# Patient Record
Sex: MALE | Race: WHITE | Employment: OTHER | URBAN - METROPOLITAN AREA
[De-identification: names, ages, dates, MRNs, and addresses within clinical notes are randomized per-mention and may not be internally consistent; named-entity substitution may affect disease eponyms.]

---

## 2017-01-04 ENCOUNTER — OFFICE VISIT (OUTPATIENT)
Dept: ENT CLINIC | Age: 82
End: 2017-01-04

## 2017-01-04 DIAGNOSIS — H90.3 SENSORINEURAL HEARING LOSS, BILATERAL: Primary | ICD-10-CM

## 2017-01-04 PROCEDURE — V5020 CONFORMITY EVALUATION: HCPCS | Performed by: AUDIOLOGIST

## 2017-06-12 ENCOUNTER — OFFICE VISIT (OUTPATIENT)
Dept: INTERNAL MEDICINE CLINIC | Age: 82
End: 2017-06-12

## 2017-06-12 VITALS
DIASTOLIC BLOOD PRESSURE: 76 MMHG | HEART RATE: 80 BPM | WEIGHT: 219 LBS | BODY MASS INDEX: 31.42 KG/M2 | TEMPERATURE: 98.8 F | SYSTOLIC BLOOD PRESSURE: 120 MMHG

## 2017-06-12 DIAGNOSIS — Z00.00 REGULAR CHECK-UP: ICD-10-CM

## 2017-06-12 DIAGNOSIS — R56.9 SEIZURE (HCC): Primary | ICD-10-CM

## 2017-06-12 LAB
A/G RATIO: 1.4 (ref 1.1–2.2)
ALBUMIN SERPL-MCNC: 4.1 G/DL (ref 3.4–5)
ALP BLD-CCNC: 107 U/L (ref 40–129)
ALT SERPL-CCNC: 14 U/L (ref 10–40)
ANION GAP SERPL CALCULATED.3IONS-SCNC: 13 MMOL/L (ref 3–16)
AST SERPL-CCNC: 19 U/L (ref 15–37)
BILIRUB SERPL-MCNC: 0.7 MG/DL (ref 0–1)
BILIRUBIN, POC: NORMAL
BLOOD URINE, POC: NORMAL
BUN BLDV-MCNC: 15 MG/DL (ref 7–20)
CALCIUM SERPL-MCNC: 9.4 MG/DL (ref 8.3–10.6)
CHLORIDE BLD-SCNC: 97 MMOL/L (ref 99–110)
CHOLESTEROL, TOTAL: 137 MG/DL (ref 0–199)
CLARITY, POC: NORMAL
CO2: 28 MMOL/L (ref 21–32)
COLOR, POC: YELLOW
CREAT SERPL-MCNC: 0.7 MG/DL (ref 0.8–1.3)
GFR AFRICAN AMERICAN: >60
GFR NON-AFRICAN AMERICAN: >60
GLOBULIN: 2.9 G/DL
GLUCOSE BLD-MCNC: 101 MG/DL (ref 70–99)
GLUCOSE URINE, POC: NORMAL
HCT VFR BLD CALC: 39.5 % (ref 40.5–52.5)
HDLC SERPL-MCNC: 54 MG/DL (ref 40–60)
HEMOGLOBIN: 13.2 G/DL (ref 13.5–17.5)
KETONES, POC: NORMAL
LDL CHOLESTEROL CALCULATED: 70 MG/DL
LEUKOCYTE EST, POC: POSITIVE
MCH RBC QN AUTO: 33.3 PG (ref 26–34)
MCHC RBC AUTO-ENTMCNC: 33.5 G/DL (ref 31–36)
MCV RBC AUTO: 99.6 FL (ref 80–100)
NITRITE, POC: POSITIVE
PDW BLD-RTO: 13.5 % (ref 12.4–15.4)
PH, POC: 6
PLATELET # BLD: 290 K/UL (ref 135–450)
PMV BLD AUTO: 9.6 FL (ref 5–10.5)
POTASSIUM SERPL-SCNC: 4.2 MMOL/L (ref 3.5–5.1)
PROTEIN, POC: NORMAL
RBC # BLD: 3.97 M/UL (ref 4.2–5.9)
SODIUM BLD-SCNC: 138 MMOL/L (ref 136–145)
SPECIFIC GRAVITY, POC: 1.02
TOTAL PROTEIN: 7 G/DL (ref 6.4–8.2)
TRIGL SERPL-MCNC: 66 MG/DL (ref 0–150)
TSH SERPL DL<=0.05 MIU/L-ACNC: 1.04 UIU/ML (ref 0.27–4.2)
UROBILINOGEN, POC: 0.2
VLDLC SERPL CALC-MCNC: 13 MG/DL
WBC # BLD: 8.9 K/UL (ref 4–11)

## 2017-06-12 PROCEDURE — 99214 OFFICE O/P EST MOD 30 MIN: CPT | Performed by: INTERNAL MEDICINE

## 2017-06-12 PROCEDURE — 36415 COLL VENOUS BLD VENIPUNCTURE: CPT | Performed by: INTERNAL MEDICINE

## 2017-06-12 PROCEDURE — 81002 URINALYSIS NONAUTO W/O SCOPE: CPT | Performed by: INTERNAL MEDICINE

## 2017-06-12 RX ORDER — CIPROFLOXACIN 500 MG/1
500 TABLET, FILM COATED ORAL 2 TIMES DAILY
Qty: 20 TABLET | Refills: 0 | Status: SHIPPED | OUTPATIENT
Start: 2017-06-12 | End: 2017-06-22

## 2017-06-12 ASSESSMENT — ENCOUNTER SYMPTOMS: RESPIRATORY NEGATIVE: 1

## 2017-06-13 ENCOUNTER — TELEPHONE (OUTPATIENT)
Dept: INTERNAL MEDICINE CLINIC | Age: 82
End: 2017-06-13

## 2017-06-13 DIAGNOSIS — R56.9 SEIZURE (HCC): Primary | ICD-10-CM

## 2017-07-20 PROBLEM — R56.9 SEIZURE (HCC): Status: ACTIVE | Noted: 2017-07-20

## 2017-07-20 PROBLEM — E87.20 LACTIC ACIDOSIS: Status: ACTIVE | Noted: 2017-07-20

## 2017-07-25 ENCOUNTER — HOSPITAL ENCOUNTER (OUTPATIENT)
Dept: NEUROLOGY | Age: 82
Discharge: HOME | End: 2017-07-25
Attending: HOSPITALIST | Admitting: HOSPITALIST

## 2017-07-25 DIAGNOSIS — R56.9 CONVULSIONS (HCC): ICD-10-CM

## 2017-08-22 ENCOUNTER — OFFICE VISIT (OUTPATIENT)
Dept: INTERNAL MEDICINE CLINIC | Age: 82
End: 2017-08-22

## 2017-08-22 VITALS
BODY MASS INDEX: 33.36 KG/M2 | TEMPERATURE: 97.8 F | WEIGHT: 213 LBS | DIASTOLIC BLOOD PRESSURE: 74 MMHG | HEART RATE: 72 BPM | SYSTOLIC BLOOD PRESSURE: 128 MMHG

## 2017-08-22 DIAGNOSIS — R56.9 SEIZURE (HCC): Primary | ICD-10-CM

## 2017-08-22 DIAGNOSIS — D64.9 ANEMIA, UNSPECIFIED TYPE: ICD-10-CM

## 2017-08-22 LAB
HCT VFR BLD CALC: 38.9 % (ref 40.5–52.5)
HEMOGLOBIN: 13.7 G/DL (ref 13.5–17.5)
MCH RBC QN AUTO: 34.6 PG (ref 26–34)
MCHC RBC AUTO-ENTMCNC: 35.2 G/DL (ref 31–36)
MCV RBC AUTO: 98.3 FL (ref 80–100)
PDW BLD-RTO: 14 % (ref 12.4–15.4)
PLATELET # BLD: 277 K/UL (ref 135–450)
PMV BLD AUTO: 10.3 FL (ref 5–10.5)
RBC # BLD: 3.95 M/UL (ref 4.2–5.9)
WBC # BLD: 8.9 K/UL (ref 4–11)

## 2017-08-22 PROCEDURE — 36415 COLL VENOUS BLD VENIPUNCTURE: CPT | Performed by: INTERNAL MEDICINE

## 2017-08-22 PROCEDURE — 99214 OFFICE O/P EST MOD 30 MIN: CPT | Performed by: INTERNAL MEDICINE

## 2017-08-22 RX ORDER — LEVETIRACETAM 500 MG/1
500 TABLET ORAL 2 TIMES DAILY
Qty: 180 TABLET | Refills: 3 | Status: SHIPPED | OUTPATIENT
Start: 2017-08-22 | End: 2018-08-07 | Stop reason: SDUPTHER

## 2017-08-22 ASSESSMENT — ENCOUNTER SYMPTOMS
RESPIRATORY NEGATIVE: 1
GASTROINTESTINAL NEGATIVE: 1

## 2018-08-07 ENCOUNTER — OFFICE VISIT (OUTPATIENT)
Dept: INTERNAL MEDICINE CLINIC | Age: 83
End: 2018-08-07

## 2018-08-07 VITALS
SYSTOLIC BLOOD PRESSURE: 120 MMHG | WEIGHT: 213 LBS | HEIGHT: 60 IN | DIASTOLIC BLOOD PRESSURE: 60 MMHG | HEART RATE: 69 BPM | BODY MASS INDEX: 41.82 KG/M2 | TEMPERATURE: 98.1 F | OXYGEN SATURATION: 97 %

## 2018-08-07 DIAGNOSIS — M20.42 HAMMER TOE OF LEFT FOOT: Primary | ICD-10-CM

## 2018-08-07 DIAGNOSIS — D64.9 ANEMIA, UNSPECIFIED TYPE: ICD-10-CM

## 2018-08-07 DIAGNOSIS — R56.9 SEIZURE (HCC): ICD-10-CM

## 2018-08-07 LAB
HCT VFR BLD CALC: 37 % (ref 40.5–52.5)
HEMOGLOBIN: 13.3 G/DL (ref 13.5–17.5)
MCH RBC QN AUTO: 35.2 PG (ref 26–34)
MCHC RBC AUTO-ENTMCNC: 35.9 G/DL (ref 31–36)
MCV RBC AUTO: 98.1 FL (ref 80–100)
PDW BLD-RTO: 13.5 % (ref 12.4–15.4)
PLATELET # BLD: 254 K/UL (ref 135–450)
PMV BLD AUTO: 10 FL (ref 5–10.5)
RBC # BLD: 3.78 M/UL (ref 4.2–5.9)
WBC # BLD: 8.1 K/UL (ref 4–11)

## 2018-08-07 PROCEDURE — G8427 DOCREV CUR MEDS BY ELIG CLIN: HCPCS | Performed by: INTERNAL MEDICINE

## 2018-08-07 PROCEDURE — 4040F PNEUMOC VAC/ADMIN/RCVD: CPT | Performed by: INTERNAL MEDICINE

## 2018-08-07 PROCEDURE — 99214 OFFICE O/P EST MOD 30 MIN: CPT | Performed by: INTERNAL MEDICINE

## 2018-08-07 PROCEDURE — 1101F PT FALLS ASSESS-DOCD LE1/YR: CPT | Performed by: INTERNAL MEDICINE

## 2018-08-07 PROCEDURE — G8417 CALC BMI ABV UP PARAM F/U: HCPCS | Performed by: INTERNAL MEDICINE

## 2018-08-07 PROCEDURE — 1036F TOBACCO NON-USER: CPT | Performed by: INTERNAL MEDICINE

## 2018-08-07 PROCEDURE — 36415 COLL VENOUS BLD VENIPUNCTURE: CPT | Performed by: INTERNAL MEDICINE

## 2018-08-07 PROCEDURE — 1123F ACP DISCUSS/DSCN MKR DOCD: CPT | Performed by: INTERNAL MEDICINE

## 2018-08-07 RX ORDER — LEVETIRACETAM 500 MG/1
500 TABLET ORAL 2 TIMES DAILY
Qty: 180 TABLET | Refills: 3 | Status: SHIPPED | OUTPATIENT
Start: 2018-08-07 | End: 2019-04-30 | Stop reason: SDUPTHER

## 2018-08-07 ASSESSMENT — ENCOUNTER SYMPTOMS
ABDOMINAL PAIN: 0
VOMITING: 0
COLOR CHANGE: 0
EYES NEGATIVE: 1
SINUS PRESSURE: 0
CHOKING: 0
BACK PAIN: 0
STRIDOR: 0
APNEA: 0

## 2019-04-30 RX ORDER — LEVETIRACETAM 500 MG/1
500 TABLET ORAL 2 TIMES DAILY
Qty: 180 TABLET | Refills: 1 | Status: SHIPPED | OUTPATIENT
Start: 2019-04-30 | End: 2019-05-17 | Stop reason: SDUPTHER

## 2019-05-17 ENCOUNTER — OFFICE VISIT (OUTPATIENT)
Dept: INTERNAL MEDICINE CLINIC | Age: 84
End: 2019-05-17
Payer: MEDICARE

## 2019-05-17 VITALS
OXYGEN SATURATION: 95 % | HEART RATE: 75 BPM | BODY MASS INDEX: 1045.77 KG/M2 | WEIGHT: 214.19 LBS | DIASTOLIC BLOOD PRESSURE: 80 MMHG | SYSTOLIC BLOOD PRESSURE: 130 MMHG | TEMPERATURE: 98.1 F

## 2019-05-17 DIAGNOSIS — Z00.00 REGULAR CHECK-UP: ICD-10-CM

## 2019-05-17 DIAGNOSIS — R56.9 SEIZURE (HCC): Primary | ICD-10-CM

## 2019-05-17 PROCEDURE — 1123F ACP DISCUSS/DSCN MKR DOCD: CPT | Performed by: INTERNAL MEDICINE

## 2019-05-17 PROCEDURE — 4040F PNEUMOC VAC/ADMIN/RCVD: CPT | Performed by: INTERNAL MEDICINE

## 2019-05-17 PROCEDURE — G8417 CALC BMI ABV UP PARAM F/U: HCPCS | Performed by: INTERNAL MEDICINE

## 2019-05-17 PROCEDURE — 1036F TOBACCO NON-USER: CPT | Performed by: INTERNAL MEDICINE

## 2019-05-17 PROCEDURE — G8427 DOCREV CUR MEDS BY ELIG CLIN: HCPCS | Performed by: INTERNAL MEDICINE

## 2019-05-17 PROCEDURE — 99213 OFFICE O/P EST LOW 20 MIN: CPT | Performed by: INTERNAL MEDICINE

## 2019-05-17 RX ORDER — LEVETIRACETAM 500 MG/1
500 TABLET ORAL 2 TIMES DAILY
Qty: 180 TABLET | Refills: 3 | Status: SHIPPED | OUTPATIENT
Start: 2019-05-17 | End: 2020-05-07

## 2019-05-17 ASSESSMENT — PATIENT HEALTH QUESTIONNAIRE - PHQ9
2. FEELING DOWN, DEPRESSED OR HOPELESS: 0
1. LITTLE INTEREST OR PLEASURE IN DOING THINGS: 1
SUM OF ALL RESPONSES TO PHQ QUESTIONS 1-9: 1
SUM OF ALL RESPONSES TO PHQ9 QUESTIONS 1 & 2: 1
SUM OF ALL RESPONSES TO PHQ QUESTIONS 1-9: 1

## 2019-05-17 ASSESSMENT — ENCOUNTER SYMPTOMS
EYES NEGATIVE: 1
COLOR CHANGE: 0
APNEA: 0
BACK PAIN: 0
RESPIRATORY NEGATIVE: 1
CHOKING: 0
STRIDOR: 0
SINUS PRESSURE: 0
GASTROINTESTINAL NEGATIVE: 1

## 2019-05-17 NOTE — PATIENT INSTRUCTIONS
Please call your pharmacy if you need any refills of your medication(s). Please call our office at [phone] if you don't hear from us about your test results. Bring an accurate list of your medications with you at every appointment to ensure that we have the correct information.     Our office hours are: Monday - Friday 8:30 am- 5 pm    Phone lines turn on at 8:30 am

## 2019-05-17 NOTE — PROGRESS NOTES
Subjective:      Patient ID: Christ Jenkins is a 80 y.o. male. 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 No side effect and no more seizure activities since. Needs refill thru mail order. Christ Jenkins is a 80 y.o. male with the following history as recorded in EpicCare:  Patient Active Problem List    Seizure [facility])         Date Noted: 07/20/2017      Lactic acidosis         Date Noted: 07/20/2017      Current Outpatient Medications:  levETIRAcetam (KEPPRA) 500 MG tablet, Take 1 tablet by mouth 2 times daily, Disp: 180 tablet, Rfl: 3    No current facility-administered medications for this visit. Allergies: Patient has no known allergies. Past Medical History:  No date: Dehydration  2014, 2015: Leg weakness, bilateral      Comment:  legs give out and pt collaspes  7/20/2017: Seizure ([address]  8-: UTI (urinary tract infection)      Comment:  admitted with high fever and fainted. Past Surgical History:  No date: CATARACT REMOVAL; Bilateral  No date: SPLENECTOMY, TOTAL      Comment:  at age 15 y old. Review of patient's family history indicates:  Problem: Cancer      Relation: Mother          Age of Onset: (Not Specified)          Comment: pancreatic  Problem: Diabetes      Relation: Father          Age of Onset: (Not Specified)  Problem: Cancer      Relation: Brother          Age of Onset: (Not Specified)    Social History    Tobacco Use      Smoking status: Never Smoker      Smokeless tobacco: Never Used    Alcohol use: Yes      Alcohol/week: 0.0 oz      Comment: occasionally       Seizures   This is a chronic problem. The current episode started more than 1 year ago. The problem occurs rarely. Associated symptoms include a rash. Pertinent negatives include no anorexia or myalgias. Nothing aggravates the symptoms. Treatments tried: Keppra. The treatment provided significant relief. Review of Systems   Constitutional: Negative.   Negative for appetite change and unexpected weight change. HENT: Negative. Negative for ear pain, hearing loss and sinus pressure. Eyes: Negative. Negative for visual disturbance. Respiratory: Negative. Negative for apnea, choking and stridor. Cardiovascular: Negative. Gastrointestinal: Negative. Negative for anorexia. Genitourinary: Negative. Negative for discharge, hematuria, penile pain and penile swelling. Musculoskeletal: Negative. Negative for back pain and myalgias. Skin: Positive for rash. Negative for color change and pallor. Neurological: Positive for seizures. Negative for dizziness, tremors and syncope. Hematological: Does not bruise/bleed easily. Psychiatric/Behavioral: Negative. Negative for confusion, decreased concentration and dysphoric mood. Objective:   Physical Exam   HENT:   Head: Normocephalic. Right Ear: External ear normal.   Left Ear: External ear normal.   Nose: Nose normal.   Mouth/Throat: Oropharynx is clear and moist. No oropharyngeal exudate. Eyes: Pupils are equal, round, and reactive to light. Conjunctivae and EOM are normal. No scleral icterus. Neck: Normal range of motion. No thyromegaly present. Cardiovascular: Normal rate and regular rhythm. No murmur heard. Pulmonary/Chest: Effort normal. He has no wheezes. Abdominal: Soft. Bowel sounds are normal. There is no tenderness. Musculoskeletal: He exhibits edema. He exhibits no tenderness. Lymphadenopathy:     He has no cervical adenopathy. Neurological: He is alert. Skin: Skin is warm. He is not diaphoretic. Assessment:      Encounter Diagnoses   Name Primary?  Seizure ([address] Yes    Regular check-up            Plan:      Alberto Rubio was seen today for medication check. Diagnoses and all orders for this visit:    Seizure ([address]    Regular check-up    Other orders  -     levETIRAcetam (KEPPRA) 500 MG tablet;  Take 1 tablet by mouth 2 times daily              Myke Biswas MD

## 2020-05-07 RX ORDER — LEVETIRACETAM 500 MG/1
TABLET ORAL
Qty: 180 TABLET | Refills: 3 | Status: SHIPPED | OUTPATIENT
Start: 2020-05-07 | End: 2021-03-19

## 2020-12-22 ENCOUNTER — APPOINTMENT (OUTPATIENT)
Dept: CT IMAGING | Age: 85
DRG: 871 | End: 2020-12-22
Payer: MEDICARE

## 2020-12-22 ENCOUNTER — HOSPITAL ENCOUNTER (INPATIENT)
Age: 85
LOS: 6 days | Discharge: NURSING, REHAB OR LONG-TERM CARE | DRG: 871 | End: 2020-12-28
Attending: EMERGENCY MEDICINE | Admitting: INTERNAL MEDICINE
Payer: MEDICARE

## 2020-12-22 ENCOUNTER — APPOINTMENT (OUTPATIENT)
Dept: GENERAL RADIOLOGY | Age: 85
DRG: 871 | End: 2020-12-22
Payer: MEDICARE

## 2020-12-22 PROBLEM — J15.9 BACTERIAL PNEUMONIA: Status: ACTIVE | Noted: 2020-12-22

## 2020-12-22 PROBLEM — A41.9 SEPSIS (HCC): Status: ACTIVE | Noted: 2020-12-22

## 2020-12-22 PROBLEM — M62.82 RHABDOMYOLYSIS: Status: ACTIVE | Noted: 2020-12-22

## 2020-12-22 PROBLEM — F10.10 ALCOHOL ABUSE: Status: ACTIVE | Noted: 2020-12-22

## 2020-12-22 PROBLEM — J12.82 PNEUMONIA DUE TO COVID-19 VIRUS: Status: ACTIVE | Noted: 2020-12-22

## 2020-12-22 PROBLEM — S09.93XA FACIAL TRAUMA: Status: ACTIVE | Noted: 2020-12-22

## 2020-12-22 PROBLEM — I26.99 BILATERAL PULMONARY EMBOLISM (HCC): Status: ACTIVE | Noted: 2020-12-22

## 2020-12-22 PROBLEM — R79.89 ELEVATED LFTS: Status: ACTIVE | Noted: 2020-12-22

## 2020-12-22 PROBLEM — U07.1 PNEUMONIA DUE TO COVID-19 VIRUS: Status: ACTIVE | Noted: 2020-12-22

## 2020-12-22 LAB
A/G RATIO: 1 (ref 1.1–2.2)
ABO/RH: NORMAL
ALBUMIN SERPL-MCNC: 3.5 G/DL (ref 3.4–5)
ALP BLD-CCNC: 122 U/L (ref 40–129)
ALT SERPL-CCNC: 47 U/L (ref 10–40)
ANION GAP SERPL CALCULATED.3IONS-SCNC: 15 MMOL/L (ref 3–16)
ANTIBODY SCREEN: NORMAL
APTT: 135.1 SEC (ref 24.2–36.2)
APTT: 159.8 SEC (ref 24.2–36.2)
APTT: 30.6 SEC (ref 24.2–36.2)
AST SERPL-CCNC: 166 U/L (ref 15–37)
BASE EXCESS ARTERIAL: 2.2 MMOL/L (ref -3–3)
BASOPHILS ABSOLUTE: 0 K/UL (ref 0–0.2)
BASOPHILS RELATIVE PERCENT: 0.2 %
BILIRUB SERPL-MCNC: 1.2 MG/DL (ref 0–1)
BILIRUBIN URINE: ABNORMAL
BLOOD, URINE: ABNORMAL
BUN BLDV-MCNC: 29 MG/DL (ref 7–20)
C-REACTIVE PROTEIN: 87.8 MG/L (ref 0–5.1)
CALCIUM SERPL-MCNC: 8.8 MG/DL (ref 8.3–10.6)
CARBOXYHEMOGLOBIN ARTERIAL: 1.2 % (ref 0–1.5)
CHLORIDE BLD-SCNC: 98 MMOL/L (ref 99–110)
CLARITY: ABNORMAL
CO2: 25 MMOL/L (ref 21–32)
COARSE CASTS, UA: ABNORMAL /LPF (ref 0–2)
COLOR: ABNORMAL
COMMENT UA: ABNORMAL
CREAT SERPL-MCNC: 0.9 MG/DL (ref 0.8–1.3)
EOSINOPHILS ABSOLUTE: 0 K/UL (ref 0–0.6)
EOSINOPHILS RELATIVE PERCENT: 0 %
EPITHELIAL CELLS, UA: 13 /HPF (ref 0–5)
ETHANOL: NORMAL MG/DL (ref 0–0.08)
FERRITIN: 722.6 NG/ML (ref 30–400)
FIBRINOGEN: 356 MG/DL (ref 200–397)
GFR AFRICAN AMERICAN: >60
GFR NON-AFRICAN AMERICAN: >60
GLOBULIN: 3.5 G/DL
GLUCOSE BLD-MCNC: 149 MG/DL (ref 70–99)
GLUCOSE URINE: NEGATIVE MG/DL
HCO3 ARTERIAL: 26.6 MMOL/L (ref 21–29)
HCT VFR BLD CALC: 44.2 % (ref 40.5–52.5)
HEMOGLOBIN, ART, EXTENDED: 14.4 G/DL (ref 13.5–17.5)
HEMOGLOBIN: 15.2 G/DL (ref 13.5–17.5)
HYALINE CASTS: ABNORMAL /LPF (ref 0–2)
KEPPRA DOSE AMT: ABNORMAL
KEPPRA: <2 UG/ML (ref 6–46)
KETONES, URINE: 15 MG/DL
LACTIC ACID, SEPSIS: 2.6 MMOL/L (ref 0.4–1.9)
LACTIC ACID, SEPSIS: 4 MMOL/L (ref 0.4–1.9)
LACTIC ACID: 2.1 MMOL/L (ref 0.4–2)
LEUKOCYTE ESTERASE, URINE: NEGATIVE
LYMPHOCYTES ABSOLUTE: 0.8 K/UL (ref 1–5.1)
LYMPHOCYTES RELATIVE PERCENT: 4.5 %
MCH RBC QN AUTO: 32.8 PG (ref 26–34)
MCHC RBC AUTO-ENTMCNC: 34.4 G/DL (ref 31–36)
MCV RBC AUTO: 95.2 FL (ref 80–100)
METHEMOGLOBIN ARTERIAL: 0.2 %
MICROSCOPIC EXAMINATION: YES
MONOCYTES ABSOLUTE: 1.5 K/UL (ref 0–1.3)
MONOCYTES RELATIVE PERCENT: 9.1 %
MUCUS: ABNORMAL /LPF
NEUTROPHILS ABSOLUTE: 14.7 K/UL (ref 1.7–7.7)
NEUTROPHILS RELATIVE PERCENT: 86.2 %
NITRITE, URINE: NEGATIVE
O2 CONTENT ARTERIAL: 20 ML/DL
O2 SAT, ARTERIAL: 99.3 %
O2 THERAPY: ABNORMAL
PCO2 ARTERIAL: 39.7 MMHG (ref 35–45)
PDW BLD-RTO: 13.1 % (ref 12.4–15.4)
PH ARTERIAL: 7.43 (ref 7.35–7.45)
PH UA: 5.5 (ref 5–8)
PLATELET # BLD: 299 K/UL (ref 135–450)
PMV BLD AUTO: 9.2 FL (ref 5–10.5)
PO2 ARTERIAL: 112 MMHG (ref 75–108)
POTASSIUM SERPL-SCNC: 4.5 MMOL/L (ref 3.5–5.1)
PRO-BNP: 1136 PG/ML (ref 0–449)
PROCALCITONIN: 0.28 NG/ML (ref 0–0.15)
PROTEIN UA: 30 MG/DL
RAPID INFLUENZA  B AGN: NEGATIVE
RAPID INFLUENZA A AGN: NEGATIVE
RBC # BLD: 4.65 M/UL (ref 4.2–5.9)
RBC UA: 62 /HPF (ref 0–4)
RENAL EPITHELIAL, UA: ABNORMAL /HPF (ref 0–1)
SARS-COV-2, NAAT: DETECTED
SODIUM BLD-SCNC: 138 MMOL/L (ref 136–145)
SPECIFIC GRAVITY UA: 1.02 (ref 1–1.03)
TCO2 ARTERIAL: 27.8 MMOL/L
TOTAL CK: 2301 U/L (ref 39–308)
TOTAL CK: 2930 U/L (ref 39–308)
TOTAL PROTEIN: 7 G/DL (ref 6.4–8.2)
TROPONIN: 0.02 NG/ML
URINE REFLEX TO CULTURE: YES
URINE TYPE: ABNORMAL
UROBILINOGEN, URINE: 1 E.U./DL
WBC # BLD: 17 K/UL (ref 4–11)
WBC UA: 10 /HPF (ref 0–5)

## 2020-12-22 PROCEDURE — 36415 COLL VENOUS BLD VENIPUNCTURE: CPT

## 2020-12-22 PROCEDURE — 82306 VITAMIN D 25 HYDROXY: CPT

## 2020-12-22 PROCEDURE — 84145 PROCALCITONIN (PCT): CPT

## 2020-12-22 PROCEDURE — 73130 X-RAY EXAM OF HAND: CPT

## 2020-12-22 PROCEDURE — 85025 COMPLETE CBC W/AUTO DIFF WBC: CPT

## 2020-12-22 PROCEDURE — 84484 ASSAY OF TROPONIN QUANT: CPT

## 2020-12-22 PROCEDURE — 87086 URINE CULTURE/COLONY COUNT: CPT

## 2020-12-22 PROCEDURE — 82550 ASSAY OF CK (CPK): CPT

## 2020-12-22 PROCEDURE — 2500000003 HC RX 250 WO HCPCS: Performed by: INTERNAL MEDICINE

## 2020-12-22 PROCEDURE — 82803 BLOOD GASES ANY COMBINATION: CPT

## 2020-12-22 PROCEDURE — 2060000000 HC ICU INTERMEDIATE R&B

## 2020-12-22 PROCEDURE — 80177 DRUG SCRN QUAN LEVETIRACETAM: CPT

## 2020-12-22 PROCEDURE — 94640 AIRWAY INHALATION TREATMENT: CPT

## 2020-12-22 PROCEDURE — 2500000003 HC RX 250 WO HCPCS: Performed by: EMERGENCY MEDICINE

## 2020-12-22 PROCEDURE — G0480 DRUG TEST DEF 1-7 CLASSES: HCPCS

## 2020-12-22 PROCEDURE — 6370000000 HC RX 637 (ALT 250 FOR IP): Performed by: INTERNAL MEDICINE

## 2020-12-22 PROCEDURE — 83880 ASSAY OF NATRIURETIC PEPTIDE: CPT

## 2020-12-22 PROCEDURE — U0003 INFECTIOUS AGENT DETECTION BY NUCLEIC ACID (DNA OR RNA); SEVERE ACUTE RESPIRATORY SYNDROME CORONAVIRUS 2 (SARS-COV-2) (CORONAVIRUS DISEASE [COVID-19]), AMPLIFIED PROBE TECHNIQUE, MAKING USE OF HIGH THROUGHPUT TECHNOLOGIES AS DESCRIBED BY CMS-2020-01-R: HCPCS

## 2020-12-22 PROCEDURE — 72170 X-RAY EXAM OF PELVIS: CPT

## 2020-12-22 PROCEDURE — 86140 C-REACTIVE PROTEIN: CPT

## 2020-12-22 PROCEDURE — 87804 INFLUENZA ASSAY W/OPTIC: CPT

## 2020-12-22 PROCEDURE — 6360000002 HC RX W HCPCS: Performed by: EMERGENCY MEDICINE

## 2020-12-22 PROCEDURE — 70486 CT MAXILLOFACIAL W/O DYE: CPT

## 2020-12-22 PROCEDURE — 71260 CT THORAX DX C+: CPT

## 2020-12-22 PROCEDURE — 96361 HYDRATE IV INFUSION ADD-ON: CPT

## 2020-12-22 PROCEDURE — 71045 X-RAY EXAM CHEST 1 VIEW: CPT

## 2020-12-22 PROCEDURE — 99285 EMERGENCY DEPT VISIT HI MDM: CPT

## 2020-12-22 PROCEDURE — 6360000002 HC RX W HCPCS: Performed by: INTERNAL MEDICINE

## 2020-12-22 PROCEDURE — 82728 ASSAY OF FERRITIN: CPT

## 2020-12-22 PROCEDURE — 94761 N-INVAS EAR/PLS OXIMETRY MLT: CPT

## 2020-12-22 PROCEDURE — 72125 CT NECK SPINE W/O DYE: CPT

## 2020-12-22 PROCEDURE — 96365 THER/PROPH/DIAG IV INF INIT: CPT

## 2020-12-22 PROCEDURE — 6370000000 HC RX 637 (ALT 250 FOR IP): Performed by: EMERGENCY MEDICINE

## 2020-12-22 PROCEDURE — 85384 FIBRINOGEN ACTIVITY: CPT

## 2020-12-22 PROCEDURE — XW13325 TRANSFUSION OF CONVALESCENT PLASMA (NONAUTOLOGOUS) INTO PERIPHERAL VEIN, PERCUTANEOUS APPROACH, NEW TECHNOLOGY GROUP 5: ICD-10-PCS | Performed by: INTERNAL MEDICINE

## 2020-12-22 PROCEDURE — 83605 ASSAY OF LACTIC ACID: CPT

## 2020-12-22 PROCEDURE — 81001 URINALYSIS AUTO W/SCOPE: CPT

## 2020-12-22 PROCEDURE — 93005 ELECTROCARDIOGRAM TRACING: CPT | Performed by: EMERGENCY MEDICINE

## 2020-12-22 PROCEDURE — 96375 TX/PRO/DX INJ NEW DRUG ADDON: CPT

## 2020-12-22 PROCEDURE — 85730 THROMBOPLASTIN TIME PARTIAL: CPT

## 2020-12-22 PROCEDURE — 6360000004 HC RX CONTRAST MEDICATION: Performed by: EMERGENCY MEDICINE

## 2020-12-22 PROCEDURE — XW033E5 INTRODUCTION OF REMDESIVIR ANTI-INFECTIVE INTO PERIPHERAL VEIN, PERCUTANEOUS APPROACH, NEW TECHNOLOGY GROUP 5: ICD-10-PCS | Performed by: INTERNAL MEDICINE

## 2020-12-22 PROCEDURE — 86900 BLOOD TYPING SEROLOGIC ABO: CPT

## 2020-12-22 PROCEDURE — 80053 COMPREHEN METABOLIC PANEL: CPT

## 2020-12-22 PROCEDURE — 73110 X-RAY EXAM OF WRIST: CPT

## 2020-12-22 PROCEDURE — U0002 COVID-19 LAB TEST NON-CDC: HCPCS

## 2020-12-22 PROCEDURE — 87040 BLOOD CULTURE FOR BACTERIA: CPT

## 2020-12-22 PROCEDURE — 2580000003 HC RX 258: Performed by: EMERGENCY MEDICINE

## 2020-12-22 PROCEDURE — 86850 RBC ANTIBODY SCREEN: CPT

## 2020-12-22 PROCEDURE — 70450 CT HEAD/BRAIN W/O DYE: CPT

## 2020-12-22 PROCEDURE — 86901 BLOOD TYPING SEROLOGIC RH(D): CPT

## 2020-12-22 PROCEDURE — 2580000003 HC RX 258: Performed by: INTERNAL MEDICINE

## 2020-12-22 PROCEDURE — 2700000000 HC OXYGEN THERAPY PER DAY

## 2020-12-22 PROCEDURE — 94760 N-INVAS EAR/PLS OXIMETRY 1: CPT

## 2020-12-22 RX ORDER — METHYLPREDNISOLONE SODIUM SUCCINATE 125 MG/2ML
125 INJECTION, POWDER, LYOPHILIZED, FOR SOLUTION INTRAMUSCULAR; INTRAVENOUS ONCE
Status: COMPLETED | OUTPATIENT
Start: 2020-12-22 | End: 2020-12-22

## 2020-12-22 RX ORDER — ACETAMINOPHEN 325 MG/1
650 TABLET ORAL EVERY 6 HOURS PRN
Status: DISCONTINUED | OUTPATIENT
Start: 2020-12-22 | End: 2020-12-28 | Stop reason: HOSPADM

## 2020-12-22 RX ORDER — POLYETHYLENE GLYCOL 3350 17 G/17G
17 POWDER, FOR SOLUTION ORAL DAILY PRN
Status: DISCONTINUED | OUTPATIENT
Start: 2020-12-22 | End: 2020-12-28 | Stop reason: HOSPADM

## 2020-12-22 RX ORDER — SODIUM CHLORIDE 0.9 % (FLUSH) 0.9 %
10 SYRINGE (ML) INJECTION EVERY 12 HOURS SCHEDULED
Status: DISCONTINUED | OUTPATIENT
Start: 2020-12-22 | End: 2020-12-28 | Stop reason: HOSPADM

## 2020-12-22 RX ORDER — DEXAMETHASONE SODIUM PHOSPHATE 10 MG/ML
10 INJECTION, SOLUTION INTRAMUSCULAR; INTRAVENOUS EVERY 24 HOURS
Status: DISCONTINUED | OUTPATIENT
Start: 2020-12-22 | End: 2020-12-28 | Stop reason: HOSPADM

## 2020-12-22 RX ORDER — LEVETIRACETAM 500 MG/1
500 TABLET ORAL 2 TIMES DAILY
Status: DISCONTINUED | OUTPATIENT
Start: 2020-12-22 | End: 2020-12-28 | Stop reason: HOSPADM

## 2020-12-22 RX ORDER — ALBUTEROL SULFATE 90 UG/1
6 AEROSOL, METERED RESPIRATORY (INHALATION) ONCE
Status: DISCONTINUED | OUTPATIENT
Start: 2020-12-22 | End: 2020-12-22

## 2020-12-22 RX ORDER — SODIUM CHLORIDE 9 MG/ML
INJECTION, SOLUTION INTRAVENOUS CONTINUOUS
Status: DISCONTINUED | OUTPATIENT
Start: 2020-12-22 | End: 2020-12-23

## 2020-12-22 RX ORDER — HEPARIN SODIUM 1000 [USP'U]/ML
80 INJECTION, SOLUTION INTRAVENOUS; SUBCUTANEOUS PRN
Status: DISCONTINUED | OUTPATIENT
Start: 2020-12-22 | End: 2020-12-22

## 2020-12-22 RX ORDER — SODIUM CHLORIDE 9 MG/ML
INJECTION, SOLUTION INTRAVENOUS PRN
Status: DISCONTINUED | OUTPATIENT
Start: 2020-12-22 | End: 2020-12-23

## 2020-12-22 RX ORDER — ONDANSETRON 2 MG/ML
4 INJECTION INTRAMUSCULAR; INTRAVENOUS EVERY 6 HOURS PRN
Status: DISCONTINUED | OUTPATIENT
Start: 2020-12-22 | End: 2020-12-28 | Stop reason: HOSPADM

## 2020-12-22 RX ORDER — ALBUTEROL SULFATE 2.5 MG/3ML
2.5 SOLUTION RESPIRATORY (INHALATION) ONCE
Status: COMPLETED | OUTPATIENT
Start: 2020-12-22 | End: 2020-12-22

## 2020-12-22 RX ORDER — SODIUM CHLORIDE 0.9 % (FLUSH) 0.9 %
10 SYRINGE (ML) INJECTION EVERY 12 HOURS SCHEDULED
Status: DISCONTINUED | OUTPATIENT
Start: 2020-12-22 | End: 2020-12-22

## 2020-12-22 RX ORDER — SODIUM CHLORIDE 0.9 % (FLUSH) 0.9 %
10 SYRINGE (ML) INJECTION PRN
Status: DISCONTINUED | OUTPATIENT
Start: 2020-12-22 | End: 2020-12-28 | Stop reason: HOSPADM

## 2020-12-22 RX ORDER — IPRATROPIUM BROMIDE AND ALBUTEROL SULFATE 2.5; .5 MG/3ML; MG/3ML
1 SOLUTION RESPIRATORY (INHALATION) ONCE
Status: COMPLETED | OUTPATIENT
Start: 2020-12-22 | End: 2020-12-22

## 2020-12-22 RX ORDER — HEPARIN SODIUM 10000 [USP'U]/100ML
12.3 INJECTION, SOLUTION INTRAVENOUS CONTINUOUS
Status: DISCONTINUED | OUTPATIENT
Start: 2020-12-22 | End: 2020-12-24

## 2020-12-22 RX ORDER — SODIUM CHLORIDE FOR INHALATION 0.9 %
3 VIAL, NEBULIZER (ML) INHALATION ONCE
Status: COMPLETED | OUTPATIENT
Start: 2020-12-22 | End: 2020-12-22

## 2020-12-22 RX ORDER — PROMETHAZINE HYDROCHLORIDE 25 MG/1
12.5 TABLET ORAL EVERY 6 HOURS PRN
Status: DISCONTINUED | OUTPATIENT
Start: 2020-12-22 | End: 2020-12-28 | Stop reason: HOSPADM

## 2020-12-22 RX ORDER — MULTIVITAMIN WITH IRON
1 TABLET ORAL DAILY
Status: DISCONTINUED | OUTPATIENT
Start: 2020-12-22 | End: 2020-12-28 | Stop reason: HOSPADM

## 2020-12-22 RX ORDER — DEXAMETHASONE 6 MG/1
6 TABLET ORAL DAILY
Status: CANCELLED | OUTPATIENT
Start: 2020-12-23 | End: 2021-01-02

## 2020-12-22 RX ORDER — 0.9 % SODIUM CHLORIDE 0.9 %
30 INTRAVENOUS SOLUTION INTRAVENOUS ONCE
Status: COMPLETED | OUTPATIENT
Start: 2020-12-22 | End: 2020-12-22

## 2020-12-22 RX ORDER — HEPARIN SODIUM 1000 [USP'U]/ML
40 INJECTION, SOLUTION INTRAVENOUS; SUBCUTANEOUS PRN
Status: DISCONTINUED | OUTPATIENT
Start: 2020-12-22 | End: 2020-12-22

## 2020-12-22 RX ORDER — HEPARIN SODIUM 1000 [USP'U]/ML
6600 INJECTION, SOLUTION INTRAVENOUS; SUBCUTANEOUS ONCE
Status: COMPLETED | OUTPATIENT
Start: 2020-12-22 | End: 2020-12-22

## 2020-12-22 RX ORDER — ACETAMINOPHEN 650 MG/1
650 SUPPOSITORY RECTAL EVERY 6 HOURS PRN
Status: DISCONTINUED | OUTPATIENT
Start: 2020-12-22 | End: 2020-12-28 | Stop reason: HOSPADM

## 2020-12-22 RX ORDER — THIAMINE HYDROCHLORIDE 100 MG/ML
100 INJECTION, SOLUTION INTRAMUSCULAR; INTRAVENOUS DAILY
Status: DISCONTINUED | OUTPATIENT
Start: 2020-12-22 | End: 2020-12-22

## 2020-12-22 RX ORDER — SODIUM CHLORIDE 0.9 % (FLUSH) 0.9 %
10 SYRINGE (ML) INJECTION PRN
Status: DISCONTINUED | OUTPATIENT
Start: 2020-12-22 | End: 2020-12-22

## 2020-12-22 RX ADMIN — HEPARIN SODIUM 14.8 ML/HR: 10000 INJECTION, SOLUTION INTRAVENOUS at 15:57

## 2020-12-22 RX ADMIN — THIAMINE HYDROCHLORIDE 100 MG: 100 INJECTION, SOLUTION INTRAMUSCULAR; INTRAVENOUS at 22:31

## 2020-12-22 RX ADMIN — SODIUM CHLORIDE: 9 INJECTION, SOLUTION INTRAVENOUS at 21:31

## 2020-12-22 RX ADMIN — CEFTRIAXONE SODIUM 1 G: 1 INJECTION, POWDER, FOR SOLUTION INTRAMUSCULAR; INTRAVENOUS at 14:41

## 2020-12-22 RX ADMIN — IOPAMIDOL 75 ML: 755 INJECTION, SOLUTION INTRAVENOUS at 14:14

## 2020-12-22 RX ADMIN — METHYLPREDNISOLONE SODIUM SUCCINATE 125 MG: 125 INJECTION, POWDER, FOR SOLUTION INTRAMUSCULAR; INTRAVENOUS at 12:04

## 2020-12-22 RX ADMIN — SODIUM CHLORIDE: 9 INJECTION, SOLUTION INTRAVENOUS at 15:52

## 2020-12-22 RX ADMIN — IPRATROPIUM BROMIDE AND ALBUTEROL SULFATE 1 AMPULE: .5; 3 SOLUTION RESPIRATORY (INHALATION) at 12:23

## 2020-12-22 RX ADMIN — REMDESIVIR 200 MG: 100 INJECTION, POWDER, LYOPHILIZED, FOR SOLUTION INTRAVENOUS at 21:31

## 2020-12-22 RX ADMIN — HEPARIN SODIUM 6600 UNITS: 1000 INJECTION INTRAVENOUS; SUBCUTANEOUS at 15:54

## 2020-12-22 RX ADMIN — SODIUM CHLORIDE 2889 ML: 9 INJECTION, SOLUTION INTRAVENOUS at 12:04

## 2020-12-22 RX ADMIN — AZITHROMYCIN MONOHYDRATE 500 MG: 500 INJECTION, POWDER, LYOPHILIZED, FOR SOLUTION INTRAVENOUS at 15:20

## 2020-12-22 RX ADMIN — ALBUTEROL SULFATE 2.5 MG: 2.5 SOLUTION RESPIRATORY (INHALATION) at 12:23

## 2020-12-22 RX ADMIN — ISODIUM CHLORIDE 3 ML: 0.03 SOLUTION RESPIRATORY (INHALATION) at 12:35

## 2020-12-22 RX ADMIN — RACEPINEPHRINE HYDROCHLORIDE 11.25 MG: 11.25 SOLUTION RESPIRATORY (INHALATION) at 12:35

## 2020-12-22 RX ADMIN — LEVETIRACETAM 500 MG: 500 TABLET ORAL at 21:32

## 2020-12-22 RX ADMIN — DEXAMETHASONE SODIUM PHOSPHATE 10 MG: 10 INJECTION, SOLUTION INTRAMUSCULAR; INTRAVENOUS at 21:33

## 2020-12-22 RX ADMIN — THERA TABS 1 TABLET: TAB at 21:32

## 2020-12-22 ASSESSMENT — PAIN SCALES - GENERAL
PAINLEVEL_OUTOF10: 0

## 2020-12-22 NOTE — H&P
Hospital Medicine History & Physical      PCP: Doreen Royal MD    Date of Admission: 12/22/2020    Date of Service: Pt seen/examined on 12/22/2020 and Admitted to Inpatient with expected LOS greater than two midnights due to medical therapy. Chief Complaint: Cough, shortness of breath found on the floor      History Of Present Illness:      80 y.o. male who presented to [facility] after he was found on the floor at his home, apparently his son found him at about 9/9/1930 this morning, patient stated that he is feeling bad since Saturday having some chills to note that patient is hard of hearing and very poor historian, in emergency department found to have facial trauma extensive work-up was done tested positive for Covid his lactic acid is elevated found to have bilateral pulmonary embolism, patient at this time having mild shortness of breath having cough no obvious alleviating or aggravating factors denies fever at this time no nausea vomiting or abdominal pain at this time he stated he drinks alcohol on a daily basis he was not able to further quantify. Past Medical History:          Diagnosis Date    Alcohol abuse 12/22/2020    Dehydration     Leg weakness, bilateral 2014, 2015    legs give out and pt collaspes    Seizure ([address] 7/20/2017    UTI (urinary tract infection) 8-    admitted with high fever and fainted. Past Surgical History:          Procedure Laterality Date    CATARACT REMOVAL Bilateral     SPLENECTOMY, TOTAL      at age 15 y old. Medications Prior to Admission:      Prior to Admission medications    Medication Sig Start Date End Date Taking? Authorizing Provider   levETIRAcetam (KEPPRA) 500 MG tablet TAKE 1 TABLET BY MOUTH TWO  TIMES DAILY 5/7/20  Yes Doreen Royal MD       Allergies:  Patient has no known allergies. Social History:      The patient currently lives at home    TOBACCO:   reports that he has never smoked.  He has never used smokeless reviewed the CXR with the following interpretation: biLateral opacities  EKG:  I have reviewed the EKG with the following interpretation: No ST elevation    CT CHEST PULMONARY EMBOLISM W CONTRAST   Final Result   Bilateral pulmonary emboli. No obvious right heart strain by CT scan. Scattered ground-glass opacity is seen throughout the lungs either due to   atelectasis, or post inflammatory-and infectious change. No focal   wedge-shaped area of consolidation is seen to suggest infarct      Mildly dilated ascending aorta. .  Trace aortic valve calcification is seen. Cholelithiasis      Results discussed with Kate Brunson by Jose Martinez. Cadence Felix MD at 2:41   pm on 12/22/2020         CT HEAD WO CONTRAST   Final Result   No traumatic abnormality. CT CERVICAL SPINE WO CONTRAST   Final Result   No traumatic abnormality. CT FACIAL BONES WO CONTRAST   Final Result   Left periorbital soft tissue contusion. No underlying facial bone fracture. XR CHEST PORTABLE   Final Result   1. No acute pulmonary abnormality to account for patient's shortness of   breath. 2. Stable mild enlargement of the cardiac silhouette. XR PELVIS (1-2 VIEWS)   Final Result   Negative         XR WRIST LEFT (MIN 3 VIEWS)   Preliminary Result   1. Soft tissue swelling of the hand dorsum with no associated acute osseous   abnormality. 2. No acute osseous abnormality in the left wrist.  If patient's pain   persists or worsens, repeat radiograph can be obtained in 7-10 days as acute   non-displaced fractures can be occult. If patient has snuffbox tenderness,   follow up radiograph should include a scaphoid view. Alternatively, MRI can   be done to evaluate for non-displaced fractures. XR HAND LEFT (MIN 3 VIEWS)   Preliminary Result   1. Soft tissue swelling of the hand dorsum with no associated acute osseous   abnormality.    2. No acute osseous abnormality in the left wrist.  If patient's pain saline at this time will check CK and adjust fluids accordingly trying not to use very aggressive fluid resuscitation taking consideration his current lung situation. 8.  Ischial trauma, likely due to fall, patient was on the floor for unknown period of time he does not recall patient still could have a seizure as a trigger. Will do seizure precautions at this time  9. Lactic acidosis, lactic acid elevated, IV fluid, will repeat and trend  10. History of seizure, restart Keppra, seizure precautions      ADDENDUM   Patient revisited, discussed plan of care again with patient discussed plasma transfusion discussed potential reaction and infection as discussed potential lung injury related to that discussed life-threatening complications with blood product transfusion patient verbalized understanding and agreement to proceed      DVT Prophylaxis: Heparin  Diet: No diet orders on file  Code Status: Prior    PT/OT Eval Status: Ordered    Dispo -inpatient for 5 days       Guillermo Langford MD    Thank you Andrea Weiss MD for the opportunity to be involved in this patient's care. If you have any questions or concerns please feel free to contact me at [phone].

## 2020-12-22 NOTE — ED NOTES
Acknowledged pt by pt's name. Verified pt by name and date of birth. Checked arm band, allergies, reviewed past medical history. Introduced myself to patient  Duration of ED plan of care explained to patient  Explained planned tests and procedures  Thanked patient for coming to [facility].    Asked if there was anything else I could do for the patient before exiting room. CB in reach.      Kimberly Lemon RN  12/22/20 6317

## 2020-12-22 NOTE — ED NOTES
Spoke with son, states pt lives alone and is relatively independent with care. Son states he last spoke with him late Saturday and reminded pt that they were putting in a new garage  today. Pt was found laying next to his bed.      Zac Andrade RN  12/22/20 2741

## 2020-12-22 NOTE — ED NOTES
Pt placed on/continued on cardiac monitor and continuous pulse ox - see flowsheet     Jaclynn Dubin, RN  12/22/20 9153

## 2020-12-22 NOTE — ED NOTES
Pt having expiratory wheezes Dr. Padmini Cruz made aware. Head of bead elevated.       Seda Israel, RN  12/22/20 1118

## 2020-12-22 NOTE — ED NOTES
ED SBAR report provider to [place], [address] [facility]. Patient to be transported to Room 5272 via stretcher by transport tech. Patient transported with bedside cardiac monitor and with IV medications infusing. IV site clean, dry, and intact. MEWS score and pain assessed and documented. Updated patient on plan of care.      Isaac Barrett RN  12/22/20 Doreen Kalen

## 2020-12-22 NOTE — PROGRESS NOTES
Medication Reconciliation    List of medications patient is currently taking is complete.     Jenelle WoodardD, BCPS  12/22/2020 1:01 PM

## 2020-12-22 NOTE — ED PROVIDER NOTES
[facility]  eMERGENCY dEPARTMENT eNCOUnter      Pt Name: Maria Antonia Aguirre  MRN: [id number]  [place] 1935  Date of evaluation: 12/22/2020  Provider: Maribel Bucio MD    CHIEF COMPLAINT       Chief Complaint   Patient presents with    Fall     FOUND DOWN; lives alone. Unsure how long pt was down         CRITICAL CARE TIME   Total Critical Care time was a minimum of 30 minutes, excluding separately reportable procedures. There was a high probability of clinically significant/life threatening deterioration in the patient's condition which required my urgent intervention. Critical care time includes my initial evaluation, ongoing reassessment, review of laboratory EKG, chest x-ray, CT scans, consultation with the hospitalist for admission. Review of old records. No procedure time was included. HISTORY OF PRESENT ILLNESS  (Location/Symptom, Timing/Onset, Context/Setting, Quality, Duration, Modifying Factors, Severity.)   Maria Antonia Aguirre is a 80 y.o. male who presents to the emergency department the [facility] after being found down on his floor at home. Based on the information I got from the patient it appears his son found him. He states he lives alone. He believes that he fell out of bed around 930 this morning, although based on appearances I would say has been laying on the floor much longer than that. He is oriented to person and place and time. He states he has had a \"cold\" since Saturday. He states he has been coughing and short of breath. On transport to the emergency room the patient had pressure injuries to his knees and face with bruising to his left hand with dried blood on his face, and dried stool. He admits to some facial pain on the left. He denies chest pain. He admits to shortness of breath and cough. He denies abdominal pain. He denies upper or lower extremity pain. Nursing Notes were reviewed and I agree.     REVIEW OF SYSTEMS (2-9 systems for level 4, 10 or more for level 5)     Somewhat unreliable due to the fact that the patient is extremely hard of hearing even with hearing aids in place and has difficulty understanding questioning. HEENT: Left facial pain. Pressure ulceration to his left forehead. Cardiovascular: No chest pain. Pulmonary: Cough and shortness of breath. GI: No abdominal pain nausea or vomiting. Musculoskeletal: Denies neck or back pain. Denies upper or lower extremity pain. Neuro: Facial pain/head pain. Unknown if loss of consciousness. Except as noted above the remainder of the review of systems was reviewed and negative. PAST MEDICAL HISTORY     Past Medical History:   Diagnosis Date    Dehydration     Leg weakness, bilateral 2014, 2015    legs give out and pt collaspes    Se[facility] 7/20/2017    UTI (urinary tract infection) 8-    admitted with high fever and fainted. SURGICAL HISTORY       Past Surgical History:   Procedure Laterality Date    CATARACT REMOVAL Bilateral     SPLENECTOMY, TOTAL      at age 15 y old. CURRENT MEDICATIONS       Previous Medications    LEVETIRACETAM (KEPPRA) 500 MG TABLET    TAKE 1 TABLET BY MOUTH TWO  TIMES DAILY       ALLERGIES     Patient has no known allergies.     FAMILY HISTORY       Family History   Problem Relation Age of Onset    Cancer Mother         pancreatic    Diabetes Father     Cancer Brother           SOCIAL HISTORY       Social History     Socioeconomic History    Marital status:      Spouse name: None    Number of children: None    Years of education: None    Highest education level: None   Occupational History    None   Social Needs    Financial resource strain: None    Food insecurity     Worry: None     Inability: None    Transportation needs     Medical: None     Non-medical: None   Tobacco Use    Smoking status: Never Smoker    Smokeless tobacco: Never Used   Substance and Sexual Activity    wrist and hand with bruising over the dorsum of the hand and the wrist and a skin tear/laceration in the left wrist area. He moves his hips knees and ankles without any complaints of pain. There is no shortening or rotation of lower extremities. Skin: Pressure ulceration on the left face and bilateral knees as pictured below. Bruising to the left wrist and forearm. Neuro: Awake, alert, oriented. Eyes/pupil exam as above. No facial asymmetry. Symmetrical motor function. DIFFERENTIAL DIAGNOSIS   Differential includes but is not limited to closed head injury, intracerebral hemorrhage, subarachnoid hemorrhage, facial contusion, facial bone fracture, cervical spine injury, rhabdomyolysis, dehydration, electrolyte abnormalities, hypoglycemia, hyperglycemia, COVID-19, bacterial pneumonia, congestive heart failure, respiratory failure, urinary tract infection. DIAGNOSTIC RESULTS     EKG: All EKG's are interpreted by Comfort Jones MD in the absence of a cardiologist.    Sinus tachycardia, rate of 117, nonspecific ST-T changes. Rhythm strip shows sinus tachycardia with a rate of 117, TX interval 142 ms, QRS 68 ms with no other ectopy as interpreted by me. No significant change compared to EKG dated 7/20/2017. RADIOLOGY:   Non-plain film images such as CT, Ultrasound and MRI are read by the radiologist. Plain radiographic images are visualized and preliminarily interpreted Comfort Jones MD with the below findings:      Interpretation per the Radiologist below, if available at the time of this note:    CT CHEST PULMONARY EMBOLISM W CONTRAST   Final Result   Bilateral pulmonary emboli. No obvious right heart strain by CT scan. Scattered ground-glass opacity is seen throughout the lungs either due to   atelectasis, or post inflammatory-and infectious change. No focal   wedge-shaped area of consolidation is seen to suggest infarct      Mildly dilated ascending aorta. .  Trace aortic Absolute 0.8 (*)     Monocytes Absolute 1.5 (*)     All other components within normal limits    Narrative:     Performed at:  [address] [facility] [employer]   Phone [phone]   COMPREHENSIVE METABOLIC PANEL - Abnormal; Notable for the following components:    Chloride 98 (*)     Glucose 149 (*)     BUN 29 (*)     Albumin/Globulin Ratio 1.0 (*)     Total Bilirubin 1.2 (*)     ALT 47 (*)      (*)     All other components within normal limits    Narrative:     Performed at:  [address] [facility] [employer]   Phone [phone]   CK - Abnormal; Notable for the following components:     Total CK 2,930 (*)     All other components within normal limits    Narrative:     Performed at:  [address] [facility] [employer]   Phone [phone]   TROPONIN - Abnormal; Notable for the following components:    Troponin 0.02 (*)     All other components within normal limits    Narrative:     Performed at:  [address] [place]   Phone [phone]   BRAIN NATRIURETIC PEPTIDE - Abnormal; Notable for the following components:    Pro-BNP 1,136 (*)     All other components within normal limits    Narrative:     Performed at:  [address] [place]   Phone [phone]   URINE RT REFLEX TO CULTURE - Abnormal; Notable for the following components:    Clarity, UA TURBID (*)     Bilirubin Urine SMALL (*)     Ketones, Urine 15 (*)     Blood, Urine LARGE (*)     Protein, UA 30 (*)     All other components within normal limits    Narrative:     Performed at:  [address] [facility] [employer]   Phone [phone]   BLOOD GAS, ARTERIAL - Abnormal; Notable for the following components:    pO2, Arterial 112.0 (*)     All other components within normal limits    Narrative:     Performed at:  [address] [facility]   Phone [phone]   LACTATE, SEPSIS - Abnormal; Notable for the following components:    Lactic Acid, Sepsis 4.0 (*)     All other components within normal limits    Narrative:     Johnnie Milian tel. [phone],  Chemistry results called to and read back by Sameer Ladd, 12/22/2020 12:26,  by Rashid Mc  Performed at:  [place] [address] [facility] [address]   Phone [phone] - Abnormal; Notable for the following components:    SARS-CoV-2, NAAT DETECTED (*)     All other components within normal limits    Narrative:     Performed at:  [address] [facility]   Phone [phone]   MICROSCOPIC URINALYSIS - Abnormal; Notable for the following components:    Hyaline Casts, UA 11-20 (*)     Mucus, UA 2+ (*)     Renal Epithelial, UA 2-5 (*)     WBC, UA 10 (*)     RBC, UA 62 (*)     Epithelial Cells, UA 13 (*)     All other components within normal limits    Narrative:     Performed at:  [address] [facility]   Phone [phone]   RAPID INFLUENZA A/B ANTIGENS    Narrative:     Performed at:  [address] [facility]   Phone [phone]   CULTURE, BLOOD 2   CULTURE, BLOOD 1   CULTURE, URINE   ETHANOL    Narrative:     Performed at:  [address]   Phone [phone]   LEVETIRACETAM LEVEL    Narrative:     Performed at:  [facility]  [address] [place] [address] [address]   Phone [phone]   [address], SEPSIS   COVID-19   COVID-19 APTT   APTT   APTT       All other labs were within normal range or not returned as of this dictation. EMERGENCY DEPARTMENT COURSE and DIFFERENTIAL DIAGNOSIS/MDM:   Vitals:    Vitals:    12/22/20 1315 12/22/20 1330 12/22/20 1350 12/22/20 1431   BP: (!) 154/86 134/78  (!) 127/92   Pulse: 113 112  115   Resp: 26 21  28   Temp:   101 °F (38.3 °C)    TempSrc:   Rectal    SpO2: 96% 97%  96%   Weight:       Height: This patient presents via EMS after being found down beside his bed by his son. He thinks he fell out of bed at around 930 this morning. Based on his injuries and the appearance of the patient I suspect that he has been down longer than that. He is alert and oriented. He is very hard of hearing and difficult to get a history from, but seems reliable. He says he has not felt well since Saturday, he reports a cough and difficulty breathing. He says he feels weak. He has facial injuries as documented above as well as some bruising to his left wrist and hand and pressure ulcers on his knees. His BUN is elevated at 29 likely indicating some degree of dehydration. His CK is 1577, certainly consistent with being on the floor for a prolonged period of time, indicating rhabdomyolysis. His chest x-ray shows no acute infiltrates, but his CT pulmonary embolism study does show bilateral groundglass opacities which would be consistent with his positive Covid test.  I cannot rule out a superimposed bacterial pneumonia. His lactic acid is elevated at 4, he is tachycardic, he is febrile at 101, consistent with sepsis. He also has bilateral lower lobe pulmonary emboli without evidence of right heart strain. His CT of his facial bones shows no evidence of fracture, he has significant left facial and periorbital swelling. His CT of his head shows no acute intracranial injury, and CT of the cervical spine shows no cervical spine injury. X-ray of his pelvis shows no evidence of fracture.   X-ray of his left wrist shows soft tissue swelling but no evidence of fracture. X-ray of his hand shows soft tissue swelling with no evidence of fracture. Sepsis fluids have been initiated. IV antibiotics have been initiated. Blood cultures were obtained. He had some bronchospasm on exam, he was given hand-held nebulizers as well as IV Solu-Medrol. He will be bolused with heparin and placed on a heparin drip for his pulmonary emboli. Hospitalist will be consulted for admission. 1512:  Discussed with Dr. Stefanie Ray. He will admit. 1520:  Reevaluated. Patient states he feels better. Appears to be in less respiratory distress. His heart rate is 112. His respiratory rate is 24. Moving air better on pulmonary exam, minimal wheezing. No stridor. Discussed with patient test results and need for admission. Patient is agreeable. Nursing staff is contacted son to make him aware of the admission per patient's request.    CONSULTS:  IP CONSULT TO HOSPITALIST  IP CONSULT TO PHARMACY  IP CONSULT TO PULMONOLOGY    PROCEDURES:  None    FINAL IMPRESSION      1. COVID-19 virus infection    2. Septicemia ([address]    3. Traumatic rhabdomyolysis, initial encounter ([address]    4. Acute respiratory failure with hypoxia (HCC)    5. Bilateral pulmonary embolism (HCC)          DISPOSITION/PLAN   DISPOSITION        PATIENT REFERRED TO:  No follow-up provider specified.     DISCHARGE MEDICATIONS:  New Prescriptions    No medications on file       (Please note that portions of this note were completed with a voice recognition program.  Efforts were made to edit the dictations but occasionally words are mis-transcribed.)    Keshav Pemberton MD  Attending Emergency Physician        Stacy Ulloa MD  12/22/20 1371

## 2020-12-23 LAB
A/G RATIO: 1.1 (ref 1.1–2.2)
ALBUMIN SERPL-MCNC: 2.7 G/DL (ref 3.4–5)
ALP BLD-CCNC: 84 U/L (ref 40–129)
ALT SERPL-CCNC: 35 U/L (ref 10–40)
ANION GAP SERPL CALCULATED.3IONS-SCNC: 10 MMOL/L (ref 3–16)
APTT: 34.6 SEC (ref 24.2–36.2)
APTT: 72.3 SEC (ref 24.2–36.2)
APTT: 79 SEC (ref 24.2–36.2)
AST SERPL-CCNC: 110 U/L (ref 15–37)
BASOPHILS ABSOLUTE: 0 K/UL (ref 0–0.2)
BASOPHILS RELATIVE PERCENT: 0.2 %
BILIRUB SERPL-MCNC: 0.6 MG/DL (ref 0–1)
BLOOD BANK DISPENSE STATUS: NORMAL
BLOOD BANK PRODUCT CODE: NORMAL
BPU ID: NORMAL
BUN BLDV-MCNC: 30 MG/DL (ref 7–20)
CALCIUM SERPL-MCNC: 7.8 MG/DL (ref 8.3–10.6)
CHLORIDE BLD-SCNC: 106 MMOL/L (ref 99–110)
CO2: 23 MMOL/L (ref 21–32)
CREAT SERPL-MCNC: 0.8 MG/DL (ref 0.8–1.3)
DESCRIPTION BLOOD BANK: NORMAL
EKG ATRIAL RATE: 117 BPM
EKG DIAGNOSIS: NORMAL
EKG P AXIS: 54 DEGREES
EKG P-R INTERVAL: 142 MS
EKG Q-T INTERVAL: 304 MS
EKG QRS DURATION: 68 MS
EKG QTC CALCULATION (BAZETT): 424 MS
EKG R AXIS: -24 DEGREES
EKG T AXIS: -10 DEGREES
EKG VENTRICULAR RATE: 117 BPM
EOSINOPHILS ABSOLUTE: 0 K/UL (ref 0–0.6)
EOSINOPHILS RELATIVE PERCENT: 0 %
GFR AFRICAN AMERICAN: >60
GFR NON-AFRICAN AMERICAN: >60
GLOBULIN: 2.4 G/DL
GLUCOSE BLD-MCNC: 145 MG/DL (ref 70–99)
HCT VFR BLD CALC: 33.3 % (ref 40.5–52.5)
HEMOGLOBIN: 11.6 G/DL (ref 13.5–17.5)
L. PNEUMOPHILA SEROGP 1 UR AG: NORMAL
LACTIC ACID: 1.2 MMOL/L (ref 0.4–2)
LACTIC ACID: 1.5 MMOL/L (ref 0.4–2)
LACTIC ACID: 1.6 MMOL/L (ref 0.4–2)
LACTIC ACID: 2 MMOL/L (ref 0.4–2)
LYMPHOCYTES ABSOLUTE: 0.6 K/UL (ref 1–5.1)
LYMPHOCYTES RELATIVE PERCENT: 5.1 %
MCH RBC QN AUTO: 33.3 PG (ref 26–34)
MCHC RBC AUTO-ENTMCNC: 34.9 G/DL (ref 31–36)
MCV RBC AUTO: 95.4 FL (ref 80–100)
MONOCYTES ABSOLUTE: 1 K/UL (ref 0–1.3)
MONOCYTES RELATIVE PERCENT: 8.1 %
NEUTROPHILS ABSOLUTE: 10.7 K/UL (ref 1.7–7.7)
NEUTROPHILS RELATIVE PERCENT: 86.6 %
PDW BLD-RTO: 13.5 % (ref 12.4–15.4)
PLATELET # BLD: 230 K/UL (ref 135–450)
PMV BLD AUTO: 9.7 FL (ref 5–10.5)
POTASSIUM SERPL-SCNC: 4 MMOL/L (ref 3.5–5.1)
RBC # BLD: 3.49 M/UL (ref 4.2–5.9)
SODIUM BLD-SCNC: 139 MMOL/L (ref 136–145)
STREP PNEUMONIAE ANTIGEN, URINE: NORMAL
TOTAL CK: 1537 U/L (ref 39–308)
TOTAL CK: 1647 U/L (ref 39–308)
TOTAL CK: 1761 U/L (ref 39–308)
TOTAL CK: 1830 U/L (ref 39–308)
TOTAL PROTEIN: 5.1 G/DL (ref 6.4–8.2)
URINE CULTURE, ROUTINE: NORMAL
VITAMIN D 25-HYDROXY: 29.9 NG/ML
WBC # BLD: 12.4 K/UL (ref 4–11)

## 2020-12-23 PROCEDURE — 87449 NOS EACH ORGANISM AG IA: CPT

## 2020-12-23 PROCEDURE — 83605 ASSAY OF LACTIC ACID: CPT

## 2020-12-23 PROCEDURE — 97530 THERAPEUTIC ACTIVITIES: CPT

## 2020-12-23 PROCEDURE — 2060000000 HC ICU INTERMEDIATE R&B

## 2020-12-23 PROCEDURE — 6360000002 HC RX W HCPCS: Performed by: INTERNAL MEDICINE

## 2020-12-23 PROCEDURE — 97166 OT EVAL MOD COMPLEX 45 MIN: CPT

## 2020-12-23 PROCEDURE — 82550 ASSAY OF CK (CPK): CPT

## 2020-12-23 PROCEDURE — 85730 THROMBOPLASTIN TIME PARTIAL: CPT

## 2020-12-23 PROCEDURE — 6360000002 HC RX W HCPCS: Performed by: EMERGENCY MEDICINE

## 2020-12-23 PROCEDURE — 97162 PT EVAL MOD COMPLEX 30 MIN: CPT

## 2020-12-23 PROCEDURE — 93010 ELECTROCARDIOGRAM REPORT: CPT | Performed by: INTERNAL MEDICINE

## 2020-12-23 PROCEDURE — 2580000003 HC RX 258: Performed by: INTERNAL MEDICINE

## 2020-12-23 PROCEDURE — 85025 COMPLETE CBC W/AUTO DIFF WBC: CPT

## 2020-12-23 PROCEDURE — 97535 SELF CARE MNGMENT TRAINING: CPT

## 2020-12-23 PROCEDURE — 99223 1ST HOSP IP/OBS HIGH 75: CPT | Performed by: INTERNAL MEDICINE

## 2020-12-23 PROCEDURE — 2500000003 HC RX 250 WO HCPCS: Performed by: INTERNAL MEDICINE

## 2020-12-23 PROCEDURE — 80053 COMPREHEN METABOLIC PANEL: CPT

## 2020-12-23 PROCEDURE — 93306 TTE W/DOPPLER COMPLETE: CPT

## 2020-12-23 PROCEDURE — 36415 COLL VENOUS BLD VENIPUNCTURE: CPT

## 2020-12-23 PROCEDURE — 6370000000 HC RX 637 (ALT 250 FOR IP): Performed by: INTERNAL MEDICINE

## 2020-12-23 PROCEDURE — 93931 UPPER EXTREMITY STUDY: CPT

## 2020-12-23 RX ORDER — LORAZEPAM 2 MG/ML
3 INJECTION INTRAMUSCULAR
Status: DISCONTINUED | OUTPATIENT
Start: 2020-12-23 | End: 2020-12-23

## 2020-12-23 RX ORDER — LORAZEPAM 2 MG/ML
2 INJECTION INTRAMUSCULAR
Status: DISCONTINUED | OUTPATIENT
Start: 2020-12-23 | End: 2020-12-23

## 2020-12-23 RX ORDER — LORAZEPAM 2 MG/ML
1 INJECTION INTRAMUSCULAR
Status: DISCONTINUED | OUTPATIENT
Start: 2020-12-23 | End: 2020-12-23

## 2020-12-23 RX ORDER — LORAZEPAM 1 MG/1
2 TABLET ORAL
Status: DISCONTINUED | OUTPATIENT
Start: 2020-12-23 | End: 2020-12-23

## 2020-12-23 RX ORDER — LORAZEPAM 1 MG/1
1 TABLET ORAL
Status: DISCONTINUED | OUTPATIENT
Start: 2020-12-23 | End: 2020-12-23

## 2020-12-23 RX ORDER — LORAZEPAM 2 MG/ML
4 INJECTION INTRAMUSCULAR
Status: DISCONTINUED | OUTPATIENT
Start: 2020-12-23 | End: 2020-12-23

## 2020-12-23 RX ORDER — CASTOR OIL AND BALSAM, PERU 788; 87 MG/G; MG/G
OINTMENT TOPICAL 2 TIMES DAILY
Status: DISCONTINUED | OUTPATIENT
Start: 2020-12-23 | End: 2020-12-28 | Stop reason: HOSPADM

## 2020-12-23 RX ORDER — LORAZEPAM 1 MG/1
4 TABLET ORAL
Status: DISCONTINUED | OUTPATIENT
Start: 2020-12-23 | End: 2020-12-23

## 2020-12-23 RX ORDER — LORAZEPAM 1 MG/1
3 TABLET ORAL
Status: DISCONTINUED | OUTPATIENT
Start: 2020-12-23 | End: 2020-12-23

## 2020-12-23 RX ORDER — HEPARIN SODIUM 1000 [USP'U]/ML
3280 INJECTION, SOLUTION INTRAVENOUS; SUBCUTANEOUS ONCE
Status: COMPLETED | OUTPATIENT
Start: 2020-12-23 | End: 2020-12-23

## 2020-12-23 RX ADMIN — LEVETIRACETAM 500 MG: 500 TABLET ORAL at 20:36

## 2020-12-23 RX ADMIN — Medication 10 ML: at 20:36

## 2020-12-23 RX ADMIN — CASTOR OIL AND BALSAM, PERU: 788; 87 OINTMENT TOPICAL at 20:47

## 2020-12-23 RX ADMIN — DEXAMETHASONE SODIUM PHOSPHATE 10 MG: 10 INJECTION, SOLUTION INTRAMUSCULAR; INTRAVENOUS at 20:36

## 2020-12-23 RX ADMIN — SODIUM CHLORIDE: 9 INJECTION, SOLUTION INTRAVENOUS at 08:05

## 2020-12-23 RX ADMIN — CASTOR OIL AND BALSAM, PERU: 788; 87 OINTMENT TOPICAL at 12:26

## 2020-12-23 RX ADMIN — ACETAMINOPHEN 650 MG: 325 TABLET ORAL at 01:54

## 2020-12-23 RX ADMIN — AZITHROMYCIN MONOHYDRATE 500 MG: 500 INJECTION, POWDER, LYOPHILIZED, FOR SOLUTION INTRAVENOUS at 15:11

## 2020-12-23 RX ADMIN — THERA TABS 1 TABLET: TAB at 08:05

## 2020-12-23 RX ADMIN — REMDESIVIR 100 MG: 5 INJECTION INTRAVENOUS at 21:58

## 2020-12-23 RX ADMIN — THIAMINE HYDROCHLORIDE 100 MG: 100 INJECTION, SOLUTION INTRAMUSCULAR; INTRAVENOUS at 20:36

## 2020-12-23 RX ADMIN — HEPARIN SODIUM 13.3 ML/HR: 10000 INJECTION, SOLUTION INTRAVENOUS at 17:15

## 2020-12-23 RX ADMIN — LEVETIRACETAM 500 MG: 500 TABLET ORAL at 08:05

## 2020-12-23 RX ADMIN — HEPARIN SODIUM 3280 UNITS: 1000 INJECTION INTRAVENOUS; SUBCUTANEOUS at 14:06

## 2020-12-23 RX ADMIN — CEFTRIAXONE 1 G: 1 INJECTION, POWDER, FOR SOLUTION INTRAMUSCULAR; INTRAVENOUS at 14:06

## 2020-12-23 ASSESSMENT — PAIN SCALES - GENERAL
PAINLEVEL_OUTOF10: 0

## 2020-12-23 NOTE — PROGRESS NOTES
Physical Therapy    Facility/Department: [address] PROGRESSIVE CARE  Initial Assessment    NAME: Jim Davis  : 1935  MRN: [id number]    Date of Service: 2020    Discharge Recommendations:  Continue to assess pending progress   PT Equipment Recommendations  Other: Will monitor for potential equipt needs. Assessment   Body structures, Functions, Activity limitations: Decreased functional mobility ; Decreased strength;Decreased safe awareness;Decreased cognition;Decreased endurance  Assessment: 81 y/o male admit 2020 with Acute Respiratory, PE, Fall, Traumatic Rhabdo. + COVID. CT / X-Rays all negative. PMH as noted including UTI, Seizures (2017), LE Weak/Falls, ETOH Abuse. PTA pt living alone in bilevel with steps to enter and access all areas. Pt requiring assist x 2 for OOB via Stedy. At this time, anticipate need SNF setting. Will cont to monitor pt's progress. Prognosis: Fair;Good  Decision Making: Medium Complexity  History: 81 y/o male admit 2020 with Acute Respiratory, PE, Fall, Traumatic Rhabdo. + COVID. CT / X-Rays all negative. PMH as noted including UTI, Seizures (2017), LE Weak/Falls, ETOH Abuse. Exam: See above. Clinical Presentation: See above. Patient Education: Role of PT, POC, Need to call for assist, OOB via Stedy. Barriers to Learning: Cognitive, [place]. REQUIRES PT FOLLOW UP: Yes  Activity Tolerance  Activity Tolerance: Patient limited by endurance  Activity Tolerance: Pt currently requiring 3L O2. Pt did become alittle  dyspneic although sats remain at least 90%; improves with brief rest breaks. Patient Diagnosis(es): The primary encounter diagnosis was COVID-19 virus infection. Diagnoses of Septicemia ([facility] [address] Traumatic rhabdomyolysis, initial encounter ([facility] [address] Acute respiratory failure with hypoxia ([address] and Bilateral pulmonary embolism ([facility] [address] were also pertinent to this visit.      has a past medical history of Alcohol abuse, Dehydration, Leg weakness, bilateral, Seizure ([address] and UTI (urinary tract infection). has a past surgical history that includes Splenectomy, total and Cataract removal (Bilateral). Restrictions  Restrictions/Precautions  Restrictions/Precautions: Fall Risk, Isolation  Position Activity Restriction  Other position/activity restrictions: Droplet Plus : COVID +. O2 3L via NC. 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 + COVID. CT / X-Rays all negative. PMH as noted including UTI, Seizures (7/2017), LE Weak/Falls, ETOH Abuse. Family / Caregiver Present: No  Referring Practitioner: Dr. Ju Interiano  Diagnosis: Acute Respiratory, PE, Fall, Traumatic Rhabdo. +COVID. Other (Comment): Pt follows simple commands, delayed. Subjective  Subjective: Pt agreeable to PT Eval/Rx.   Pain Screening  Patient Currently in Pain: Denies          Orientation  Orientation  Overall Orientation Status: (Pt alittle confused to recent events.)  Orientation Level: Oriented to person;Oriented to place;Oriented to time  Social/Functional History  Social/Functional History  Lives With: Alone(Son lives close, stops by often.)  Type of Home: House  Home Layout: Two level, Bed/Bath upstairs, Laundry in basement(Bilevel.)  Home Access: Stairs to enter with rails  Entrance Stairs - Number of Steps: 3+3 with handrails to enter; then 5-6 steps up and down upon entering  Bathroom Shower/Tub: Walk-in shower  Bathroom Toilet: Standard  Bathroom Accessibility: Accessible  Home Equipment: [employer]  ADL Assistance: [address]: Independent  Homemaking Responsibilities: Yes  Ambulation Assistance: Independent  Transfer Assistance: Independent  Active : Yes  Occupation: Retired  Additional Comments: Pt admit with fall, although denies prior falls (????). Cognition   Cognition  Overall Cognitive Status: Exceptions  Arousal/Alertness: Appropriate responses to stimuli  Following Commands: Follows one step commands with increased time; Follows one step commands with repetition  Safety Judgement: Decreased awareness of need for safety  Insights: Decreased awareness of deficits  Initiation: Requires cues for some  Sequencing: Requires cues for some    Objective     Observation/Palpation  Observation: L Eye Abrasions/Swelling; unable to open. Small Abrasion R Knee. Large Darkened Abrasion L Knee. AROM RLE (degrees)  RLE AROM: WFL  AROM LLE (degrees)  LLE AROM : WFL  AROM RUE (degrees)  RUE AROM : WFL  AROM LUE (degrees)  LUE AROM : WFL  Strength Other  Other: Pt unable to carlos enrique formal assess; grossly 3+ 4-/5. Sensation  Overall Sensation Status: WFL  Bed mobility  Supine to Sit: Maximum assistance;2 Person assistance  Transfers  Sit to Stand: Minimal Assistance; Moderate Assistance;2 Person Assistance(EOB via Stedy Min/Mod x 2. Chair via [employer] x 2.)  Stand to sit: Minimal Assistance;2 Person Assistance(Via Stedy.)  Bed to Chair: Dependent/Total(Via Stedy.)  Ambulation  Ambulation?: No(OOB via [email or web address]     Balance  Comments: EOB Min assist.  Static Stand via Stedy CGA. Plan   Plan  Times per week: 3-5x week while in acute care setting.   Current Treatment Recommendations: Strengthening, Functional Mobility Training, Transfer Training, Gait Training, Safety Education & Training, Patient/Caregiver Education & Training  Safety Devices  Type of devices: Call light within reach, Chair alarm in place, Left in chair, Nurse notified      AM-PAC Score  AM-PAC Inpatient Mobility Raw Score : 7 (12/23/20 0814)  AM-PAC Inpatient T-Scale Score : 26.42 (12/23/20 0814)  Mobility Inpatient CMS 0-100% Score: 92.36 (12/23/20 8773)  Mobility Inpatient CMS G-Code Modifier : CM (12/23/20 0144)          Goals  Short term goals  Time Frame for Short term goals: Upon d/c acute care setting. Short term goal 1: Bed Mob Min x 2. Short term goal 2: Transfer with assist devcie Min x 2. Short term goal 3: Amb 5-10' with assist device Min x 2. Patient Goals   Patient goals : Go home.        Therapy Time   Individual Concurrent Group Co-treatment   Time In 0715         Time Out 0800         Minutes [address], PT

## 2020-12-23 NOTE — CONSULTS
REASON FOR CONSULTATION/CC: covid 19 PE       Consult at request of David Johnson MD for same    PCP: Tray Lopez MD  Established Pulmonologist:  None    HISTORY OF PRESENT ILLNESS: Tyree Humphrey is a 80y.o. year old male with a history of seizure  who presents with being found down. the patient thinks he rolled out of bed but does not remember this. He was found down by his son. He denies any symptoms prior to this. PAST MEDICAL HISTORY:  Past Medical History:   Diagnosis Date    Alcohol abuse 12/22/2020    Dehydration     Leg weakness, bilateral 2014, 2015    legs give out and pt collaspes    Seizure ([address] 7/20/2017    UTI (urinary tract infection) 8-    admitted with high fever and fainted. 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 FAMILY HISTORY:  family history includes Cancer in his brother and mother; Diabetes in his father. SOCIAL HISTORY:   reports that he has never smoked. He has never used smokeless tobacco.    Scheduled Meds:   levETIRAcetam  500 mg Oral BID    sodium chloride flush  10 mL Intravenous 2 times per day    azithromycin  500 mg Intravenous Q24H    And    cefTRIAXone (ROCEPHIN) IV  1 g Intravenous Q24H    dexamethasone  10 mg Intravenous Q24H    multivitamin  1 tablet Oral Daily    thiamine (VITAMIN B1) IVPB  100 mg Intravenous Q24H    remdesivir IVPB  100 mg Intravenous Q24H       Continuous Infusions:   heparin (PORCINE) Infusion 10 mL/hr (12/23/20 0019)    sodium chloride 75 mL/hr at 12/23/20 0920       PRN Meds:  sodium chloride flush, promethazine **OR** ondansetron, polyethylene glycol, acetaminophen **OR** acetaminophen    ALLERGIES:  Patient has No Known Allergies.     REVIEW OF SYSTEMS:  Constitutional: Negative for fever  HENT: Negative for sore throat  Eyes: Negative for redness   Respiratory: Negative for dyspnea, cough  Cardiovascular: Negative for chest pain  Gastrointestinal: Negative for vomiting, diarrhea   Genitourinary: Negative for hematuria   Musculoskeletal: Negative for arthralgias   Skin: Negative for rash  Neurological: Negative for syncope  Hematological: Negative for adenopathy  Psychiatric/Behavorial: Negative for anxiety    Objective:   PHYSICAL EXAM:  Blood pressure 115/72, pulse 89, temperature 98.6 °F (37 °C), temperature source Axillary, resp. rate 24, height 5' 10\" (1.778 m), weight 225 lb 5 oz (102.2 kg), SpO2 96 %.'  Gen: No distress. Eyes: PERRL. No sclera icterus. No conjunctival injection. Left eye is sown shut  ENT: No discharge. Pharynx clear. External appearance of ears and nose normal.  Slight bruising on face, may be old. Neck: Trachea midline. No obvious mass. Resp: No accessory muscle use. No crackles. No wheezes. No rhonchi. CV: Regular rate. Regular rhythm. No murmur or rub. No edema. GI: Non-tender. Non-distended. No hernia. Skin: Warm, dry, normal texture and turgor. No nodule on exposed extremities. Lymph: No cervical LAD. No supraclavicular LAD. M/S: No cyanosis. No clubbing. No joint deformity. Neuro: Moves all four extremities. Psych: Oriented x 3. No anxiety. Awake. Alert. Intact judgement and insight. Data Reviewed:   LABS:  CBC:   Recent Labs     12/22/20  1143 12/23/20  0627   WBC 17.0* 12.4*   HGB 15.2 11.6*   HCT 44.2 33.3*   MCV 95.2 95.4    230     BMP:   Recent Labs     12/22/20  1143 12/23/20  0627    139   K 4.5 4.0   CL 98* 106   CO2 25 23   BUN 29* 30*   CREATININE 0.9 0.8     LIVER PROFILE:   Recent Labs     12/22/20 1143 12/23/20  0627   * 110*   ALT 47* 35   BILITOT 1.2* 0.6   ALKPHOS 122 84     PT/INR: No results for input(s): PROTIME, INR in the last 72 hours.   APTT:   Recent Labs     12/22/20 2016 12/22/20 2219 12/23/20  0626   APTT 159.8* 135.1* 72.3*     UA:  Recent Labs     12/22/20  1143   COLORU CONRAD ZAPIEN PHUR 5.5   WBCUA 10*   RBCUA 62*   MUCUS 2+*   CLARITYU TURBID*   SPECGRAV 1.025   LEUKOCYTESUR Negative   UROBILINOGEN 1.0   BILIRUBINUR SMALL*   BLOODU LARGE*   GLUCOSEU Negative     Recent Labs     12/22/20  1227   PHART 7.434   AGZ2ECP 39.7   PO2ART 112.0*       Vent Information  Skin Assessment: Clean, dry, & intact  SpO2: 96 %    Radiology Review:  Pertinent images / reports were reviewed as a part of this visit. CT Chest w/ contrast: No results found for this or any previous visit. CT Chest w/o contrast: No results found for this or any previous visit. CTPA:   Results for orders placed during the hospital encounter of 12/22/20   CT CHEST PULMONARY EMBOLISM W CONTRAST    Narrative EXAMINATION:  CTA OF THE CHEST 12/22/2020 1:58 pm    TECHNIQUE:  CTA of the chest was performed after the administration of intravenous  contrast.  Multiplanar reformatted images are provided for review. MIP  images are provided for review. Dose modulation, iterative reconstruction,  and/or weight based adjustment of the mA/kV was utilized to reduce the  radiation dose to as low as reasonably achievable. COMPARISON:  None. HISTORY:  ORDERING SYSTEM PROVIDED HISTORY: hypoxic / covid positive / rule out PE  TECHNOLOGIST PROVIDED HISTORY:  Reason for exam:->hypoxic / covid positive / rule out PE  Reason for Exam: hypoxic / covid positive / rule out PE  Acuity: Acute  Type of Exam: Initial    FINDINGS:  Motion artifact degrades the study. Thyroid gland appears normal.  Trace pericardial fluid is seen. Small to  moderate size hiatal hernia is seen. There is nonspecific thickening at the  GE junction. Coronary artery calcification is seen. Aortic valve calcification is seen. Ascending aorta is mildly dilated measuring 4.4 cm    No embolus is seen in the central, right, or left main pulmonary artery. On  the right, small pulmonary emboli are seen in the right upper lobe.   Small  embolus is seen in the right ------------------------------------------------------------------------------       CXR portable:   Results for orders placed during the hospital encounter of 12/22/20   XR CHEST PORTABLE    Narrative EXAMINATION:  ONE XRAY VIEW OF THE CHEST    12/22/2020 12:12 pm    COMPARISON:  July 20, 2017    HISTORY:  ORDERING SYSTEM PROVIDED HISTORY: sob  TECHNOLOGIST PROVIDED HISTORY:  Reason for exam:->sob  Reason for Exam: fall    Acute shortness of breath. Initial encounter. FINDINGS:  Cardiac silhouette is mildly enlarged but stable. No focal pulmonary  consolidation, right pleural effusion, or pneumothorax. Chronic blunting of  the left lateral costophrenic sulcus which may be due to pleural thickening  or small pleural effusion. Impression 1. No acute pulmonary abnormality to account for patient's shortness of  breath. 2. Stable mild enlargement of the cardiac silhouette. Assessment:     Bilateral PE  COVID 19  Acute hypoxemic respiratory failure  Alcohol abuse (he denies any recent etoh intake)   Facial Trauma    Plan:      Hospital Day 1     COVID 23  - almost no pulmonary filtrates. Therefore, it is not clear if remdesivir and steroids are needed. - Procalcitonin  0.28. On empiric antibiotics. No clear pneumonia on CT chest.     PE  - ok to change to Lovenox or Eliquis. Hypoxemia  - likely secondary to PE and not pulmonary involvement from COVID   - move to room air     Likely can be discharged if able to wean to room air. Facial trauma. - no RV strain on CT chest or EKG  - agree with echo. - DDX syncope vs seizure vs mechanical fall. This note was transcribed using [id number] [employer]. Please disregard any translational errors.     Thank you for the consult    [facility]  [phone]

## 2020-12-23 NOTE — CARE COORDINATION
INITIAL CASE MANAGEMENT ASSESSMENT    Reviewed chart, unable to meet with patient due to isolation status. Call to patient's room, spoke with patient over the phone to assess possible discharge needs. Explained Case Management role/services. SW called to patient's son, Gretchen Callejas ([phone]), to complete initial assessment. Gretchen Callejas confirmed he and Ed ([phone]) are patient's only children, and legal next of kin. Living Situation: Patient lives alone in a Bi-level; predominately laundry in the basement and patient has no previous issues with the steps. ADLs: Independent prior to admission. DME: Patient did not use DME prior to admission. PT/OT Recs: Skilled level care. SW attempted to discuss options with patient. SW spoke with patient's son regarding PT/OT recs SNF. SW sent SNF list to son. The Plan for Transition of Care is related to the following treatment goals: Strengthening    The Patient's son was provided with a choice of provider and agrees   with the discharge plan. [x] Yes [] No    Freedom of choice list was provided with basic dialogue that supports the patient's individualized plan of care/goals, treatment preferences and shares the quality data associated with the providers. [x] Yes [] No       Active Services: No active services. Transportation: Patient is an active . Family to transport at discharge. Medications: [facility] in [place]     PCP: Amalia Holter, MD      HD/PD: N/A     PLAN/COMMENTS: Patient asked SW to call back later. SW called patient's son to verify information. Patient and family to review SNF list sent via email. Case management will follow up with patient and family to confirm facility choices. NEED ACP with patient. CM provided contact information for patient or family to call with any questions. CM will follow and assist as needed.     SEUN Hilario, [facility], [employer]  [phone]  Electronically signed by Melina Dalton SEUN Bowie, STONEW on 12/23/2020 at 2:40 PM

## 2020-12-23 NOTE — DISCHARGE INSTR - COC
Continuity of Care Form    Patient Name: Ranjana Leon   :  1935  MRN:  [id number]    Admit date:  2020  Discharge date:  2020    Code Status Order: Full Code   Advance Directives:   [facility] Documentation       Date/Time Healthcare Directive Type of Healthcare Directive Copy in [address] [facility] [address] Agent's Name Healthcare Agent's Phone Number    20  Yes, patient has an advance directive for healthcare treatment  Durable power of  for health care;Living will  No, copy requested from family  --  --  --    20  No, patient does not have an advance directive for healthcare treatment  --  --  --  --  --            Admitting Physician:  Jayden Quezada MD  PCP: Marline Pike MD    Discharging Nurse: [facility] Unit/Room#: [id number]-1174/6156-67  Discharging Unit Phone Number: [phone]    Emergency Contact:   Extended Emergency Contact Information  Primary Emergency Contact: Dee,Scott   [address] Phone: [phone]  Relation: Child  Secondary Emergency Contact: [facility] Phone: [phone]  Mobile Phone: [phone]  Relation: Child    Past Surgical History:  Past Surgical History:   Procedure Laterality Date    CATARACT REMOVAL Bilateral     SPLENECTOMY, TOTAL      at age 15 y old. Immunization History:   Immunization History   Administered Date(s) Administered    Pneumococcal Conjugate 7-valent (Lisa Stamp) 2014       Active Problems:  Patient Active Problem List   Diagnosis Code    Seizure ([facility] [address] R56.9    Lactic acidosis E87.2    Sepsis ([facility] [address] A41.9    Pneumonia due to COVID-19 virus U07.1, J12.89    Bilateral pulmonary embolism (HCC) I26.99    Elevated LFTs R79.89    Rhabdomyolysis M62.82    Facial trauma S09. 93XA    Bacterial pneumonia J15.9    Alcohol abuse F10.10       Isolation/Infection:   Isolation            Droplet Plus          Patient Infection Status Infection Onset Added Last Indicated Last Indicated By Review Planned Expiration Resolved Resolved By    COVID-19 12/22/20 12/22/20 12/22/20 COVID-19 12/29/20 01/05/21      Resolved    COVID-19 Rule Out 12/22/20 12/22/20 12/22/20 COVID-19 (Ordered)   12/22/20 Rule-Out Test Resulted            Nurse Assessment:  Last Vital Signs: /70   Pulse 93   Temp 98.1 °F (36.7 °C) (Oral)   Resp 26   Ht 5' 10\" (1.778 m)   Wt 225 lb 5 oz (102.2 kg)   SpO2 92%   BMI 32.33 kg/m²     Last documented pain score (0-10 scale): Pain Level: 0  Last Weight:   Wt Readings from Last 1 Encounters:   12/23/20 225 lb 5 oz (102.2 kg)     Mental Status:  oriented and alert    IV Access:  - None    Nursing Mobility/ADLs:  Walking   Assisted  Transfer  Assisted  Bathing  Assisted  Dressing  Assisted  Toileting  Independent  Feeding  Independent  Med Admin  Independent  Med Delivery   whole    Wound Care Documentation and Therapy:  Wound 12/22/20 Knee Anterior;Left;Mid;Outer abrasion (Active)   Dressing Status New dressing applied 12/23/20 1227   Wound Cleansed Not Cleansed 12/23/20 0813   Dressing/Treatment Pharmaceutical agent (see MAR) 12/23/20 1227   Wound Length (cm) 5 cm 12/22/20 2150   Wound Width (cm) 3 cm 12/22/20 2150   Wound Surface Area (cm^2) 15 cm^2 12/22/20 2150   Wound Assessment Blood filled blister;Dry;Erythema 12/23/20 1227   Drainage Amount Scant 12/23/20 1227   Drainage Description Serosanguinous 12/23/20 1227   Odor None 12/22/20 2150   Araseli-wound Assessment Edematous;Fragile;Blanchable erythema;Ecchymosis 12/22/20 2150   Margins Undefined edges 12/22/20 2150   Number of days: 0       Wound 12/22/20 Face Left;Upper (Active)   Dressing Status New dressing applied 12/23/20 1227   Dressing/Treatment Pharmaceutical agent (see MAR) 12/23/20 1227   Wound Length (cm) 2.3 cm 12/22/20 2150   Wound Width (cm) 2.1 cm 12/22/20 2150   Wound Surface Area (cm^2) 4.83 cm^2 12/22/20 2150   Wound Assessment Blood filled blister;Dry;Erythema 12/23/20 1227   Drainage Amount None 12/23/20 1227   Araseli-wound Assessment Edematous; Ecchymosis 12/23/20 1227   Number of days: 0       Wound 12/22/20 Knee Right; Inner (Active)   Dressing Status New dressing applied 12/23/20 1227   Wound Cleansed Not Cleansed 12/22/20 2150   Dressing/Treatment Pharmaceutical agent (see MAR) 12/23/20 1227   Wound Length (cm) 1.4 cm 12/22/20 2150   Wound Width (cm) 0.4 cm 12/22/20 2150   Wound Surface Area (cm^2) 0.56 cm^2 12/22/20 2150   Wound Assessment Blood filled blister;Erythema 12/23/20 1227   Drainage Amount None 12/22/20 2150   Araseli-wound Assessment Fragile; Excoriated;Ecchymosis 12/22/20 2150   Number of days: 0       Wound 12/22/20 Toe (Comment  which one) Anterior; Left 2nd toe Abrasion (Active)   Wound Cleansed Not Cleansed 12/22/20 2150   Dressing/Treatment Open to air 12/22/20 2150   Wound Length (cm) 0.6 cm 12/22/20 2150   Wound Width (cm) 0.6 cm 12/22/20 2150   Wound Surface Area (cm^2) 0.36 cm^2 12/22/20 2150   Drainage Amount None 12/22/20 2150   Araseli-wound Assessment Edematous;Fragile; Intact; Blanchable erythema 12/22/20 2150   Number of days: 0       Wound 12/22/20 Knee Anterior;Left; Inner (Active)   Wound Cleansed Not Cleansed 12/22/20 2150   Dressing/Treatment Open to air 12/22/20 2150   Wound Length (cm) 3 cm 12/22/20 2150   Wound Width (cm) 1.3 cm 12/22/20 2150   Wound Surface Area (cm^2) 3.9 cm^2 12/22/20 2150   Wound Assessment Hyper granulation tissue 12/22/20 2150   Drainage Amount None 12/22/20 2150   Odor None 12/22/20 2150   Araseli-wound Assessment Blanchable erythema;Fragile;Edematous 12/22/20 2150   Number of days: 0       Wound 12/22/20 Nose Abrasion (Active)   Wound Etiology Other 12/22/20 2200   Wound Cleansed Not Cleansed 12/22/20 2200   Dressing/Treatment Open to air 12/23/20 0813   Wound Assessment Dry;Superficial 12/23/20 0813   Drainage Amount None 12/23/20 0813   Odor None 12/23/20 0813   Araseli-wound Assessment Excoriated 12/23/20 0813   Number of days: 0        Elimination:  Continence:   · Bowel: Yes  · Bladder: Yes  Urinary Catheter: None   Colostomy/Ileostomy/Ileal Conduit: No       Date of Last BM: 12/27/2020    Intake/Output Summary (Last 24 hours) at 12/23/2020 1443  Last data filed at 12/23/2020 0903  Gross per 24 hour   Intake 2075.28 ml   Output 1200 ml   Net 875.28 ml     I/O last 3 completed shifts: In: 1955.3 [P.O.:300; I.V.:1095.3; Blood:210; IV Piggyback:350]  Out: 950 [Urine:950]    Safety Concerns:     History of Falls (last 30 days)    Impairments/Disabilities:      None    Nutrition Therapy:  Current Nutrition Therapy:   - Oral Diet:  General    Routes of Feeding: Oral  Liquids: No Restrictions  Daily Fluid Restriction: no  Last Modified Barium Swallow with Video (Video Swallowing Test): not done    Treatments at the Time of Hospital Discharge:   Respiratory Treatments:   Oxygen Therapy:  is not on home oxygen therapy.   Ventilator:    - No ventilator support    Rehab Therapies: Physical Therapy and Occupational Therapy  Weight Bearing Status/Restrictions: No weight bearing restirctions  Other Medical Equipment (for information only, NOT a DME order):  walker and Stedy for transfers  Other Treatments:     Patient's personal belongings (please select all that are sent with patient):  Packed and sent with patient    RN SIGNATURE:  Electronically signed by Laurie Briggs RN on 12/28/20 at 2:16 PM EST    CASE MANAGEMENT/SOCIAL WORK SECTION    Inpatient Status Date: 12/22/2020    Readmission Risk Assessment Score:  Readmission Risk              Risk of Unplanned Readmission:        15           Discharging to Facility/ Agency   · Name: [facility]         · Address:  · Phone: [phone]    · [phone]    / signature: Electronically signed by Leticia Kaur MSW, LSW on 12/28/20 at 1:55 PM EST    PHYSICIAN SECTION    Prognosis: Good    Condition at Discharge: Stable    Rehab Potential (if transferring to Rehab): Good    Recommended Labs or Other Treatments After Discharge: PT, OT, follow up PCP in 1 week    Physician Certification: I certify the above information and transfer of Chidi Godfrey  is necessary for the continuing treatment of the diagnosis listed and that he requires [facility] for less 30 days.      Update Admission H&P: No change in H&P    PHYSICIAN SIGNATURE:  Electronically signed by Betty Rodgers MD on 12/25/20 at 1:10 PM EST

## 2020-12-23 NOTE — PROGRESS NOTES
Occupational Therapy   Occupational Therapy Initial Assessment and Tentative D/C    Date: 2020   Patient Name: Jodi Reid  MRN: [id number]     : 1935    Date of Service: 2020    Discharge Recommendations: Jodi Reid scored a 13/24 on the AM-PAC ADL Inpatient form. Current research shows that an AM-PAC score of 17 or less is typically not associated with a discharge to the patient's home setting. Based on the patient's AM-PAC score and their current ADL deficits, it is recommended that the patient have 3-5 sessions per week of Occupational Therapy at d/c to increase the patient's independence. Please see assessment section for further patient specific details. If patient discharges prior to next session this note will serve as a discharge summary. Please see below for the latest assessment towards goals. Continue to assess pending progress, 3-5 sessions per week  OT Equipment Recommendations  Equipment Needed: No    Assessment   Performance deficits / Impairments: Decreased functional mobility ; Decreased strength;Decreased endurance;Decreased ADL status; Decreased balance;Decreased cognition;Decreased high-level IADLs;Decreased safe awareness  Assessment: Jodi Reid is a 80 y.o. male who presents to the emergency department the [facility] after being found down on his floor at home. Based on the information I got from the patient it appears his son found him. He states he lives alone. He believes that he fell out of bed around 930 this morning, although based on appearances I would say has been laying on the floor much longer than that. He is oriented to person and place and time. He states he has had a \"cold\" since Saturday. He states he has been coughing and short of breath. On transport to the emergency room the patient had pressure injuries to his knees and face with bruising to his left hand with dried blood on his face, and dried stool.   He admits to some facial pain on the left. He denies chest pain. He admits to shortness of breath and cough. He denies abdominal pain. He denies upper or lower extremity pain. PTA pt from home alone where he was Ind with mobility and ADLs. Pt currently functioning below baseline completing bed mobility with Max Ax2. Pt completes transfers with Min/Mod Ax2 and use of stedy. Anticipate pt needing up to Max A for ADLs. Pt currently limited due to decreased overall strength, balance, and endurance. Pt will benefit from skilled OT services at this time. Anticipate pt with need for continued OT at time of D/C. Prognosis: Good  Decision Making: Medium Complexity  Exam: see above  Assistance / Modification: stedy  OT Education: OT Role;Plan of Care;Transfer Training;ADL Adaptive Strategies  REQUIRES OT FOLLOW UP: Yes  Activity Tolerance  Activity Tolerance: Patient limited by fatigue  Safety Devices  Safety Devices in place: Yes  Type of devices: Chair alarm in place;Call light within reach;Gait belt;Nurse notified; Left in chair           Patient Diagnosis(es): The primary encounter diagnosis was COVID-19 virus infection. Diagnoses of Septicemia ([facility] [address] Traumatic rhabdomyolysis, initial encounter ([facility] [address] Acute respiratory failure with hypoxia ([address] and Bilateral pulmonary embolism ([facility] [address] were also pertinent to this visit. has a past medical history of Alcohol abuse, Dehydration, Leg weakness, bilateral, Seizure ([address] and UTI (urinary tract infection). has a past surgical history that includes Splenectomy, total and Cataract removal (Bilateral). Restrictions  Restrictions/Precautions  Restrictions/Precautions: Fall Risk, Isolation  Position Activity Restriction  Other position/activity restrictions: Droplet Plus : COVID +. O2 3L via NC.     Subjective   General  Chart Reviewed: Yes  Patient assessed for rehabilitation services?: Yes  Additional Pertinent Hx: per ED note, Alley Spencer is a 80 y.o. male who presents to the emergency department the [facility] after being found down on his floor at home. Based on the information I got from the patient it appears his son found him. He states he lives alone. He believes that he fell out of bed around 930 this morning, although based on appearances I would say has been laying on the floor much longer than that. He is oriented to person and place and time. He states he has had a \"cold\" since Saturday. He states he has been coughing and short of breath. On transport to the emergency room the patient had pressure injuries to his knees and face with bruising to his left hand with dried blood on his face, and dried stool. He admits to some facial pain on the left. He denies chest pain. He admits to shortness of breath and cough. He denies abdominal pain. He denies upper or lower extremity pain. Family / Caregiver Present: No  Referring Practitioner: Jose Beltre MD  Subjective  Subjective: Pt supine in bed upon arrival and agreeable to OT evaluation. Pt with no reports of pain. Pt on 3L O2 throughout session. General Comment  Comments: okay for therapy per RN.   Patient Currently in Pain: Denies  Pain Assessment  Pain Assessment: 0-10  Pain Level: 0  Patient's Stated Pain Goal: No pain  Vital Signs  Temp: 98.6 °F (37 °C)  Temp Source: Axillary  Pulse: 89  Heart Rate Source: Telemetry  Resp: 24  BP: 115/72  BP Location: Right Arm  Patient Position: Sitting  Level of Consciousness: Alert (0)  MEWS Score: 2  Patient Currently in Pain: Denies  Oxygen Therapy  SpO2: 96 %  O2 Device: Nasal cannula  O2 Flow Rate (L/min): 3 L/min  Social/Functional History  Social/Functional History  Lives With: Alone(Son lives close, stops by often.)  Type of Home: House  Home Layout: Two level, Bed/Bath upstairs, Laundry in basement(Bilevel.)  Home Access: Stairs to enter with rails  Entrance Stairs - Number of Steps: 3+3 with handrails to enter; then 5-6 steps up and down upon entering  Bathroom Shower/Tub: Walk-in shower  Bathroom Toilet: Standard  Bathroom Accessibility: Accessible  Home Equipment: Modacruz  ADL Assistance: Independent  Homemaking Assistance: Independent  Homemaking Responsibilities: Yes  Ambulation Assistance: Independent  Transfer Assistance: Independent  Active : Yes  Occupation: Retired  Additional Comments: Pt admit with fall, although denies prior falls (????). Objective   Vision: Within Functional Limits  Hearing: Exceptions to [facility]  Hearing Exceptions: Hard of hearing/hearing concerns    Orientation  Overall Orientation Status: Impaired  Orientation Level: Disoriented to situation;Oriented to time;Oriented to place;Oriented to person  Observation/Palpation  Observation: L Eye Abrasions/Swelling; unable to open. Small Abrasion R Knee. Large Darkened Abrasion L Knee. Balance  Sitting Balance: Minimal assistance(seated EOB)  Standing Balance: Contact guard assistance(in stedy)  Wheelchair Bed Transfers  Wheelchair/Bed - Technique: (stedy)  Equipment Used: Bed;Other(bed to chair)  Level of Asssistance: Dependent/Total  ADL  LE Dressing: Maximum assistance(assist to don bilateral socks)  Toileting: Dependent/Total(spann; pt needing assist for pericare in stance in stedy)  Additional Comments: Anticipate pt needing up to Max A for ADLs including dressing, bathing, and toileting based on ROM, strength, and balance  Tone RUE  RUE Tone: Normotonic  Tone LUE  LUE Tone: Normotonic  Coordination  Movements Are Fluid And Coordinated: Yes     Bed mobility  Supine to Sit: 2 Person assistance;Maximum assistance  Sit to Supine: Unable to assess  Scooting: Maximal assistance  Comment: up in chair after session  Transfers  Sit to stand: 2 Person assistance; Moderate assistance;Minimal assistance  Stand to sit: 2 Person assistance; Moderate assistance;Minimal assistance  Transfer Comments: to/from stedy;  Mod Ax2 from EOB and Min Ax2 from chair     Cognition  Overall Cognitive Status: Exceptions  Arousal/Alertness: Appropriate responses to stimuli  Following Commands: Follows one step commands with increased time; Follows one step commands with repetition  Safety Judgement: Decreased awareness of need for safety  Insights: Decreased awareness of deficits  Initiation: Requires cues for some  Sequencing: Requires cues for some        Sensation  Overall Sensation Status: WFL        LUE AROM (degrees)  LUE AROM : WFL  RUE AROM (degrees)  RUE AROM : WFL  LUE Strength  Gross LUE Strength: WFL  RUE Strength  Gross RUE Strength: WFL                   Plan   Plan  Times per week: 3-5x  Current Treatment Recommendations: Strengthening, Balance Training, Endurance Training, Self-Care / ADL, Functional Mobility Training, Gait Training, Patient/Caregiver Education & Training, Safety Education & Training      AM-PAC Score        AM-PAC Inpatient Daily Activity Raw Score: 13 (12/23/20 0810)  AM-PAC Inpatient ADL T-Scale Score : 32.03 (12/23/20 0810)  ADL Inpatient CMS 0-100% Score: 63.03 (12/23/20 0810)  ADL Inpatient CMS G-Code Modifier : CL (12/23/20 0810)    Goals  Short term goals  Time Frame for Short term goals: prior to D/C  Short term goal 1: complete functional mobility and transfers with supervision  Short term goal 2: complete toileting with supervision  Short term goal 3: complete bathing and dressing with supervision  Short term goal 4: complete grooming in stance at sink with supervision  Short term goal 5: complete bed mobility with supervision  Long term goals  Time Frame for Long term goals : STG=LTG  Patient Goals   Patient goals : return home       Therapy Time   Individual Concurrent Group Co-treatment   Time In 0722         Time Out 0805         Minutes 43         Timed Code Treatment Minutes: 28 Minutes(15 minute eval)       Michael Viera OTR/L

## 2020-12-23 NOTE — PROGRESS NOTES
Clinical Pharmacy Note  Heparin Dosing       Lab Results   Component Value Date    APTT 72.3 12/23/2020     Lab Results   Component Value Date    HGB 11.6 12/23/2020    HCT 33.3 12/23/2020     12/23/2020    INR 1.06 07/20/2017       Current Infusion Rate: 10 mL/hr    Plan:    No change in rate. Continue: 10 mL/hr  Next aPTT: 1200  12/23/20    Pharmacy will continue to monitor and adjust based on aPTT results.     Keegan Kennedy PharmD, BCPS  12/23/2020  8:02 AM

## 2020-12-23 NOTE — PROGRESS NOTES
Consent for blood was given by audelia Vasquez over phone and with two nurses verifying. Consent form is in chart.

## 2020-12-23 NOTE — CARE COORDINATION
Wound care consulted for injuries due to a fall. Reviewed media taken in ED. Pt has wounds to the face, bilateral knees and L wrist. The wounds on his knees and wrist appear to be DTI's. Would recommend venelex barrier to these areas. Unable to assess pt at this time due to COVID isolation. Will continue to follow. Specialty bed already ordered. Will update beside RN with plan of care.   Electronically signed by Bridget Farias RN on 12/23/2020 at 10:17 AM

## 2020-12-23 NOTE — PROGRESS NOTES
Plasma is infusing. At bedside for over 15 mins and patient remains stable. Will continue to monitor patient.

## 2020-12-23 NOTE — PROGRESS NOTES
Clinical Pharmacy Note  Heparin Dosing       Lab Results   Component Value Date    APTT 34.6 12/23/2020     Lab Results   Component Value Date    HGB 11.6 12/23/2020    HCT 33.3 12/23/2020     12/23/2020    INR 1.06 07/20/2017       Current Infusion Rate: 10 mL/hr    Plan:  Bolus: 3280 units  Rate: 13.3 mL/hr  Next aPTT: 2000  12/23/20    Pharmacy will continue to monitor and adjust based on aPTT results.     Ulices Muñoz, PharmD, BCPS  12/23/2020  1:44 PM

## 2020-12-23 NOTE — PROGRESS NOTES
aPtt 135.1. Pharmacy called and was told to pause Heparin gtt for 1 hour. And will put orders in for rate and next PTT.

## 2020-12-23 NOTE — PROGRESS NOTES
Clinical Pharmacy Note  Heparin Dosing       Lab Results   Component Value Date    APTT 135.1 12/22/2020     Lab Results   Component Value Date    HGB 15.2 12/22/2020    HCT 44.2 12/22/2020     12/22/2020    INR 1.06 07/20/2017       Current Infusion Rate: 14.8 mL/hr    Plan:  Hold heparin for 1 hour  Rate: decrease to 10 mL/hr  Next aPTT: 0600 12/23/20    Pharmacy will continue to monitor and adjust based on aPTT results.   Clemente Orozco, PharmD

## 2020-12-23 NOTE — PROGRESS NOTES
12/22/2020    GLUCOSEU Negative 08/10/2011       Radiology:     CXR: I have reviewed the CXR with the following interpretation: biLateral opacities  EKG:  I have reviewed the EKG with the following interpretation: No ST elevation    VL DUP UPPER EXTREMITY ARTERIES BILATERAL         CT CHEST PULMONARY EMBOLISM W CONTRAST   Final Result   Bilateral pulmonary emboli. No obvious right heart strain by CT scan. Scattered ground-glass opacity is seen throughout the lungs either due to   atelectasis, or post inflammatory-and infectious change. No focal   wedge-shaped area of consolidation is seen to suggest infarct      Mildly dilated ascending aorta. .  Trace aortic valve calcification is seen. Cholelithiasis      Results discussed with Marilee Blunt by Trinh Waters. Jose Segundo MD at 2:41   pm on 12/22/2020         CT HEAD WO CONTRAST   Final Result   No traumatic abnormality. CT CERVICAL SPINE WO CONTRAST   Final Result   No traumatic abnormality. CT FACIAL BONES WO CONTRAST   Final Result   Left periorbital soft tissue contusion. No underlying facial bone fracture. XR CHEST PORTABLE   Final Result   1. No acute pulmonary abnormality to account for patient's shortness of   breath. 2. Stable mild enlargement of the cardiac silhouette. XR PELVIS (1-2 VIEWS)   Final Result   Negative         XR WRIST LEFT (MIN 3 VIEWS)   Preliminary Result   1. Soft tissue swelling of the hand dorsum with no associated acute osseous   abnormality. 2. No acute osseous abnormality in the left wrist.  If patient's pain   persists or worsens, repeat radiograph can be obtained in 7-10 days as acute   non-displaced fractures can be occult. If patient has snuffbox tenderness,   follow up radiograph should include a scaphoid view. Alternatively, MRI can   be done to evaluate for non-displaced fractures. XR HAND LEFT (MIN 3 VIEWS)   Preliminary Result   1.  Soft tissue swelling of the hand Keppra, seizure precautions    DVT Prophylaxis: Heparin  Diet: DIET GENERAL;  Code Status: Full Code    PT/OT Eval Status: Ordered    Dispo -likely over weekend       Britt Berrios MD

## 2020-12-23 NOTE — PROGRESS NOTES
Notified by Micro of positive blood culture for Staph. Dr. Jones Gooden notified via secure message.         Electronically signed by Nando Tubbs RN on 12/23/2020 at 10:36 AM

## 2020-12-24 LAB
A/G RATIO: 0.9 (ref 1.1–2.2)
ALBUMIN SERPL-MCNC: 2.6 G/DL (ref 3.4–5)
ALP BLD-CCNC: 76 U/L (ref 40–129)
ALT SERPL-CCNC: 38 U/L (ref 10–40)
ANION GAP SERPL CALCULATED.3IONS-SCNC: 8 MMOL/L (ref 3–16)
APTT: 60.7 SEC (ref 24.2–36.2)
APTT: 68.9 SEC (ref 24.2–36.2)
AST SERPL-CCNC: 104 U/L (ref 15–37)
BILIRUB SERPL-MCNC: 0.6 MG/DL (ref 0–1)
BUN BLDV-MCNC: 26 MG/DL (ref 7–20)
CALCIUM SERPL-MCNC: 8 MG/DL (ref 8.3–10.6)
CHLORIDE BLD-SCNC: 102 MMOL/L (ref 99–110)
CO2: 25 MMOL/L (ref 21–32)
CREAT SERPL-MCNC: 0.6 MG/DL (ref 0.8–1.3)
GFR AFRICAN AMERICAN: >60
GFR NON-AFRICAN AMERICAN: >60
GLOBULIN: 2.8 G/DL
GLUCOSE BLD-MCNC: 130 MG/DL (ref 70–99)
HCT VFR BLD CALC: 34.4 % (ref 40.5–52.5)
HEMOGLOBIN: 11.8 G/DL (ref 13.5–17.5)
LACTIC ACID: 1.1 MMOL/L (ref 0.4–2)
LACTIC ACID: 1.2 MMOL/L (ref 0.4–2)
MCH RBC QN AUTO: 32.7 PG (ref 26–34)
MCHC RBC AUTO-ENTMCNC: 34.3 G/DL (ref 31–36)
MCV RBC AUTO: 95.5 FL (ref 80–100)
PDW BLD-RTO: 13.3 % (ref 12.4–15.4)
PLATELET # BLD: 244 K/UL (ref 135–450)
PMV BLD AUTO: 10.2 FL (ref 5–10.5)
POTASSIUM SERPL-SCNC: 4 MMOL/L (ref 3.5–5.1)
RBC # BLD: 3.6 M/UL (ref 4.2–5.9)
SODIUM BLD-SCNC: 135 MMOL/L (ref 136–145)
TOTAL CK: 1058 U/L (ref 39–308)
TOTAL CK: 1083 U/L (ref 39–308)
TOTAL PROTEIN: 5.4 G/DL (ref 6.4–8.2)
WBC # BLD: 12.4 K/UL (ref 4–11)

## 2020-12-24 PROCEDURE — 2500000003 HC RX 250 WO HCPCS: Performed by: INTERNAL MEDICINE

## 2020-12-24 PROCEDURE — 85027 COMPLETE CBC AUTOMATED: CPT

## 2020-12-24 PROCEDURE — 6360000002 HC RX W HCPCS: Performed by: INTERNAL MEDICINE

## 2020-12-24 PROCEDURE — 2580000003 HC RX 258: Performed by: INTERNAL MEDICINE

## 2020-12-24 PROCEDURE — 6370000000 HC RX 637 (ALT 250 FOR IP): Performed by: NURSE PRACTITIONER

## 2020-12-24 PROCEDURE — 83605 ASSAY OF LACTIC ACID: CPT

## 2020-12-24 PROCEDURE — 99233 SBSQ HOSP IP/OBS HIGH 50: CPT | Performed by: INTERNAL MEDICINE

## 2020-12-24 PROCEDURE — 94640 AIRWAY INHALATION TREATMENT: CPT

## 2020-12-24 PROCEDURE — 97530 THERAPEUTIC ACTIVITIES: CPT

## 2020-12-24 PROCEDURE — 36415 COLL VENOUS BLD VENIPUNCTURE: CPT

## 2020-12-24 PROCEDURE — 94761 N-INVAS EAR/PLS OXIMETRY MLT: CPT

## 2020-12-24 PROCEDURE — 80053 COMPREHEN METABOLIC PANEL: CPT

## 2020-12-24 PROCEDURE — 2700000000 HC OXYGEN THERAPY PER DAY

## 2020-12-24 PROCEDURE — 97535 SELF CARE MNGMENT TRAINING: CPT

## 2020-12-24 PROCEDURE — 94760 N-INVAS EAR/PLS OXIMETRY 1: CPT

## 2020-12-24 PROCEDURE — 82550 ASSAY OF CK (CPK): CPT

## 2020-12-24 PROCEDURE — 6370000000 HC RX 637 (ALT 250 FOR IP): Performed by: INTERNAL MEDICINE

## 2020-12-24 PROCEDURE — 85730 THROMBOPLASTIN TIME PARTIAL: CPT

## 2020-12-24 PROCEDURE — 2060000000 HC ICU INTERMEDIATE R&B

## 2020-12-24 RX ORDER — ALBUTEROL SULFATE 90 UG/1
2 AEROSOL, METERED RESPIRATORY (INHALATION) EVERY 6 HOURS PRN
Status: DISCONTINUED | OUTPATIENT
Start: 2020-12-24 | End: 2020-12-28 | Stop reason: HOSPADM

## 2020-12-24 RX ORDER — ALBUTEROL SULFATE 2.5 MG/3ML
2.5 SOLUTION RESPIRATORY (INHALATION)
Status: DISCONTINUED | OUTPATIENT
Start: 2020-12-24 | End: 2020-12-24

## 2020-12-24 RX ADMIN — REMDESIVIR 100 MG: 5 INJECTION INTRAVENOUS at 21:41

## 2020-12-24 RX ADMIN — APIXABAN 10 MG: 5 TABLET, FILM COATED ORAL at 12:03

## 2020-12-24 RX ADMIN — LEVETIRACETAM 500 MG: 500 TABLET ORAL at 20:31

## 2020-12-24 RX ADMIN — THIAMINE HYDROCHLORIDE 100 MG: 100 INJECTION, SOLUTION INTRAMUSCULAR; INTRAVENOUS at 20:31

## 2020-12-24 RX ADMIN — CASTOR OIL AND BALSAM, PERU: 788; 87 OINTMENT TOPICAL at 09:30

## 2020-12-24 RX ADMIN — LEVETIRACETAM 500 MG: 500 TABLET ORAL at 09:30

## 2020-12-24 RX ADMIN — CASTOR OIL AND BALSAM, PERU: 788; 87 OINTMENT TOPICAL at 20:33

## 2020-12-24 RX ADMIN — Medication 10 ML: at 20:31

## 2020-12-24 RX ADMIN — AZITHROMYCIN MONOHYDRATE 500 MG: 500 INJECTION, POWDER, LYOPHILIZED, FOR SOLUTION INTRAVENOUS at 15:31

## 2020-12-24 RX ADMIN — CEFTRIAXONE 1 G: 1 INJECTION, POWDER, FOR SOLUTION INTRAMUSCULAR; INTRAVENOUS at 14:59

## 2020-12-24 RX ADMIN — DEXAMETHASONE SODIUM PHOSPHATE 10 MG: 10 INJECTION, SOLUTION INTRAMUSCULAR; INTRAVENOUS at 20:31

## 2020-12-24 RX ADMIN — ALBUTEROL SULFATE 2 PUFF: 108 AEROSOL, METERED RESPIRATORY (INHALATION) at 22:46

## 2020-12-24 RX ADMIN — THERA TABS 1 TABLET: TAB at 09:30

## 2020-12-24 RX ADMIN — APIXABAN 10 MG: 5 TABLET, FILM COATED ORAL at 20:31

## 2020-12-24 RX ADMIN — Medication 10 ML: at 09:30

## 2020-12-24 ASSESSMENT — PAIN SCALES - GENERAL
PAINLEVEL_OUTOF10: 0

## 2020-12-24 NOTE — PROGRESS NOTES
Initial    FINDINGS:  Motion artifact degrades the study. Thyroid gland appears normal.  Trace pericardial fluid is seen. Small to  moderate size hiatal hernia is seen. There is nonspecific thickening at the  GE junction. Coronary artery calcification is seen. Aortic valve calcification is seen. Ascending aorta is mildly dilated measuring 4.4 cm    No embolus is seen in the central, right, or left main pulmonary artery. On  the right, small pulmonary emboli are seen in the right upper lobe. Small  embolus is seen in the right middle lobe pulmonary artery. .  Small emboli  embolus is seen peripherally in the right lower lobe pulmonary artery    On the left, small embolus is seen posteriorly in the left upper lobe. There  are also likely small emboli peripherally in the left lower lobe pulmonary  artery    Subtle ground-glass opacity seen in the right upper lobe. Bandlike opacities  in the right middle lobe. Bandlike opacities in the right lower lobe    On the left subtle ground-glass opacity is seen in the left upper lobe. Bandlike opacity seen in the left lower lobe. Spurring is seen in the spine. Spurring is seen in the shoulder joints. Gallstones are seen. Contrast is seen in the gallbladder      Impression Bilateral pulmonary emboli. No obvious right heart strain by CT scan. Scattered ground-glass opacity is seen throughout the lungs either due to  atelectasis, or post inflammatory-and infectious change. No focal  wedge-shaped area of consolidation is seen to suggest infarct    Mildly dilated ascending aorta. .  Trace aortic valve calcification is seen. Cholelithiasis    Results discussed with Josephine Carrillo by Rob Flores.  Zhen Alberts MD at 2:41  pm on 12/22/2020         CXR PA/LAT:   Results for orders placed during the hospital encounter of 01/06/13   XR Chest Standard TWO VW    Narrative    AP \T\ LATERAL CHEST X-RAY     INDICATION-    WEAKNESS        COMPARISON- 8/10/2011 FINDINGS- Moderate cardiomegaly is present. The pulmonary  vascularity is borderline. The lungs are clear without  infiltrates, new nodules or pleural effusions. IMPRESSION- Moderate cardiomegaly with borderline pulmonary  vascularity. Dictated on- 01/07/2013 [id number]          Electronically Read By- Serg Montgomery       Electronically Released By- Serg Montgomery       Released Date Time- 01/07/13 213 [address] Huber   ------------------------------------------------------------------------------       CXR portable:   Results for orders placed during the hospital encounter of 12/22/20   XR CHEST PORTABLE    Narrative EXAMINATION:  ONE XRAY VIEW OF THE CHEST    12/22/2020 12:12 pm    COMPARISON:  July 20, 2017    HISTORY:  ORDERING SYSTEM PROVIDED HISTORY: sob  TECHNOLOGIST PROVIDED HISTORY:  Reason for exam:->sob  Reason for Exam: fall    Acute shortness of breath. Initial encounter. FINDINGS:  Cardiac silhouette is mildly enlarged but stable. No focal pulmonary  consolidation, right pleural effusion, or pneumothorax. Chronic blunting of  the left lateral costophrenic sulcus which may be due to pleural thickening  or small pleural effusion. Impression 1. No acute pulmonary abnormality to account for patient's shortness of  breath. 2. Stable mild enlargement of the cardiac silhouette. Assessment:     Bilateral PE  COVID 19  Acute hypoxemic respiratory failure  Alcohol abuse (he denies any recent etoh intake)   Facial Trauma          Plan:        Hospital Day: 2     COVID 23  - almost no pulmonary filtrates. Therefore, it is not clear if remdesivir and steroids are needed. - Procalcitonin  0.28. On empiric antibiotics. No clear pneumonia on CT chest.   - procal may be elevated from cellulitis on face.  This appears slightly improved.      PE  - Eliquis.         Hypoxemia  - likely secondary to PE and not pulmonary involvement from COVID - move to room air            Facial trauma. - no RV strain on CT chest or EKG  - agree with echo. - DDX syncope vs seizure vs mechanical fall.    - may only be cellulitis. May be able to d/c today. Lanre Mederos MD                   This note was transcribed using [id number] [employer]. Please disregard any translational errors.       [phone]

## 2020-12-24 NOTE — PROGRESS NOTES
Clinical Pharmacy Note  Heparin Dosing       Lab Results   Component Value Date    APTT 79.0 12/23/2020     Lab Results   Component Value Date    HGB 11.6 12/23/2020    HCT 33.3 12/23/2020     12/23/2020    INR 1.06 07/20/2017       Current Infusion Rate: 13.3 mL/hr    Plan:  Rate: decrease to 12.3 mL/hr  Next aPTT: 0300  12/24/20    Pharmacy will continue to monitor and adjust based on aPTT results.   Nani Booth [facility],12/23/2020,8:57 PM

## 2020-12-24 NOTE — PROGRESS NOTES
Hospitalist Progress Note      PCP: Jason Ruiz MD    Date of Admission: 12/22/2020      Subjective: up to chair, weak, no fever or chills       Medications:  Reviewed    Infusion Medications    heparin (PORCINE) Infusion 12.3 mL/hr (12/23/20 2158)     Scheduled Medications    Venelex   Topical BID    levETIRAcetam  500 mg Oral BID    sodium chloride flush  10 mL Intravenous 2 times per day    azithromycin  500 mg Intravenous Q24H    And    cefTRIAXone (ROCEPHIN) IV  1 g Intravenous Q24H    dexamethasone  10 mg Intravenous Q24H    multivitamin  1 tablet Oral Daily    thiamine (VITAMIN B1) IVPB  100 mg Intravenous Q24H    remdesivir IVPB  100 mg Intravenous Q24H     PRN Meds: sodium chloride flush, promethazine **OR** ondansetron, polyethylene glycol, acetaminophen **OR** acetaminophen      Intake/Output Summary (Last 24 hours) at 12/24/2020 1100  Last data filed at 12/24/2020 0920  Gross per 24 hour   Intake 720 ml   Output 1200 ml   Net -480 ml       Physical Exam Performed:    BP (!) 159/105   Pulse 139   Temp 98.7 °F (37.1 °C) (Axillary)   Resp 24   Ht 5' 10\" (1.778 m)   Wt 228 lb 6.3 oz (103.6 kg)   SpO2 91%   BMI 32.77 kg/m²     General appearance: No apparent distress  HEENT: healing wound left face, unable to see his left eye    Neck: Supple  Respiratory:  Normal respiratory effort. Clear to auscultation, bilaterally without Rales/Wheezes/Rhonchi. Cardiovascular: Regular rate and rhythm with normal S1/S2 without murmurs, rubs or gallops. Abdomen: Soft, non-tender  Musculoskeletal: No clubbing, cyanosis   Skin: Skin color, texture, turgor normal.  No rashes or lesions.   Neurologic:  No focal weakness   Psychiatric: Alert   Capillary Refill: Brisk,< 3 seconds   Peripheral Pulses: +2 palpable, equal bilaterally       Labs:   Recent Labs     12/22/20  1143 12/23/20  0627 12/24/20  0854   WBC 17.0* 12.4* 12.4*   HGB 15.2 11.6* 11.8*   HCT 44.2 33.3* 34.4*    230 244     Recent Labs 12/22/20  1143 12/23/20  0627 12/24/20  0854    139 135*   K 4.5 4.0 4.0   CL 98* 106 102   CO2 25 23 25   BUN 29* 30* 26*   CREATININE 0.9 0.8 0.6*   CALCIUM 8.8 7.8* 8.0*     Recent Labs     12/22/20  1143 12/23/20  0627 12/24/20  0854   * 110* 104*   ALT 47* 35 38   BILITOT 1.2* 0.6 0.6   ALKPHOS 122 84 76     No results for input(s): INR in the last 72 hours. Recent Labs     12/22/20  1143 12/22/20  1143 12/23/20  1534 12/23/20  1932 12/24/20  0237   CKTOTAL 2,930*   < > 1,761* 1,647* 1,058*   TROPONINI 0.02*  --   --   --   --     < > = values in this interval not displayed. Urinalysis:      Lab Results   Component Value Date    NITRU Negative 12/22/2020    WBCUA 10 12/22/2020    BACTERIA 4+ 08/10/2011    RBCUA 62 12/22/2020    BLOODU LARGE 12/22/2020    SPECGRAV 1.025 12/22/2020    GLUCOSEU Negative 12/22/2020    GLUCOSEU Negative 08/10/2011       Radiology:  VL DUP UPPER EXTREMITY ARTERIES BILATERAL   Final Result      CT CHEST PULMONARY EMBOLISM W CONTRAST   Final Result   Bilateral pulmonary emboli. No obvious right heart strain by CT scan. Scattered ground-glass opacity is seen throughout the lungs either due to   atelectasis, or post inflammatory-and infectious change. No focal   wedge-shaped area of consolidation is seen to suggest infarct      Mildly dilated ascending aorta. .  Trace aortic valve calcification is seen. Cholelithiasis      Results discussed with Mark Schulte by Casey Chapa. Kim Cardona MD at 2:41   pm on 12/22/2020         CT HEAD WO CONTRAST   Final Result   No traumatic abnormality. CT CERVICAL SPINE WO CONTRAST   Final Result   No traumatic abnormality. CT FACIAL BONES WO CONTRAST   Final Result   Left periorbital soft tissue contusion. No underlying facial bone fracture. XR CHEST PORTABLE   Final Result   1. No acute pulmonary abnormality to account for patient's shortness of   breath.    2. Stable mild enlargement of the

## 2020-12-24 NOTE — PROGRESS NOTES
Clinical Pharmacy Note  Heparin Dosing       Lab Results   Component Value Date    APTT 60.7 12/24/2020     Lab Results   Component Value Date    HGB 11.6 12/23/2020    HCT 33.3 12/23/2020     12/23/2020    INR 1.06 07/20/2017       Current Infusion Rate: 12.3 mL/hr    Plan:  Rate: Continue  12.3 mL/hr  Next aPTT: 0900  12/24/20    Pharmacy will continue to monitor and adjust based on aPTT results.     Mario Fuentes, PharmD  12/24/2020 3:42 AM

## 2020-12-24 NOTE — PROGRESS NOTES
Clinical Pharmacy Note  Heparin Dosing       Lab Results   Component Value Date    APTT 68.9 12/24/2020     Lab Results   Component Value Date    HGB 11.8 12/24/2020    HCT 34.4 12/24/2020     12/24/2020    INR 1.06 07/20/2017       Current Infusion Rate: 12.3 mL/hr    Plan:  Continue current rate: 12.3 mL/hr  Next aPTT: 0600 on 12-25-20    Amanda Duarte [facility]  12/24/2020  10:04 AM

## 2020-12-24 NOTE — PROGRESS NOTES
Occupational Therapy  Facility/Department: [address] PROGRESSIVE CARE  Daily Treatment Note and Tentative D/C    NAME: Ju Mederos  : 1935  MRN: [id number]    Date of Service: 2020    Discharge Recommendations: Ju Mederos scored a 13/24 on the AM-PAC ADL Inpatient form. Current research shows that an AM-PAC score of 17 or less is typically not associated with a discharge to the patient's home setting. Based on the patient's AM-PAC score and their current ADL deficits, it is recommended that the patient have 3-5 sessions per week of Occupational Therapy at d/c to increase the patient's independence. Please see assessment section for further patient specific details. If patient discharges prior to next session this note will serve as a discharge summary. Please see below for the latest assessment towards goals. Continue to assess pending progress, 3-5 sessions per week  OT Equipment Recommendations  Equipment Needed: No    Assessment   Performance deficits / Impairments: Decreased functional mobility ; Decreased strength;Decreased endurance;Decreased ADL status; Decreased balance;Decreased cognition;Decreased high-level IADLs;Decreased safe awareness  Assessment: Pt tolerated sessin fair. Pt completes bed mobility with Max/Total A. Pt needing cues to initiate and sequence. Pt completes transfers with Mod A and use of stedy. Pt completes toileting with Max/Total A with use of stedy. Continue with POC. Prognosis: Good  Assistance / Modification: stedy  OT Education: OT Role;Plan of Care;Transfer Training;ADL Adaptive Strategies  REQUIRES OT FOLLOW UP: Yes  Activity Tolerance  Activity Tolerance: Patient limited by fatigue  Safety Devices  Safety Devices in place: Yes  Type of devices: Chair alarm in place;Call light within reach;Gait belt;Nurse notified; Left in chair         Patient Diagnosis(es): The primary encounter diagnosis was COVID-19 virus infection.  Diagnoses of Septicemia ([facility] [address] Traumatic rhabdomyolysis, initial encounter ([facility] [address] Acute respiratory failure with hypoxia ([facility] [address] and Bilateral pulmonary embolism ([facility] [address] were also pertinent to this visit. has a past medical history of Alcohol abuse, Dehydration, Leg weakness, bilateral, Seizure ([address] and UTI (urinary tract infection). has a past surgical history that includes Splenectomy, total and Cataract removal (Bilateral). Restrictions  Restrictions/Precautions  Restrictions/Precautions: Fall Risk, Isolation  Position Activity Restriction  Other position/activity restrictions: Droplet Plus : COVID +. Subjective   General  Chart Reviewed: Yes  Patient assessed for rehabilitation services?: Yes  Additional Pertinent Hx: per ED note, Maria Antonia Aguirre is a 80 y.o. male who presents to the emergency department the [facility] after being found down on his floor at home. Based on the information I got from the patient it appears his son found him. He states he lives alone. He believes that he fell out of bed around 930 this morning, although based on appearances I would say has been laying on the floor much longer than that. He is oriented to person and place and time. He states he has had a \"cold\" since Saturday. He states he has been coughing and short of breath. On transport to the emergency room the patient had pressure injuries to his knees and face with bruising to his left hand with dried blood on his face, and dried stool. He admits to some facial pain on the left. He denies chest pain. He admits to shortness of breath and cough. He denies abdominal pain. He denies upper or lower extremity pain. Family / Caregiver Present: No  Referring Practitioner: Marielena Santa MD  Subjective  Subjective: Pt agreeable to OT treatment. Pt with no reports of pain. General Comment  Comments: okay for therapy per RN.       Orientation  Orientation  Overall Orientation Status: Impaired  Orientation Level: Disoriented to situation;Oriented to time;Oriented to place;Oriented to person  Objective    ADL  LE Dressing: Maximum assistance(to don bilateral socks)  Toileting: Dependent/Total(spann; assist to complete pericare in stance in stedy with CGA for balance;)        Balance  Sitting Balance: Moderate assistance(Mod A for initial sitting balance seated EOB; progressing to SBA/CGA for balance)  Standing Balance: Contact guard assistance(in stance in stedy)  Standing Balance  Time: ~1 min x2 attempts  Activity: standing in in stedy for pericare  Wheelchair Bed Transfers  Wheelchair/Bed - Technique: ([facility])  Equipment Used: Bed;Other(bed to chair)  Level of Asssistance: Dependent/Total  Bed mobility  Supine to Sit: Maximum assistance;Dependent/Total  Sit to Supine: Unable to assess  Scooting: Maximal assistance  Transfers  Sit to stand: Dependent/Total;Moderate assistance  Stand to sit: Dependent/Total;Moderate assistance  Transfer Comments: to/from stedy from EOB                       Cognition  Overall Cognitive Status: Exceptions  Arousal/Alertness: Appropriate responses to stimuli  Following Commands: Follows one step commands with increased time; Follows one step commands with repetition  Safety Judgement: Decreased awareness of need for safety  Insights: Decreased awareness of deficits  Initiation: Requires cues for some  Sequencing: Requires cues for some              Plan   Plan  Times per week: 3-5x  Current Treatment Recommendations: Strengthening, Balance Training, Endurance Training, Self-Care / ADL, Functional Mobility Training, Gait Training, Patient/Caregiver Education & Training, Safety Education & Training    AM-PAC Score        AM-[facility] Inpatient Daily Activity Raw Score: 13 (12/24/20 1129)  AM-PAC Inpatient ADL T-Scale Score : 32.03 (12/24/20 1129)  ADL Inpatient CMS 0-100% Score: 63.03 (12/24/20 1129)  ADL Inpatient CMS G-Code Modifier : CL (12/24/20 1129)    Goals  Short term goals  Time Frame for Short term goals: prior to D/C; ongoing 12/24  Short term goal 1: complete functional mobility and transfers with supervision  Short term goal 2: complete toileting with supervision  Short term goal 3: complete bathing and dressing with supervision  Short term goal 4: complete grooming in stance at sink with supervision  Short term goal 5: complete bed mobility with supervision  Long term goals  Time Frame for Long term goals : STG=LTG  Patient Goals   Patient goals : return home       Therapy Time   Individual Concurrent Group Co-treatment   Time In 1052         Time Out 1125         Minutes 33         Timed Code Treatment Minutes: 33 Minutes       Albina Fountain OTR/L

## 2020-12-25 LAB
A/G RATIO: 1 (ref 1.1–2.2)
ALBUMIN SERPL-MCNC: 2.5 G/DL (ref 3.4–5)
ALP BLD-CCNC: 66 U/L (ref 40–129)
ALT SERPL-CCNC: 37 U/L (ref 10–40)
ANION GAP SERPL CALCULATED.3IONS-SCNC: 6 MMOL/L (ref 3–16)
AST SERPL-CCNC: 87 U/L (ref 15–37)
BILIRUB SERPL-MCNC: 0.6 MG/DL (ref 0–1)
BLOOD CULTURE, ROUTINE: ABNORMAL
BUN BLDV-MCNC: 25 MG/DL (ref 7–20)
CALCIUM SERPL-MCNC: 7.8 MG/DL (ref 8.3–10.6)
CHLORIDE BLD-SCNC: 102 MMOL/L (ref 99–110)
CO2: 25 MMOL/L (ref 21–32)
CREAT SERPL-MCNC: 0.6 MG/DL (ref 0.8–1.3)
GFR AFRICAN AMERICAN: >60
GFR NON-AFRICAN AMERICAN: >60
GLOBULIN: 2.5 G/DL
GLUCOSE BLD-MCNC: 194 MG/DL (ref 70–99)
ORGANISM: ABNORMAL
POTASSIUM SERPL-SCNC: 4.1 MMOL/L (ref 3.5–5.1)
PROCALCITONIN: 0.12 NG/ML (ref 0–0.15)
SODIUM BLD-SCNC: 133 MMOL/L (ref 136–145)
TOTAL CK: 581 U/L (ref 39–308)
TOTAL CK: 689 U/L (ref 39–308)
TOTAL CK: 796 U/L (ref 39–308)
TOTAL PROTEIN: 5 G/DL (ref 6.4–8.2)

## 2020-12-25 PROCEDURE — 6360000002 HC RX W HCPCS: Performed by: INTERNAL MEDICINE

## 2020-12-25 PROCEDURE — 6370000000 HC RX 637 (ALT 250 FOR IP): Performed by: INTERNAL MEDICINE

## 2020-12-25 PROCEDURE — 2580000003 HC RX 258: Performed by: INTERNAL MEDICINE

## 2020-12-25 PROCEDURE — 2060000000 HC ICU INTERMEDIATE R&B

## 2020-12-25 PROCEDURE — 94761 N-INVAS EAR/PLS OXIMETRY MLT: CPT

## 2020-12-25 PROCEDURE — 82550 ASSAY OF CK (CPK): CPT

## 2020-12-25 PROCEDURE — 2700000000 HC OXYGEN THERAPY PER DAY

## 2020-12-25 PROCEDURE — 80053 COMPREHEN METABOLIC PANEL: CPT

## 2020-12-25 PROCEDURE — 84145 PROCALCITONIN (PCT): CPT

## 2020-12-25 PROCEDURE — 36415 COLL VENOUS BLD VENIPUNCTURE: CPT

## 2020-12-25 PROCEDURE — 2500000003 HC RX 250 WO HCPCS: Performed by: INTERNAL MEDICINE

## 2020-12-25 RX ORDER — CEFDINIR 300 MG/1
300 CAPSULE ORAL 2 TIMES DAILY
Qty: 6 CAPSULE | Refills: 0 | Status: SHIPPED | OUTPATIENT
Start: 2020-12-25 | End: 2020-12-27 | Stop reason: HOSPADM

## 2020-12-25 RX ORDER — DEXAMETHASONE 6 MG/1
6 TABLET ORAL
Qty: 5 TABLET | Refills: 0 | Status: SHIPPED | OUTPATIENT
Start: 2020-12-25 | End: 2020-12-27 | Stop reason: SDUPTHER

## 2020-12-25 RX ORDER — MULTIVITAMIN WITH IRON
1 TABLET ORAL DAILY
Qty: 30 TABLET | Refills: 0 | Status: SHIPPED | OUTPATIENT
Start: 2020-12-26

## 2020-12-25 RX ORDER — LANOLIN ALCOHOL/MO/W.PET/CERES
100 CREAM (GRAM) TOPICAL DAILY
Qty: 15 TABLET | Refills: 0 | Status: ON HOLD | OUTPATIENT
Start: 2020-12-25 | End: 2021-06-04 | Stop reason: HOSPADM

## 2020-12-25 RX ADMIN — DEXAMETHASONE SODIUM PHOSPHATE 10 MG: 10 INJECTION, SOLUTION INTRAMUSCULAR; INTRAVENOUS at 21:03

## 2020-12-25 RX ADMIN — AZITHROMYCIN MONOHYDRATE 500 MG: 500 INJECTION, POWDER, LYOPHILIZED, FOR SOLUTION INTRAVENOUS at 14:12

## 2020-12-25 RX ADMIN — THERA TABS 1 TABLET: TAB at 08:06

## 2020-12-25 RX ADMIN — APIXABAN 10 MG: 5 TABLET, FILM COATED ORAL at 08:06

## 2020-12-25 RX ADMIN — REMDESIVIR 100 MG: 5 INJECTION INTRAVENOUS at 21:56

## 2020-12-25 RX ADMIN — LEVETIRACETAM 500 MG: 500 TABLET ORAL at 08:06

## 2020-12-25 RX ADMIN — Medication 10 ML: at 21:04

## 2020-12-25 RX ADMIN — Medication 10 ML: at 08:06

## 2020-12-25 RX ADMIN — CEFTRIAXONE 1 G: 1 INJECTION, POWDER, FOR SOLUTION INTRAMUSCULAR; INTRAVENOUS at 14:12

## 2020-12-25 RX ADMIN — CASTOR OIL AND BALSAM, PERU: 788; 87 OINTMENT TOPICAL at 21:04

## 2020-12-25 RX ADMIN — CASTOR OIL AND BALSAM, PERU: 788; 87 OINTMENT TOPICAL at 08:07

## 2020-12-25 RX ADMIN — THIAMINE HYDROCHLORIDE 100 MG: 100 INJECTION, SOLUTION INTRAMUSCULAR; INTRAVENOUS at 21:05

## 2020-12-25 RX ADMIN — LEVETIRACETAM 500 MG: 500 TABLET ORAL at 21:03

## 2020-12-25 RX ADMIN — APIXABAN 10 MG: 5 TABLET, FILM COATED ORAL at 21:03

## 2020-12-25 ASSESSMENT — PAIN SCALES - GENERAL
PAINLEVEL_OUTOF10: 0

## 2020-12-25 NOTE — PROGRESS NOTES
Hospitalist Progress Note      PCP: Tray Lopez MD    Date of Admission: 12/22/2020        Subjective: feels better, opening his left eye. No fever or chills        Medications:  Reviewed    Infusion Medications   Scheduled Medications    apixaban  10 mg Oral BID    Venelex   Topical BID    levETIRAcetam  500 mg Oral BID    sodium chloride flush  10 mL Intravenous 2 times per day    azithromycin  500 mg Intravenous Q24H    And    cefTRIAXone (ROCEPHIN) IV  1 g Intravenous Q24H    dexamethasone  10 mg Intravenous Q24H    multivitamin  1 tablet Oral Daily    thiamine (VITAMIN B1) IVPB  100 mg Intravenous Q24H    remdesivir IVPB  100 mg Intravenous Q24H     PRN Meds: albuterol sulfate HFA, sodium chloride flush, promethazine **OR** ondansetron, polyethylene glycol, acetaminophen **OR** acetaminophen      Intake/Output Summary (Last 24 hours) at 12/25/2020 0913  Last data filed at 12/25/2020 5059  Gross per 24 hour   Intake 1060 ml   Output 750 ml   Net 310 ml       Physical Exam Performed:    BP (!) 163/80   Pulse 72   Temp 98.9 °F (37.2 °C) (Axillary)   Resp 20   Ht 5' 10\" (1.778 m)   Wt 222 lb 10.6 oz (101 kg)   SpO2 92%   BMI 31.95 kg/m²     General appearance: No apparent distress, opening his left eye, no conjunctival congestion, visual filed intact. Neck: Supple  Respiratory:  Normal respiratory effort. Clear to auscultation, bilaterally without Rales/Wheezes/Rhonchi. Cardiovascular: Regular rate and rhythm with normal S1/S2 without murmurs, rubs or gallops. Abdomen: Soft, non-tender, non-distended  Musculoskeletal: No clubbing, cyanosis  Skin: Skin color, texture, turgor normal.  No rashes or lesions.   Neurologic:  No focal weakness   Psychiatric: Alert and oriented to place, partially to time and person   Capillary Refill: Brisk,< 3 seconds   Peripheral Pulses: +2 palpable, equal bilaterally       Labs:   Recent Labs     12/22/20  1143 12/23/20  0627 12/24/20  0854   WBC 17.0* 12.4* 12.4*   HGB 15.2 11.6* 11.8*   HCT 44.2 33.3* 34.4*    230 244     Recent Labs     12/23/20  0627 12/24/20  0854 12/25/20  0336    135* 133*   K 4.0 4.0 4.1    102 102   CO2 23 25 25   BUN 30* 26* 25*   CREATININE 0.8 0.6* 0.6*   CALCIUM 7.8* 8.0* 7.8*     Recent Labs     12/23/20  0627 12/24/20  0854 12/25/20  0336   * 104* 87*   ALT 35 38 37   BILITOT 0.6 0.6 0.6   ALKPHOS 84 76 66     No results for input(s): INR in the last 72 hours. Recent Labs     12/22/20  1143 12/22/20  1143 12/24/20  0237 12/24/20  1202 12/25/20  0336   CKTOTAL 2,930*   < > 1,058* 1,083* 796*   TROPONINI 0.02*  --   --   --   --     < > = values in this interval not displayed. Urinalysis:      Lab Results   Component Value Date    NITRU Negative 12/22/2020    WBCUA 10 12/22/2020    BACTERIA 4+ 08/10/2011    RBCUA 62 12/22/2020    BLOODU LARGE 12/22/2020    SPECGRAV 1.025 12/22/2020    GLUCOSEU Negative 12/22/2020    GLUCOSEU Negative 08/10/2011       Radiology:  VL DUP UPPER EXTREMITY ARTERIES BILATERAL   Final Result      CT CHEST PULMONARY EMBOLISM W CONTRAST   Final Result   Bilateral pulmonary emboli. No obvious right heart strain by CT scan. Scattered ground-glass opacity is seen throughout the lungs either due to   atelectasis, or post inflammatory-and infectious change. No focal   wedge-shaped area of consolidation is seen to suggest infarct      Mildly dilated ascending aorta. .  Trace aortic valve calcification is seen. Cholelithiasis      Results discussed with Beto Olvera by Barbara Lomeli. Gumaro Mosher MD at 2:41   pm on 12/22/2020         CT HEAD WO CONTRAST   Final Result   No traumatic abnormality. CT CERVICAL SPINE WO CONTRAST   Final Result   No traumatic abnormality. CT FACIAL BONES WO CONTRAST   Final Result   Left periorbital soft tissue contusion. No underlying facial bone fracture. XR CHEST PORTABLE   Final Result   1.  No acute pulmonary abnormality to account for patient's shortness of   breath. 2. Stable mild enlargement of the cardiac silhouette. XR PELVIS (1-2 VIEWS)   Final Result   Negative         XR WRIST LEFT (MIN 3 VIEWS)   Final Result   1. Soft tissue swelling of the hand dorsum with no associated acute osseous   abnormality. 2. No acute osseous abnormality in the left wrist.  If patient's pain   persists or worsens, repeat radiograph can be obtained in 7-10 days as acute   non-displaced fractures can be occult. If patient has snuffbox tenderness,   follow up radiograph should include a scaphoid view. Alternatively, MRI can   be done to evaluate for non-displaced fractures. XR HAND LEFT (MIN 3 VIEWS)   Final Result   1. Soft tissue swelling of the hand dorsum with no associated acute osseous   abnormality. 2. No acute osseous abnormality in the left wrist.  If patient's pain   persists or worsens, repeat radiograph can be obtained in 7-10 days as acute   non-displaced fractures can be occult. If patient has snuffbox tenderness,   follow up radiograph should include a scaphoid view. Alternatively, MRI can   be done to evaluate for non-displaced fractures. Assessment/Plan:    Active Hospital Problems    Diagnosis    Sepsis ([address] [A41.9]    Pneumonia due to COVID-19 virus [U07.1, J12.89]    Bilateral pulmonary embolism (HCC) [I26.99]    Elevated LFTs [R79.89]    Rhabdomyolysis [M62.82]    Facial trauma [S09.93XA]    Bacterial pneumonia [J15.9]    Alcohol abuse [F10.10]    Lactic acidosis [E87.2]     1.  Acute respiratory failure with hypoxia, due to potential bacterial pneumonia, COVID-19 and bilateral pulmonary embolism, clinically improving. 2.  Pneumonia due to COVID-19, dexamethasone, remdesivir and convalescent plasma, he is now on RA. Plan to discontinue remdesivir on discharge.   3. Thought to have bacterial pneumonia, IV azithromycin and ceftriaxone   4.  Sepsis, fever, tachypnea tachycardia and elevated white count, likely due to to viral pneumonia with possible facial cellulitis. 5.  Bilateral pulmonary embolism, likely triggered by COVID-19 infection, heparin drip since admission, will change to eliquis. Echo with normal right RV size. 6.  Alcohol abuse, patient stated that he drinks on a daily basis, [facility] protocol ordered  7.  Rhabdomyolysis, likely from laying down on the floor, IV fluid, improved CK. 8.  Facial trauma, likely due to fall, edema better, patient is opening both eyes without issues, no blurry or double vision. 9.  Lactic acidosis, lactic acid elevated on admission, improved.    10.  History of seizure, restart Keppra, seizure precautions      Diet: DIET GENERAL;  Code Status: Full Code      Vanessa Georges MD

## 2020-12-25 NOTE — PROGRESS NOTES
PRN Albuterol MDI given to patient at RN request.  Patient has only upper airway wheezes, no wheezes heard in lung fields.   SpO2 89%, O2 started @ 1 lpm.

## 2020-12-25 NOTE — PLAN OF CARE
Problem: Airway Clearance - Ineffective  Goal: Achieve or maintain patent airway  12/25/2020 1057 by Tomás Limon RN  Outcome: Ongoing  12/25/2020 0121 by Merari Gaines RN  Outcome: Ongoing     Problem: Gas Exchange - Impaired  Goal: Absence of hypoxia  12/25/2020 1057 by Tomás Limon RN  Outcome: Ongoing  12/25/2020 0121 by Merari Gaines RN  Outcome: Ongoing     Problem: Breathing Pattern - Ineffective  Goal: Ability to achieve and maintain a regular respiratory rate  12/25/2020 1057 by Tomás Limon RN  Outcome: Ongoing  12/25/2020 0121 by Merari Gaines RN  Outcome: Ongoing     Problem:  Body Temperature -  Risk of, Imbalanced  Goal: Ability to maintain a body temperature within defined limits  12/25/2020 1057 by Tomás Limon RN  Outcome: Ongoing  12/25/2020 0121 by Merari Gaines RN  Outcome: Ongoing     Problem: Isolation Precautions - Risk of Spread of Infection  Goal: Prevent transmission of infection  12/25/2020 1057 by Tomás Limon RN  Outcome: Ongoing  12/25/2020 0121 by Merari Gaines RN  Outcome: Ongoing     Problem: Nutrition Deficits  Goal: Optimize nutrtional status  12/25/2020 1057 by Tomás Limon RN  Outcome: Ongoing  12/25/2020 0121 by Merari Gaines RN  Outcome: Ongoing     Problem: Risk for Fluid Volume Deficit  Goal: Maintain normal heart rhythm  12/25/2020 1057 by Tomás Limon RN  Outcome: Ongoing  12/25/2020 0121 by Merari Gaines RN  Outcome: Ongoing     Problem: Loneliness or Risk for Loneliness  Goal: Demonstrate positive use of time alone when socialization is not possible  12/25/2020 1057 by Tomás Limon RN  Outcome: Ongoing  12/25/2020 0121 by Merari Gaines RN  Outcome: Ongoing     Problem: Fatigue  Goal: Verbalize increase energy and improved vitality  12/25/2020 1057 by Tomás Limon RN  Outcome: Ongoing  12/25/2020 0121 by Merari Gaines RN  Outcome: Ongoing     Problem: Patient Education: Mireya Rahman to Patient Education Activity  Goal: Patient/Family Education  12/25/2020 1057 by Zohra Souza RN  Outcome: Ongoing  12/25/2020 0121 by Anna Tellez RN  Outcome: Ongoing     Problem: Falls - Risk of:  Goal: Will remain free from falls  Description: Will remain free from falls  12/25/2020 1057 by Zohra Souza RN  Outcome: Ongoing  12/25/2020 0121 by Anna Tellez RN  Outcome: Ongoing     Problem: Skin Integrity:  Goal: Will show no infection signs and symptoms  Description: Will show no infection signs and symptoms  12/25/2020 1057 by Zohra Souza RN  Outcome: Ongoing  12/25/2020 0121 by Anna Tellez RN  Outcome: Ongoing

## 2020-12-25 NOTE — CARE COORDINATION
Discharge Planning:  Discharge order noted  SW spoke with patient's son ED-  Sent referrals to [facility] of [facility], [facility] and [address]. Waiting response.

## 2020-12-26 LAB
A/G RATIO: 1 (ref 1.1–2.2)
ALBUMIN SERPL-MCNC: 2.5 G/DL (ref 3.4–5)
ALP BLD-CCNC: 71 U/L (ref 40–129)
ALT SERPL-CCNC: 37 U/L (ref 10–40)
ANION GAP SERPL CALCULATED.3IONS-SCNC: 6 MMOL/L (ref 3–16)
AST SERPL-CCNC: 76 U/L (ref 15–37)
BILIRUB SERPL-MCNC: 0.6 MG/DL (ref 0–1)
BUN BLDV-MCNC: 20 MG/DL (ref 7–20)
CALCIUM SERPL-MCNC: 7.8 MG/DL (ref 8.3–10.6)
CHLORIDE BLD-SCNC: 104 MMOL/L (ref 99–110)
CO2: 27 MMOL/L (ref 21–32)
CREAT SERPL-MCNC: <0.5 MG/DL (ref 0.8–1.3)
CULTURE, BLOOD 2: NORMAL
GFR AFRICAN AMERICAN: >60
GFR NON-AFRICAN AMERICAN: >60
GLOBULIN: 2.4 G/DL
GLUCOSE BLD-MCNC: 161 MG/DL (ref 70–99)
HCT VFR BLD CALC: 31.8 % (ref 40.5–52.5)
HEMOGLOBIN: 10.9 G/DL (ref 13.5–17.5)
MCH RBC QN AUTO: 32.6 PG (ref 26–34)
MCHC RBC AUTO-ENTMCNC: 34.4 G/DL (ref 31–36)
MCV RBC AUTO: 95 FL (ref 80–100)
PDW BLD-RTO: 13.1 % (ref 12.4–15.4)
PLATELET # BLD: 244 K/UL (ref 135–450)
PMV BLD AUTO: 10.1 FL (ref 5–10.5)
POTASSIUM SERPL-SCNC: 4.5 MMOL/L (ref 3.5–5.1)
RBC # BLD: 3.35 M/UL (ref 4.2–5.9)
SODIUM BLD-SCNC: 137 MMOL/L (ref 136–145)
TOTAL CK: 550 U/L (ref 39–308)
TOTAL PROTEIN: 4.9 G/DL (ref 6.4–8.2)
WBC # BLD: 11.5 K/UL (ref 4–11)

## 2020-12-26 PROCEDURE — 2580000003 HC RX 258: Performed by: INTERNAL MEDICINE

## 2020-12-26 PROCEDURE — 80053 COMPREHEN METABOLIC PANEL: CPT

## 2020-12-26 PROCEDURE — 85027 COMPLETE CBC AUTOMATED: CPT

## 2020-12-26 PROCEDURE — 6360000002 HC RX W HCPCS: Performed by: INTERNAL MEDICINE

## 2020-12-26 PROCEDURE — 94761 N-INVAS EAR/PLS OXIMETRY MLT: CPT

## 2020-12-26 PROCEDURE — 2500000003 HC RX 250 WO HCPCS: Performed by: INTERNAL MEDICINE

## 2020-12-26 PROCEDURE — 82550 ASSAY OF CK (CPK): CPT

## 2020-12-26 PROCEDURE — 36415 COLL VENOUS BLD VENIPUNCTURE: CPT

## 2020-12-26 PROCEDURE — 2060000000 HC ICU INTERMEDIATE R&B

## 2020-12-26 PROCEDURE — 6370000000 HC RX 637 (ALT 250 FOR IP): Performed by: INTERNAL MEDICINE

## 2020-12-26 RX ADMIN — Medication 10 ML: at 08:39

## 2020-12-26 RX ADMIN — LEVETIRACETAM 500 MG: 500 TABLET ORAL at 08:39

## 2020-12-26 RX ADMIN — CEFTRIAXONE 1 G: 1 INJECTION, POWDER, FOR SOLUTION INTRAMUSCULAR; INTRAVENOUS at 14:32

## 2020-12-26 RX ADMIN — Medication 10 ML: at 21:02

## 2020-12-26 RX ADMIN — DEXAMETHASONE SODIUM PHOSPHATE 10 MG: 10 INJECTION, SOLUTION INTRAMUSCULAR; INTRAVENOUS at 21:02

## 2020-12-26 RX ADMIN — LEVETIRACETAM 500 MG: 500 TABLET ORAL at 21:03

## 2020-12-26 RX ADMIN — CASTOR OIL AND BALSAM, PERU: 788; 87 OINTMENT TOPICAL at 08:39

## 2020-12-26 RX ADMIN — CASTOR OIL AND BALSAM, PERU: 788; 87 OINTMENT TOPICAL at 21:03

## 2020-12-26 RX ADMIN — REMDESIVIR 100 MG: 5 INJECTION INTRAVENOUS at 21:41

## 2020-12-26 RX ADMIN — APIXABAN 10 MG: 5 TABLET, FILM COATED ORAL at 08:39

## 2020-12-26 RX ADMIN — APIXABAN 10 MG: 5 TABLET, FILM COATED ORAL at 21:02

## 2020-12-26 RX ADMIN — THIAMINE HYDROCHLORIDE 100 MG: 100 INJECTION, SOLUTION INTRAMUSCULAR; INTRAVENOUS at 21:03

## 2020-12-26 RX ADMIN — AZITHROMYCIN MONOHYDRATE 500 MG: 500 INJECTION, POWDER, LYOPHILIZED, FOR SOLUTION INTRAVENOUS at 14:33

## 2020-12-26 RX ADMIN — THERA TABS 1 TABLET: TAB at 08:39

## 2020-12-26 ASSESSMENT — PAIN SCALES - GENERAL
PAINLEVEL_OUTOF10: 0

## 2020-12-26 NOTE — PLAN OF CARE
remain free from falls  Description: Will remain free from falls  12/26/2020 0941 by Jillian Ball RN  Outcome: Ongoing  12/25/2020 2219 by Rosendo Allen RN  Outcome: Ongoing  Note: Patient's bed is in a low position, call light in reach, and bed alarms on.  Will continue to monitor     Problem: Skin Integrity:  Goal: Will show no infection signs and symptoms  Description: Will show no infection signs and symptoms  12/26/2020 0941 by Jillian Ball RN  Outcome: Ongoing  12/25/2020 2219 by Rosendo Allen RN  Outcome: Ongoing

## 2020-12-26 NOTE — ACP (ADVANCE CARE PLANNING)
Advance Care Planning     Advance Care Planning Activator (Inpatient)  Conversation Note      Date of ACP Conversation: 12/26/2020    Conversation Conducted with: Patient with Decision Making Capacity    ACP Activator: Chuy Abernathy Decision Maker:     Current Designated Health Care Decision Maker:   Scott is primary decision maker and Luis is secondary. Care Preferences    Ventilation: \"If you were in your present state of health and suddenly became very ill and were unable to breathe on your own, what would your preference be about the use of a ventilator (breathing machine) if it were available to you? \"      Would the patient desire the use of ventilator (breathing machine)?: yes    \"If your health worsens and it becomes clear that your chance of recovery is unlikely, what would your preference be about the use of a ventilator (breathing machine) if it were available to you? \"     Would the patient desire the use of ventilator (breathing machine)?: No      Resuscitation  \"CPR works best to restart the heart when there is a sudden event, like a heart attack, in someone who is otherwise healthy. Unfortunately, CPR does not typically restart the heart for people who have serious health conditions or who are very sick. \"    \"In the event your heart stopped as a result of an underlying serious health condition, would you want attempts to be made to restart your heart (answer \"yes\" for attempt to resuscitate) or would you prefer a natural death (answer \"no\" for do not attempt to resuscitate)? \" yes     [] Yes   [x] No   Educated Patient / Leotha Bender regarding differences between Advance Directives and portable DNR orders.     Length of ACP Conversation in minutes:  8    Conversation Outcomes:  [x] ACP discussion completed  [] Existing advance directive reviewed with patient; no changes to patient's previously recorded wishes  [] New Advance Directive completed  [] Portable Do Not Rescitate prepared for Provider review and signature  [] POLST/POST/MOLST/MOST prepared for Provider review and signature      Follow-up plan:    [] Schedule follow-up conversation to continue planning  [x] Referred individual to Provider for additional questions/concerns   [] Advised patient/agent/surrogate to review completed ACP document and update if needed with changes in condition, patient preferences or care setting    [x] This note routed to one or more involved healthcare providers      Zoltan Cotter RN, BSN, Case Management  Phone: [phone]  Electronically signed by Zoltan Cotter RN on 12/26/2020 at 12:51 PM

## 2020-12-26 NOTE — PROGRESS NOTES
12/24/20  0854 12/25/20  0336 12/26/20  0617   * 133* 137   K 4.0 4.1 4.5    102 104   CO2 25 25 27   BUN 26* 25* 20   CREATININE 0.6* 0.6* <0.5*   CALCIUM 8.0* 7.8* 7.8*     Recent Labs     12/24/20  0854 12/25/20  0336 12/26/20  0617   * 87* 76*   ALT 38 37 37   BILITOT 0.6 0.6 0.6   ALKPHOS 76 66 71     No results for input(s): INR in the last 72 hours. Recent Labs     12/25/20  1036 12/25/20  2004 12/26/20  0329   CKTOTAL 689* 581* 550*       Urinalysis:      Lab Results   Component Value Date    NITRU Negative 12/22/2020    WBCUA 10 12/22/2020    BACTERIA 4+ 08/10/2011    RBCUA 62 12/22/2020    BLOODU LARGE 12/22/2020    SPECGRAV 1.025 12/22/2020    GLUCOSEU Negative 12/22/2020    GLUCOSEU Negative 08/10/2011       Radiology:  VL DUP UPPER EXTREMITY ARTERIES BILATERAL   Final Result      CT CHEST PULMONARY EMBOLISM W CONTRAST   Final Result   Bilateral pulmonary emboli. No obvious right heart strain by CT scan. Scattered ground-glass opacity is seen throughout the lungs either due to   atelectasis, or post inflammatory-and infectious change. No focal   wedge-shaped area of consolidation is seen to suggest infarct      Mildly dilated ascending aorta. .  Trace aortic valve calcification is seen. Cholelithiasis      Results discussed with Annie Perez by Anish Mesa. Ivone Knowles MD at 2:41   pm on 12/22/2020         CT HEAD WO CONTRAST   Final Result   No traumatic abnormality. CT CERVICAL SPINE WO CONTRAST   Final Result   No traumatic abnormality. CT FACIAL BONES WO CONTRAST   Final Result   Left periorbital soft tissue contusion. No underlying facial bone fracture. XR CHEST PORTABLE   Final Result   1. No acute pulmonary abnormality to account for patient's shortness of   breath. 2. Stable mild enlargement of the cardiac silhouette. XR PELVIS (1-2 VIEWS)   Final Result   Negative         XR WRIST LEFT (MIN 3 VIEWS)   Final Result   1.  Soft tissue swelling of the hand dorsum with no associated acute osseous   abnormality. 2. No acute osseous abnormality in the left wrist.  If patient's pain   persists or worsens, repeat radiograph can be obtained in 7-10 days as acute   non-displaced fractures can be occult. If patient has snuffbox tenderness,   follow up radiograph should include a scaphoid view. Alternatively, MRI can   be done to evaluate for non-displaced fractures. XR HAND LEFT (MIN 3 VIEWS)   Final Result   1. Soft tissue swelling of the hand dorsum with no associated acute osseous   abnormality. 2. No acute osseous abnormality in the left wrist.  If patient's pain   persists or worsens, repeat radiograph can be obtained in 7-10 days as acute   non-displaced fractures can be occult. If patient has snuffbox tenderness,   follow up radiograph should include a scaphoid view. Alternatively, MRI can   be done to evaluate for non-displaced fractures. Assessment/Plan:    Active Hospital Problems    Diagnosis    Sepsis ([facility] [address] [A41.9]    Pneumonia due to COVID-19 virus [U07.1, J12.89]    Bilateral pulmonary embolism (HCC) [I26.99]    Elevated LFTs [R79.89]    Rhabdomyolysis [M62.82]    Facial trauma [S09.93XA]    Bacterial pneumonia [J15.9]    Alcohol abuse [F10.10]    Lactic acidosis [E87.2]     1.  Acute respiratory failure with hypoxia, due to potential bacterial pneumonia, COVID-19 and bilateral pulmonary embolism, clinically continue to improve. 2.  Pneumonia due to COVID-19, dexamethasone, remdesivir and convalescent plasma, he is now on RA. Plan to discontinue remdesivir on discharge.   3. Thought to have bacterial pneumonia, IV azithromycin and ceftriaxone   4.  Sepsis, fever, tachypnea tachycardia and elevated white count, likely due to to viral pneumonia with possible facial cellulitis.   5.  Bilateral pulmonary embolism, likely triggered by COVID-19 infection, heparin drip since admission, will change to eliquis. Echo with normal right RV size.   6.  Alcohol abuse, patient stated that he drinks on a daily basis, [facility] protocol ordered  7.  Rhabdomyolysis, likely from laying down on the floor, IV fluid, improved CK.   8.  Facial trauma, likely due to fall, edema better, patient is opening both eyes without issues, no blurry or double vision.    9.  Lactic acidosis, lactic acid elevated on admission, improved.   10.  History of seizure, restart Keppra, seizure precautions      Diet: DIET GENERAL;  Code Status: Full Code        Dispo - pending placement     Johnie Marley MD

## 2020-12-26 NOTE — PLAN OF CARE
Problem: Gas Exchange - Impaired  Goal: Absence of hypoxia  12/25/2020 2219 by Laureen Vogt RN  Outcome: Ongoing  12/25/2020 1057 by Esperanza Gray RN  Outcome: Ongoing     Problem: Isolation Precautions - Risk of Spread of Infection  Goal: Prevent transmission of infection  12/25/2020 2219 by Laureen Vogt RN  Outcome: Ongoing  12/25/2020 1057 by Esperanza Gray RN  Outcome: Ongoing     Problem: Falls - Risk of:  Goal: Will remain free from falls  Description: Will remain free from falls  12/25/2020 2219 by Laureen Vogt RN  Outcome: Ongoing  Note: Patient's bed is in a low position, call light in reach, and bed alarms on.  Will continue to monitor  12/25/2020 1057 by Esperanza Gray RN  Outcome: Ongoing

## 2020-12-26 NOTE — CARE COORDINATION
Left message for Lily Hutton at [facility], [phone], to check on the status of the referral.  Padmini Collado at [facility] had notified  earlier this week that they would not have beds available until at least Monday. Call to Zahida Minor at [facility], she requested the faceshett be faxed to her at [phone]. Faxed facesheet as requested with call back number. Ed Torres RN, BSN, Case Management  Phone: [phone]  Electronically signed by Ed Torres RN on 12/26/2020 at 12:45 PM     L/M for Maria Fernanda at [facility] to check on the status of the referral.    Call to Lily Hutton at [facility], left another message. Ed Torres RN, BSN, Case Management  Phone: [phone]  Electronically signed by Ed Torres RN on 12/26/2020 at 4:02 PM     Spoke with Zahida Minor at [facility]. She states that she believes this may require a precert but she cannot confirm this with the billing department as they are not available this weekend. Will submit precert to [facility] for possible precert to be obtained for D/C tomorrow if possible. Ed Torres RN, BSN, Case Management  Phone: [phone]  Electronically signed by Ed Torres RN on 12/26/2020 at 4:27 PM     Call to Lily Hutton at [facility], left another message. Call to [facility], they confirmed that Lily Hutton is on call.   Ed Torres RN, BSN, Case Management  Phone: [phone]  Electronically signed by Ed Torres RN on 12/26/2020 at 4:50 PM

## 2020-12-26 NOTE — CARE COORDINATION
[address] faxed to [facility] for admission to [place].   Pedro Souza RN, BSN, Case Management  Phone: [phone]  Electronically signed by Pedro Souza RN on 12/26/2020 at 5:10 PM

## 2020-12-27 LAB
A/G RATIO: 1 (ref 1.1–2.2)
ALBUMIN SERPL-MCNC: 2.8 G/DL (ref 3.4–5)
ALP BLD-CCNC: 80 U/L (ref 40–129)
ALT SERPL-CCNC: 42 U/L (ref 10–40)
ANION GAP SERPL CALCULATED.3IONS-SCNC: 8 MMOL/L (ref 3–16)
AST SERPL-CCNC: 76 U/L (ref 15–37)
BILIRUB SERPL-MCNC: 0.7 MG/DL (ref 0–1)
BUN BLDV-MCNC: 17 MG/DL (ref 7–20)
CALCIUM SERPL-MCNC: 8.2 MG/DL (ref 8.3–10.6)
CHLORIDE BLD-SCNC: 101 MMOL/L (ref 99–110)
CO2: 26 MMOL/L (ref 21–32)
CREAT SERPL-MCNC: <0.5 MG/DL (ref 0.8–1.3)
GFR AFRICAN AMERICAN: >60
GFR NON-AFRICAN AMERICAN: >60
GLOBULIN: 2.9 G/DL
GLUCOSE BLD-MCNC: 169 MG/DL (ref 70–99)
POTASSIUM SERPL-SCNC: 4.1 MMOL/L (ref 3.5–5.1)
SODIUM BLD-SCNC: 135 MMOL/L (ref 136–145)
TOTAL PROTEIN: 5.7 G/DL (ref 6.4–8.2)

## 2020-12-27 PROCEDURE — 6360000002 HC RX W HCPCS: Performed by: INTERNAL MEDICINE

## 2020-12-27 PROCEDURE — 2580000003 HC RX 258: Performed by: INTERNAL MEDICINE

## 2020-12-27 PROCEDURE — 6370000000 HC RX 637 (ALT 250 FOR IP): Performed by: INTERNAL MEDICINE

## 2020-12-27 PROCEDURE — 36415 COLL VENOUS BLD VENIPUNCTURE: CPT

## 2020-12-27 PROCEDURE — 2060000000 HC ICU INTERMEDIATE R&B

## 2020-12-27 PROCEDURE — 80053 COMPREHEN METABOLIC PANEL: CPT

## 2020-12-27 PROCEDURE — 94761 N-INVAS EAR/PLS OXIMETRY MLT: CPT

## 2020-12-27 RX ORDER — DEXAMETHASONE 6 MG/1
6 TABLET ORAL
Qty: 3 TABLET | Refills: 0 | Status: SHIPPED | OUTPATIENT
Start: 2020-12-27 | End: 2020-12-28 | Stop reason: SDUPTHER

## 2020-12-27 RX ADMIN — CEFTRIAXONE 1 G: 1 INJECTION, POWDER, FOR SOLUTION INTRAMUSCULAR; INTRAVENOUS at 13:58

## 2020-12-27 RX ADMIN — Medication 10 ML: at 21:38

## 2020-12-27 RX ADMIN — CASTOR OIL AND BALSAM, PERU: 788; 87 OINTMENT TOPICAL at 08:04

## 2020-12-27 RX ADMIN — APIXABAN 10 MG: 5 TABLET, FILM COATED ORAL at 21:38

## 2020-12-27 RX ADMIN — Medication 10 ML: at 08:04

## 2020-12-27 RX ADMIN — THERA TABS 1 TABLET: TAB at 08:04

## 2020-12-27 RX ADMIN — DEXAMETHASONE SODIUM PHOSPHATE 10 MG: 10 INJECTION, SOLUTION INTRAMUSCULAR; INTRAVENOUS at 21:38

## 2020-12-27 RX ADMIN — APIXABAN 10 MG: 5 TABLET, FILM COATED ORAL at 08:04

## 2020-12-27 RX ADMIN — LEVETIRACETAM 500 MG: 500 TABLET ORAL at 08:04

## 2020-12-27 RX ADMIN — CASTOR OIL AND BALSAM, PERU: 788; 87 OINTMENT TOPICAL at 21:38

## 2020-12-27 RX ADMIN — LEVETIRACETAM 500 MG: 500 TABLET ORAL at 21:37

## 2020-12-27 RX ADMIN — THIAMINE HYDROCHLORIDE 100 MG: 100 INJECTION, SOLUTION INTRAMUSCULAR; INTRAVENOUS at 21:38

## 2020-12-27 RX ADMIN — AZITHROMYCIN MONOHYDRATE 500 MG: 500 INJECTION, POWDER, LYOPHILIZED, FOR SOLUTION INTRAVENOUS at 13:58

## 2020-12-27 ASSESSMENT — PAIN SCALES - GENERAL
PAINLEVEL_OUTOF10: 0

## 2020-12-27 NOTE — PLAN OF CARE
Problem: Airway Clearance - Ineffective  Goal: Achieve or maintain patent airway  12/27/2020 0835 by Armand Meneses RN  Outcome: Ongoing  12/27/2020 0007 by Abdiel Worthington RN  Outcome: Ongoing     Problem: Gas Exchange - Impaired  Goal: Absence of hypoxia  12/27/2020 0835 by Armand Meneses RN  Outcome: Ongoing  12/27/2020 0007 by Abdiel Worthington RN  Outcome: Ongoing     Problem: Breathing Pattern - Ineffective  Goal: Ability to achieve and maintain a regular respiratory rate  12/27/2020 0835 by Armand Meneses RN  Outcome: Ongoing  12/27/2020 0007 by Abdiel Worthington RN  Outcome: Ongoing     Problem:  Body Temperature -  Risk of, Imbalanced  Goal: Ability to maintain a body temperature within defined limits  12/27/2020 0835 by Armand Meneses RN  Outcome: Ongoing  12/27/2020 0007 by Abdiel Worthington RN  Outcome: Ongoing     Problem: Isolation Precautions - Risk of Spread of Infection  Goal: Prevent transmission of infection  12/27/2020 0835 by Armand Meneses RN  Outcome: Ongoing  12/27/2020 0007 by Abdiel Worthington RN  Outcome: Ongoing     Problem: Nutrition Deficits  Goal: Optimize nutrtional status  12/27/2020 0835 by Armand Meneses RN  Outcome: Ongoing  12/27/2020 0007 by Abdiel Worthington RN  Outcome: Ongoing     Problem: Risk for Fluid Volume Deficit  Goal: Maintain normal heart rhythm  12/27/2020 0835 by Armand Meneses RN  Outcome: Ongoing  12/27/2020 0007 by Abdiel Worthington RN  Outcome: Ongoing     Problem: Loneliness or Risk for Loneliness  Goal: Demonstrate positive use of time alone when socialization is not possible  12/27/2020 0835 by Armand Meneses RN  Outcome: Ongoing  12/27/2020 0007 by Abdiel Worthington RN  Outcome: Ongoing     Problem: Fatigue  Goal: Verbalize increase energy and improved vitality  12/27/2020 0835 by Armand Meneses RN  Outcome: Ongoing  12/27/2020 0007 by Abdiel Worthington RN  Outcome: Ongoing     Problem: Patient Education: Go to Patient Education Activity  Goal: Patient/Family Education  12/27/2020 6494 by Skyla Fountain RN  Outcome: Ongoing  12/27/2020 0007 by Miya Oates RN  Outcome: Ongoing     Problem: Falls - Risk of:  Goal: Will remain free from falls  Description: Will remain free from falls  12/27/2020 0835 by Skyla Fountain RN  Outcome: Ongoing  12/27/2020 0007 by Miya Oates RN  Outcome: Ongoing  Note: Patient's bed is in a low position, call light in reach, and bed alarms on.  Will continue to monitor     Problem: Skin Integrity:  Goal: Will show no infection signs and symptoms  Description: Will show no infection signs and symptoms  12/27/2020 0835 by Skyla Fountain RN  Outcome: Ongoing  12/27/2020 0007 by Miya Oates RN  Outcome: Ongoing  Note: Will turn patient q 2 hours, float heels, and assess skin q shift

## 2020-12-27 NOTE — PLAN OF CARE
Problem: Fatigue  Goal: Verbalize increase energy and improved vitality  Outcome: Ongoing     Problem: Falls - Risk of:  Goal: Will remain free from falls  Description: Will remain free from falls  Outcome: Ongoing  Note: Patient's bed is in a low position, call light in reach, and bed alarms on.  Will continue to monitor     Problem: Skin Integrity:  Goal: Will show no infection signs and symptoms  Description: Will show no infection signs and symptoms  Outcome: Ongoing  Note: Will turn patient q 2 hours, float heels, and assess skin q shift

## 2020-12-27 NOTE — CARE COORDINATION
[address] Medicare Authorization      Authorization ID [id number]     Start date 12/26/2020     Next review date  12/29/2020    Fax update to [phone]    Lia Hamm Ref #: [id number]    To Edvin Lopez of [employer] [facility]  Rivas Leigh RN, BSN, Case Management  Phone: [phone]  Electronically signed by Rivas Leigh RN on 12/27/2020 at 10:00 AM

## 2020-12-27 NOTE — CARE COORDINATION
Case Management:  Called [facility] to inform them of insurance approval but was told that no one from admissions is in today, they will send message to Lena Mejia   to have them call this cm back.   Electronically signed by Chelsey Dominguez on 12/27/2020 at 12:10 PM

## 2020-12-27 NOTE — PROGRESS NOTES
Hospitalist Progress Note      PCP: Daniella Yoo MD    Date of Admission: 12/22/2020    Subjective: continue to feel better, no SOB, chest pain or hallucination, no fever or chills, no blurry or double vision. Medications:  Reviewed    Infusion Medications   Scheduled Medications    apixaban  10 mg Oral BID    Venelex   Topical BID    levETIRAcetam  500 mg Oral BID    sodium chloride flush  10 mL Intravenous 2 times per day    azithromycin  500 mg Intravenous Q24H    And    cefTRIAXone (ROCEPHIN) IV  1 g Intravenous Q24H    dexamethasone  10 mg Intravenous Q24H    multivitamin  1 tablet Oral Daily    thiamine (VITAMIN B1) IVPB  100 mg Intravenous Q24H     PRN Meds: albuterol sulfate HFA, sodium chloride flush, promethazine **OR** ondansetron, polyethylene glycol, acetaminophen **OR** acetaminophen      Intake/Output Summary (Last 24 hours) at 12/27/2020 0711  Last data filed at 12/27/2020 0658  Gross per 24 hour   Intake 660 ml   Output 1150 ml   Net -490 ml       Physical Exam Performed:    BP (!) 158/81   Pulse 88   Temp 98.3 °F (36.8 °C) (Axillary)   Resp 18   Ht 5' 10\" (1.778 m)   Wt 216 lb 0.8 oz (98 kg)   SpO2 93%   BMI 31.00 kg/m²     General appearance: No apparent distress, healing wound left face   Neck: Supple  Respiratory:  Normal respiratory effort. Clear to auscultation, bilaterally without Rales/Wheezes/Rhonchi. Cardiovascular: Regular rate and rhythm with normal S1/S2 without murmurs, rubs or gallops. Abdomen: Soft, non-tender  Musculoskeletal: No clubbing, cyanosis, healing ulcers knees level. Skin: as above   Neurologic:  No focal weakness.    Psychiatric: Alert and oriented  Capillary Refill: Brisk,< 3 seconds   Peripheral Pulses: +2 palpable, equal bilaterally       Labs:   Recent Labs     12/24/20  0854 12/26/20  0617   WBC 12.4* 11.5*   HGB 11.8* 10.9*   HCT 34.4* 31.8*    244     Recent Labs     12/25/20  0336 12/26/20  0617 12/27/20  0620   * 137

## 2020-12-28 VITALS
BODY MASS INDEX: 29.42 KG/M2 | WEIGHT: 205.47 LBS | OXYGEN SATURATION: 96 % | HEIGHT: 70 IN | SYSTOLIC BLOOD PRESSURE: 106 MMHG | TEMPERATURE: 98.3 F | RESPIRATION RATE: 18 BRPM | DIASTOLIC BLOOD PRESSURE: 58 MMHG | HEART RATE: 88 BPM

## 2020-12-28 LAB
A/G RATIO: 0.9 (ref 1.1–2.2)
ALBUMIN SERPL-MCNC: 2.6 G/DL (ref 3.4–5)
ALP BLD-CCNC: 83 U/L (ref 40–129)
ALT SERPL-CCNC: 34 U/L (ref 10–40)
ANION GAP SERPL CALCULATED.3IONS-SCNC: 8 MMOL/L (ref 3–16)
AST SERPL-CCNC: 53 U/L (ref 15–37)
BILIRUB SERPL-MCNC: 0.8 MG/DL (ref 0–1)
BUN BLDV-MCNC: 18 MG/DL (ref 7–20)
CALCIUM SERPL-MCNC: 8.1 MG/DL (ref 8.3–10.6)
CHLORIDE BLD-SCNC: 103 MMOL/L (ref 99–110)
CO2: 27 MMOL/L (ref 21–32)
CREAT SERPL-MCNC: 0.5 MG/DL (ref 0.8–1.3)
GFR AFRICAN AMERICAN: >60
GFR NON-AFRICAN AMERICAN: >60
GLOBULIN: 2.8 G/DL
GLUCOSE BLD-MCNC: 137 MG/DL (ref 70–99)
HCT VFR BLD CALC: 31.5 % (ref 40.5–52.5)
HEMOGLOBIN: 10.8 G/DL (ref 13.5–17.5)
MCH RBC QN AUTO: 32.8 PG (ref 26–34)
MCHC RBC AUTO-ENTMCNC: 34.4 G/DL (ref 31–36)
MCV RBC AUTO: 95.4 FL (ref 80–100)
PDW BLD-RTO: 13.3 % (ref 12.4–15.4)
PLATELET # BLD: 339 K/UL (ref 135–450)
PMV BLD AUTO: 9.3 FL (ref 5–10.5)
POTASSIUM SERPL-SCNC: 4.1 MMOL/L (ref 3.5–5.1)
RBC # BLD: 3.3 M/UL (ref 4.2–5.9)
SODIUM BLD-SCNC: 138 MMOL/L (ref 136–145)
TOTAL PROTEIN: 5.4 G/DL (ref 6.4–8.2)
WBC # BLD: 12.8 K/UL (ref 4–11)

## 2020-12-28 PROCEDURE — 80053 COMPREHEN METABOLIC PANEL: CPT

## 2020-12-28 PROCEDURE — 6360000002 HC RX W HCPCS: Performed by: INTERNAL MEDICINE

## 2020-12-28 PROCEDURE — 36415 COLL VENOUS BLD VENIPUNCTURE: CPT

## 2020-12-28 PROCEDURE — 2580000003 HC RX 258: Performed by: INTERNAL MEDICINE

## 2020-12-28 PROCEDURE — 6370000000 HC RX 637 (ALT 250 FOR IP): Performed by: INTERNAL MEDICINE

## 2020-12-28 PROCEDURE — 85027 COMPLETE CBC AUTOMATED: CPT

## 2020-12-28 RX ORDER — LACTOBACILLUS RHAMNOSUS GG 10B CELL
1 CAPSULE ORAL
Status: DISCONTINUED | OUTPATIENT
Start: 2020-12-28 | End: 2020-12-28 | Stop reason: HOSPADM

## 2020-12-28 RX ORDER — DEXAMETHASONE 6 MG/1
6 TABLET ORAL
Qty: 2 TABLET | Refills: 0 | Status: SHIPPED | OUTPATIENT
Start: 2020-12-28 | End: 2020-12-30

## 2020-12-28 RX ORDER — LACTOBACILLUS RHAMNOSUS GG 10B CELL
1 CAPSULE ORAL
Qty: 30 CAPSULE | Refills: 0 | Status: SHIPPED | OUTPATIENT
Start: 2020-12-29 | End: 2022-08-29

## 2020-12-28 RX ADMIN — AZITHROMYCIN MONOHYDRATE 500 MG: 500 INJECTION, POWDER, LYOPHILIZED, FOR SOLUTION INTRAVENOUS at 14:27

## 2020-12-28 RX ADMIN — APIXABAN 10 MG: 5 TABLET, FILM COATED ORAL at 08:42

## 2020-12-28 RX ADMIN — CEFTRIAXONE 1 G: 1 INJECTION, POWDER, FOR SOLUTION INTRAMUSCULAR; INTRAVENOUS at 14:26

## 2020-12-28 RX ADMIN — THERA TABS 1 TABLET: TAB at 08:42

## 2020-12-28 RX ADMIN — LEVETIRACETAM 500 MG: 500 TABLET ORAL at 08:42

## 2020-12-28 RX ADMIN — Medication 10 ML: at 08:43

## 2020-12-28 RX ADMIN — CASTOR OIL AND BALSAM, PERU: 788; 87 OINTMENT TOPICAL at 08:42

## 2020-12-28 RX ADMIN — Medication 1 CAPSULE: at 12:37

## 2020-12-28 ASSESSMENT — PAIN SCALES - GENERAL
PAINLEVEL_OUTOF10: 0
PAINLEVEL_OUTOF10: 0

## 2020-12-28 NOTE — PLAN OF CARE
Problem: Airway Clearance - Ineffective  Goal: Achieve or maintain patent airway  Outcome: Met This Shift   Alert and oriented x4 Resp. Easy and even Sats. WNL on RA while up and awake. Sats. Down 84% while sleeping O2 placed on at 1L and sats. Up in 90's Lungs with some expir. Wheezes in upper lobes and diminished in lower. Cough and deep breathing exercises encouraged. Cont. Per urinal with occasional incont. Araseli-care prn.  Up to chair and returned to bed with steady and assist. X2. Frequently turned and repositioned Free from fall/injury

## 2020-12-28 NOTE — CARE COORDINATION
Discharge order acknowledged, chart reviewed. Attempted to contact [facility]. SW left a voicemail for Judy ([phone]). JOSUÉ to verify patient's discharge plan. SEUN Bertrand, [place], Social Work  [phone]  Electronically signed by SEUN Bertrand LSW on 12/28/2020 at 8:40 AM    Spoke to Judy to follow up on their ability to accept patient.  Maria Fernanda to verify with facility team and will contact SEUN Van, [employer]  [phone]  Electronically signed by SEUN Bertrand LSW on 12/28/2020 at 12:35 PM    CASE MANAGEMENT DISCHARGE SUMMARY:    DISCHARGE DATE: 12/28/2020    DISCHARGED TO: [facility]               REPORT NUMBER: [id number]               FAX NUMBER: [phone]    TRANSPORTATION: [facility]             TIME: 4:30PM     PREFERRED PHARMACY: At facility     [address] [place] [address] Medicare Authorization  Authorization ID [id number]   Start date 12/26/2020  Next review date  12/29/2020  Fax update to [phone]  [facility] Ref #: [id number]    ALEXSANDER/JOSE COMPLETED: Completed by facility      SEUN Bertrand, [employer]  [phone]  Electronically signed by SEUN Bertrand LSW on 12/28/2020 at 1:56 PM

## 2020-12-28 NOTE — PROGRESS NOTES
IV and telemetry monitor removed. Discharge paperwork completed and discussed with the patient and placed in transport folder. All questions answered. All belongings have been packed up and sent with the patient. Patient will be transported via [facility]. Attempted to call Yoni Delarosa multiple times to give report to RN receiving patient but have been unsuccessful, having been put on hold multiple times.

## 2020-12-28 NOTE — PLAN OF CARE
Problem: Airway Clearance - Ineffective  Goal: Achieve or maintain patent airway  12/28/2020 0950 by Poppy Farrar RN  Outcome: Ongoing     Problem: Gas Exchange - Impaired  Goal: Absence of hypoxia  12/28/2020 0950 by Poppy Farrar RN  Outcome: Ongoing     Problem: Gas Exchange - Impaired  Goal: Promote optimal lung function  12/28/2020 0950 by Poppy Farrar RN  Outcome: Ongoing     Problem: Breathing Pattern - Ineffective  Goal: Ability to achieve and maintain a regular respiratory rate  12/28/2020 0950 by Poppy Farrar RN  Outcome: Ongoing     Problem: Body Temperature -  Risk of, Imbalanced  Goal: Ability to maintain a body temperature within defined limits  12/28/2020 0950 by Poppy Farrar RN  Outcome: Ongoing     Problem: Body Temperature -  Risk of, Imbalanced  Goal: Will regain or maintain usual level of consciousness  12/28/2020 0950 by Poppy Farrar RN  Outcome: Ongoing     Problem:  Body Temperature -  Risk of, Imbalanced  Goal: Complications related to the disease process, condition or treatment will be avoided or minimized  12/28/2020 0950 by Poppy Farrar RN  Outcome: Ongoing     Problem: Isolation Precautions - Risk of Spread of Infection  Goal: Prevent transmission of infection  12/28/2020 0950 by Poppy Farrar RN  Outcome: Ongoing     Problem: Nutrition Deficits  Goal: Optimize nutrtional status  12/28/2020 0950 by Poppy Farrar RN  Outcome: Ongoing     Problem: Risk for Fluid Volume Deficit  Goal: Maintain normal heart rhythm  12/28/2020 0950 by Poppy Farrar RN  Outcome: Ongoing     Problem: Risk for Fluid Volume Deficit  Goal: Maintain absence of muscle cramping  12/28/2020 0950 by Poppy Farrar RN  Outcome: Ongoing     Problem: Risk for Fluid Volume Deficit  Goal: Maintain normal serum potassium, sodium, calcium, phosphorus, and pH  12/28/2020 0950 by Poppy Farrar RN  Outcome: Ongoing     Problem: Loneliness or Risk for Loneliness  Goal: Demonstrate positive use of time alone when socialization is not possible  12/28/2020 0950 by Debora Pollock RN  Outcome: Ongoing     Problem: Fatigue  Goal: Verbalize increase energy and improved vitality  12/28/2020 0950 by Debora Pollock RN  Outcome: Ongoing     Problem: Patient Education: Go to Patient Education Activity  Goal: Patient/Family Education  12/28/2020 0950 by Debora Pollock RN  Outcome: Ongoing     Problem: Falls - Risk of:  Goal: Will remain free from falls  12/28/2020 0950 by Debora Pollock RN  Outcome: Ongoing     Problem: Falls - Risk of:  Goal: Absence of physical injury  12/28/2020 0950 by Debora Pollock RN  Outcome: Ongoing     Problem: Skin Integrity:  Goal: Will show no infection signs and symptoms  12/28/2020 0950 by Debora Pollock RN  Outcome: Ongoing     Problem: Skin Integrity:  Goal: Absence of new skin breakdown  12/28/2020 0950 by Debora Pollock RN  Outcome: Ongoing

## 2020-12-28 NOTE — DISCHARGE SUMMARY
Hospital Medicine Discharge Summary    Patient ID: Aracelis Patricio      Patient's PCP: Anum Mooney MD    Admit Date: 12/22/2020     Discharge Date: 12/28/2020      Admitting Physician: Johnie Marley MD     Discharge Physician: Johnie Marley MD     Discharge Diagnoses: Active Hospital Problems    Diagnosis    Sepsis ([address] [A41.9]    Pneumonia due to COVID-19 virus [U07.1, J12.89]    Bilateral pulmonary embolism (HCC) [I26.99]    Elevated LFTs [R79.89]    Rhabdomyolysis [M62.82]    Facial trauma [S09.93XA]    Bacterial pneumonia [J15.9]    Alcohol abuse [F10.10]    Lactic acidosis [E87.2]       The patient was seen and examined on day of discharge and this discharge summary is in conjunction with any daily progress note from day of discharge. Hospital Course:     From HPI:85 y.o. male who presented to [facility] after he was found on the floor at his home, apparently his son found him at about 9/9/1930 this morning, patient stated that he is feeling bad since Saturday having some chills to note that patient is hard of hearing and very poor historian, in emergency department found to have facial trauma extensive work-up was done tested positive for Covid his lactic acid is elevated found to have bilateral pulmonary embolism, patient at this time having mild shortness of breath having cough no obvious alleviating or aggravating factors denies fever at this time no nausea vomiting or abdominal pain at this time he stated he drinks alcohol on a daily basis he was not able to further quantify\"      1.  Acute respiratory failure with hypoxia, due to potential bacterial pneumonia, COVID-19 and bilateral pulmonary embolism, improving.   2.  Pneumonia due to COVID-19, dexamethasone, remdesivir and convalescent plasma, he is now on RA. Received remdesivir.   3. Thought to have bacterial pneumonia, IV azithromycin and ceftriaxone   4.  Sepsis, fever, tachypnea tachycardia and elevated white count, likely due to to viral pneumonia with possible facial cellulitis. will complete antibiotics as inpatient.   5.  Bilateral pulmonary embolism, likely triggered by COVID-19 infection, heparin drip on admission, changed to eliquis. 6.  Alcohol abuse, no DTs  7.  Rhabdomyolysis, likely from laying down on the floor, IV fluid, improved CK.   8.  Facial trauma, likely due to fall, edema better, patient is opening both eyes without issues, no blurry or double vision.   9.  Lactic acidosis, lactic acid elevated on admission, improved.   10.  History of seizure, restart Keppra, seizure precautions        Physical Exam Performed:     BP (!) 106/58   Pulse 88   Temp 98.3 °F (36.8 °C) (Oral)   Resp 18   Ht 5' 10\" (1.778 m)   Wt 205 lb 7.5 oz (93.2 kg)   SpO2 96%   BMI 29.48 kg/m²     Per progress note      Labs: For convenience and continuity at follow-up the following most recent labs are provided:      CBC:    Lab Results   Component Value Date    WBC 12.8 12/28/2020    HGB 10.8 12/28/2020    HCT 31.5 12/28/2020     12/28/2020       Renal:    Lab Results   Component Value Date     12/28/2020    K 4.1 12/28/2020     12/28/2020    CO2 27 12/28/2020    BUN 18 12/28/2020    CREATININE 0.5 12/28/2020    CALCIUM 8.1 12/28/2020         Significant Diagnostic Studies    Radiology:   VL DUP UPPER EXTREMITY ARTERIES BILATERAL   Final Result      CT CHEST PULMONARY EMBOLISM W CONTRAST   Final Result   Bilateral pulmonary emboli. No obvious right heart strain by CT scan. Scattered ground-glass opacity is seen throughout the lungs either due to   atelectasis, or post inflammatory-and infectious change. No focal   wedge-shaped area of consolidation is seen to suggest infarct      Mildly dilated ascending aorta. .  Trace aortic valve calcification is seen. Cholelithiasis      Results discussed with Blossom Jasso by Vlaentina Bhardwaj.  Magaly Howard MD at 2:41   pm on 12/22/2020         CT HEAD WO CONTRAST   Final Result   No traumatic abnormality. CT CERVICAL SPINE WO CONTRAST   Final Result   No traumatic abnormality. CT FACIAL BONES WO CONTRAST   Final Result   Left periorbital soft tissue contusion. No underlying facial bone fracture. XR CHEST PORTABLE   Final Result   1. No acute pulmonary abnormality to account for patient's shortness of   breath. 2. Stable mild enlargement of the cardiac silhouette. XR PELVIS (1-2 VIEWS)   Final Result   Negative         XR WRIST LEFT (MIN 3 VIEWS)   Final Result   1. Soft tissue swelling of the hand dorsum with no associated acute osseous   abnormality. 2. No acute osseous abnormality in the left wrist.  If patient's pain   persists or worsens, repeat radiograph can be obtained in 7-10 days as acute   non-displaced fractures can be occult. If patient has snuffbox tenderness,   follow up radiograph should include a scaphoid view. Alternatively, MRI can   be done to evaluate for non-displaced fractures. XR HAND LEFT (MIN 3 VIEWS)   Final Result   1. Soft tissue swelling of the hand dorsum with no associated acute osseous   abnormality. 2. No acute osseous abnormality in the left wrist.  If patient's pain   persists or worsens, repeat radiograph can be obtained in 7-10 days as acute   non-displaced fractures can be occult. If patient has snuffbox tenderness,   follow up radiograph should include a scaphoid view. Alternatively, MRI can   be done to evaluate for non-displaced fractures.                 Consults:     IP CONSULT TO HOSPITALIST  IP CONSULT TO PHARMACY  IP CONSULT TO PULMONOLOGY  IP CONSULT TO SOCIAL WORK    Disposition:  SNF     Condition at Discharge: Stable    Discharge Instructions/Follow-up:  PCP    Code Status:  Full Code     Activity: activity as tolerated    Diet: cardiac diet      Discharge Medications:     Discharge Medication List as of 12/28/2020  3:14 PM           Details   lactobacillus (Gera Caballero) capsule Take 1 capsule by mouth daily (with breakfast), Disp-30 capsule, R-0Print      Multiple Vitamin (MULTIVITAMIN) TABS tablet Take 1 tablet by mouth daily, Disp-30 tablet, R-0Print      thiamine 100 MG tablet Take 1 tablet by mouth daily, Disp-15 tablet, R-0Print              Details   apixaban (ELIQUIS) 5 MG TABS tablet 10 mg po twice daily for 2 days then 5 mg po twice daily, Disp-60 tablet, R-0Normal      dexamethasone (DECADRON) 6 MG tablet Take 1 tablet by mouth daily (with breakfast) for 2 days, Disp-2 tablet, R-0Normal              Details   levETIRAcetam (KEPPRA) 500 MG tablet TAKE 1 TABLET BY MOUTH TWO  TIMES DAILY, Disp-180 tablet, R-3Normal             Time Spent on discharge is more than 1 hour in the examination, evaluation, counseling and review of medications and discharge plan. Signed:    Cristy Leblanc MD   12/28/2020      Thank you Laurel Chua MD for the opportunity to be involved in this patient's care. If you have any questions or concerns please feel free to contact me at [phone].

## 2020-12-28 NOTE — PROGRESS NOTES
Hospitalist Progress Note      PCP: Doreen Royal MD    Date of Admission: 12/22/2020        Subjective: no new complaints, cough better, no fever, chills, nausea, blurry or double vision. Medications:  Reviewed    Infusion Medications   Scheduled Medications    apixaban  10 mg Oral BID    Venelex   Topical BID    levETIRAcetam  500 mg Oral BID    sodium chloride flush  10 mL Intravenous 2 times per day    azithromycin  500 mg Intravenous Q24H    And    cefTRIAXone (ROCEPHIN) IV  1 g Intravenous Q24H    dexamethasone  10 mg Intravenous Q24H    multivitamin  1 tablet Oral Daily    thiamine (VITAMIN B1) IVPB  100 mg Intravenous Q24H     PRN Meds: albuterol sulfate HFA, sodium chloride flush, promethazine **OR** ondansetron, polyethylene glycol, acetaminophen **OR** acetaminophen      Intake/Output Summary (Last 24 hours) at 12/28/2020 1012  Last data filed at 12/28/2020 0457  Gross per 24 hour   Intake 600 ml   Output 101 ml   Net 499 ml       Physical Exam Performed:    BP (!) 155/82   Pulse 77   Temp 97.6 °F (36.4 °C) (Oral)   Resp 18   Ht 5' 10\" (1.778 m)   Wt 205 lb 7.5 oz (93.2 kg)   SpO2 94%   BMI 29.48 kg/m²     General appearance: No apparent distress, healing wound left eyelid angle   Neck: Supple  Respiratory:  Normal respiratory effort. Clear to auscultation, bilaterally without Rales/Wheezes/Rhonchi. Cardiovascular: Regular rate and rhythm with normal S1/S2 without murmurs, rubs or gallops. Abdomen: Soft, non-tender, non-distended with normal bowel sounds. Musculoskeletal: No clubbing, cyanosis. Bilateral chronic edema, healing wounds on both knees.    Skin: as above   Neurologic:  No focal weakness   Psychiatric: Alert   Capillary Refill: Brisk,< 3 seconds   Peripheral Pulses: +2 palpable, equal bilaterally       Labs:   Recent Labs     12/26/20  0617 12/28/20  0835   WBC 11.5* 12.8*   HGB 10.9* 10.8*   HCT 31.8* 31.5*    339     Recent Labs     12/26/20  0617 12/27/20  0620 12/28/20  0835    135* 138   K 4.5 4.1 4.1    101 103   CO2 27 26 27   BUN 20 17 18   CREATININE <0.5* <0.5* 0.5*   CALCIUM 7.8* 8.2* 8.1*     Recent Labs     12/26/20  0617 12/27/20  0620 12/28/20  0835   AST 76* 76* 53*   ALT 37 42* 34   BILITOT 0.6 0.7 0.8   ALKPHOS 71 80 83     No results for input(s): INR in the last 72 hours. Recent Labs     12/25/20  1036 12/25/20  2004 12/26/20  0329   CKTOTAL 689* 581* 550*       Urinalysis:      Lab Results   Component Value Date    NITRU Negative 12/22/2020    WBCUA 10 12/22/2020    BACTERIA 4+ 08/10/2011    RBCUA 62 12/22/2020    BLOODU LARGE 12/22/2020    SPECGRAV 1.025 12/22/2020    GLUCOSEU Negative 12/22/2020    GLUCOSEU Negative 08/10/2011       Radiology:  VL DUP UPPER EXTREMITY ARTERIES BILATERAL   Final Result      CT CHEST PULMONARY EMBOLISM W CONTRAST   Final Result   Bilateral pulmonary emboli. No obvious right heart strain by CT scan. Scattered ground-glass opacity is seen throughout the lungs either due to   atelectasis, or post inflammatory-and infectious change. No focal   wedge-shaped area of consolidation is seen to suggest infarct      Mildly dilated ascending aorta. .  Trace aortic valve calcification is seen. Cholelithiasis      Results discussed with Annie Perez by Anish Mesa. Ivone Knowles MD at 2:41   pm on 12/22/2020         CT HEAD WO CONTRAST   Final Result   No traumatic abnormality. CT CERVICAL SPINE WO CONTRAST   Final Result   No traumatic abnormality. CT FACIAL BONES WO CONTRAST   Final Result   Left periorbital soft tissue contusion. No underlying facial bone fracture. XR CHEST PORTABLE   Final Result   1. No acute pulmonary abnormality to account for patient's shortness of   breath. 2. Stable mild enlargement of the cardiac silhouette. XR PELVIS (1-2 VIEWS)   Final Result   Negative         XR WRIST LEFT (MIN 3 VIEWS)   Final Result   1.  Soft tissue swelling of bid based on the day patient will be placed.    6.  Alcohol abuse, patient stated that he drinks on a daily basis, [facility] protocol ordered  7.  Rhabdomyolysis, likely from laying down on the floor, IV fluid, improved CK.   8.  Facial trauma, likely due to fall, edema better, patient is opening both eyes without issues, no blurry or double vision.   9.  Lactic acidosis, lactic acid elevated on admission, improved.   10.  History of seizure, restart Keppra, seizure precautions    DVT Prophylaxis: eliquis  Diet: DIET GENERAL;  Code Status: Full Code        Dispo - pending placement     Darlene Blackwell MD

## 2021-01-13 ENCOUNTER — TELEPHONE (OUTPATIENT)
Dept: FAMILY MEDICINE CLINIC | Age: 86
End: 2021-01-13

## 2021-02-11 ENCOUNTER — APPOINTMENT (OUTPATIENT)
Dept: GENERAL RADIOLOGY | Age: 86
DRG: 683 | End: 2021-02-11
Payer: MEDICARE

## 2021-02-11 ENCOUNTER — HOSPITAL ENCOUNTER (INPATIENT)
Age: 86
LOS: 4 days | Discharge: HOME HEALTH | DRG: 683 | End: 2021-02-15
Attending: STUDENT IN AN ORGANIZED HEALTH CARE EDUCATION/TRAINING PROGRAM | Admitting: INTERNAL MEDICINE
Payer: MEDICARE

## 2021-02-11 DIAGNOSIS — E87.5 HYPERKALEMIA: ICD-10-CM

## 2021-02-11 DIAGNOSIS — R77.8 ELEVATED TROPONIN: ICD-10-CM

## 2021-02-11 DIAGNOSIS — N17.9 AKI (ACUTE KIDNEY INJURY) (HCC): Primary | ICD-10-CM

## 2021-02-11 LAB
A/G RATIO: 0.8 (ref 1.1–2.2)
ALBUMIN SERPL-MCNC: 2.8 G/DL (ref 3.4–5)
ALP BLD-CCNC: 121 U/L (ref 40–129)
ALT SERPL-CCNC: 9 U/L (ref 10–40)
ANION GAP SERPL CALCULATED.3IONS-SCNC: 10 MMOL/L (ref 3–16)
ANION GAP SERPL CALCULATED.3IONS-SCNC: 11 MMOL/L (ref 3–16)
AST SERPL-CCNC: 16 U/L (ref 15–37)
BASOPHILS ABSOLUTE: 0.1 K/UL (ref 0–0.2)
BASOPHILS RELATIVE PERCENT: 0.6 %
BILIRUB SERPL-MCNC: 0.3 MG/DL (ref 0–1)
BILIRUBIN URINE: NEGATIVE
BLOOD, URINE: ABNORMAL
BUN BLDV-MCNC: 40 MG/DL (ref 7–20)
BUN BLDV-MCNC: 47 MG/DL (ref 7–20)
CALCIUM SERPL-MCNC: 8.8 MG/DL (ref 8.3–10.6)
CALCIUM SERPL-MCNC: 8.9 MG/DL (ref 8.3–10.6)
CHLORIDE BLD-SCNC: 106 MMOL/L (ref 99–110)
CHLORIDE BLD-SCNC: 107 MMOL/L (ref 99–110)
CLARITY: ABNORMAL
CO2: 23 MMOL/L (ref 21–32)
CO2: 24 MMOL/L (ref 21–32)
COLOR: ABNORMAL
CREAT SERPL-MCNC: 4.3 MG/DL (ref 0.8–1.3)
CREAT SERPL-MCNC: 5.9 MG/DL (ref 0.8–1.3)
EKG ATRIAL RATE: 101 BPM
EKG DIAGNOSIS: NORMAL
EKG P AXIS: 51 DEGREES
EKG P-R INTERVAL: 164 MS
EKG Q-T INTERVAL: 310 MS
EKG QRS DURATION: 78 MS
EKG QTC CALCULATION (BAZETT): 401 MS
EKG R AXIS: 0 DEGREES
EKG T AXIS: 29 DEGREES
EKG VENTRICULAR RATE: 101 BPM
EOSINOPHILS ABSOLUTE: 0.8 K/UL (ref 0–0.6)
EOSINOPHILS RELATIVE PERCENT: 9.5 %
EPITHELIAL CELLS, UA: 1 /HPF (ref 0–5)
GFR AFRICAN AMERICAN: 11
GFR AFRICAN AMERICAN: 16
GFR NON-AFRICAN AMERICAN: 13
GFR NON-AFRICAN AMERICAN: 9
GLOBULIN: 3.7 G/DL
GLUCOSE BLD-MCNC: 91 MG/DL (ref 70–99)
GLUCOSE BLD-MCNC: 93 MG/DL (ref 70–99)
GLUCOSE URINE: NEGATIVE MG/DL
HCT VFR BLD CALC: 26.3 % (ref 40.5–52.5)
HEMOGLOBIN: 8.4 G/DL (ref 13.5–17.5)
HYALINE CASTS: 0 /LPF (ref 0–8)
KETONES, URINE: NEGATIVE MG/DL
LACTIC ACID: 0.9 MMOL/L (ref 0.4–2)
LEUKOCYTE ESTERASE, URINE: ABNORMAL
LYMPHOCYTES ABSOLUTE: 0.8 K/UL (ref 1–5.1)
LYMPHOCYTES RELATIVE PERCENT: 9.3 %
MCH RBC QN AUTO: 30.6 PG (ref 26–34)
MCHC RBC AUTO-ENTMCNC: 31.8 G/DL (ref 31–36)
MCV RBC AUTO: 96.2 FL (ref 80–100)
MICROSCOPIC EXAMINATION: YES
MONOCYTES ABSOLUTE: 0.9 K/UL (ref 0–1.3)
MONOCYTES RELATIVE PERCENT: 10.2 %
NEUTROPHILS ABSOLUTE: 6.1 K/UL (ref 1.7–7.7)
NEUTROPHILS RELATIVE PERCENT: 70.4 %
NITRITE, URINE: NEGATIVE
PDW BLD-RTO: 16.6 % (ref 12.4–15.4)
PH UA: 6.5 (ref 5–8)
PLATELET # BLD: 314 K/UL (ref 135–450)
PMV BLD AUTO: 8.6 FL (ref 5–10.5)
POTASSIUM REFLEX MAGNESIUM: 6.1 MMOL/L (ref 3.5–5.1)
POTASSIUM SERPL-SCNC: 5.1 MMOL/L (ref 3.5–5.1)
PRO-BNP: 545 PG/ML (ref 0–449)
PROTEIN UA: ABNORMAL MG/DL
RBC # BLD: 2.73 M/UL (ref 4.2–5.9)
RBC UA: >900 /HPF (ref 0–4)
SODIUM BLD-SCNC: 140 MMOL/L (ref 136–145)
SODIUM BLD-SCNC: 141 MMOL/L (ref 136–145)
SPECIFIC GRAVITY UA: 1.01 (ref 1–1.03)
TOTAL PROTEIN: 6.5 G/DL (ref 6.4–8.2)
TROPONIN: 0.05 NG/ML
URINE REFLEX TO CULTURE: ABNORMAL
URINE TYPE: ABNORMAL
UROBILINOGEN, URINE: 0.2 E.U./DL
WBC # BLD: 8.6 K/UL (ref 4–11)
WBC UA: 5 /HPF (ref 0–5)

## 2021-02-11 PROCEDURE — 36415 COLL VENOUS BLD VENIPUNCTURE: CPT

## 2021-02-11 PROCEDURE — 87040 BLOOD CULTURE FOR BACTERIA: CPT

## 2021-02-11 PROCEDURE — 85025 COMPLETE CBC W/AUTO DIFF WBC: CPT

## 2021-02-11 PROCEDURE — 71045 X-RAY EXAM CHEST 1 VIEW: CPT

## 2021-02-11 PROCEDURE — 6370000000 HC RX 637 (ALT 250 FOR IP): Performed by: INTERNAL MEDICINE

## 2021-02-11 PROCEDURE — 1200000000 HC SEMI PRIVATE

## 2021-02-11 PROCEDURE — 83880 ASSAY OF NATRIURETIC PEPTIDE: CPT

## 2021-02-11 PROCEDURE — 81003 URINALYSIS AUTO W/O SCOPE: CPT

## 2021-02-11 PROCEDURE — 51702 INSERT TEMP BLADDER CATH: CPT

## 2021-02-11 PROCEDURE — 2580000003 HC RX 258: Performed by: NURSE PRACTITIONER

## 2021-02-11 PROCEDURE — 84484 ASSAY OF TROPONIN QUANT: CPT

## 2021-02-11 PROCEDURE — 83605 ASSAY OF LACTIC ACID: CPT

## 2021-02-11 PROCEDURE — 2580000003 HC RX 258: Performed by: INTERNAL MEDICINE

## 2021-02-11 PROCEDURE — 93005 ELECTROCARDIOGRAM TRACING: CPT | Performed by: NURSE PRACTITIONER

## 2021-02-11 PROCEDURE — 80053 COMPREHEN METABOLIC PANEL: CPT

## 2021-02-11 PROCEDURE — 2500000003 HC RX 250 WO HCPCS: Performed by: HOSPITALIST

## 2021-02-11 PROCEDURE — 6370000000 HC RX 637 (ALT 250 FOR IP): Performed by: HOSPITALIST

## 2021-02-11 PROCEDURE — 81001 URINALYSIS AUTO W/SCOPE: CPT

## 2021-02-11 PROCEDURE — 2500000003 HC RX 250 WO HCPCS: Performed by: STUDENT IN AN ORGANIZED HEALTH CARE EDUCATION/TRAINING PROGRAM

## 2021-02-11 PROCEDURE — 99284 EMERGENCY DEPT VISIT MOD MDM: CPT

## 2021-02-11 PROCEDURE — 93010 ELECTROCARDIOGRAM REPORT: CPT | Performed by: INTERNAL MEDICINE

## 2021-02-11 RX ORDER — SODIUM CHLORIDE 9 MG/ML
INJECTION, SOLUTION INTRAVENOUS CONTINUOUS
Status: DISCONTINUED | OUTPATIENT
Start: 2021-02-11 | End: 2021-02-14

## 2021-02-11 RX ORDER — LACTOBACILLUS RHAMNOSUS GG 10B CELL
1 CAPSULE ORAL
Status: DISCONTINUED | OUTPATIENT
Start: 2021-02-12 | End: 2021-02-15 | Stop reason: HOSPADM

## 2021-02-11 RX ORDER — CEPHALEXIN 500 MG/1
500 CAPSULE ORAL 4 TIMES DAILY
Status: ON HOLD | COMMUNITY
End: 2021-02-14 | Stop reason: HOSPADM

## 2021-02-11 RX ORDER — PANTOPRAZOLE SODIUM 40 MG/1
40 TABLET, DELAYED RELEASE ORAL
Status: DISCONTINUED | OUTPATIENT
Start: 2021-02-12 | End: 2021-02-15 | Stop reason: HOSPADM

## 2021-02-11 RX ORDER — ACETAMINOPHEN 650 MG/1
650 SUPPOSITORY RECTAL EVERY 6 HOURS PRN
Status: DISCONTINUED | OUTPATIENT
Start: 2021-02-11 | End: 2021-02-15 | Stop reason: HOSPADM

## 2021-02-11 RX ORDER — TAMSULOSIN HYDROCHLORIDE 0.4 MG/1
0.4 CAPSULE ORAL DAILY
Status: DISCONTINUED | OUTPATIENT
Start: 2021-02-12 | End: 2021-02-15 | Stop reason: HOSPADM

## 2021-02-11 RX ORDER — ONDANSETRON 2 MG/ML
4 INJECTION INTRAMUSCULAR; INTRAVENOUS EVERY 6 HOURS PRN
Status: DISCONTINUED | OUTPATIENT
Start: 2021-02-11 | End: 2021-02-15 | Stop reason: HOSPADM

## 2021-02-11 RX ORDER — LEVETIRACETAM 500 MG/1
500 TABLET ORAL 2 TIMES DAILY
Status: DISCONTINUED | OUTPATIENT
Start: 2021-02-11 | End: 2021-02-15 | Stop reason: HOSPADM

## 2021-02-11 RX ORDER — GAUZE BANDAGE 2" X 2"
100 BANDAGE TOPICAL DAILY
Status: DISCONTINUED | OUTPATIENT
Start: 2021-02-12 | End: 2021-02-15 | Stop reason: HOSPADM

## 2021-02-11 RX ORDER — ACETAMINOPHEN 325 MG/1
650 TABLET ORAL EVERY 4 HOURS PRN
COMMUNITY

## 2021-02-11 RX ORDER — ACETAMINOPHEN 325 MG/1
325 TABLET ORAL EVERY 4 HOURS PRN
COMMUNITY
End: 2021-05-31

## 2021-02-11 RX ORDER — TAMSULOSIN HYDROCHLORIDE 0.4 MG/1
0.4 CAPSULE ORAL EVERY MORNING
COMMUNITY
End: 2021-07-09

## 2021-02-11 RX ORDER — ACETAMINOPHEN 325 MG/1
650 TABLET ORAL EVERY 6 HOURS PRN
Status: DISCONTINUED | OUTPATIENT
Start: 2021-02-11 | End: 2021-02-15 | Stop reason: HOSPADM

## 2021-02-11 RX ORDER — PANTOPRAZOLE SODIUM 40 MG/1
40 TABLET, DELAYED RELEASE ORAL EVERY EVENING
COMMUNITY
End: 2021-07-09

## 2021-02-11 RX ORDER — ZINC GLUCONATE 50 MG
50 TABLET ORAL DAILY
Status: ON HOLD | COMMUNITY
End: 2021-06-04 | Stop reason: HOSPADM

## 2021-02-11 RX ORDER — SODIUM CHLORIDE 9 MG/ML
INJECTION, SOLUTION INTRAVENOUS CONTINUOUS
Status: DISCONTINUED | OUTPATIENT
Start: 2021-02-11 | End: 2021-02-11 | Stop reason: SDUPTHER

## 2021-02-11 RX ORDER — SODIUM CHLORIDE 0.9 % (FLUSH) 0.9 %
10 SYRINGE (ML) INJECTION EVERY 12 HOURS SCHEDULED
Status: DISCONTINUED | OUTPATIENT
Start: 2021-02-11 | End: 2021-02-15 | Stop reason: HOSPADM

## 2021-02-11 RX ORDER — CALCIUM GLUCONATE 20 MG/ML
1000 INJECTION, SOLUTION INTRAVENOUS ONCE
Status: COMPLETED | OUTPATIENT
Start: 2021-02-11 | End: 2021-02-11

## 2021-02-11 RX ORDER — SODIUM CHLORIDE 0.9 % (FLUSH) 0.9 %
10 SYRINGE (ML) INJECTION PRN
Status: DISCONTINUED | OUTPATIENT
Start: 2021-02-11 | End: 2021-02-15 | Stop reason: HOSPADM

## 2021-02-11 RX ORDER — ASCORBIC ACID 500 MG
1000 TABLET ORAL DAILY
Status: DISCONTINUED | OUTPATIENT
Start: 2021-02-12 | End: 2021-02-15 | Stop reason: HOSPADM

## 2021-02-11 RX ORDER — ZINC SULFATE 50(220)MG
50 CAPSULE ORAL DAILY
Status: DISCONTINUED | OUTPATIENT
Start: 2021-02-12 | End: 2021-02-15 | Stop reason: HOSPADM

## 2021-02-11 RX ORDER — MULTIVITAMIN WITH IRON
1 TABLET ORAL DAILY
Status: DISCONTINUED | OUTPATIENT
Start: 2021-02-12 | End: 2021-02-15 | Stop reason: HOSPADM

## 2021-02-11 RX ORDER — HEPARIN SODIUM 5000 [USP'U]/ML
5000 INJECTION, SOLUTION INTRAVENOUS; SUBCUTANEOUS EVERY 8 HOURS SCHEDULED
Status: DISCONTINUED | OUTPATIENT
Start: 2021-02-11 | End: 2021-02-11 | Stop reason: ALTCHOICE

## 2021-02-11 RX ORDER — PROMETHAZINE HYDROCHLORIDE 25 MG/1
12.5 TABLET ORAL EVERY 6 HOURS PRN
Status: DISCONTINUED | OUTPATIENT
Start: 2021-02-11 | End: 2021-02-15 | Stop reason: HOSPADM

## 2021-02-11 RX ORDER — 0.9 % SODIUM CHLORIDE 0.9 %
1000 INTRAVENOUS SOLUTION INTRAVENOUS ONCE
Status: COMPLETED | OUTPATIENT
Start: 2021-02-11 | End: 2021-02-11

## 2021-02-11 RX ADMIN — LEVETIRACETAM 500 MG: 500 TABLET ORAL at 21:40

## 2021-02-11 RX ADMIN — SODIUM CHLORIDE 1000 ML: 9 INJECTION, SOLUTION INTRAVENOUS at 18:03

## 2021-02-11 RX ADMIN — APIXABAN 5 MG: 5 TABLET, FILM COATED ORAL at 21:40

## 2021-02-11 RX ADMIN — SODIUM CHLORIDE: 9 INJECTION, SOLUTION INTRAVENOUS at 21:40

## 2021-02-11 RX ADMIN — Medication 10 ML: at 21:41

## 2021-02-11 RX ADMIN — SODIUM ZIRCONIUM CYCLOSILICATE 10 G: 10 POWDER, FOR SUSPENSION ORAL at 21:40

## 2021-02-11 RX ADMIN — CALCIUM GLUCONATE 1000 MG: 20 INJECTION, SOLUTION INTRAVENOUS at 18:03

## 2021-02-11 RX ADMIN — SODIUM BICARBONATE 25 MEQ: 84 INJECTION, SOLUTION INTRAVENOUS at 17:53

## 2021-02-11 NOTE — H&P
Hospital Medicine History & Physical      PCP: Mylene Gilford, MD    Date of Admission: 2/11/2021    Chief Complaint: Abnormal blood work, elevated creatinine    History Of Present Illness:  Patient is a 80-year-old male nursing home resident with past medical history of seizures, alcohol abuse, CKD who presents to the hospital for abnormal blood work. Patient is very poor historian and is not sure why he is here however he mentions he had some urine issue and they took out his urine otherwise denied chest pain shortness of breath nausea vomiting diarrhea constipation dysuria, he mentions he is not sure if he is keeping up with hydration. Past Medical History:          Diagnosis Date    GEOVANI (acute kidney injury) ([facility] [address] 2/11/2021    Alcohol abuse 12/22/2020    Atrial fibrillation (HCC)     BPH (benign prostatic hyperplasia)     Dehydration     [facility] ([facility])     Leg weakness, bilateral 2014, 2015    legs give out and pt collaspes    Seizure ([facility] [address] 7/20/2017    UTI (urinary tract infection) 8-    admitted with high fever and fainted. Past Surgical History:          Procedure Laterality Date    CATARACT REMOVAL Bilateral     SPLENECTOMY, TOTAL      at age 15 y old. Medications Prior to Admission:      Prior to Admission medications    Medication Sig Start Date End Date Taking?  Authorizing Provider   zinc gluconate 50 MG tablet Take 50 mg by mouth daily   Yes Historical Provider, MD   tamsulosin (FLOMAX) 0.4 MG capsule Take 0.4 mg by mouth daily   Yes Historical Provider, MD   ascorbic acid (ASCO-TABS-1000) 1000 MG tablet Take 1,000 mg by mouth daily   Yes Historical Provider, MD   Cholecalciferol (VITAMIN D3) 125 MCG (5000 UT) TABS Take 5,000 Units by mouth daily    Yes Historical Provider, MD   pantoprazole (PROTONIX) 40 MG tablet Take 40 mg by mouth 2 times daily   Yes Historical Provider, MD collagenase 250 UNIT/GM ointment Apply topically nightly Apply to left knee, right knee, and left wrist nightly   Yes Historical Provider, MD   cephALEXin (KEFLEX) 500 MG capsule Take 500 mg by mouth 4 times daily Received 2 of 4 of today's doses - Last day to be 02/11/2021   Yes Historical Provider, MD   acetaminophen (TYLENOL) 325 MG tablet Take 650 mg by mouth every 4 hours as needed for Fever   Yes Historical Provider, MD   acetaminophen (TYLENOL) 325 MG tablet Take 325 mg by mouth every 4 hours as needed for Pain   Yes Historical Provider, MD   apixaban (ELIQUIS) 5 MG TABS tablet 10 mg po twice daily for 2 days then 5 mg po twice daily  Patient taking differently: Take 5 mg by mouth 2 times daily History of VTE 12/28/20  Yes Guillermo Gabriel MD   lactobacillus (CULTURELLE) capsule Take 1 capsule by mouth daily (with breakfast) 12/29/20  Yes Guillermo Gabriel MD   Multiple Vitamin (MULTIVITAMIN) TABS tablet Take 1 tablet by mouth daily 12/26/20  Yes Nima Castellanos MD   thiamine 100 MG tablet Take 1 tablet by mouth daily 12/25/20  Yes Guillermo Gabriel MD   levETIRAcetam (KEPPRA) 500 MG tablet TAKE 1 TABLET BY MOUTH TWO  TIMES DAILY 5/7/20  Yes Tray Osman MD       Allergies:  Patient has no known allergies. Social History:      TOBACCO:   reports that he has never smoked. He has never used smokeless tobacco.  ETOH:   reports current alcohol use. Family History:       Reviewed in detail and non contributory          Problem Relation Age of Onset    Cancer Mother         pancreatic    Diabetes Father     Cancer Brother        REVIEW OF SYSTEMS:   Pertinent positives as noted in the HPI. All other systems reviewed and negative. PHYSICAL EXAM PERFORMED:    /81   Pulse 106   Temp 98.4 °F (36.9 °C) (Oral)   Resp 19   Ht 5' 10\" (1.778 m)   Wt 229 lb 4.5 oz (104 kg)   SpO2 95%   BMI 32.90 kg/m²     General appearance:  No apparent distress, cooperative. HEENT:  Normal cephalic, atraumatic without obvious deformity. Conjunctivae/corneas clear. Neck: Supple, with full range of motion. No cervical lymphadenopathy  Respiratory:  Normal respiratory effort. Clear to auscultation, bilaterally without Rales/Wheezes/Rhonchi. Cardiovascular:  Regular rate and rhythm with normal S1/S2 without murmurs, rubs or gallops. Abdomen: Soft, non-tender, non-distended, normal bowel sounds. Musculoskeletal:  No edema noted bilaterally. No tenderness on palpation   Skin: no rash visible  Neurologic:  Neurologically intact without any focal sensory/motor deficits. grossly non-focal.  Psychiatric:  Alert and oriented, normal mood  Peripheral Pulses: +2 palpable, equal bilaterally       Labs:     Recent Labs     02/11/21  1407   WBC 8.6   HGB 8.4*   HCT 26.3*        Recent Labs     02/11/21  1407      K 6.1*      CO2 23   BUN 47*   CREATININE 5.9*   CALCIUM 8.8     Recent Labs     02/11/21  1407   AST 16   ALT 9*   BILITOT 0.3   ALKPHOS 121     No results for input(s): INR in the last 72 hours. Recent Labs     02/11/21  1407   TROPONINI 0.05*       Urinalysis:      Lab Results   Component Value Date    NITRU Negative 02/11/2021    WBCUA 10 12/22/2020    BACTERIA 4+ 08/10/2011    RBCUA 62 12/22/2020    BLOODU LARGE 02/11/2021    SPECGRAV 1.012 02/11/2021    GLUCOSEU Negative 02/11/2021    GLUCOSEU Negative 08/10/2011       Radiology:       XR CHEST PORTABLE   Final Result   Negative portable chest x-ray. No evidence of acute cardiopulmonary disease.                  Active Hospital Problems    Diagnosis Date Noted    GEOVANI (acute kidney injury) ([facility] [address] [N17.9] 02/11/2021 Patient is a 80-year-old male nursing home resident with past medical history of seizures, alcohol abuse, CKD who presents to the hospital for abnormal blood work. Patient is very poor historian and is not sure why he is here however he mentions he had some urine issue and they took out his urine otherwise denied chest pain shortness of breath nausea vomiting diarrhea constipation dysuria, he mentions he is not sure if he is keeping up with hydration. Assessment  Acute kidney injury on CKD, differential includes renal or postrenal  Hyperkalemia  Elevated troponin in the setting of GEOVANI, patient is chest pain-free  History of seizures  CKD  Alcohol abuse    Plan  Will order ultrasound renal, strict I's and O's with Ambrosio, will check bladder scan, start IV fluid therapy  Patient is status post hyperkalemia cocktail in ED, to ensure level within normal limits recheck level  Resume home medications, hold nephrotoxic medications  DVT prophylaxis-Heparin  Diet: ordered  Code Status: full    PT/OT Eval Status: ordered    Dispo - pending clinical improvement       Milo Gonzales MD    The note was completed using EMR and Dragon dictation system. Every effort was made to ensure accuracy; however, inadvertent computerized transcription errors may be present. Thank you Eric Hester MD for the opportunity to be involved in this patient's care. If you have any questions or concerns please feel free to contact me at [phone].     Milo Gonzales MD

## 2021-02-11 NOTE — ED PROVIDER NOTES
MidLevel Attestation   I havepersonally performed and/or participated in the history, exam and medical decision making and agree with all pertinent clinical information. I have also reviewed and agree with the past medical, family and social historyunless otherwise noted. I have personally performed a face to face diagnostic evaluation onthis patient. I have reviewed the mid-levels findings and agree. In brief, Brynn Lester is a 80 y.o. male that presented to the emergency department with   Chief Complaint   Patient presents with    Other     K 6.1 per nursing home    . CONSTITUTIONAL: Well appearing, in no acute distress   EYES: PERRL, No injection, discharge or scleral icterus. NECK: Normal ROM, NO LAD and Moist mucous membranes. CARDIOVASCULAR: Regular rate and rhythm. No murmurs or gallop. PULMONARY/CHEST: Airway patent. No retractions. Breath sounds clear with good air entry bilaterally. ABDOMEN: Soft, Non-distended and non-tender, without guarding or rebound. SKIN: Acyanotic, warm, dry   MUSCULOSKELETAL: No swelling, tenderness or deformity   NEUROLOGICAL: Awake. Pulses intact. Grossly nonfocal     27-year-old gentleman with history of seizures, pulmonary embolism, alcohol abuse, chronic kidney disease, resident of a skilled nursing facility presenting this afternoon with elevated potassium levels. Patient was sent here because routine blood work at his facility showed potassium of 6.1. Repeat potassium here was elevated with creatinine of 5.6 from a baseline of 0.5. Troponin is also elevated which I believe is from his GEOVANI. EKG with no peaked T waves. Nonetheless given this findings patient was given 1 calcium gluconate, given a liter of IV fluids, nephrology consulted pending the recommendation. Will need admission for further evaluation and treatment. Hospitalist consulted patient admitted to the service for further evaluation and treatment. Alkaline Phosphatase 121 40 - 129 U/L    ALT 9 (L) 10 - 40 U/L    AST 16 15 - 37 U/L    Globulin 3.7 g/dL   Troponin   Result Value Ref Range    Troponin 0.05 (H) <0.01 ng/mL   Lactic Acid, Plasma   Result Value Ref Range    Lactic Acid 0.9 0.4 - 2.0 mmol/L   Urinalysis Reflex to Culture    Specimen: Urine, clean catch   Result Value Ref Range    Color, UA DK YELLOW Straw/Yellow    Clarity, UA CLOUDY (A) Clear    Glucose, Ur Negative Negative mg/dL    Bilirubin Urine Negative Negative    Ketones, Urine Negative Negative mg/dL    Specific Gravity, UA 1.012 1.005 - 1.030    Blood, Urine LARGE (A) Negative    pH, UA 6.5 5.0 - 8.0    Protein, UA TRACE (A) Negative mg/dL    Urobilinogen, Urine 0.2 <2.0 E.U./dL    Nitrite, Urine Negative Negative    Leukocyte Esterase, Urine TRACE (A) Negative    Microscopic Examination YES     Urine Type NotGiven     Urine Reflex to Culture Not Indicated    Brain Natriuretic Peptide   Result Value Ref Range    Pro- (H) 0 - 449 pg/mL   Urinalysis with Microscopic   Result Value Ref Range    Hyaline Casts, UA 0 0 - 8 /LPF    WBC, UA 5 0 - 5 /HPF    RBC, UA >900 (H) 0 - 4 /HPF    Epithelial Cells, UA 1 0 - 5 /HPF   Basic Metabolic Panel   Result Value Ref Range    Sodium 141 136 - 145 mmol/L    Potassium 5.1 3.5 - 5.1 mmol/L    Chloride 107 99 - 110 mmol/L    CO2 24 21 - 32 mmol/L    Anion Gap 10 3 - 16    Glucose 93 70 - 99 mg/dL    BUN 40 (H) 7 - 20 mg/dL    CREATININE 4.3 (H) 0.8 - 1.3 mg/dL    GFR Non-African American 13 (A) >60    GFR  16 (A) >60    Calcium 8.9 8.3 - 10.6 mg/dL   Basic Metabolic Panel w/ Reflex to MG   Result Value Ref Range    Sodium 141 136 - 145 mmol/L    Potassium reflex Magnesium 4.6 3.5 - 5.1 mmol/L    Chloride 109 99 - 110 mmol/L    CO2 24 21 - 32 mmol/L    Anion Gap 8 3 - 16    Glucose 85 70 - 99 mg/dL    BUN 32 (H) 7 - 20 mg/dL    CREATININE 3.0 (H) 0.8 - 1.3 mg/dL    GFR Non-African American 20 (A) >60 GFR  24 (A) >60    Calcium 8.7 8.3 - 10.6 mg/dL   CBC   Result Value Ref Range    WBC 7.5 4.0 - 11.0 K/uL    RBC 2.39 (L) 4.20 - 5.90 M/uL    Hemoglobin 7.2 (L) 13.5 - 17.5 g/dL    Hematocrit 22.5 (L) 40.5 - 52.5 %    MCV 94.2 80.0 - 100.0 fL    MCH 30.3 26.0 - 34.0 pg    MCHC 32.1 31.0 - 36.0 g/dL    RDW 16.3 (H) 12.4 - 15.4 %    Platelets 060 722 - 406 K/uL    MPV 8.5 5.0 - 10.5 fL   Sodium, urine, random   Result Value Ref Range    Sodium, Ur 121 Not Established mmol/L   Creatinine, Random Urine   Result Value Ref Range    Creatinine, Ur 46.3 39.0 - 259.0 mg/dL   Uric Acid   Result Value Ref Range    Uric Acid, Serum 6.5 3.5 - 7.2 mg/dL   Basic Metabolic Panel w/ Reflex to MG   Result Value Ref Range    Sodium 143 136 - 145 mmol/L    Potassium reflex Magnesium 4.1 3.5 - 5.1 mmol/L    Chloride 110 99 - 110 mmol/L    CO2 27 21 - 32 mmol/L    Anion Gap 6 3 - 16    Glucose 84 70 - 99 mg/dL    BUN 15 7 - 20 mg/dL    CREATININE 1.4 (H) 0.8 - 1.3 mg/dL    GFR Non- 48 (A) >60    GFR  58 (A) >60    Calcium 8.5 8.3 - 10.6 mg/dL   CBC   Result Value Ref Range    WBC 7.9 4.0 - 11.0 K/uL    RBC 2.38 (L) 4.20 - 5.90 M/uL    Hemoglobin 7.3 (L) 13.5 - 17.5 g/dL    Hematocrit 22.6 (L) 40.5 - 52.5 %    MCV 95.1 80.0 - 100.0 fL    MCH 30.7 26.0 - 34.0 pg    MCHC 32.3 31.0 - 36.0 g/dL    RDW 16.7 (H) 12.4 - 15.4 %    Platelets 736 681 - 607 K/uL    MPV 8.4 5.0 - 10.5 fL   Procalcitonin   Result Value Ref Range    Procalcitonin 0.10 0.00 - 0.15 ng/mL   C-Reactive Protein   Result Value Ref Range    CRP 34.7 (H) 0.0 - 5.1 mg/L   EKG 12 Lead   Result Value Ref Range    Ventricular Rate 101 BPM    Atrial Rate 101 BPM    P-R Interval 164 ms    QRS Duration 78 ms    Q-T Interval 310 ms    QTc Calculation (Bazett) 401 ms    P Axis 51 degrees    R Axis 0 degrees    T Axis 29 degrees    Diagnosis Sinus tachycardiaNonspecific ST abnormalityConfirmed by PHYLLIS ARTHUR, Jannet Leach ([id number]) on 2/11/2021 2:47:27 PM     No results found.     ED Medication Orders (From admission, onward)    Start Ordered     Status Ordering Provider    02/12/21 1200 02/12/21 1030  [facility] wound/burn dressing gel  DAILY      Last MAR action: Given - by Leda Melendez on 02/13/21 at [address]    02/12/21 1100 02/12/21 1013  levetiracetam (KEPPRA) 500 mg/100 mL IVPB  EVERY 12 HOURS      Last MAR action: Stopped - by Leda Melendez on 02/13/21 at [address]    02/12/21 1029 02/12/21 1030  menthol-zinc oxide (CALMOSEPTINE) 0.44-20.6 % ointment 1-3 each  2 TIMES DAILY PRN      Last MAR action: Given - by Leda Melendez on 02/13/21 at [address] [place] [address]    02/12/21 0900 02/11/21 1838  ascorbic acid (VITAMIN C) tablet 1,000 mg  DAILY      Last MAR action: Given - by Leda Melendez on 02/13/21 at [address] [place] [address]    02/12/21 0900 02/11/21 1838  vitamin D (CHOLECALCIFEROL) capsule 5,000 Units  DAILY      Last MAR action: Given - by Leda Melendez on 02/13/21 at [address] [place] [address]    02/12/21 0900 02/11/21 1838  multivitamin 1 tablet  DAILY      Last MAR action: Given - by Leda Melendez on 02/13/21 at [address] [place] [address]    02/12/21 0900 02/11/21 1838  tamsulosin (FLOMAX) capsule 0.4 mg  DAILY      Last MAR action: Given - by Leda Melendez on 02/13/21 at [address] [place] [address]    02/12/21 0900 02/11/21 1838  thiamine mononitrate tablet 100 mg  DAILY      Last MAR action: Given - by Leda Melendez on 02/13/21 at [address] [place] [address]    02/12/21 0900 02/11/21 1838  zinc sulfate (ZINCATE) capsule 50 mg  DAILY      Last MAR action: Given - by Leda Melendez on 02/13/21 at [address] [facility], [facility]    02/12/21 0800 02/11/21 1838  lactobacillus (CULTURELLE) capsule 1 capsule  DAILY WITH BREAKFAST      Last MAR action: Given - by Leda Melendez on 02/13/21 at [facility], [facility] 02/12/21 0700 02/11/21 1838  pantoprazole (PROTONIX) tablet 40 mg  2 TIMES DAILY BEFORE MEALS      Last MAR action: Given - by Ysabel Lea on 02/13/21 at [address], [facility]    02/11/21 2130 02/11/21 2108  sodium chloride flush 0.9 % injection 10 mL  2 times per day      Last MAR action: Given - by Ysabel Lea on 02/13/21 at [address], [facility]    02/11/21 2130 02/11/21 2108  0.9 % sodium chloride infusion  CONTINUOUS      Last MAR action: New Bag - by Rose Marie Ortiz on 02/12/21 [facility]    02/11/21 2108 02/11/21 2108  sodium chloride flush 0.9 % injection 10 mL  PRN      Acknowledged [facility], [facility]    02/11/21 2108 02/11/21 2108  promethazine (PHENERGAN) tablet 12.5 mg  EVERY 6 HOURS PRN      Acknowledged [facility], [facility]    02/11/21 2108 02/11/21 2108  ondansetron (ZOFRAN) injection 4 mg  EVERY 6 HOURS PRN      Acknowledged [facility], [facility]    02/11/21 2108 02/11/21 2108  acetaminophen (TYLENOL) tablet 650 mg  EVERY 6 HOURS PRN      Acknowledged [facility], [facility]    02/11/21 2108 02/11/21 2108  acetaminophen (TYLENOL) suppository 650 mg  EVERY 6 HOURS PRN      Acknowledged [facility], [facility]    02/11/21 2100 02/11/21 1838  apixaban (ELIQUIS) tablet 5 mg  2 TIMES DAILY      Last MAR action: Given - by Ysabel Lea on 02/13/21 at [address]    02/11/21 2100 02/11/21 1838  [Held by provider]  levETIRAcetam (KEPPRA) tablet 500 mg  2 TIMES DAILY     (Held by provider since Fri 2/12/2021 at 1013 by Jane Ybarra MD.Hold Reason: Pt NPO.)    Last [facility] action: Automatically Held - by Sharee Silva on 02/17/21 at [address] [facility], [facility]    02/11/21 1615 02/11/21 1556  calcium gluconate 1000 mg in sodium chloride 50 mL  ONCE      Last MAR action: Stop Time - by Oksana Nino on 02/11/21 at [address] [facility], [facility]    02/11/21 1615 02/11/21 1611  sodium bicarbonate 8.4 % injection 25 mEq  ONCE      Last MAR action: Given - by MORGAN DEWEY on 02/11/21 at [address] [facility], [facility] 02/11/21 1615 02/11/21 1611  sodium zirconium cyclosilicate (LOKELMA) oral suspension 10 g  ONCE      Last MAR action: Given - by Ean Shoemaker on 02/11/21 at [address]    02/11/21 1545 02/11/21 1541  0.9 % sodium chloride bolus  ONCE      Last MAR action: Stopped - by MORGAN DEWEY on 02/11/21 at [address], [address] [facility] [address]          Final Impression      1. GEOVANI (acute kidney injury) ([facility] [address]    2. Hyperkalemia    3. Elevated troponin        DISPOSITION Admitted 02/11/2021 04:53:48 PM       Amount and/or Complexity of Data Reviewed:  Clinical lab tests: ordered and reviewed   Tests in the radiology section of CPT®: ordered and reviewed   Tests in the medicine section of CPT®: ordered and reviewed   Decide to obtain previous medical records or to obtain history from someone other than the patient: no  Obtain history from someone other than the patient: no  Review and summarize past medical records:yes  I looked up the patient in our electronic medical record:yes  Discuss the patient with other providers:yes  Independent visualization of images, tracings, or specimens:yes  Risk of Complications, Morbidity, and/or Mortality:Moderate  Presenting problems: moderate Diagnostic procedures: moderate yes Management options: moderate     This record is transcribed utilizing voice recognition technology. There are inherent limitations in this technology. In addition, there may be limitations in editing of this report. If there are any discrepancies, please contact me directly.     Skyla Page MD   2/13/2021         Nsehniitooh Master Gonzalez MD  02/13/21 1822

## 2021-02-11 NOTE — ED PROVIDER NOTES
[facility] Emergency Department    CHIEF COMPLAINT  Other (K 6.1 per nursing home )      SHARED SERVICE VISIT:  I have seen and evaluated this patient with my supervising physician, Dr. Nestor Tillman. HISTORY OF PRESENT ILLNESS  Skylar West is a 80 y.o. male who presents to the ED from [address]  home via EMS status post he had abnormal labs which include a potassium of 6.1, BUN 55, and creatinine 61. He also has bilateral lower extremity cellulitis. Patient denies chest pain, abdominal pain, nausea, vomiting, diarrhea, fever, chills, sweats, urinary symptoms, urine retention, cough, change in smell or taste, shortness of breath, dizziness, presyncope, or other complaints. Patient is unable to tell me who the president is and when asked states \"I cannot think of his name right now\", and when asked what year he has his states \"2001\". No other complaints, modifying factors or associated symptoms. Nursing notes reviewed. Past Medical History:   Diagnosis Date    Alcohol abuse 12/22/2020    Dehydration     Leg weakness, bilateral 2014, 2015    legs give out and pt collaspes    Seizure ([facility] [address] 7/20/2017    UTI (urinary tract infection) 8-    admitted with high fever and fainted. Past Surgical History:   Procedure Laterality Date    CATARACT REMOVAL Bilateral     SPLENECTOMY, TOTAL      at age 15 y old.      Family History   Problem Relation Age of Onset    Cancer Mother         pancreatic    Diabetes Father     Cancer Brother      Social History     Socioeconomic History    Marital status:      Spouse name: Not on file    Number of children: Not on file    Years of education: Not on file    Highest education level: Not on file   Occupational History    Not on file   Social Needs    Financial resource strain: Not on file    Food insecurity     Worry: Not on file     Inability: Not on file    Transportation needs     Medical: Not on file Non-medical: Not on file   Tobacco Use    Smoking status: Never Smoker    Smokeless tobacco: Never Used   Substance and Sexual Activity    Alcohol use: Yes     Alcohol/week: 0.0 standard drinks     Comment: occasionally     Drug use: No    Sexual activity: Not Currently   Lifestyle    Physical activity     Days per week: Not on file     Minutes per session: Not on file    Stress: Not on file   Relationships    Social connections     Talks on phone: Not on file     Gets together: Not on file     Attends Latter day service: Not on file     Active member of club or organization: Not on file     Attends meetings of clubs or organizations: Not on file     Relationship status: Not on file    Intimate partner violence     Fear of current or ex partner: Not on file     Emotionally abused: Not on file     Physically abused: Not on file     Forced sexual activity: Not on file   Other Topics Concern    Not on file   Social History Narrative    Not on file     No current facility-administered medications for this encounter. Current Outpatient Medications   Medication Sig Dispense Refill    apixaban (ELIQUIS) 5 MG TABS tablet 10 mg po twice daily for 2 days then 5 mg po twice daily 60 tablet 0    lactobacillus (CULTURELLE) capsule Take 1 capsule by mouth daily (with breakfast) 30 capsule 0    Multiple Vitamin (MULTIVITAMIN) TABS tablet Take 1 tablet by mouth daily 30 tablet 0    thiamine 100 MG tablet Take 1 tablet by mouth daily 15 tablet 0    levETIRAcetam (KEPPRA) 500 MG tablet TAKE 1 TABLET BY MOUTH TWO  TIMES DAILY 180 tablet 3     No Known Allergies    REVIEW OF SYSTEMS  6 systems reviewed, pertinent positives per HPI otherwise noted to be negative    PHYSICAL EXAM  There were no vitals taken for this visit. GENERAL APPEARANCE: Awake and alert. Cooperative. No acute distress. HEAD: Normocephalic. Atraumatic. EYES: PERRL. EOM's grossly intact.    ENT: Mucous membranes are moist. NECK: Supple. Normal ROM. There is no meningismus. There is no midline cervical spine tenderness upon palpation. CHEST: Equal symmetric chest rise. Heart: + S1-S2. Regular rhythm with a tachycardic [email or web address] bpm.  LUNGS: Breathing is unlabored. Speaking comfortably in full sentences. Abdomen: Nondistended.  + Bowel sounds x4 quadrants. There is no midline pulsatile abdominal mass. EXTREMITIES: MAEE. No acute deformities. SKIN: Warm and dry. NEUROLOGICAL: Alert and oriented. Strength is 5/5 in all extremities and sensation is intact. RADIOLOGY  No results found. ED COURSE  Patient did not require analgesia while in the emergency department.     Labs ordered  I have reviewed and interpreted all of the currently available lab results from this visit:  Results for orders placed or performed during the hospital encounter of 02/11/21   CBC Auto Differential   Result Value Ref Range    WBC 8.6 4.0 - 11.0 K/uL    RBC 2.73 (L) 4.20 - 5.90 M/uL    Hemoglobin 8.4 (L) 13.5 - 17.5 g/dL    Hematocrit 26.3 (L) 40.5 - 52.5 %    MCV 96.2 80.0 - 100.0 fL    MCH 30.6 26.0 - 34.0 pg    MCHC 31.8 31.0 - 36.0 g/dL    RDW 16.6 (H) 12.4 - 15.4 %    Platelets 289 002 - 570 K/uL    MPV 8.6 5.0 - 10.5 fL    Neutrophils % 70.4 %    Lymphocytes % 9.3 %    Monocytes % 10.2 %    Eosinophils % 9.5 %    Basophils % 0.6 %    Neutrophils Absolute 6.1 1.7 - 7.7 K/uL    Lymphocytes Absolute 0.8 (L) 1.0 - 5.1 K/uL    Monocytes Absolute 0.9 0.0 - 1.3 K/uL    Eosinophils Absolute 0.8 (H) 0.0 - 0.6 K/uL    Basophils Absolute 0.1 0.0 - 0.2 K/uL   Comprehensive Metabolic Panel w/ Reflex to MG   Result Value Ref Range    Sodium 140 136 - 145 mmol/L    Potassium reflex Magnesium 6.1 (HH) 3.5 - 5.1 mmol/L    Chloride 106 99 - 110 mmol/L    CO2 23 21 - 32 mmol/L    Anion Gap 11 3 - 16    Glucose 91 70 - 99 mg/dL    BUN 47 (H) 7 - 20 mg/dL    CREATININE 5.9 (HH) 0.8 - 1.3 mg/dL    GFR Non-African American 9 (A) >60 GFR  11 (A) >60    Calcium 8.8 8.3 - 10.6 mg/dL    Total Protein 6.5 6.4 - 8.2 g/dL    Albumin 2.8 (L) 3.4 - 5.0 g/dL    Albumin/Globulin Ratio 0.8 (L) 1.1 - 2.2    Total Bilirubin 0.3 0.0 - 1.0 mg/dL    Alkaline Phosphatase 121 40 - 129 U/L    ALT 9 (L) 10 - 40 U/L    AST 16 15 - 37 U/L    Globulin 3.7 g/dL   Troponin   Result Value Ref Range    Troponin 0.05 (H) <0.01 ng/mL   Lactic Acid, Plasma   Result Value Ref Range    Lactic Acid 0.9 0.4 - 2.0 mmol/L   Urinalysis Reflex to Culture    Specimen: Urine, clean catch   Result Value Ref Range    Color, UA DK YELLOW Straw/Yellow    Clarity, UA CLOUDY (A) Clear    Glucose, Ur Negative Negative mg/dL    Bilirubin Urine Negative Negative    Ketones, Urine Negative Negative mg/dL    Specific Gravity, UA 1.012 1.005 - 1.030    Blood, Urine LARGE (A) Negative    pH, UA 6.5 5.0 - 8.0    Protein, UA TRACE (A) Negative mg/dL    Urobilinogen, Urine 0.2 <2.0 E.U./dL    Nitrite, Urine Negative Negative    Leukocyte Esterase, Urine TRACE (A) Negative    Microscopic Examination YES     Urine Type NotGiven     Urine Reflex to Culture Not Indicated    Brain Natriuretic Peptide   Result Value Ref Range    Pro- (H) 0 - 449 pg/mL   Urinalysis with Microscopic   Result Value Ref Range    Hyaline Casts, UA 0 0 - 8 /LPF    WBC, UA 5 0 - 5 /HPF    RBC, UA >900 (H) 0 - 4 /HPF    Epithelial Cells, UA 1 0 - 5 /HPF   EKG 12 Lead   Result Value Ref Range    Ventricular Rate 101 BPM    Atrial Rate 101 BPM    P-R Interval 164 ms    QRS Duration 78 ms    Q-T Interval 310 ms    QTc Calculation (Bazett) 401 ms    P Axis 51 degrees    R Axis 0 degrees    T Axis 29 degrees    Diagnosis       Sinus tachycardiaNonspecific ST abnormalityConfirmed by Andreea FERGUSON MD ([id number]) on 2/11/2021 2:47:27 PM           Imaging ordered  Xr Chest Portable    Result Date: 2/11/2021 EXAMINATION: ONE XRAY VIEW OF THE CHEST 2/11/2021 2:29 pm COMPARISON: December 22, 2020 HISTORY: ORDERING SYSTEM PROVIDED HISTORY: other TECHNOLOGIST PROVIDED HISTORY: Reason for exam:->other Reason for Exam: other FINDINGS: A low lung volume expiratory portable chest x-ray is submitted. No consolidation, edema or other acute pulmonary process is demonstrated. No evidence of acute process of the cardiomediastinal structures. Negative portable chest x-ray. No evidence of acute cardiopulmonary disease. A discussion was had with Mr. Sherren Lawn and staff at [address] [place] [address] regarding GEOVANI, hyperkalemia, elevated troponin, and intention to admit. Risk management discussed and shared decision making had with patient and/or surrogate. All questions were answered. Patient and staff are agreeable with the plan for admission for further evaluation and treatment. MDM  Patient presents to the emergency department with acute kidney injury, hypokalemia, and elevated troponin. Alternative diagnoses are less likely based on history and physical. Alternative diagnoses are less likely based on history and physical. Considered malignant dysrhythmia, dehydration, prostatitis, sepsis, erysipelas, .kidney stone, pyelonephritis, UTI, appendicitis, bowel obstruction, diverticulitis, hernia, gastritis/gastroenteritis, pancreatitis, cholecystitis, hepatitis, constipation, IBS, IBDcamong others but less likely based on physical.    Nephrology was consulted by PIETER Britton. See his note for details. My attending physician nor I believe patient is appropriate as he has an acute kidney injury with a potassium of 6.1, and elevated [email or web address]. Patient was given sodium bicarb and a liter bolus of normal saline. Therefore,  patient will be admitted to the hospital for evaluation and treatment. Patient is agreeable with the plan for admission.             Clinical Impression:  Acute kidney injury  Hyperkalemia Elevated troponin        Disposition:  Admitted      Patient will be admitted to hospital for further evaluation and treatment.        Hospitalist: Dr. Ninoska Vásquez    Discussed patients HPI, ED work-up, results, treatment, and response with my attending physician Dr. Tre Jeffers and the Hospitalist -Dr. Ninoska Vásquez who agrees to admit the patient to the hospital.                 CHRISTINE Solorzano - CNP  02/11/21 [id number]

## 2021-02-11 NOTE — PROGRESS NOTES
Medication Reconciliation    List of medications patient is currently taking is complete. Source of information: 1. [employer] [facility] [place]                                           Allergies  Patient has no known allergies. Notes regarding home medications:   1. Patient received all morning medications prior to arrival to the ED. 2. Patient finishing up course of cephalexin for cellulitis today. Has received 2 of 4 doses so far today. 3. Patient is on Eliquis for recent Bilateral PE in December 2020.

## 2021-02-11 NOTE — CONSULTS
Consult received  Full note to follow    Rashel Velázquez  2/11/2021    [facility] [address]  Office : [phone]  Fax :[phone]

## 2021-02-12 ENCOUNTER — APPOINTMENT (OUTPATIENT)
Dept: ULTRASOUND IMAGING | Age: 86
DRG: 683 | End: 2021-02-12
Payer: MEDICARE

## 2021-02-12 ENCOUNTER — APPOINTMENT (OUTPATIENT)
Dept: CT IMAGING | Age: 86
DRG: 683 | End: 2021-02-12
Payer: MEDICARE

## 2021-02-12 LAB
ANION GAP SERPL CALCULATED.3IONS-SCNC: 8 MMOL/L (ref 3–16)
BUN BLDV-MCNC: 32 MG/DL (ref 7–20)
CALCIUM SERPL-MCNC: 8.7 MG/DL (ref 8.3–10.6)
CHLORIDE BLD-SCNC: 109 MMOL/L (ref 99–110)
CO2: 24 MMOL/L (ref 21–32)
CREAT SERPL-MCNC: 3 MG/DL (ref 0.8–1.3)
CREATININE URINE: 46.3 MG/DL (ref 39–259)
GFR AFRICAN AMERICAN: 24
GFR NON-AFRICAN AMERICAN: 20
GLUCOSE BLD-MCNC: 85 MG/DL (ref 70–99)
HCT VFR BLD CALC: 22.5 % (ref 40.5–52.5)
HEMOGLOBIN: 7.2 G/DL (ref 13.5–17.5)
MCH RBC QN AUTO: 30.3 PG (ref 26–34)
MCHC RBC AUTO-ENTMCNC: 32.1 G/DL (ref 31–36)
MCV RBC AUTO: 94.2 FL (ref 80–100)
PDW BLD-RTO: 16.3 % (ref 12.4–15.4)
PLATELET # BLD: 303 K/UL (ref 135–450)
PMV BLD AUTO: 8.5 FL (ref 5–10.5)
POTASSIUM REFLEX MAGNESIUM: 4.6 MMOL/L (ref 3.5–5.1)
RBC # BLD: 2.39 M/UL (ref 4.2–5.9)
SODIUM BLD-SCNC: 141 MMOL/L (ref 136–145)
SODIUM URINE: 121 MMOL/L
URIC ACID, SERUM: 6.5 MG/DL (ref 3.5–7.2)
WBC # BLD: 7.5 K/UL (ref 4–11)

## 2021-02-12 PROCEDURE — 84550 ASSAY OF BLOOD/URIC ACID: CPT

## 2021-02-12 PROCEDURE — 6360000002 HC RX W HCPCS: Performed by: FAMILY MEDICINE

## 2021-02-12 PROCEDURE — 97530 THERAPEUTIC ACTIVITIES: CPT

## 2021-02-12 PROCEDURE — 97535 SELF CARE MNGMENT TRAINING: CPT

## 2021-02-12 PROCEDURE — 87086 URINE CULTURE/COLONY COUNT: CPT

## 2021-02-12 PROCEDURE — 85027 COMPLETE CBC AUTOMATED: CPT

## 2021-02-12 PROCEDURE — 6370000000 HC RX 637 (ALT 250 FOR IP): Performed by: FAMILY MEDICINE

## 2021-02-12 PROCEDURE — 97162 PT EVAL MOD COMPLEX 30 MIN: CPT

## 2021-02-12 PROCEDURE — 6370000000 HC RX 637 (ALT 250 FOR IP): Performed by: INTERNAL MEDICINE

## 2021-02-12 PROCEDURE — 74176 CT ABD & PELVIS W/O CONTRAST: CPT

## 2021-02-12 PROCEDURE — 80048 BASIC METABOLIC PNL TOTAL CA: CPT

## 2021-02-12 PROCEDURE — 1200000000 HC SEMI PRIVATE

## 2021-02-12 PROCEDURE — 94760 N-INVAS EAR/PLS OXIMETRY 1: CPT

## 2021-02-12 PROCEDURE — 36415 COLL VENOUS BLD VENIPUNCTURE: CPT

## 2021-02-12 PROCEDURE — 76770 US EXAM ABDO BACK WALL COMP: CPT

## 2021-02-12 PROCEDURE — 2580000003 HC RX 258: Performed by: HOSPITALIST

## 2021-02-12 PROCEDURE — 97166 OT EVAL MOD COMPLEX 45 MIN: CPT

## 2021-02-12 PROCEDURE — 84300 ASSAY OF URINE SODIUM: CPT

## 2021-02-12 PROCEDURE — 82570 ASSAY OF URINE CREATININE: CPT

## 2021-02-12 RX ORDER — LEVETIRACETAM 5 MG/ML
500 INJECTION INTRAVASCULAR EVERY 12 HOURS
Status: DISCONTINUED | OUTPATIENT
Start: 2021-02-12 | End: 2021-02-15 | Stop reason: HOSPADM

## 2021-02-12 RX ADMIN — LEVETIRACETAM 500 MG: 5 INJECTION INTRAVENOUS at 11:08

## 2021-02-12 RX ADMIN — PANTOPRAZOLE SODIUM 40 MG: 40 TABLET, DELAYED RELEASE ORAL at 06:29

## 2021-02-12 RX ADMIN — PANTOPRAZOLE SODIUM 40 MG: 40 TABLET, DELAYED RELEASE ORAL at 17:09

## 2021-02-12 RX ADMIN — SODIUM CHLORIDE: 9 INJECTION, SOLUTION INTRAVENOUS at 20:48

## 2021-02-12 RX ADMIN — Medication: at 11:09

## 2021-02-12 RX ADMIN — SODIUM CHLORIDE: 9 INJECTION, SOLUTION INTRAVENOUS at 10:31

## 2021-02-12 RX ADMIN — LEVETIRACETAM 500 MG: 5 INJECTION INTRAVENOUS at 22:49

## 2021-02-12 RX ADMIN — APIXABAN 5 MG: 5 TABLET, FILM COATED ORAL at 20:48

## 2021-02-12 ASSESSMENT — PAIN SCALES - GENERAL: PAINLEVEL_OUTOF10: 0

## 2021-02-12 NOTE — CONSULTS
Consulting Physician: Dr. Brian Tovar    Reason for Consult: GEOVANI, nephrolithiasis    History of Present Illness: Rosetta Nelson is a 80 y.o.  male who is a poor historian. He states he was sent to the hospital for a kidney problem. His Cr was 5.9 on admission, spann catheter was placed for 1700cc and his Cr is gradually reducing to 3.0 today. Patient denies urinary issues or prior history of kidney stones. Nocturia 1x/night. JOHN this morning with mild bilateral hydro and a 2.5cm calcification within the anterior aspect of right kidney. Will get CT to further evaluate this. Past Medical History:   Past Medical History:   Diagnosis Date    GEOVANI (acute kidney injury) ([facility] [address] 2/11/2021    Alcohol abuse 12/22/2020    Atrial fibrillation (HCC)     BPH (benign prostatic hyperplasia)     Dehydration     [facility] (HCC)     Leg weakness, bilateral 2014, 2015    legs give out and pt collaspes    Seizure ([facility] [address] 7/20/2017    UTI (urinary tract infection) 8-    admitted with high fever and fainted. Past Surgical History:  Past Surgical History:   Procedure Laterality Date    CATARACT REMOVAL Bilateral     SPLENECTOMY, TOTAL      at age 15 y old. Social History:  Social History     Socioeconomic History    Marital status:      Spouse name: Not on file    Number of children: Not on file    Years of education: Not on file    Highest education level: Not on file   Occupational History    Not on file   Social Needs    Financial resource strain: Not on file    Food insecurity     Worry: Not on file     Inability: Not on file    Transportation needs     Medical: Not on file     Non-medical: Not on file   Tobacco Use    Smoking status: Never Smoker    Smokeless tobacco: Never Used   Substance and Sexual Activity    Alcohol use:  Yes     Alcohol/week: 0.0 standard drinks     Comment: occasionally     Drug use: No    Sexual activity: Not Currently Lifestyle    Physical activity     Days per week: Not on file     Minutes per session: Not on file    Stress: Not on file   Relationships    Social connections     Talks on phone: Not on file     Gets together: Not on file     Attends Taoist service: Not on file     Active member of club or organization: Not on file     Attends meetings of clubs or organizations: Not on file     Relationship status: Not on file    Intimate partner violence     Fear of current or ex partner: Not on file     Emotionally abused: Not on file     Physically abused: Not on file     Forced sexual activity: Not on file   Other Topics Concern    Not on file   Social History Narrative    Not on file       Family History:  Family History   Problem Relation Age of Onset    Cancer Mother         pancreatic    Diabetes Father     Cancer Brother        Meds:   Current Facility-Administered Medications: levetiracetam (KEPPRA) 500 mg/100 mL IVPB, 500 mg, Intravenous, Q12H  medihoney wound/burn dressing gel, , Topical, Daily  menthol-zinc oxide (CALMOSEPTINE) 0.44-20.6 % ointment 1-3 each, 1-3 each, Topical, BID PRN  sodium chloride flush 0.9 % injection 10 mL, 10 mL, Intravenous, 2 times per day  sodium chloride flush 0.9 % injection 10 mL, 10 mL, Intravenous, PRN  promethazine (PHENERGAN) tablet 12.5 mg, 12.5 mg, Oral, Q6H PRN **OR** ondansetron (ZOFRAN) injection 4 mg, 4 mg, Intravenous, Q6H PRN  acetaminophen (TYLENOL) tablet 650 mg, 650 mg, Oral, Q6H PRN **OR** acetaminophen (TYLENOL) suppository 650 mg, 650 mg, Rectal, Q6H PRN  apixaban (ELIQUIS) tablet 5 mg, 5 mg, Oral, BID  ascorbic acid (VITAMIN C) tablet 1,000 mg, 1,000 mg, Oral, Daily  vitamin D (CHOLECALCIFEROL) capsule 5,000 Units, 5,000 Units, Oral, Daily  lactobacillus (CULTURELLE) capsule 1 capsule, 1 capsule, Oral, Daily with breakfast  [Held by provider] levETIRAcetam (KEPPRA) tablet 500 mg, 500 mg, Oral, BID  multivitamin 1 tablet, 1 tablet, Oral, Daily pantoprazole (PROTONIX) tablet 40 mg, 40 mg, Oral, BID AC  tamsulosin (FLOMAX) capsule 0.4 mg, 0.4 mg, Oral, Daily  thiamine mononitrate tablet 100 mg, 100 mg, Oral, Daily  zinc sulfate (ZINCATE) capsule 50 mg, 50 mg, Oral, Daily  0.9 % sodium chloride infusion, , Intravenous, Continuous    Review of Systems:  10 Systems were reviewed and negative except as in HPI    Vitals:  /63   Pulse 94   Temp 97.6 °F (36.4 °C) (Oral)   Resp 16   Ht 5' 10\" (1.778 m)   Wt 219 lb 12.8 oz (99.7 kg)   SpO2 93%   BMI 31.54 kg/m²     Intake/Output Summary (Last 24 hours) at 2/12/2021 1201  Last data filed at 2/12/2021 1146  Gross per 24 hour   Intake    Output 6325 ml   Net -6325 ml       Physical Exam:  General Appearance: Alert and oriented x 2, cooperative, no distress, appears stated age  Head: Normocephalic, without obvious abnormality, atraumatic  Back: no CVA tenderness  Lungs: respirations unlabored, no wheezing  Heart: Regular rate and rhythm, no lower extremity edema noted  Abdomen: Soft, non-tender, non-distended, no masses  Skin: Skin color, texture, turgor normal, no rashes or lesions  Neurologic: no gross deficits  Male :  ? Nonpalpable bladder  ? No CVA tenderness  ? Ambrosio catheter intact with clear/yellow output  ? Penis appears normal and circumcised  ? Urethral meatus is normal in size and location  ? Scrotum appears normal and both testicles appear normal in size and location  ?  RAISA deferred    Labs:  CBC   Lab Results   Component Value Date    WBC 7.5 02/12/2021    RBC 2.39 02/12/2021    HGB 7.2 02/12/2021    HCT 22.5 02/12/2021    MCV 94.2 02/12/2021    MCH 30.3 02/12/2021    MCHC 32.1 02/12/2021    RDW 16.3 02/12/2021     02/12/2021    MPV 8.5 02/12/2021     BMP   Lab Results   Component Value Date     02/12/2021    K 4.6 02/12/2021     02/12/2021    CO2 24 02/12/2021    BUN 32 02/12/2021    CREATININE 3.0 02/12/2021    GLUCOSE 85 02/12/2021    CALCIUM 8.7 02/12/2021 Urinalysis:   Lab Results   Component Value Date    COLORU DK YELLOW 02/11/2021    GLUCOSEU Negative 02/11/2021    GLUCOSEU Negative 08/10/2011    BLOODU LARGE 02/11/2021    NITRU Negative 02/11/2021    LEUKOCYTESUR TRACE 02/11/2021       Imaging:  Pertinent images and radiologist's report were reviewed independently  JOHN 2/12/21  Impression   1. Mild bilateral hydronephrosis. 2. Right nephrolithiasis. Impression/Plan:   - 85y. o. male with GEOVANI, urinary retention. Catheter placed for 1700cc. - JOHN with mild bilateral hydro, possible 2.5cm right renal calculi.  - CT scan to further evaluate.    - Continue Flomax    Ulysses Cleaver, APRN - CNP 2/12/202112:01 PM

## 2021-02-12 NOTE — PROGRESS NOTES
Physical Therapy    Facility/Department: [address] ORTHOPEDICS  Initial Assessment  Co Treat with OT  This note serves as patient discharge summary if pt discharges prior to next PT visit      NAME: Saeid Lazo  : 1935  MRN: [id number]    Date of Service: 2021    Discharge Recommendations:  3-5 sessions per week   Saeid Lazo scored a 9/24 on the AM-PAC short mobility form. Current research shows that an AM-PAC score of 17 or less is typically not associated with a discharge to the patient's home setting. Based on the patient's AM-PAC score and their current functional mobility deficits, it is recommended that the patient have 3-5 sessions per week of Physical Therapy at d/c to increase the patient's independence. Please see assessment section for further patient specific details. PT Equipment Recommendations  Other: defer to next level of care. Assessment   Body structures, Functions, Activity limitations: Decreased functional mobility ; Decreased endurance;Decreased safe awareness;Decreased balance;Decreased ADL status  Assessment: Prior to coming to hospital with abnormal blood work, patient lived in a facility, requiring assist for ADLs, transfers. He states he was standing to a wh walker with therapy, but not ambulating. Status 2021: Min A and extra time for bed mobility, Transfers to Stedy standing lift with Min A of 1 and CGA of 1. Sit to stand from [facility] chair to RW up to Mod A of 1. He walks a total of 4' with RW with poor gait quality and tolerance. Demonstrated progressively decreased balance during gait. Patient requires ongoing skilled therapy to maximize functional capabilities to tolerance.  Will continue to see while in the hospital.  Treatment Diagnosis: Impaired functional mobility  Prognosis: Good;Fair  Decision Making: Medium Complexity  History: as noted  Clinical Presentation: Evolving PT Education: Goals;PT Role;Plan of Care;Transfer Training;Gait Training; Adaptive Device Training;General Safety  REQUIRES PT FOLLOW UP: Yes  Activity Tolerance  Activity Tolerance: Patient limited by fatigue       Patient Diagnosis(es): The primary encounter diagnosis was GEOVANI (acute kidney injury) ([facility] [address] Diagnoses of Hyperkalemia and Elevated troponin were also pertinent to this visit. has a past medical history of GEOVANI (acute kidney injury) ([address] Alcohol abuse, Atrial fibrillation ([address] BPH (benign prostatic hyperplasia), Dehydration, [facility] ([address] Leg weakness, bilateral, Seizure ([address] and UTI (urinary tract infection). has a past surgical history that includes Splenectomy, total and Cataract removal (Bilateral). Restrictions  Restrictions/Precautions  Restrictions/Precautions: Fall Risk  Position Activity Restriction  Other position/activity restrictions: [place]. wounds B knees and L wrist. Hx of B PEs, Covid, rhabdomyolysis, and pneumonia 12/2020. Vision/Hearing  Vision: Impaired  Vision Exceptions: (wears to drive.)  Hearing: Exceptions to [facility]  Hearing Exceptions: Bilateral hearing aid       Subjective  General  Chart Reviewed: Yes  Patient assessed for rehabilitation services?: Yes  Additional Pertinent Hx: Per H and P:  \"Patient is a 70-year-old male nursing home resident with past medical history of seizures, alcohol abuse, CKD who presents to the hospital for abnormal blood work. \"  Per chart review, patient in this facility [id number] having been found down on the floor of his home by his son (unknown how long he was down). Found at that time to have facial trauma and rhabdomyolysis. He also tested positive for Covid with pneumonia, and found to have B pulmonary emboli at that time. He DC'd from hospital to [address] [place] [address].   Response To Previous Treatment: Not applicable  Family / Caregiver Present: No  Referring Practitioner: Carter Gimenez MD Referral Date : 02/11/21  Subjective  Subjective: Patient in bed. Soundly sleeping. Slowly awakens for therapist. Rosie Coyle to therapy. Denies pain. Pain Screening  Patient Currently in Pain: Denies    Orientation  Orientation  Overall Orientation Status: Impaired  Orientation Level: Oriented to person;Oriented to time(oriented to place with cues and extra time.)     Social/Functional History  Social/Functional History  Type of Home: Facility(Not certain which one. He had been DC'd to [facility] in Dec 2020, but indicates he is at [facility].)  ADL Assistance: Needs assistance  Homemaking Responsibilities: No  Ambulation Assistance: (states he was only standing to walker with assist; wasn't ambulating.)  Transfer Assistance: Needs assistance  Active : (states last drove in November? ? )  Additional Comments: Pt was living independently at home until fall on Dec 23, 2020. Per chart review, pt has been in SNF until d/c. Cognition   Cognition  Overall Cognitive Status: Health system  Cognition Comment: Except decreased insight to his limitations, decreased STM, and recall of biographical info. Objective  Observation/Palpation  Observation: Patient seems to be perseverating on words/ideas, and stuttering while speaking. Uncertain if this is premorbid. No facial asymmetry note. He states his gait quality of difficulty progressing R LE is premorbid. AROM RLE (degrees)  RLE AROM: WFL  RLE General AROM: ankle DF to neutral. Hip/knee flexion 90/90 EOB. Edema throughout. AROM LLE (degrees)  LLE AROM : WFL  LLE General AROM: ankle DF to neutral. Hip/knee flexion 90/90 EOB. Edema throughout. Strength RLE  Comment: Hip 3+/5, knee 3-/5  Strength LLE  Comment: Hip 3+/5, knee 3-/5  Sensation  Overall Sensation Status: (patient responses inconsistent. May have numbness B feet.)  Bed mobility  Supine to Sit: Minimal assistance(Extra time. HOB up, 1 rail. Effortful.  Exits to his R.) Sit to Supine: Unable to assess(In BS chair at end of session)  Transfers  Sit to Stand: Moderate Assistance(from BS chair to stance at RW, 2nd time. Min A 1st time. Cues for technique. Elevated EOB to stance at [facility] A 1 and CGA 1.)  Stand to sit: Minimal Assistance(to/from seat of Stedy, and from seat of Stedy to sitting in BS chair.)  Bed to Chair: Dependent/Total(Via Stedy standing lift.)  Ambulation  Ambulation?: Yes  Ambulation 1  Surface: level tile  Device: Rolling Walker  Assistance: Minimal assistance; Moderate assistance(and another for chair follow)  Quality of Gait: Posterior lean. Short, choppy, partial steps. Difficulty progressing R LE. Progressively increasing posterior lean. Gait Deviations: Decreased step length;Decreased step height  Distance: 4'  Comments: This is max distance tolerated; aborted by therapist due to quality of ambulation declining. Stairs/Curb  Stairs?: No  Balance  Posture: Fair  Sitting - Static: Fair(R and posterior lean)  Sitting - Dynamic: Poor(posterior and R lean.)  Standing - Static: Fair(At Stedy and brief stance at [employer])  Standing - Dynamic: Poor(during limited gait, at RW.)  Exercises  Ankle Pumps: x 8 B.  Cues for controlled vs fast     Plan   Plan  Times per week: 3-5  Current Treatment Recommendations: Functional Mobility Training, Transfer Training, Strengthening, ROM, Gait Training, Safety Education & Training, Patient/Caregiver Education & Training, Equipment Evaluation, Education, & procurement  Safety Devices  Type of devices: Call light within reach, Chair alarm in place, Gait belt, Patient at risk for falls, Left in chair, Nurse notified(RN [facility] informed)    AM-PAC Score  AM-PAC Inpatient Mobility Raw Score : 9 (02/12/21 1430)  AM-PAC Inpatient T-Scale Score : 30.55 (02/12/21 1430)  Mobility Inpatient CMS 0-100% Score: 81.38 (02/12/21 1430)  Mobility Inpatient CMS G-Code Modifier : CM (02/12/21 1430)    Goals  Short term goals Time Frame for Short term goals: By acute DC  Short term goal 1: Bed mobility CGA-Min A  Short term goal 2: Transfers to walker Min A  Short term goal 3: Gait wh walker 10', Min A 1. Short term goal 4: Tolerates 10 reps each B LE exs. Patient Goals   Patient goals : \"To Walk. \"    Therapy Time   Individual Concurrent Group Co-treatment   Time In 3933         Time Out [facility] [facility] Oliver Pagan  Electronically signed by Rivera Marquez, [address] [place] [address] Drive (#[phone])  on 2/12/2021 at 2:45 PM

## 2021-02-12 NOTE — PROGRESS NOTES
4 Eyes Admission Assessment     I agree as the admission nurse that 2 RN's have performed a thorough Head to Toe Skin Assessment on the patient. ALL assessment sites listed below have been assessed on admission. Areas assessed by both nurses:  [x]   Head, Face, and Ears   [x]   Shoulders, Back, and Chest  [x]   Arms, Elbows, and Hands   [x]   Coccyx, Sacrum, and Ischum  [x]   Legs, Feet, and Heels        Does the Patient have Skin Breakdown?   Yes         Leo Prevention initiated: yes  Wound Care Orders initiated:  yes      [facility] nurse consulted for Pressure Injury (Stage 3,4, Unstageable, DTI, NWPT, and Complex wounds):  yes    Nurse 1 eSignature: Electronically signed by Sirena Anand RN on 2/11/2021 at 11:23 PM    **SHARE this note so that the co-signing nurse is able to place an eSignature**    Nurse 2 eSignature: Electronically signed by Carrie Swann RN on 2/12/21 at 1:25 AM EST

## 2021-02-12 NOTE — PLAN OF CARE
Problem: Nutrition  Goal: Optimal nutrition therapy  Outcome: Ongoing     Nutrition Problem #1: Increased nutrient needs  Intervention: Food and/or Nutrient Delivery: Continue NPO(start nutrition when appropriate)  Nutritional Goals:  Tolerate diet and consume greater than 50% of meals and supplements

## 2021-02-12 NOTE — CARE COORDINATION
INITIAL CASE MANAGEMENT ASSESSMENT    Reviewed chart, met with patient to assess possible discharge needs. Explained Case Management role/services. Living Situation: Current at [facility], transferred there from [facility], Patient plans to return to [facility] at discharge. Patient reports that his wife  3/23/2020. Patient advised his 2 sons are his [facility]. POA- Ed and Mani. ADLs: Prior to current Illness, patient lived home alone and was independent-     DME: provided at SNF    PT/OT Recs: PT      Active Services: @ SNF      Transportation: Sons able to transport if able. Medications: [employer] in [place]    PCP: Leland Garibay MD      HD/PD:N/A    PLAN/COMMENTS: Return to [facility]( Precert needed)  JOSUÉ spoke with Myra Langford at [facility] she will start Precert for possible weekend discharge. - No Covid Test Needed. SW/CM provided contact information for patient or family to call with any questions. SW/CM will follow and assist as needed. JOSUÉ left message for son - Ed  JOSUÉ spoke with son Luis, discussed plan is to return to [facility].

## 2021-02-12 NOTE — PLAN OF CARE
Problem: Skin Integrity:  Goal: Will show no infection signs and symptoms  Description: Will show no infection signs and symptoms  Outcome: Ongoing  Note: Patient skin condition and mucus membrane integrity remain unchanged during this shift. Skin breakdown prevention interventions are in place. Will continue to monitor and assess. Goal: Absence of new skin breakdown  Description: Absence of new skin breakdown  Outcome: Ongoing  Note: Patient skin condition and mucus membrane integrity remain unchanged during this shift. Skin breakdown prevention interventions are in place. Will continue to monitor and assess. Problem: Nutrition  Goal: Optimal nutrition therapy  2/12/2021 1344 by Julio César Bueno RN  Outcome: Ongoing  Note: Patient NPO throughout this shift, will update patient on dietary changes as they become available.  Patient complaint with NPO restrictions at this time   2/12/2021 1116 by Johan Ryan RD, LD  Outcome: Ongoing

## 2021-02-12 NOTE — PROGRESS NOTES
Patient to floor from ER, vital signs stable, tele on and sinus tach, patient alert and oriented x4 at this time, answering questions appropriately. Patient noted to have 2 unstageable wounds on both bilateral knees, and 1 unstageable wound on left wrist. Patient states these are from result of a fall on December 23rd, have not healed. New mepilex dressings placed on these. Paged [facility] [place], NP about ordered eliquis due to patient urine output in spann being bloody and if I should hold, she states to give, medication given, IV fluids infusing. Patient asking for food, but has NPO order, [address] also states to keep NPO status at this time, will update patient. Bed alarm on, call light in reach, all needs met at this time, will continue to monitor.

## 2021-02-12 NOTE — PROGRESS NOTES
Patient returned to unit from CT and was placed back into room 3130. Patient denies needs at this time. Call light, telephone, and bed side table are within reach. Fall precautions in place. Will continue to monitor and assess.

## 2021-02-12 NOTE — PROGRESS NOTES
Hospitalist Progress Note      PCP: Alta Ríos MD    Date of Admission: 2/11/2021    Chief Complaint: obstructing renal stone    Hospital Course:     Subjective: U/S shows obstructing renal stone, urology consulted      Medications:  Reviewed    Infusion Medications    sodium chloride 100 mL/hr at 02/12/21 1032     Scheduled Medications    levetiracetam  500 mg Intravenous Q12H    medihoney wound/burn dressing   Topical Daily    sodium chloride flush  10 mL Intravenous 2 times per day    apixaban  5 mg Oral BID    ascorbic acid  1,000 mg Oral Daily    vitamin D  5,000 Units Oral Daily    lactobacillus  1 capsule Oral Daily with breakfast    [Held by provider] levETIRAcetam  500 mg Oral BID    multivitamin  1 tablet Oral Daily    pantoprazole  40 mg Oral BID AC    tamsulosin  0.4 mg Oral Daily    thiamine mononitrate  100 mg Oral Daily    zinc sulfate  50 mg Oral Daily     PRN Meds: menthol-zinc oxide, sodium chloride flush, promethazine **OR** ondansetron, acetaminophen **OR** acetaminophen      Intake/Output Summary (Last 24 hours) at 2/12/2021 1836  Last data filed at 2/12/2021 1433  Gross per 24 hour   Intake    Output 5275 ml   Net -5275 ml       Exam:    BP 97/60   Pulse 96   Temp 97.8 °F (36.6 °C) (Oral)   Resp 15   Ht 5' 10\" (1.778 m)   Wt 219 lb 12.8 oz (99.7 kg)   SpO2 96%   BMI 31.54 kg/m²     General appearance: No apparent distress, appears stated age and cooperative. HEENT: Pupils equal, round, and reactive to light. Conjunctivae/corneas clear. Neck: Supple, with full range of motion. No jugular venous distention. Trachea midline. Respiratory:  Normal respiratory effort. Clear to auscultation, bilaterally without Rales/Wheezes/Rhonchi. Cardiovascular: Regular rate and rhythm with normal S1/S2 without murmurs, rubs or gallops. Abdomen: Soft, non-tender, non-distended with normal bowel sounds. Musculoskeletal: No clubbing, cyanosis or edema bilaterally. Full range of motion without deformity. Skin: Skin color, texture, turgor normal.  No rashes or lesions. Neurologic:  Neurovascularly intact without any focal sensory/motor deficits. Cranial nerves: II-XII intact, grossly non-focal.  Psychiatric: Alert and oriented, thought content appropriate, normal insight  Capillary Refill: Brisk,< 3 seconds   Peripheral Pulses: +2 palpable, equal bilaterally       Labs:   Recent Labs     02/11/21  1407 02/12/21  0539   WBC 8.6 7.5   HGB 8.4* 7.2*   HCT 26.3* 22.5*    303     Recent Labs     02/11/21  1407 02/11/21  2136 02/12/21  0539    141 141   K 6.1* 5.1 4.6    107 109   CO2 23 24 24   BUN 47* 40* 32*   CREATININE 5.9* 4.3* 3.0*   CALCIUM 8.8 8.9 8.7     Recent Labs     02/11/21  1407   AST 16   ALT 9*   BILITOT 0.3   ALKPHOS 121     No results for input(s): INR in the last 72 hours. Recent Labs     02/11/21  1407   TROPONINI 0.05*       Urinalysis:      Lab Results   Component Value Date    NITRU Negative 02/11/2021    WBCUA 5 02/11/2021    BACTERIA 4+ 08/10/2011    RBCUA >900 02/11/2021    BLOODU LARGE 02/11/2021    SPECGRAV 1.012 02/11/2021    GLUCOSEU Negative 02/11/2021    GLUCOSEU Negative 08/10/2011       Radiology:  CT ABDOMEN PELVIS WO CONTRAST Additional Contrast? None   Final Result   Moderate bilateral hydronephrosis and hydroureter, without obstructing   calculus. Mild induration of fat about the bladder and kidneys, for which  infection   could be considered in the appropriate clinical setting. Correlate with   urinalysis. As there is also presacral edema and induration of subcutaneous   fat, anasarca could also be considered. 1.9 cm metallic star-shaped foreign body along the proximal duodenum, of   uncertain etiology. Correlate with any history of surgery or ingestion. Cholelithiasis/gallbladder sludge. Trace bilateral pleural effusions. Mild bilateral inguinal adenopathy. Additional incidental findings as above. US RENAL COMPLETE   Final Result   1. Mild bilateral hydronephrosis. 2. Right nephrolithiasis. XR CHEST PORTABLE   Final Result   Negative portable chest x-ray. No evidence of acute cardiopulmonary disease.                  Assessment/Plan:    Active Hospital Problems    Diagnosis Date Noted    GEOVANI (acute kidney injury) ([address] [N17.9] 02/11/2021       GEOVANI:  improving  - likely obstructive stone  Seen on U/S  - urology consulted -- CT scan ordered to further characterize  - cont IVF rescucitation    Obstructing Rt renal Stone:  - urology following  - fluids, flomax    H/o A Fib and DVT:  Cont Eliquis  H/o seizure d/o:  Cont keppra    DVT Prophylaxis: Eliquis  Diet: DIET RENAL;  Code Status: Full Code    PT/OT Eval Status: ordered    Arielle Trevizo MD

## 2021-02-12 NOTE — PROGRESS NOTES
Occupational Therapy   Occupational Therapy Initial Assessment/Treatment   Date: 2021   Patient Name: Lilly Jay  MRN: [id number]     : 1935    Date of Service: 2021    Discharge Recommendations:    Lilly Jay scored a  on the AM-PAC ADL Inpatient form. Current research shows that an AM-PAC score of 17 or less is typically not associated with a discharge to the patient's home setting. Based on the patient's AM-PAC score and their current ADL deficits, it is recommended that the patient have 3-5 sessions per week of Occupational Therapy at d/c to increase the patient's independence. Please see assessment section for further patient specific details. If patient discharges prior to next session this note will serve as a discharge summary. Please see below for the latest assessment towards goals. 3-5 sessions per week  OT Equipment Recommendations  Other: defer to next level of care    Assessment   Performance deficits / Impairments: Decreased functional mobility ; Decreased balance;Decreased ADL status; Decreased endurance;Decreased strength;Decreased cognition  Assessment: Pt is a 80yr old male admitted for urinary retention from SNF. Pt tolerated OT evaluation well this date and highly motivated to increase function. Pt required Min to CGA up to [place] this date. Pt able to tolerate an additional ~4ft of functional mobility using RW with chair follow. Anticipate patient would benefit from skilled OT in order to increase functional status. Treatment Diagnosis: decreased functional status due to GEOVANI  Prognosis: Good  Decision Making: Medium Complexity  OT Education: OT Role;Transfer Training;Plan of Care;Energy Conservation;Precautions;Orientation; ADL Adaptive Strategies; Family Education  Patient Education: pt is not an independent learner  Barriers to Learning: hearing, cog  REQUIRES OT FOLLOW UP: Yes  Activity Tolerance Activity Tolerance: Patient Tolerated treatment well;Patient limited by fatigue  Safety Devices  Safety Devices in place: Yes  Type of devices: All fall risk precautions in place;Nurse notified; Patient at risk for falls;Call light within reach; Left in chair;Chair alarm in place           Patient Diagnosis(es): The primary encounter diagnosis was GEOVANI (acute kidney injury) ([address] Diagnoses of Hyperkalemia and Elevated troponin were also pertinent to this visit. has a past medical history of GEOVANI (acute kidney injury) ([address] Alcohol abuse, Atrial fibrillation ([address] BPH (benign prostatic hyperplasia), Dehydration, [facility] ([address] Leg weakness, bilateral, Seizure ([address] and UTI (urinary tract infection). has a past surgical history that includes Splenectomy, total and Cataract removal (Bilateral). Treatment Diagnosis: decreased functional status due to GEOVANI      Restrictions  Restrictions/Precautions  Restrictions/Precautions: Fall Risk  Position Activity Restriction  Other position/activity restrictions: [place]. wounds B knees and L wrist. Hx of B PEs, Covid, rhabdomyolysis, and pneumonia 12/2020. Per H and P:  \"Patient is a 15-year-old male nursing home resident with past medical history of seizures, alcohol abuse, CKD who presents to the hospital for abnormal blood work. \"  Per chart review, patient in this facility [id number] having been found down on the floor of his home by his son (unknown how long he was down). Found at that time to have facial trauma and rhabdomyolysis. He also tested positive for Covid with pneumonia, and found to have B pulmonary emboli at that time. He DC'd from hospital to [address] [place] [address].     Subjective   General  Chart Reviewed: Yes, Labs, Orders, Progress Notes, History and Physical, Imaging  Patient assessed for rehabilitation services?: Yes  Family / Caregiver Present: No  Referring Practitioner: Karan Greer MD Diagnosis: GEOVANI; Urinary retention  Subjective  Subjective: Pt pleasant, stuttering speech noted, but unclear if that is patients baseline. Pt agreeable to OT/PT co evaluation to further assess and progress mobility/ADLs. Patient Currently in Pain: Denies  Vital Signs  Patient Currently in Pain: Denies  Social/Functional History  Social/Functional History  Type of Home: Facility(Not certain which one. He had been DC'd to [facility] in Dec 2020, but indicates he is at [address] [facility]  ADL Assistance: Needs assistance  Homemaking Responsibilities: No  Ambulation Assistance: (states he was only standing to walker with assist; wasn't ambulating.)  Transfer Assistance: Needs assistance  Active : (states last drove in November? ? )  Additional Comments: Pt was living independently at home until fall on Dec 23, 2020. Per chart review, pt has been in SNF until d/c.       Objective        Orientation  Overall Orientation Status: Impaired  Orientation Level: Disoriented to place;Oriented to time;Disoriented to situation;Oriented to person  Observation/Palpation  Posture: Fair  Observation: Patient seems to be perseverating on words/ideas, and stuttering while speaking. Uncertain if this is premorbid. No facial asymmetry note. He states his gait quality of difficulty progressing R LE is premorbid.   Balance  Sitting Balance: Minimal assistance(started at CGA but progressing to min due to posterior pushing into extension)  Standing Balance: Minimal assistance(Min- CGA up in [place]; Min up in RW)  Standing Balance  Time: 3 min, 1 min, 3 min  Activity: Up in [place], ~4ft mobility, LB dressing  Functional Mobility  Functional - Mobility Device: Rolling Walker  Activity: Other(~4ft)  Assist Level: Dependent/Total([place]  x2 with w/c follow)  Functional Mobility Comments: decreased step length and weightshifting; shuffling gait; recliner to follow due to weakness and unsteadiness  Toilet Transfers Toilet Transfers Comments: pt declined the need for toileting  ADL  Feeding: Beverage management;Stand by assistance  Grooming: Stand by assistance(seated at [facility] face)  UE Bathing: None  LE Bathing: None  UE Dressing: None  LE Dressing: Dependent/Total;Increased time to complete(donning briefs; standing up to stedy in order to manage pants over hips)  Toileting: Dependent/Total(spann)  Tone RUE  RUE Tone: Normotonic  Tone LUE  LUE Tone: Normotonic  Coordination  Movements Are Fluid And Coordinated: Yes     Bed mobility  Supine to Sit: Minimal assistance(Extra time. HOB up, 1 rail. Effortful. Exits to his R.)  Sit to Supine: Unable to assess(In BS chair at end of session)  Transfers  Sit to stand: Minimal assistance; Moderate assistance  Stand to sit: Minimal assistance; Moderate assistance  Transfer Comments: Min at start of session; progressing to mod A as patient fatigued     Cognition  Overall Cognitive Status: Exceptions(stuttering speech with difficulty word finding; pt hard of hearing)  Arousal/Alertness: Appropriate responses to stimuli  Following Commands: Follows one step commands consistently  Attention Span: Appears intact  Memory: Decreased recall of biographical Information;Decreased short term memory;Decreased recall of recent events  Safety Judgement: Decreased awareness of need for assistance  Problem Solving: Assistance required to generate solutions;Assistance required to implement solutions;Assistance required to identify errors made;Assistance required to correct errors made  Insights: Decreased awareness of deficits  Initiation: Requires cues for some  Sequencing: Requires cues for some  Cognition Comment: Except decreased insight to his limitations, decreased STM, and recall of biographical info.         Sensation  Overall Sensation Status: (difficulty to fully assess due to cognition and ability to express)        LUE AROM (degrees)

## 2021-02-12 NOTE — PROGRESS NOTES
Patient resting in bed this morning w/o complaint. Scheduled PO medications held r/t NPO status and direction from MD ZHONG. PO Keppra changed to IV and administered. Patient tolerated well. Patient spann catheter noted to be leaking around the insertion site. An additional 10 mL of normal saline was inserted into the balloon. Patient tolerated well. Leaking noted to have subsided. NP Tierra Arambula alerted to catheter leak. No further orders at this time. Patient declines physical/emotional needs at this time. Call light, telephone, and bed side table are within reach. Fall precautions in place. Patient being turned Q2H w/ purple wedge for skin breakdown prevention. Will continue to monitor and assess.

## 2021-02-12 NOTE — CONSULTS
Nephrology Consult Note                                                                                                                                                                                                                                                                                                                                                               Office : [phone]     Fax :[phone]              Patient's Name: Donata Lundborg  6:07 PM  2/12/2021    Reason for Consult:  Acute renal failure       Requesting Physician:  Bernadette Stanley MD      Chief Complaint:  High serum Cr    History of Present Ilness:    Donata Lundborg is a 80 y.o. male with PMH of BPH , Afib   Was sent to hospital due to abnormal lab  His serum Cr found to be high    Serum cr 5.9 on admission , 1700 cc urine after spann cath insertion    No diarrhea     Past Medical History:   Diagnosis Date    [facility]) [address] 2/11/2021    Alcohol abuse 12/22/2020    Atrial fibrillation ([address]     BPH (benign prostatic hyperplasia)     Dehydration     [facility]     Leg weakness, bilateral 2014, 2015    legs give out and pt collaspes    Seizure ([address] 7/20/2017    UTI (urinary tract infection) 8-    admitted with high fever and fainted. Past Surgical History:   Procedure Laterality Date    CATARACT REMOVAL Bilateral     SPLENECTOMY, TOTAL      at age 15 y old. Family History   Problem Relation Age of Onset    Cancer Mother         pancreatic    Diabetes Father     Cancer Brother         reports that he has never smoked. He has never used smokeless tobacco. He reports current alcohol use. He reports that he does not use drugs. Allergies:  Patient has no known allergies.     Current Medications:        levetiracetam (KEPPRA) 500 mg/100 mL IVPB, Q12H      medihoney wound/burn dressing gel, Daily      menthol-zinc oxide (CALMOSEPTINE) 0.44-20.6 % ointment 1-3 each, BID PRN      sodium chloride flush 0.9 % injection 10 mL, 2 times per day      sodium chloride flush 0.9 % injection 10 mL, PRN      promethazine (PHENERGAN) tablet 12.5 mg, Q6H PRN    Or      ondansetron (ZOFRAN) injection 4 mg, Q6H PRN      acetaminophen (TYLENOL) tablet 650 mg, Q6H PRN    Or      acetaminophen (TYLENOL) suppository 650 mg, Q6H PRN      apixaban (ELIQUIS) tablet 5 mg, BID      ascorbic acid (VITAMIN C) tablet 1,000 mg, Daily      vitamin D (CHOLECALCIFEROL) capsule 5,000 Units, Daily      lactobacillus (CULTURELLE) capsule 1 capsule, Daily with breakfast      [Held by provider] levETIRAcetam (KEPPRA) tablet 500 mg, BID      multivitamin 1 tablet, Daily      pantoprazole (PROTONIX) tablet 40 mg, BID AC      tamsulosin (FLOMAX) capsule 0.4 mg, Daily      thiamine mononitrate tablet 100 mg, Daily      zinc sulfate (ZINCATE) capsule 50 mg, Daily      0.9 % sodium chloride infusion, Continuous        Review of Systems:   14 point ROS obtained but were negative except mentioned in HPI      Physical exam:     Vitals:  BP 97/60   Pulse 96   Temp 97.8 °F (36.6 °C) (Oral)   Resp 15   Ht 5' 10\" (1.778 m)   Wt 219 lb 12.8 oz (99.7 kg)   SpO2 96%   BMI 31.54 kg/m²   Constitutional:  OAA X3 NAD  Skin: no rash, turgor wnl  Heent:  eomi, mmm  Neck: no bruits or jvd noted  Cardiovascular:  S1, S2 without m/r/g  Respiratory: CTA B without w/r/r  Abdomen:  +bs, soft, nt, nd  Ext: no lower extremity edema  Psychiatric: mood and affect appropriate  Musculoskeletal:  Rom, muscular strength intact    Data:   Labs:  CBC:   Recent Labs     02/11/21  1407 02/12/21  0539   WBC 8.6 7.5   HGB 8.4* 7.2*    303     BMP:    Recent Labs     02/11/21  1407 02/11/21  2136 02/12/21  0539    141 141   K 6.1* 5.1 4.6    107 109   CO2 23 24 24   BUN 47* 40* 32*   CREATININE 5.9* 4.3* 3.0*   GLUCOSE 91 93 85     Ca/Mg/Phos:   Recent Labs     02/11/21  1407 02/11/21  2136 02/12/21  0539 CALCIUM 8.8 8.9 8.7     Hepatic:   Recent Labs     02/11/21  1407   AST 16   ALT 9*   BILITOT 0.3   ALKPHOS 121     Troponin:   Recent Labs     02/11/21  1407   TROPONINI 0.05*     BNP: No results for input(s): BNP in the last 72 hours. Lipids: No results for input(s): CHOL, TRIG, HDL, LDLCALC, LABVLDL in the last 72 hours. ABGs: No results for input(s): PHART, PO2ART, VHG6ZDL in the last 72 hours. INR: No results for input(s): INR in the last 72 hours. UA:  Recent Labs     02/11/21  1818   COLORU DK YELLOW   CLARITYU CLOUDY*   GLUCOSEU Negative   BILIRUBINUR Negative   KETUA Negative   SPECGRAV 1.012   BLOODU LARGE*   PHUR 6.5   PROTEINU TRACE*   UROBILINOGEN 0.2   NITRU Negative   LEUKOCYTESUR TRACE*   LABMICR YES   URINETYPE NotGiven      Urine Microscopic:   Recent Labs     02/11/21  1818   HYALCAST 0   WBCUA 5   RBCUA >900*   EPIU 1     Urine Culture: No results for input(s): LABURIN in the last 72 hours. Urine Chemistry:   Recent Labs     02/12/21  1116   LABCREA 46.3   NAUR 121             IMAGING:  CT ABDOMEN PELVIS WO CONTRAST Additional Contrast? None   Final Result   Moderate bilateral hydronephrosis and hydroureter, without obstructing   calculus. Mild induration of fat about the bladder and kidneys, for which  infection   could be considered in the appropriate clinical setting. Correlate with   urinalysis. As there is also presacral edema and induration of subcutaneous   fat, anasarca could also be considered. 1.9 cm metallic star-shaped foreign body along the proximal duodenum, of   uncertain etiology. Correlate with any history of surgery or ingestion. Cholelithiasis/gallbladder sludge. Trace bilateral pleural effusions. Mild bilateral inguinal adenopathy. Additional incidental findings as above. US RENAL COMPLETE   Final Result   1. Mild bilateral hydronephrosis. 2. Right nephrolithiasis.          XR CHEST PORTABLE   Final Result   Negative portable chest x-ray. No evidence of acute cardiopulmonary disease. Assessment/Plan      1. Non oliguric Acute renal failure      Etiology seems to be obstructive    has h/o BPH : on flomax at home   Renal USG : BL hydronephrosis and hydroureter      Plan    Urology consult    Continue spann catheter    Continue IVF NS     Avoid contrast  Keep MAP >65 mmhg  Avoid NSAIDs, ACEI or ARB       2.  Hyperkalemia  :    Resolved  Avoid ACEI or ARB                      Thank you for allowing us to participate in care of [address]  free to contact me   [facility] [address]  Office : [phone]  Fax :[phone]

## 2021-02-12 NOTE — PROGRESS NOTES
Patient up to chair this evening w/o complaint. Patient denies physical/emotional needs. Shift uneventful. Bed switched to specialty mattress to prevent skin breakdown. Call light, telephone, and bed side table are within reach. Fall precautions in place. Will continue to monitor and assess.

## 2021-02-13 LAB
ANION GAP SERPL CALCULATED.3IONS-SCNC: 6 MMOL/L (ref 3–16)
BUN BLDV-MCNC: 15 MG/DL (ref 7–20)
C-REACTIVE PROTEIN: 34.7 MG/L (ref 0–5.1)
CALCIUM SERPL-MCNC: 8.5 MG/DL (ref 8.3–10.6)
CHLORIDE BLD-SCNC: 110 MMOL/L (ref 99–110)
CO2: 27 MMOL/L (ref 21–32)
CREAT SERPL-MCNC: 1.4 MG/DL (ref 0.8–1.3)
GFR AFRICAN AMERICAN: 58
GFR NON-AFRICAN AMERICAN: 48
GLUCOSE BLD-MCNC: 84 MG/DL (ref 70–99)
HCT VFR BLD CALC: 22.6 % (ref 40.5–52.5)
HEMOGLOBIN: 7.3 G/DL (ref 13.5–17.5)
MCH RBC QN AUTO: 30.7 PG (ref 26–34)
MCHC RBC AUTO-ENTMCNC: 32.3 G/DL (ref 31–36)
MCV RBC AUTO: 95.1 FL (ref 80–100)
PDW BLD-RTO: 16.7 % (ref 12.4–15.4)
PLATELET # BLD: 313 K/UL (ref 135–450)
PMV BLD AUTO: 8.4 FL (ref 5–10.5)
POTASSIUM REFLEX MAGNESIUM: 4.1 MMOL/L (ref 3.5–5.1)
PROCALCITONIN: 0.1 NG/ML (ref 0–0.15)
RBC # BLD: 2.38 M/UL (ref 4.2–5.9)
SODIUM BLD-SCNC: 143 MMOL/L (ref 136–145)
WBC # BLD: 7.9 K/UL (ref 4–11)

## 2021-02-13 PROCEDURE — 80048 BASIC METABOLIC PNL TOTAL CA: CPT

## 2021-02-13 PROCEDURE — 6370000000 HC RX 637 (ALT 250 FOR IP): Performed by: INTERNAL MEDICINE

## 2021-02-13 PROCEDURE — 2580000003 HC RX 258: Performed by: INTERNAL MEDICINE

## 2021-02-13 PROCEDURE — 85027 COMPLETE CBC AUTOMATED: CPT

## 2021-02-13 PROCEDURE — 86140 C-REACTIVE PROTEIN: CPT

## 2021-02-13 PROCEDURE — 6360000002 HC RX W HCPCS: Performed by: FAMILY MEDICINE

## 2021-02-13 PROCEDURE — 1200000000 HC SEMI PRIVATE

## 2021-02-13 PROCEDURE — 84145 PROCALCITONIN (PCT): CPT

## 2021-02-13 PROCEDURE — 6370000000 HC RX 637 (ALT 250 FOR IP): Performed by: FAMILY MEDICINE

## 2021-02-13 PROCEDURE — 94760 N-INVAS EAR/PLS OXIMETRY 1: CPT

## 2021-02-13 PROCEDURE — 36415 COLL VENOUS BLD VENIPUNCTURE: CPT

## 2021-02-13 RX ADMIN — Medication 1 CAPSULE: at 08:55

## 2021-02-13 RX ADMIN — PANTOPRAZOLE SODIUM 40 MG: 40 TABLET, DELAYED RELEASE ORAL at 05:44

## 2021-02-13 RX ADMIN — Medication 5000 UNITS: at 08:56

## 2021-02-13 RX ADMIN — TAMSULOSIN HYDROCHLORIDE 0.4 MG: 0.4 CAPSULE ORAL at 08:56

## 2021-02-13 RX ADMIN — Medication: at 08:55

## 2021-02-13 RX ADMIN — Medication 10 ML: at 08:56

## 2021-02-13 RX ADMIN — ANORECTAL OINTMENT 3 EACH: 15.7; .44; 24; 20.6 OINTMENT TOPICAL at 08:55

## 2021-02-13 RX ADMIN — Medication 100 MG: at 08:56

## 2021-02-13 RX ADMIN — ZINC SULFATE 220 MG (50 MG) CAPSULE 50 MG: CAPSULE at 08:55

## 2021-02-13 RX ADMIN — APIXABAN 5 MG: 5 TABLET, FILM COATED ORAL at 21:16

## 2021-02-13 RX ADMIN — LEVETIRACETAM 500 MG: 5 INJECTION INTRAVENOUS at 22:51

## 2021-02-13 RX ADMIN — THERA TABS 1 TABLET: TAB at 08:56

## 2021-02-13 RX ADMIN — OXYCODONE HYDROCHLORIDE AND ACETAMINOPHEN 1000 MG: 500 TABLET ORAL at 08:56

## 2021-02-13 RX ADMIN — PANTOPRAZOLE SODIUM 40 MG: 40 TABLET, DELAYED RELEASE ORAL at 16:56

## 2021-02-13 RX ADMIN — Medication 10 ML: at 21:16

## 2021-02-13 RX ADMIN — LEVETIRACETAM 500 MG: 5 INJECTION INTRAVENOUS at 11:44

## 2021-02-13 RX ADMIN — APIXABAN 5 MG: 5 TABLET, FILM COATED ORAL at 08:56

## 2021-02-13 ASSESSMENT — PAIN SCALES - GENERAL
PAINLEVEL_OUTOF10: 0
PAINLEVEL_OUTOF10: 0

## 2021-02-13 NOTE — PROGRESS NOTES
Pt rounded on this morning Q2h, whiteboard updated, pt given ice water and needs assessed. Pt sitting up In chair, tolerating PO, and ambulating x2 with stedy. Pt denies pain, has no needs or concerns at this time. Daughter-in-law Dinorah Granados called and given updates. Dinorah Granados plans to bring pt's hearing aides, as pt too [facility] to hear family members through the room phone. Call light within reach, pt verbalized understanding to call prior to ambulating. Will continue to monitor and reassess.    Electronically signed by Megan Agee RN on 2/13/2021 at 12:16 PM

## 2021-02-13 NOTE — PROGRESS NOTES
Nephrology progress  Note                                                                                                                                                                                                                                                                                                                                                               Office : [phone]     Fax :[phone]              Patient's Name: Alexis Mejia  5:37 PM  2/13/2021    Reason for Consult:  Acute renal failure       Requesting Physician:  Donato Sarkar MD      Chief Complaint:  High serum Cr    History of Present Ilness:    Alexis Mejia is a 80 y.o. male with PMH of BPH , Afib   Was sent to hospital due to abnormal lab  His serum Cr found to be high    Serum cr 5.9 on admission , 1700 cc urine after spann cath insertion    No diarrhea       Interval History : Spann +    Renal function improving    Making urine        Past Medical History:   Diagnosis Date    GEOVANI (acute kidney injury) ([address] 2/11/2021    Alcohol abuse 12/22/2020    Atrial fibrillation (HCC)     BPH (benign prostatic hyperplasia)     Dehydration     [facility] (HCC)     Leg weakness, bilateral 2014, 2015    legs give out and pt collaspes    Seizure ([address] 7/20/2017    UTI (urinary tract infection) 8-    admitted with high fever and fainted. Past Surgical History:   Procedure Laterality Date    CATARACT REMOVAL Bilateral     SPLENECTOMY, TOTAL      at age 15 y old. Family History   Problem Relation Age of Onset    Cancer Mother         pancreatic    Diabetes Father     Cancer Brother         reports that he has never smoked. He has never used smokeless tobacco. He reports current alcohol use. He reports that he does not use drugs. Allergies:  Patient has no known allergies.     Current Medications:        levetiracetam (KEPPRA) 500 mg/100 mL IVPB, Q12H      medihoney wound/burn dressing gel, Daily      menthol-zinc oxide (CALMOSEPTINE) 0.44-20.6 % ointment 1-3 each, BID PRN      sodium chloride flush 0.9 % injection 10 mL, 2 times per day      sodium chloride flush 0.9 % injection 10 mL, PRN      promethazine (PHENERGAN) tablet 12.5 mg, Q6H PRN    Or      ondansetron (ZOFRAN) injection 4 mg, Q6H PRN      acetaminophen (TYLENOL) tablet 650 mg, Q6H PRN    Or      acetaminophen (TYLENOL) suppository 650 mg, Q6H PRN      apixaban (ELIQUIS) tablet 5 mg, BID      ascorbic acid (VITAMIN C) tablet 1,000 mg, Daily      vitamin D (CHOLECALCIFEROL) capsule 5,000 Units, Daily      lactobacillus (CULTURELLE) capsule 1 capsule, Daily with breakfast      [Held by provider] levETIRAcetam (KEPPRA) tablet 500 mg, BID      multivitamin 1 tablet, Daily      pantoprazole (PROTONIX) tablet 40 mg, BID AC      tamsulosin (FLOMAX) capsule 0.4 mg, Daily      thiamine mononitrate tablet 100 mg, Daily      zinc sulfate (ZINCATE) capsule 50 mg, Daily      0.9 % sodium chloride infusion, Continuous        Review of Systems:   14 point ROS obtained but were negative except mentioned in HPI      Physical exam:     Vitals:  /65   Pulse 96   Temp 97.8 °F (36.6 °C) (Oral)   Resp 17   Ht 5' 10\" (1.778 m)   Wt 207 lb 10.8 oz (94.2 kg)   SpO2 96%   BMI 29.80 kg/m²   Constitutional:  OAA X3 NAD  Skin: no rash, turgor wnl  Heent:  eomi, mmm  Neck: no bruits or jvd noted  Cardiovascular:  S1, S2 without m/r/g  Respiratory: CTA B without w/r/r  Abdomen:  +bs, soft, nt, nd  Ext: no lower extremity edema  Psychiatric: mood and affect appropriate  Musculoskeletal:  Rom, muscular strength intact    Data:   Labs:  CBC:   Recent Labs     02/11/21  1407 02/12/21  0539 02/13/21  0558   WBC 8.6 7.5 7.9   HGB 8.4* 7.2* 7.3*    303 313     BMP:    Recent Labs     02/11/21  2136 02/12/21  0539 02/13/21  0558    141 143   K 5.1 4.6 4.1    109 110   CO2 24 24 27   BUN 40* 32* 15   CREATININE bilateral hydronephrosis. 2. Right nephrolithiasis. XR CHEST PORTABLE   Final Result   Negative portable chest x-ray. No evidence of acute cardiopulmonary disease. Assessment/Plan      1. Non oliguric Acute renal failure      Etiology seems to be obstructive    has h/o BPH : on flomax at home   Renal USG : BL hydronephrosis and hydroureter      Plan    Renal function improving  : 3 ------> 1.4    Urology follow up    Continue spann catheter    Continue IVF NS     Avoid contrast  Keep MAP >65 mmhg  Avoid NSAIDs, ACEI or ARB       2.  Hyperkalemia  :    Resolved  Avoid ACEI or ARB      3 Anemia    Hb 7.3    Transfuse PRBC prn      Thank you for allowing us to participate in care of [address] free to contact me   [facility] [address]  Office : [phone]  Fax :[phone]

## 2021-02-13 NOTE — PLAN OF CARE
Problem: Skin Integrity:  Goal: Will show no infection signs and symptoms  Description: Will show no infection signs and symptoms  2/12/2021 2003 by Ninfa Juarez RN  Outcome: Ongoing     Problem: Skin Integrity:  Goal: Absence of new skin breakdown  Description: Absence of new skin breakdown  2/12/2021 2003 by Ninfa Juarez RN  Outcome: Ongoing     Problem: Nutrition  Goal: Optimal nutrition therapy  2/12/2021 2003 by Ninfa Juarez RN  Outcome: Ongoing     Problem: Falls - Risk of:  Goal: Will remain free from falls  Description: Will remain free from falls  Outcome: Ongoing  Note: Patient educated on fall prevention. Call light is within reach, bed locked in lowest position, personal items within reach, and bed alarm is on. Will round on patient per unit guidelines. Problem: Falls - Risk of:  Goal: Absence of physical injury  Description: Absence of physical injury  Outcome: Ongoing  Note: Pt is free of injury. No injury noted. Fall precautions in place. Call light within reach. Will monitor.

## 2021-02-13 NOTE — PROGRESS NOTES
Patient up in a chair this morning. Shift uneventful. Call light within reach, able to make needs knows, fall precautions in place. Will continue to monitor. .Electronically signed by Kody Dominguez RN on 2/13/2021 at 5:58 AM

## 2021-02-13 NOTE — PROGRESS NOTES
Pt in bed eyes closed. Pt tolerating PO well. PM medications administered, pt tolerated well. Shift assessment complete. Iv fluids infusing, pt Bilateral lower extremities are red and swollen, pt refusing ordered scd boots and refusing to elevate legs on pillows, pt education provided. Ambrosio remains in place draining clear yellow urine output. Call light in reach, fall precautions in place. Will continue to monitor. Electronically signed by Queenie Devi RN on 2/12/2021 at 11:40 PM

## 2021-02-13 NOTE — PROGRESS NOTES
Hospitalist Progress Note      PCP: Joan Rios MD    Date of Admission: 2/11/2021    Chief Complaint: obstructing renal stone    Hospital Course:     Subjective: No pain, other complaints      Medications:  Reviewed    Infusion Medications    sodium chloride 100 mL/hr at 02/12/21 2048     Scheduled Medications    levetiracetam  500 mg Intravenous Q12H    medihoney wound/burn dressing   Topical Daily    sodium chloride flush  10 mL Intravenous 2 times per day    apixaban  5 mg Oral BID    ascorbic acid  1,000 mg Oral Daily    vitamin D  5,000 Units Oral Daily    lactobacillus  1 capsule Oral Daily with breakfast    [Held by provider] levETIRAcetam  500 mg Oral BID    multivitamin  1 tablet Oral Daily    pantoprazole  40 mg Oral BID AC    tamsulosin  0.4 mg Oral Daily    thiamine mononitrate  100 mg Oral Daily    zinc sulfate  50 mg Oral Daily     PRN Meds: menthol-zinc oxide, sodium chloride flush, promethazine **OR** ondansetron, acetaminophen **OR** acetaminophen      Intake/Output Summary (Last 24 hours) at 2/13/2021 1456  Last data filed at 2/13/2021 0835  Gross per 24 hour   Intake 2467 ml   Output 1400 ml   Net 1067 ml       Exam:    /71   Pulse 85   Temp 97.6 °F (36.4 °C) (Oral)   Resp 17   Ht 5' 10\" (1.778 m)   Wt 207 lb 10.8 oz (94.2 kg)   SpO2 95%   BMI 29.80 kg/m²     General appearance: No apparent distress, appears stated age and cooperative. HEENT: Pupils equal, round, and reactive to light. Conjunctivae/corneas clear. Neck: Supple, with full range of motion. No jugular venous distention. Trachea midline. Respiratory:  Normal respiratory effort. Clear to auscultation, bilaterally without Rales/Wheezes/Rhonchi. Cardiovascular: Regular rate and rhythm with normal S1/S2 without murmurs, rubs or gallops. Abdomen: Soft, non-tender, non-distended with normal bowel sounds. Trace bilateral pleural effusions. Mild bilateral inguinal adenopathy. Additional incidental findings as above. US RENAL COMPLETE   Final Result   1. Mild bilateral hydronephrosis. 2. Right nephrolithiasis. XR CHEST PORTABLE   Final Result   Negative portable chest x-ray. No evidence of acute cardiopulmonary disease.                  Assessment/Plan:    Active Hospital Problems    Diagnosis Date Noted    GEOVANI (acute kidney injury) ([address] [N17.9] 02/11/2021       GEOVANI:  improving  - likely obstructive stone  Seen on U/S  - urology consulted -- CT scan ordered to further characterize  - cont IVF rescucitation    Obstructing Rt renal Stone:  - urology following  - fluids, flomax    H/o A Fib and DVT:  Cont Eliquis  H/o seizure d/o:  Cont keppra    DVT Prophylaxis: Eliquis  Diet: DIET RENAL;  Code Status: Full Code    PT/OT Eval Status: ordered    Rachid Gonzalez MD

## 2021-02-14 LAB
ANION GAP SERPL CALCULATED.3IONS-SCNC: 8 MMOL/L (ref 3–16)
BUN BLDV-MCNC: 10 MG/DL (ref 7–20)
CALCIUM SERPL-MCNC: 8 MG/DL (ref 8.3–10.6)
CHLORIDE BLD-SCNC: 106 MMOL/L (ref 99–110)
CO2: 25 MMOL/L (ref 21–32)
CREAT SERPL-MCNC: 0.9 MG/DL (ref 0.8–1.3)
GFR AFRICAN AMERICAN: >60
GFR NON-AFRICAN AMERICAN: >60
GLUCOSE BLD-MCNC: 90 MG/DL (ref 70–99)
HCT VFR BLD CALC: 21.9 % (ref 40.5–52.5)
HEMOGLOBIN: 7.1 G/DL (ref 13.5–17.5)
MAGNESIUM: 1.3 MG/DL (ref 1.8–2.4)
MCH RBC QN AUTO: 30.6 PG (ref 26–34)
MCHC RBC AUTO-ENTMCNC: 32.6 G/DL (ref 31–36)
MCV RBC AUTO: 93.9 FL (ref 80–100)
PDW BLD-RTO: 16 % (ref 12.4–15.4)
PLATELET # BLD: 307 K/UL (ref 135–450)
PMV BLD AUTO: 8.5 FL (ref 5–10.5)
POTASSIUM REFLEX MAGNESIUM: 3.5 MMOL/L (ref 3.5–5.1)
RBC # BLD: 2.33 M/UL (ref 4.2–5.9)
SODIUM BLD-SCNC: 139 MMOL/L (ref 136–145)
URINE CULTURE, ROUTINE: NORMAL
WBC # BLD: 8.6 K/UL (ref 4–11)

## 2021-02-14 PROCEDURE — 2580000003 HC RX 258: Performed by: INTERNAL MEDICINE

## 2021-02-14 PROCEDURE — 2580000003 HC RX 258: Performed by: HOSPITALIST

## 2021-02-14 PROCEDURE — 80048 BASIC METABOLIC PNL TOTAL CA: CPT

## 2021-02-14 PROCEDURE — 1200000000 HC SEMI PRIVATE

## 2021-02-14 PROCEDURE — 94760 N-INVAS EAR/PLS OXIMETRY 1: CPT

## 2021-02-14 PROCEDURE — 6370000000 HC RX 637 (ALT 250 FOR IP): Performed by: INTERNAL MEDICINE

## 2021-02-14 PROCEDURE — 36415 COLL VENOUS BLD VENIPUNCTURE: CPT

## 2021-02-14 PROCEDURE — 85027 COMPLETE CBC AUTOMATED: CPT

## 2021-02-14 PROCEDURE — 6360000002 HC RX W HCPCS: Performed by: FAMILY MEDICINE

## 2021-02-14 PROCEDURE — 83735 ASSAY OF MAGNESIUM: CPT

## 2021-02-14 RX ORDER — MAGNESIUM SULFATE IN WATER 40 MG/ML
2000 INJECTION, SOLUTION INTRAVENOUS ONCE
Status: COMPLETED | OUTPATIENT
Start: 2021-02-14 | End: 2021-02-14

## 2021-02-14 RX ORDER — MAGNESIUM SULFATE 1 G/100ML
1000 INJECTION INTRAVENOUS ONCE
Status: COMPLETED | OUTPATIENT
Start: 2021-02-14 | End: 2021-02-14

## 2021-02-14 RX ADMIN — PANTOPRAZOLE SODIUM 40 MG: 40 TABLET, DELAYED RELEASE ORAL at 05:49

## 2021-02-14 RX ADMIN — SODIUM CHLORIDE: 9 INJECTION, SOLUTION INTRAVENOUS at 05:49

## 2021-02-14 RX ADMIN — APIXABAN 5 MG: 5 TABLET, FILM COATED ORAL at 10:10

## 2021-02-14 RX ADMIN — PANTOPRAZOLE SODIUM 40 MG: 40 TABLET, DELAYED RELEASE ORAL at 17:01

## 2021-02-14 RX ADMIN — TAMSULOSIN HYDROCHLORIDE 0.4 MG: 0.4 CAPSULE ORAL at 10:10

## 2021-02-14 RX ADMIN — Medication 10 ML: at 10:10

## 2021-02-14 RX ADMIN — MAGNESIUM SULFATE HEPTAHYDRATE 2000 MG: 40 INJECTION, SOLUTION INTRAVENOUS at 16:49

## 2021-02-14 RX ADMIN — LEVETIRACETAM 500 MG: 5 INJECTION INTRAVENOUS at 22:34

## 2021-02-14 RX ADMIN — MAGNESIUM SULFATE HEPTAHYDRATE 1000 MG: 1 INJECTION, SOLUTION INTRAVENOUS at 15:30

## 2021-02-14 RX ADMIN — Medication 1 CAPSULE: at 10:10

## 2021-02-14 RX ADMIN — Medication 5000 UNITS: at 10:10

## 2021-02-14 RX ADMIN — Medication 10 ML: at 21:03

## 2021-02-14 RX ADMIN — LEVETIRACETAM 500 MG: 5 INJECTION INTRAVENOUS at 10:10

## 2021-02-14 RX ADMIN — APIXABAN 5 MG: 5 TABLET, FILM COATED ORAL at 21:03

## 2021-02-14 RX ADMIN — Medication 100 MG: at 10:10

## 2021-02-14 RX ADMIN — OXYCODONE HYDROCHLORIDE AND ACETAMINOPHEN 1000 MG: 500 TABLET ORAL at 10:10

## 2021-02-14 RX ADMIN — THERA TABS 1 TABLET: TAB at 10:10

## 2021-02-14 RX ADMIN — ZINC SULFATE 220 MG (50 MG) CAPSULE 50 MG: CAPSULE at 10:41

## 2021-02-14 RX ADMIN — Medication: at 10:11

## 2021-02-14 ASSESSMENT — PAIN SCALES - GENERAL: PAINLEVEL_OUTOF10: 0

## 2021-02-14 NOTE — PROGRESS NOTES
Hospitalist Progress Note      PCP: Geronimo Kumar MD    Date of Admission: 2/11/2021    Chief Complaint: GEOVANI    Hospital Course:     Subjective: GEOVANI resolved. Medically ready for D/C      Medications:  Reviewed    Infusion Medications     Scheduled Medications    magnesium sulfate  2,000 mg Intravenous Once    Followed by   [facility] magnesium sulfate  1,000 mg Intravenous Once    levetiracetam  500 mg Intravenous Q12H    medihoney wound/burn dressing   Topical Daily    sodium chloride flush  10 mL Intravenous 2 times per day    apixaban  5 mg Oral BID    ascorbic acid  1,000 mg Oral Daily    vitamin D  5,000 Units Oral Daily    lactobacillus  1 capsule Oral Daily with breakfast    [Held by provider] levETIRAcetam  500 mg Oral BID    multivitamin  1 tablet Oral Daily    pantoprazole  40 mg Oral BID AC    tamsulosin  0.4 mg Oral Daily    thiamine mononitrate  100 mg Oral Daily    zinc sulfate  50 mg Oral Daily     PRN Meds: menthol-zinc oxide, sodium chloride flush, promethazine **OR** ondansetron, acetaminophen **OR** acetaminophen      Intake/Output Summary (Last 24 hours) at 2/14/2021 1546  Last data filed at 2/14/2021 1537  Gross per 24 hour   Intake 720 ml   Output 3450 ml   Net -2730 ml       Exam:    /77   Pulse 102   Temp 98.4 °F (36.9 °C) (Oral)   Resp 17   Ht 5' 10\" (1.778 m)   Wt 214 lb 11.7 oz (97.4 kg)   SpO2 94%   BMI 30.81 kg/m²     General appearance: No apparent distress, appears stated age and cooperative. HEENT: Pupils equal, round, and reactive to light. Conjunctivae/corneas clear. Neck: Supple, with full range of motion. No jugular venous distention. Trachea midline. Respiratory:  Normal respiratory effort. Clear to auscultation, bilaterally without Rales/Wheezes/Rhonchi. Cardiovascular: Regular rate and rhythm with normal S1/S2 without murmurs, rubs or gallops. Abdomen: Soft, non-tender, non-distended with normal bowel sounds. Musculoskeletal: No clubbing, cyanosis or edema bilaterally. Full range of motion without deformity. Skin: Skin color, texture, turgor normal.  No rashes or lesions. Neurologic:  Neurovascularly intact without any focal sensory/motor deficits. Cranial nerves: II-XII intact, grossly non-focal.  Psychiatric: Alert and oriented, thought content appropriate, normal insight  Capillary Refill: Brisk,< 3 seconds   Peripheral Pulses: +2 palpable, equal bilaterally       Labs:   Recent Labs     02/12/21  0539 02/13/21  0558 02/14/21  0540   WBC 7.5 7.9 8.6   HGB 7.2* 7.3* 7.1*   HCT 22.5* 22.6* 21.9*    313 307     Recent Labs     02/12/21  0539 02/13/21  0558 02/14/21  0540    143 139   K 4.6 4.1 3.5    110 106   CO2 24 27 25   BUN 32* 15 10   CREATININE 3.0* 1.4* 0.9   CALCIUM 8.7 8.5 8.0*     No results for input(s): AST, ALT, BILIDIR, BILITOT, ALKPHOS in the last 72 hours. No results for input(s): INR in the last 72 hours. No results for input(s): [employer] in the last 72 hours. Urinalysis:      Lab Results   Component Value Date    NITRU Negative 02/11/2021    WBCUA 5 02/11/2021    BACTERIA 4+ 08/10/2011    RBCUA >900 02/11/2021    BLOODU LARGE 02/11/2021    SPECGRAV 1.012 02/11/2021    GLUCOSEU Negative 02/11/2021    GLUCOSEU Negative 08/10/2011       Radiology:  CT ABDOMEN PELVIS WO CONTRAST Additional Contrast? None   Final Result   Moderate bilateral hydronephrosis and hydroureter, without obstructing   calculus. Mild induration of fat about the bladder and kidneys, for which  infection   could be considered in the appropriate clinical setting. Correlate with   urinalysis. As there is also presacral edema and induration of subcutaneous   fat, anasarca could also be considered. 1.9 cm metallic star-shaped foreign body along the proximal duodenum, of   uncertain etiology. Correlate with any history of surgery or ingestion. Cholelithiasis/gallbladder sludge.

## 2021-02-14 NOTE — CONSULTS
Surgery    Called for concern with foreign body in duodenum noted on CT. Per Care Everywhere, patient had:  UPPER GASTROINTESTINAL ENDOSCOPY 1/8/2021 N/A ESOPHAGOGASTRODUODENOSCOPY with gold probe and epinephrine injection, and ovesco clip placement; recovery in ICU ; Surgeon: Zach Reynolds MD; Location: [facility]; Service: Endoscopy       Most likely foreign object on CT are consistent with clips placed for ulcer on 1/8/21.     Jr Bueno MD

## 2021-02-14 NOTE — DISCHARGE INSTR - COC
Continuity of Care Form    Patient Name: Rosetta Nelson   :  1935  MRN:  [id number]    Admit date:  2021  Discharge date:  2/15/2021    Code Status Order: Full Code   Advance Directives:   [facility] Documentation       Date/Time Healthcare Directive Type of Healthcare Directive Copy in [address] Agent's Name Healthcare Agent's Phone Number    21 2114  No, patient does not have an advance directive for healthcare treatment -- -- -- -- --            Admitting Physician:  Perri Fischer MD  PCP: Gail Langford MD    Discharging Nurse: Keya Christine Unit/Room#: N7Y-4612/3130-01  Discharging Unit Phone Number: [phone]    Emergency Contact:   Extended Emergency Contact Information  Primary Emergency Contact: Scott Price   [address] Phone: [phone]  Relation: Child  Secondary Emergency Contact: [facility], [address] Phone: [phone]  Mobile Phone: [phone]  Relation: Child    Past Surgical History:  Past Surgical History:   Procedure Laterality Date    CATARACT REMOVAL Bilateral     SPLENECTOMY, TOTAL      at age 15 y old. Immunization History:   Immunization History   Administered Date(s) Administered    Pneumococcal Conjugate 7-valent ([facility]ne Roth) 2014       Active Problems:  Patient Active Problem List   Diagnosis Code    Seizure ([address] R56.9    Lactic acidosis E87.2    Sepsis ([address] A41.9    Pneumonia due to COVID-19 virus U07.1, J12.82    Bilateral pulmonary embolism (HCC) I26.99    Elevated LFTs R79.89    Rhabdomyolysis M62.82    Facial trauma S09. 93XA    Bacterial pneumonia J15.9    Alcohol abuse F10.10    GEOVANI (acute kidney injury) ([address] N17.9       Isolation/Infection:   Isolation            No Isolation          Patient Infection Status       Infection Onset Added Last Indicated Last Indicated By Review Planned Expiration Resolved Resolved By    None active    Resolved COVID-19 20 COVID-19   21     COVID-19 Rule Out 20 COVID-19 (Ordered)   20 Rule-Out Test Resulted            Nurse Assessment:  Last Vital Signs: BP (!) 148/78   Pulse 81   Temp 97.5 °F (36.4 °C) (Oral)   Resp 21   Ht 5' 10\" (1.778 m)   Wt 214 lb 11.7 oz (97.4 kg)   SpO2 94%   BMI 30.81 kg/m²     Last documented pain score (0-10 scale): Pain Level: 0  Last Weight:   Wt Readings from Last 1 Encounters:   21 214 lb 11.7 oz (97.4 kg)     Mental Status:  oriented and alert    IV Access:  - None    Nursing Mobility/ADLs:  Walking   Dependent  Transfer  Dependent  Bathing  Assisted  Dressing  Assisted  Toileting  Assisted  Feeding  Independent  Med Admin  Assisted  Med Delivery   whole    Wound Care Documentation and Therapy:  Wound 20 Knee Anterior;Left;Mid;Outer abrasion (Active)   Number of days: 53       Wound 20 Face Left;Upper (Active)   Number of days: 53       Wound 20 Knee Right; Inner (Active)   Wound Image   21 1027   Wound Etiology Traumatic 21 1039   Dressing Status Clean;Dry; Intact; Other (Comment) 21   Wound Cleansed Not Cleansed 21 0852   Dressing/Treatment Silicone border 2023   Wound Length (cm) 1.5 cm 21 1027   Wound Width (cm) 1 cm 21 1027   Wound Surface Area (cm^2) 1.5 cm^2 21 1027   Change in Wound Size % (l*w) -167.86 21 1027   Wound Assessment Eschar dry 21   Drainage Amount None 21   Odor None 21   Araseli-wound Assessment Intact 21   Number of days: 53       Wound 20 Toe (Comment  which one) Anterior; Left 2nd toe Abrasion (Active)   Number of days: 53       Wound 20 Knee Left (Active)   Wound Image   21 1027   Wound Etiology Traumatic 21 1039   Dressing Status Clean;Dry; Intact; Other (Comment) 21   Wound Cleansed Not Cleansed 21 2487 Dressing/Treatment Silicone border 20/41/93 2023   Wound Length (cm) 5 cm 02/12/21 1027   Wound Width (cm) 3 cm 02/12/21 1027   Wound Surface Area (cm^2) 15 cm^2 02/12/21 1027   Change in Wound Size % (l*w) -284.62 02/12/21 1027   Wound Assessment Eschar moist;Slough 02/13/21 2023   Drainage Amount Scant 02/13/21 2023   Drainage Description Yellow 02/13/21 2023   Odor None 02/13/21 2023   Araseli-wound Assessment Intact; Warm 02/13/21 2023   Number of days: 53       Wound 12/22/20 Nose Abrasion (Active)   Number of days: 53        Elimination:  Continence:   · Bowel: Yes  · Bladder: Yes  Urinary Catheter: Insertion Date: 2/11/2021   Colostomy/Ileostomy/Ileal Conduit: No       Date of Last BM: 2/13/2021    Intake/Output Summary (Last 24 hours) at 2/14/2021 1223  Last data filed at 2/14/2021 0916  Gross per 24 hour   Intake 480 ml   Output 2200 ml   Net -1720 ml     I/O last 3 completed shifts: In: 840 [P.O.:840]  Out: 3150 [Urine:3150]    Safety Concerns: At Risk for Falls and seizure history    Impairments/Disabilities:      Hearing    Nutrition Therapy:  Current Nutrition Therapy:   - Oral Diet:  Renal    Routes of Feeding: Oral  Liquids: Thin Liquids  Daily Fluid Restriction: no  Last Modified Barium Swallow with Video (Video Swallowing Test): not done    Treatments at the Time of Hospital Discharge:   Respiratory Treatments:   Oxygen Therapy:  is not on home oxygen therapy.   Ventilator:    - No ventilator support    Rehab Therapies: Physical Therapy and Occupational Therapy  Weight Bearing Status/Restrictions: No weight bearing restirctions  Other Medical Equipment (for information only, NOT a DME order):  stedy x2    Other Treatments:     Patient's personal belongings (please select all that are sent with patient):  Dentures top and bottom  Hearing Aides right and left    RN SIGNATURE:  Electronically signed by Petros Espino RN on 2/15/21 at 2:39 PM EST    CASE MANAGEMENT/SOCIAL WORK SECTION Inpatient Status Date: 02/14/2021    Readmission Risk Assessment Score:  Readmission Risk              Risk of Unplanned Readmission:        18         Discharging to Facility/ Agency   · Name: [address]  · Address: [address] [place], [place] [address] [address]  · Phone: [phone]  · Fax: [phone]    / signature: Electronically signed by AMINA Almonte on 2/14/2021 at 12:23 PM      PHYSICIAN SECTION    Prognosis: Good    Condition at Discharge: Stable    Rehab Potential (if transferring to Rehab): Good    Recommended Labs or Other Treatments After Discharge:   F/u with urology in 2 weeks for voiding trial.  Cont spann care until then. PT and OT services. Physician Certification: I certify the above information and transfer of Tonia Nayak  is necessary for the continuing treatment of the diagnosis listed and that he requires [facility] for less 30 days.      Update Admission H&P: No change in H&P    PHYSICIAN SIGNATURE:  Electronically signed by Demarcus Tran MD on 2/15/21 at 1:04 PM EST

## 2021-02-14 NOTE — PROGRESS NOTES
Patient in bed eyes closed. Patient tolerating PO intake well. Shift assessment complete. PM medications given without complications. Patient denies pain, nausea or vomiting. Call light within reach, able to make needs known, fall precautions in place. Will monitor. .Electronically signed by Birdie Souza RN on 2/13/2021 at 10:49 PM

## 2021-02-14 NOTE — CARE COORDINATION
SW left messages for both sons regarding the discharge plan and left messages for both. SW will wait for a response. Respectfully submitted,    AMINA Mckeon  [facility]   [phone]    Electronically signed by AMINA Stratton on 2/14/2021 at 12:27 PM

## 2021-02-14 NOTE — PROGRESS NOTES
CMU reported that pt had 6 beats of V-tach. patient asymptomatic, asleep with no distress. pt NSR on tele with HR 82. NP Jeannine notified. Kurtis Love Electronically signed by Randall Toro RN on 2/13/2021 at 9:55 PM

## 2021-02-14 NOTE — PLAN OF CARE
Problem: Skin Integrity:  Goal: Will show no infection signs and symptoms  Description: Will show no infection signs and symptoms  2/13/2021 1957 by Gillian Meigs, RN  Outcome: Ongoing     Problem: Skin Integrity:  Goal: Absence of new skin breakdown  Description: Absence of new skin breakdown  2/13/2021 1957 by Gillian Meigs, RN  Outcome: Ongoing     Problem: Nutrition  Goal: Optimal nutrition therapy  2/13/2021 1957 by Gillian Meigs, RN  Outcome: Ongoing     Problem: Falls - Risk of:  Goal: Will remain free from falls  Description: Will remain free from falls  2/13/2021 1957 by Gillian Meigs, RN  Outcome: Ongoing  Note: Patient educated on fall prevention. Call light is within reach, bed locked in lowest position, personal items within reach, and bed alarm is on. Will round on patient per unit guidelines. Problem: Falls - Risk of:  Goal: Absence of physical injury  Description: Absence of physical injury  2/13/2021 1957 by Gillian Meigs, RN  Outcome: Ongoing  Note: Pt is free of injury. No injury noted. Fall precautions in place. Call light within reach. Will monitor.

## 2021-02-14 NOTE — PROGRESS NOTES
Pt Name: Donata Lundborg  Medical Record Number: [id number]  Date of Birth 1935   Today's Date: 2/14/2021      Subjective: Patient resting comfortably in bed. No complaints    ROS: Constitutional: No fever    Vitals:  Vitals:    02/14/21 0404 02/14/21 0510 02/14/21 0823 02/14/21 0824   BP: 126/78   (!) 148/78   Pulse: 86   81   Resp: 20   21   Temp: 97.4 °F (36.3 °C)   97.5 °F (36.4 °C)   TempSrc: Oral   Oral   SpO2: 93%  94% 94%   Weight:  214 lb 11.7 oz (97.4 kg)     Height:         I/O last 3 completed shifts:   In: 840 [P.O.:840]  Out: 3150 [Urine:3150]    Exam:  General: Awake, oriented, no acute distress  Respiratory: Nonlabored breathing  Abdomen: Soft, non-tender, non-distended, no masses  : Ambrosio catheter in place, urine clear  Skin: Skin color, texture, turgor normal, no rashes or lesions  Neurologic: no gross deficits    CURRENT MEDICATIONS   Scheduled Meds:   levetiracetam  500 mg Intravenous Q12H    medihoney wound/burn dressing   Topical Daily    sodium chloride flush  10 mL Intravenous 2 times per day    apixaban  5 mg Oral BID    ascorbic acid  1,000 mg Oral Daily    vitamin D  5,000 Units Oral Daily    lactobacillus  1 capsule Oral Daily with breakfast    [Held by provider] levETIRAcetam  500 mg Oral BID    multivitamin  1 tablet Oral Daily    pantoprazole  40 mg Oral BID AC    tamsulosin  0.4 mg Oral Daily    thiamine mononitrate  100 mg Oral Daily    zinc sulfate  50 mg Oral Daily     Continuous Infusions:   sodium chloride 100 mL/hr at 02/14/21 0549     PRN Meds:.menthol-zinc oxide, sodium chloride flush, promethazine **OR** ondansetron, acetaminophen **OR** acetaminophen    LABS     Recent Labs     02/11/21  1407 02/11/21  1407 02/12/21  0539 02/13/21  0558 02/14/21  0540   WBC 8.6  --  7.5 7.9 8.6   HGB 8.4*  --  7.2* 7.3* 7.1*   HCT 26.3*  --  22.5* 22.6* 21.9*     --  303 313 307      < > 141 143 139   K 6.1*   < > 4.6 4.1 3.5      < > 109 110 106 CO2 23   < > 24 27 25   BUN 47*   < > 32* 15 10   CREATININE 5.9*   < > 3.0* 1.4* 0.9   MG  --   --   --   --  1.30*   CALCIUM 8.8   < > 8.7 8.5 8.0*   AST 16  --   --   --   --    ALT 9*  --   --   --   --    BILITOT 0.3  --   --   --   --     < > = values in this interval not displayed. ASSESSMENT   1. Hospital day # 3  2. Urinary retention: 1700 cc on catheter insertion. Patient was minimally symptomatic at presentation  3. Acute renal failure due to obstructive uropathy. Now resolved  4. Past medical history of seizures, alcohol abuse. Poor historian  PLAN   1. Keep indwelling catheter. 2. Continue Flomax   3. follow-up in 1 to 2 weeks, would consider voiding trial.  Needs close follow-up after catheter removal  4.  he is not the best candidate for surgical intervention with TURP.   May need chronic Ambrosio or SP tube if retention persists    Polo Godoy MD 2/14/2021 8:50 AM

## 2021-02-14 NOTE — CARE COORDINATION
DISCHARGE SUMMARY     DATE OF DISCHARGE: 02/14/2021    DISCHARGE DESTINATION: SNF    FACILITY  · Name: [address] [place] [address] Place  · Address: [address] [place] [address]., [place] [address]  · Phone: [phone]  · Fax: [phone]    HENS not required    TRANSPORTATION:   Company Name:  [employer] Time: 3pm on Monday 2/15  Phone Number: [phone]    COMMENTS: SW spoke to patient's son via telephone and he is in agreement with discharge. He understands why patient can't be transported today. He stated that he would like us to ensure that patient is transported with [address]. Respectfully submitted,    AMINA Hull  [facility]   [phone]    Electronically signed by AMINA Rausch on 2/14/2021 at 2:19 PM

## 2021-02-14 NOTE — PROGRESS NOTES
Pt rounded on this morning Q2h, whiteboard updated, pt given ice water and needs assessed. Pt up to chair, x2 stedy, tolerated fairly well. Pt son called and given updates, plan to discharge pt back to [facility] tomorrow at 1500. Pt and son aware, no questions or concerns at this time. Call light within reach, pt verbalized understanding to call prior to ambulating. Will continue to monitor and reassess.    Electronically signed by Nishant Bales RN on 2/14/2021 at 2:52 PM

## 2021-02-15 VITALS
WEIGHT: 215.61 LBS | RESPIRATION RATE: 16 BRPM | HEART RATE: 80 BPM | DIASTOLIC BLOOD PRESSURE: 68 MMHG | BODY MASS INDEX: 30.87 KG/M2 | SYSTOLIC BLOOD PRESSURE: 110 MMHG | HEIGHT: 70 IN | TEMPERATURE: 98.5 F | OXYGEN SATURATION: 96 %

## 2021-02-15 LAB
ANION GAP SERPL CALCULATED.3IONS-SCNC: 7 MMOL/L (ref 3–16)
BLOOD CULTURE, ROUTINE: NORMAL
BUN BLDV-MCNC: 7 MG/DL (ref 7–20)
CALCIUM SERPL-MCNC: 8.1 MG/DL (ref 8.3–10.6)
CHLORIDE BLD-SCNC: 105 MMOL/L (ref 99–110)
CO2: 26 MMOL/L (ref 21–32)
CREAT SERPL-MCNC: 0.8 MG/DL (ref 0.8–1.3)
CULTURE, BLOOD 2: NORMAL
GFR AFRICAN AMERICAN: >60
GFR NON-AFRICAN AMERICAN: >60
GLUCOSE BLD-MCNC: 99 MG/DL (ref 70–99)
HCT VFR BLD CALC: 22 % (ref 40.5–52.5)
HEMOGLOBIN: 7.2 G/DL (ref 13.5–17.5)
MAGNESIUM: 1.9 MG/DL (ref 1.8–2.4)
MCH RBC QN AUTO: 30.3 PG (ref 26–34)
MCHC RBC AUTO-ENTMCNC: 32.5 G/DL (ref 31–36)
MCV RBC AUTO: 93 FL (ref 80–100)
PDW BLD-RTO: 16.1 % (ref 12.4–15.4)
PLATELET # BLD: 337 K/UL (ref 135–450)
PMV BLD AUTO: 8.6 FL (ref 5–10.5)
POTASSIUM REFLEX MAGNESIUM: 3.2 MMOL/L (ref 3.5–5.1)
RBC # BLD: 2.37 M/UL (ref 4.2–5.9)
SARS-COV-2, NAAT: NOT DETECTED
SODIUM BLD-SCNC: 138 MMOL/L (ref 136–145)
WBC # BLD: 9.1 K/UL (ref 4–11)

## 2021-02-15 PROCEDURE — U0002 COVID-19 LAB TEST NON-CDC: HCPCS

## 2021-02-15 PROCEDURE — 97116 GAIT TRAINING THERAPY: CPT

## 2021-02-15 PROCEDURE — 6370000000 HC RX 637 (ALT 250 FOR IP): Performed by: INTERNAL MEDICINE

## 2021-02-15 PROCEDURE — 80048 BASIC METABOLIC PNL TOTAL CA: CPT

## 2021-02-15 PROCEDURE — 2580000003 HC RX 258: Performed by: INTERNAL MEDICINE

## 2021-02-15 PROCEDURE — 85027 COMPLETE CBC AUTOMATED: CPT

## 2021-02-15 PROCEDURE — 97530 THERAPEUTIC ACTIVITIES: CPT

## 2021-02-15 PROCEDURE — 97110 THERAPEUTIC EXERCISES: CPT

## 2021-02-15 PROCEDURE — 94760 N-INVAS EAR/PLS OXIMETRY 1: CPT

## 2021-02-15 PROCEDURE — 6370000000 HC RX 637 (ALT 250 FOR IP): Performed by: HOSPITALIST

## 2021-02-15 PROCEDURE — 97535 SELF CARE MNGMENT TRAINING: CPT

## 2021-02-15 PROCEDURE — 83735 ASSAY OF MAGNESIUM: CPT

## 2021-02-15 PROCEDURE — 6360000002 HC RX W HCPCS: Performed by: FAMILY MEDICINE

## 2021-02-15 PROCEDURE — 36415 COLL VENOUS BLD VENIPUNCTURE: CPT

## 2021-02-15 RX ORDER — POTASSIUM CHLORIDE 20 MEQ/1
20 TABLET, EXTENDED RELEASE ORAL ONCE
Status: COMPLETED | OUTPATIENT
Start: 2021-02-15 | End: 2021-02-15

## 2021-02-15 RX ADMIN — TAMSULOSIN HYDROCHLORIDE 0.4 MG: 0.4 CAPSULE ORAL at 08:46

## 2021-02-15 RX ADMIN — Medication: at 08:46

## 2021-02-15 RX ADMIN — OXYCODONE HYDROCHLORIDE AND ACETAMINOPHEN 1000 MG: 500 TABLET ORAL at 08:46

## 2021-02-15 RX ADMIN — ZINC SULFATE 220 MG (50 MG) CAPSULE 50 MG: CAPSULE at 08:46

## 2021-02-15 RX ADMIN — Medication 100 MG: at 08:46

## 2021-02-15 RX ADMIN — Medication 10 ML: at 08:46

## 2021-02-15 RX ADMIN — PANTOPRAZOLE SODIUM 40 MG: 40 TABLET, DELAYED RELEASE ORAL at 06:04

## 2021-02-15 RX ADMIN — POTASSIUM CHLORIDE 20 MEQ: 1500 TABLET, EXTENDED RELEASE ORAL at 11:37

## 2021-02-15 RX ADMIN — Medication 1 CAPSULE: at 08:46

## 2021-02-15 RX ADMIN — LEVETIRACETAM 500 MG: 5 INJECTION INTRAVENOUS at 11:37

## 2021-02-15 RX ADMIN — Medication 5000 UNITS: at 08:46

## 2021-02-15 RX ADMIN — APIXABAN 5 MG: 5 TABLET, FILM COATED ORAL at 08:46

## 2021-02-15 RX ADMIN — THERA TABS 1 TABLET: TAB at 08:46

## 2021-02-15 ASSESSMENT — PAIN SCALES - WONG BAKER: WONGBAKER_NUMERICALRESPONSE: 0

## 2021-02-15 NOTE — PROGRESS NOTES
Nephrology progress  Note                                                                                                                                                                                                                                                                                                                                                               Office : [phone]     Fax :[phone]              Patient's Name: Bouchra Westfall  7:51 PM  2/14/2021    Reason for Consult:  Acute renal failure       Requesting Physician:  Tracy Oconnor MD      Chief Complaint:  High serum Cr    History of Present Ilness:    Bouchra Westfall is a 80 y.o. male with PMH of BPH , Afib   Was sent to hospital due to abnormal lab  His serum Cr found to be high    Serum cr 5.9 on admission , 1700 cc urine after spann cath insertion    No diarrhea       Interval History : Spann +    Renal function improved    Making urine        Past Medical History:   Diagnosis Date    GEOVANI (acute kidney injury) ([address] 2/11/2021    Alcohol abuse 12/22/2020    Atrial fibrillation (HCC)     BPH (benign prostatic hyperplasia)     Dehydration     [facility] (HCC)     Leg weakness, bilateral 2014, 2015    legs give out and pt collaspes    Seizure ([address] 7/20/2017    UTI (urinary tract infection) 8-    admitted with high fever and fainted. Past Surgical History:   Procedure Laterality Date    CATARACT REMOVAL Bilateral     SPLENECTOMY, TOTAL      at age 15 y old. Family History   Problem Relation Age of Onset    Cancer Mother         pancreatic    Diabetes Father     Cancer Brother         reports that he has never smoked. He has never used smokeless tobacco. He reports current alcohol use. He reports that he does not use drugs. Allergies:  Patient has no known allergies.     Current Medications:        levetiracetam (KEPPRA) 500 mg/100 mL IVPB, Q12H      medihoney wound/burn Result   Negative portable chest x-ray. No evidence of acute cardiopulmonary disease. Assessment/Plan      1. Non oliguric Acute renal failure      Etiology seems to be obstructive    has h/o BPH : on flomax at home   Renal USG : BL hydronephrosis and hydroureter      Plan    Stable from renal standpoint for DC    Renal function improving  : 3 ------> 1.4 ----> 0.9    Urology follow up  : continue spann and flomax    Continue spann catheter    DC IVF    Avoid contrast  Keep MAP >65 mmhg  Avoid NSAIDs, ACEI or ARB       2.  Hyperkalemia  :    Resolved  Avoid ACEI or ARB      3 Anemia    Hb 7.3    Transfuse PRBC prn      Thank you for allowing us to participate in care of [address]  free to contact me   [facility] [address]  Office : [phone]  Fax :[phone]

## 2021-02-15 NOTE — PLAN OF CARE
Problem: Skin Integrity:  Goal: Will show no infection signs and symptoms  Description: Will show no infection signs and symptoms  Outcome: Ongoing     Problem: Nutrition  Goal: Optimal nutrition therapy  Outcome: Ongoing     Problem: Falls - Risk of:  Goal: Will remain free from falls  Description: Will remain free from falls  Outcome: Ongoing     Problem: Coping:  Goal: Ability to cope will improve  Description: Ability to cope will improve  Outcome: Ongoing     Problem: Health Behavior:  Goal: Ability to manage health-related needs will improve  Description: Ability to manage health-related needs will improve  Outcome: Ongoing     Problem: Physical Regulation:  Goal: Signs of adequate cerebral perfusion will increase  Description: Signs of adequate cerebral perfusion will increase  Outcome: Ongoing     Problem: Safety:  Goal: Ability to remain free from injury will improve  Description: Ability to remain free from injury will improve  Outcome: Ongoing     Problem: Self-Concept:  Goal: Level of anxiety will decrease  Description: Level of anxiety will decrease  Outcome: Ongoing     Problem: Infection:  Goal: Will remain free from infection  Description: Will remain free from infection  Outcome: Ongoing     Problem: Daily Care:  Goal: Daily care needs are met  Description: Daily care needs are met  Outcome: Ongoing     Problem: Discharge Planning:  Goal: Patients continuum of care needs are met  Description: Patients continuum of care needs are met  Outcome: Ongoing

## 2021-02-15 NOTE — PROGRESS NOTES
Patient is alert and oriented to self and place, disoriented to situation and time, up with stedy x2, call light within reach, bed/chair alarm on. AM meds complete, patient tolerated well. VSS and WDL. No s/s of distress, no further needs noted at this time.  Electronically signed by Ron Herring RN on 2/15/2021 at 12:47 PM

## 2021-02-15 NOTE — PROGRESS NOTES
Physical Therapy    Facility/Department: [address] ORTHOPEDICS  Treatment note    NAME: Garrett Scruggs  : 1935  MRN: [id number]    Date of Service: 2/15/2021    Assessment / Discharge Recommendations:  -needs continued PT OT and nursing care in a skilled setting with a slow pace  Garrett Scruggs scored a 10/24 on the AM-PAC short mobility form. Current research shows that an AM-PAC score of 17 or less is typically not associated with a discharge to the patient's home setting. Based on the patient's AM-PAC score and their current functional mobility deficits, it is recommended that the patient have 3-5 sessions per week of Physical Therapy at d/ to increase the patient's independence. Body structures, Functions, Activity limitations: Decreased functional mobility ; Decreased endurance;Decreased safe awareness;Decreased balance;Decreased ADL status  Activity Tolerance  Activity Tolerance: Patient limited by endurance       Patient Diagnosis(es): The primary encounter diagnosis was GEOVANI (acute kidney injury) ([address] Diagnoses of Hyperkalemia and Elevated troponin were also pertinent to this visit. has a past medical history of GEOVANI (acute kidney injury) ([address] Alcohol abuse, Atrial fibrillation ([address] BPH (benign prostatic hyperplasia), Dehydration, [facility] ([address] Leg weakness, bilateral, Seizure ([address] and UTI (urinary tract infection). has a past surgical history that includes Splenectomy, total and Cataract removal (Bilateral). Restrictions  Restrictions/Precautions  Restrictions/Precautions: Fall Risk  Position Activity Restriction  Other position/activity restrictions: [place]. wounds B knees and L wrist.  Vision/Hearing  Vision: Impaired  Hearing: Exceptions to [facility]  Hearing Exceptions: Bilateral hearing aid     Subjective  General  Chart Reviewed:  Yes

## 2021-02-15 NOTE — PLAN OF CARE
Problem: Skin Integrity:  Goal: Will show no infection signs and symptoms  Description: Will show no infection signs and symptoms  2/14/2021 2023 by Karl Jason RN  Outcome: Ongoing     Problem: Skin Integrity:  Goal: Absence of new skin breakdown  Description: Absence of new skin breakdown  2/14/2021 2023 by Karl Jason RN  Outcome: Ongoing     Problem: Nutrition  Goal: Optimal nutrition therapy  2/14/2021 2023 by Karl Jason RN  Outcome: Ongoing     Problem: Falls - Risk of:  Goal: Will remain free from falls  Description: Will remain free from falls  2/14/2021 2023 by Karl Jason RN  Outcome: Ongoing  Note: Patient educated on fall prevention. Call light is within reach, bed locked in lowest position, personal items within reach, and bed alarm is on. Will round on patient per unit guidelines. Problem: Falls - Risk of:  Goal: Absence of physical injury  Description: Absence of physical injury  2/14/2021 2023 by Karl Jason RN  Outcome: Ongoing  Note: Pt is free of injury. No injury noted. Fall precautions in place. Call light within reach. Will monitor.

## 2021-02-15 NOTE — PROGRESS NOTES
Data- discharge order received, pt verbalized agreement to discharge, disposition to [facility]. Action- discharge instructions prepared and given to [place] transport, pt verbalized understanding. Medication information packet given r/t NEW and/or CHANGED prescriptions emphasizing name/purpose/side effects, pt verbalized understanding. Discharge instruction summary: Diet- renal, Activity- as tolerated, Primary Care Physician as follows: Bony Mcgregor -[phone] . Response- Pt belongings gathered, IV removed. Disposition is [facility], transported with [place] transport, taken to lobby via stretcher, no complications.  Electronically signed by Leydi Cesar RN on 2/15/2021 at 3:23 PM

## 2021-02-15 NOTE — CARE COORDINATION
Per Leonard Ware at [facility], [address] has intent to deny. The attending MD can do a Peer to Peer conversation with insurance MD by calling [phone] option 5. Mishel sent attending a message regarding above. Electronically signed by SEUN Gee LISW, Case Management on 2/15/2021 at 1:03 PM  [facility] [id number]    2:56 PM  Sw received notice from attending that Peer to Peer was still denied. Sw spoke with Leonard Ware at [place] is aware of above. They can accept patient back today under long term care. Mishel left message for patient's son, Cherylene Crosby, regarding dc--phone number was to a lodge--did not leave a message. Mishel left message for patient's son, Edilia Combs, regarding dc today at 3pm.  RN aware.      Electronically signed by SEUN Gee LISW, Case Management on 2/15/2021 at 3:03 PM  [place] [id number]

## 2021-02-15 NOTE — PROGRESS NOTES
Office: [phone]       Fax: [phone]      Nephrology Progress Note        Patient's Name: Suzanne Humphrey Date: 2/11/2021  Date of Visit: 2/15/2021    Reason for Consult:  GEOVANI      Subjective: Rosetta Nelson is a 80 y.o. male with PMHx of hypertension, atrial fibrillation, BPH who was hospitalized on 2/11/2021 with abnormal lab  Serum cr 5.9 on admission , 1700 cc urine after spann cath insertion    INTERVAL HISTORY    Feels better  Shortness of breath: No   UOP: Good   Creat: trending down  No abdominal pain      Medications: Allergies:  Patient has no known allergies. Scheduled Meds:   levetiracetam  500 mg Intravenous Q12H    medihoney wound/burn dressing   Topical Daily    sodium chloride flush  10 mL Intravenous 2 times per day    apixaban  5 mg Oral BID    ascorbic acid  1,000 mg Oral Daily    vitamin D  5,000 Units Oral Daily    lactobacillus  1 capsule Oral Daily with breakfast    [Held by provider] levETIRAcetam  500 mg Oral BID    multivitamin  1 tablet Oral Daily    pantoprazole  40 mg Oral BID AC    tamsulosin  0.4 mg Oral Daily    thiamine mononitrate  100 mg Oral Daily    zinc sulfate  50 mg Oral Daily     Continuous Infusions:    Labs:  CBC:   Recent Labs     02/13/21  0558 02/14/21  0540 02/15/21  0547   WBC 7.9 8.6 9.1   HGB 7.3* 7.1* 7.2*    307 337     Ca/Mg/Phos:   Recent Labs     02/13/21  0558 02/14/21  0540 02/15/21  0547   CALCIUM 8.5 8.0* 8.1*   MG  --  1.30* 1.90     UA:No results for input(s): Yvetta Sow, GLUCOSEU, BILIRUBINUR, Lova Elin, PROTEINU, UROBILINOGEN, NITRU, LEUKOCYTESUR, Aracelis Nissen in the last 72 hours. Urine Microscopic: No results for input(s): LABCAST, BACTERIA, COMU, HYALCAST, WBCUA, RBCUA, EPIU in the last 72 hours.   Urine Chemistry: -Volume: Euvolemic  -Electrolytes: Hypokalemia  -Acid-Base: Metabolic alkalosis    Recent Labs     02/13/21  0558 02/14/21  0540 02/15/21  0547   BUN 15 10 7   CREATININE 1.4* 0.9 0.8     Recent Labs     02/13/21  0558 02/14/21  0540 02/15/21  0547    139 138   K 4.1 3.5 3.2*   CO2 27 25 26   MG  --  1.30* 1.90         2. HTN  -Blood pressure at goal     BP Readings from Last 1 Encounters:   02/15/21 137/73       3. BPH  -status post Ambrosio  -On Flomax    4. Nephrolithiasis  - right 2.5 cm    5. Anemia  -on ch anticoagulation     Plan:     - [address] [facility] urology follow up   - Stone workup as outpt  - supplement K  - anemia work up  - Monitor BMP    -Monitor I/O, UOP  -Maintain MAP>65  -Avoid nephrotoxin, if able. -Dose meds to current eGFR    Thank you for allowing us to participate in care of Wardell Dancer . We will continue to follow. Feel free to contact me with any questions.       Anton Hernandez  2/15/2021    [facility] [address]  Office : [phone]  Fax :[phone]

## 2021-02-15 NOTE — PROGRESS NOTES
Occupational Therapy  Facility/Department: [address] ORTHOPEDICS  Daily Treatment Note  NAME: Jacquelyn Mena  : 1935  MRN: [id number]    Date of Service: 2/15/2021    Discharge Recommendations:  3-5 sessions per week     Jacquelyn Mena scored a 13/24 on the AM-PAC ADL Inpatient form. Current research shows that an AM-PAC score of 17 or less is typically not associated with a discharge to the patient's home setting. Based on the patient's AM-PAC score and their current ADL deficits, it is recommended that the patient have 3-5 sessions per week of Occupational Therapy at d/c to increase the patient's independence. Please see assessment section for further patient specific details. If patient discharges prior to next session this note will serve as a discharge summary. Please see below for the latest assessment towards goals. Assessment: Discussed with OTR am pac score is 13 which indicates need for continued skilled OT to increase [place] and decrease caregiver burden. Patient able to complete sit<>stand from elevated height of bed to christy stedy with Min A of 2. Min/Mod A of 2 for sit<>stand from recliner chair to RW to recliner chair. Mod A of 2 for bed mobility. Patient is unsafe to rerturn home at this time due to assist level requiements. Cont with POC           Patient Diagnosis(es): The primary encounter diagnosis was GEOVANI (acute kidney injury) ([address] Diagnoses of Hyperkalemia and Elevated troponin were also pertinent to this visit. has a past medical history of GEOVANI (acute kidney injury) ([address] Alcohol abuse, Atrial fibrillation ([address] BPH (benign prostatic hyperplasia), Dehydration, [facility] ([address] Leg weakness, bilateral, Seizure ([address] and UTI (urinary tract infection). has a past surgical history that includes Splenectomy, total and Cataract removal (Bilateral).     Restrictions  Restrictions/Precautions  Restrictions/Precautions: Fall Risk Position Activity Restriction  Other position/activity restrictions: [place]. wounds B knees and L wrist.  Subjective   General  Chart Reviewed: Yes  Patient assessed for rehabilitation services?: Yes  Response to previous treatment: Patient with no complaints from previous session  Family / Caregiver Present: No  Referring Practitioner: Silvestre Corrigan MD  Diagnosis: GEOVANI; Urinary retention  Subjective  Subjective: Patient supine in bed upon arrival to room with PT. Patient agreeable to therapy. No reports of pain      Orientation  Orientation  Orientation Level: Oriented to person  Objective    ADL  Feeding: Setup  Grooming: Setup;Stand by assistance(SBA seated in recliner chair to wash face, hands and brush hair)  Toileting: Dependent/Total(spann)        Balance  Sitting Balance: Contact guard assistance(seated on edge of bed, with noted post lean)  Standing Balance: Minimal assistance(Min A of 2)  Standing Balance  Activity: Static standing with RW as well as in christy stedy  Functional Mobility  Functional - Mobility Device: Rolling Walker  Activity: Other  Assist Level: Moderate assistance(Min/Mod A of 2)  Functional Mobility Comments: Use of christy stedy for safe mobility from bed to recliner chair. Able to take several short steps with RW with Min/Mod A of 2 noted post lean  Bed mobility  Supine to Sit: Moderate assistance;2 Person assistance  Sit to Supine: Unable to assess  Transfers  Sit to stand: Minimal assistance; Moderate assistance;2 Person assistance  Stand to sit: Minimal assistance; Moderate assistance;2 Person assistance  Transfer Comments: Min A of 2 for sit<>stand from elevated height of bed to christy stedy to recliner chair. Min/Mod A of 2 for sit<>stand from recliner chair to RW to recliner chair. Cognition  Overall Cognitive Status: Exceptions  Arousal/Alertness: Appropriate responses to stimuli  Following Commands:  Follows one step commands consistently  Attention Span: Appears intact Memory: Decreased recall of biographical Information;Decreased short term memory;Decreased recall of recent events  Safety Judgement: Decreased awareness of need for assistance  Problem Solving: Assistance required to generate solutions;Assistance required to implement solutions;Assistance required to identify errors made;Assistance required to correct errors made  Insights: Decreased awareness of deficits  Initiation: Requires cues for some  Sequencing: Requires cues for some    Assessment   Performance deficits / Impairments: Decreased functional mobility ; Decreased balance;Decreased ADL status; Decreased endurance;Decreased strength;Decreased cognition  Assessment: Discussed with OTR am pac score is 13 which indicates need for continued skilled OT to increase [place] and decrease caregiver burden. Patient able to complete sit<>stand from elevated height of bed to christy stedy with Min A of 2. Min/Mod A of 2 for sit<>stand from recliner chair to RW to recliner chair. Mod A of 2 for bed mobility. Patient is unsafe to rerturn home at this time due to assist level requiements. Cont with POC  OT Education: OT Role;Transfer Training;Plan of Care;Energy Conservation;Precautions;Orientation; ADL Adaptive Strategies; Family Education  REQUIRES OT FOLLOW UP: Yes  Activity Tolerance  Activity Tolerance: Patient Tolerated treatment well  Safety Devices  Safety Devices in place: Yes  Type of devices: All fall risk precautions in place;Nurse notified; Patient at risk for falls;Call light within reach; Left in chair;Chair alarm in place          Plan   Plan  Times per week: 3-5  Times per day: Daily  Current Treatment Recommendations: Patient/Caregiver Education & Training, Strengthening, Endurance Training, Self-Care / ADL, Equipment Evaluation, Education, & procurement, Balance Training, Functional Mobility Training, Safety Education & Training, Positioning    AM-PAC Score AM-PAC Inpatient Daily Activity Raw Score: 13 (02/15/21 0854)  AM-PAC Inpatient ADL T-Scale Score : 32.03 (02/15/21 0854)  ADL Inpatient CMS 0-100% Score: 63.03 (02/15/21 0854)  ADL Inpatient CMS G-Code Modifier : CL (02/15/21 1384)    Goals  Short term goals  Time Frame for Short term goals: by d/c (1 week ) 2/19/21  Short term goal 1: Pt will increase standing tolerance to 5 mins with CGA for balance in order to increase independence with standing ADLs  Short term goal 2: Pt will complete Lb dressing with mod A and use of AE as needed  Short term goal 3: Pt will complete stand pivot transfer to toilet w/ RW at [address] [place] [address] term goal 4: Pt will tolerate x15 reps of BUE exercises in order to increase functional strength for ADLs/transfers       Therapy Time   Individual Concurrent Group Co-treatment   Time In 0820         Time Out 0900         Minutes 40               Electronically signed by Jyoti Youngblood [id number] on 2/15/2021 at 9:07 AM

## 2021-02-15 NOTE — CARE COORDINATION
Sw left message for Orlando Hussein at [facility] regarding status of precert. Patient will need COVID test.    Electronically signed by SEUN Victor, STONEW, Case Management on 2/15/2021 at 9:16 AM  [facility] [id number]    10:55 AM  Sw left message for Orlando Hussein at [facility] regarding [address] having precert waiver in place until 2/28/21.     Electronically signed by SEUN Victor, LISW, Case Management on 2/15/2021 at 10:56 AM  [facility] [id number]

## 2021-02-15 NOTE — PROGRESS NOTES
Patient in bed eyes closed. Patient tolerating PO intake well. Shift assessment complete. PM medications given without complications. Patient denies pain, nausea or vomiting. Call light within reach, able to make needs known, fall precautions in place. Will monitor. Electronically signed by Jamie Owusu RN on 2/14/2021 at 10:19 PM

## 2021-02-16 NOTE — DISCHARGE SUMMARY
Hospital Medicine Discharge Summary    Patient ID: Shelby Meadows      Patient's PCP: Regina Stephen MD    Admit Date: 2/11/2021     Discharge Date: 2/15/2021      Admitting Physician: Carter Gimenez MD     Discharge Physician: Maciej Senior MD     Discharge Diagnoses: Active Hospital Problems    Diagnosis Date Noted    GEOVANI (acute kidney injury) ([facility] [address] [N17.9] 02/11/2021       The patient was seen and examined on day of discharge and this discharge summary is in conjunction with any daily progress note from day of discharge. Hospital Course:     GEOVANI:  resolved  - likely obstructive uropathy, relieved on admission by placing Spann  - D/C IVF rescucitation  - D/C with spann, f/u with urology in 2 weeks for potential removal.  Not a candidate for TURP     Rt renal Stone:  - urology following:  - urology consulted for kidney stone -- CT scan ordered to further characterize  - fluids, flomax     H/o A Fib and DVT:  Cont Eliquis  H/o seizure d/o:  Cont keppra      Exam:     /68 Comment: manual  Pulse 80   Temp 98.5 °F (36.9 °C) (Oral)   Resp 16   Ht 5' 10\" (1.778 m)   Wt 215 lb 9.8 oz (97.8 kg)   SpO2 96%   BMI 30.94 kg/m²     General appearance: No apparent distress, appears stated age and cooperative. HEENT: Pupils equal, round, and reactive to light. Conjunctivae/corneas clear. Neck: Supple, with full range of motion. No jugular venous distention. Trachea midline. Respiratory:  Normal respiratory effort. Clear to auscultation, bilaterally without Rales/Wheezes/Rhonchi. Cardiovascular: Regular rate and rhythm with normal S1/S2 without murmurs, rubs or gallops. Abdomen: Soft, non-tender, non-distended with normal bowel sounds. Musculoskelatal: No clubbing, cyanosis or edema bilaterally. Full range of motion without deformity. Skin: Skin color, texture, turgor normal.  No rashes or lesions. Neurologic:  Neurovascularly intact without any focal sensory/motor deficits.  Cranial nerves: II-XII intact, grossly non-focal.  Psychiatric: Alert and oriented, thought content appropriate, normal insight      Consults:     IP CONSULT TO NEPHROLOGY  IP CONSULT TO HOSPITALIST  IP CONSULT TO UROLOGY  IP CONSULT TO GENERAL SURGERY  IP CONSULT TO HOME CARE NEEDS    Significant Diagnostic Studies:        Radiology:  CT ABDOMEN PELVIS WO CONTRAST Additional Contrast? None   Final Result   Moderate bilateral hydronephrosis and hydroureter, without obstructing   calculus.       Mild induration of fat about the bladder and kidneys, for which  infection   could be considered in the appropriate clinical setting. Correlate with   urinalysis. As there is also presacral edema and induration of subcutaneous   fat, anasarca could also be considered.       1.9 cm metallic star-shaped foreign body along the proximal duodenum, of   uncertain etiology. Correlate with any history of surgery or ingestion.       Cholelithiasis/gallbladder sludge.       Trace bilateral pleural effusions.       Mild bilateral inguinal adenopathy.       Additional incidental findings as above.           US RENAL COMPLETE   Final Result   1. Mild bilateral hydronephrosis. 2. Right nephrolithiasis.         XR CHEST PORTABLE   Final Result   Negative portable chest x-ray. No evidence of acute cardiopulmonary disease.               PCP/SNF to follow up: [facility] per urology    Disposition:  Long-term care    Condition on D/C:  Stable    Discharge Instructions/Follow-up:  F/u with urology in 2 weeks for voiding trial    Code Status:  Prior     Activity: activity as tolerated    Diet: Cardiac    Labs:  For convenience and continuity at follow-up the following most recent labs are provided:      CBC:    Lab Results   Component Value Date    WBC 9.1 02/15/2021    HGB 7.2 02/15/2021    HCT 22.0 02/15/2021     02/15/2021       Renal:    Lab Results   Component Value Date     02/15/2021    K 3.2 02/15/2021     02/15/2021    CO2 26 02/15/2021    BUN 7 02/15/2021    CREATININE 0.8 02/15/2021    CALCIUM 8.1 02/15/2021       Discharge Medications:     Discharge Medication List as of 2/15/2021  2:42 PM           Details   apixaban (ELIQUIS) 5 MG TABS tablet Take 1 tablet by mouth 2 times daily, Disp-60 tablet, R-0Print              Details   zinc gluconate 50 MG tablet Take 50 mg by mouth dailyHistorical Med      tamsulosin (FLOMAX) 0.4 MG capsule Take 0.4 mg by mouth dailyHistorical Med      ascorbic acid (ASCO-TABS-1000) 1000 MG tablet Take 1,000 mg by mouth dailyHistorical Med      Cholecalciferol (VITAMIN D3) 125 MCG (5000 UT) TABS Take 5,000 Units by mouth daily Historical Med      pantoprazole (PROTONIX) 40 MG tablet Take 40 mg by mouth 2 times dailyHistorical Med      collagenase 250 UNIT/GM ointment Apply topically nightly Apply to left knee, right knee, and left wrist nightly, Topical, NIGHTLY, Historical Med      !! acetaminophen (TYLENOL) 325 MG tablet Take 650 mg by mouth every 4 hours as needed for FeverHistorical Med      !! acetaminophen (TYLENOL) 325 MG tablet Take 325 mg by mouth every 4 hours as needed for PainHistorical Med      lactobacillus (CULTURELLE) capsule Take 1 capsule by mouth daily (with breakfast), Disp-30 capsule, R-0Print      Multiple Vitamin (MULTIVITAMIN) TABS tablet Take 1 tablet by mouth daily, Disp-30 tablet, R-0Print      thiamine 100 MG tablet Take 1 tablet by mouth daily, Disp-15 tablet, R-0Print      levETIRAcetam (KEPPRA) 500 MG tablet TAKE 1 TABLET BY MOUTH TWO  TIMES DAILY, Disp-180 tablet, R-3Normal       !! - Potential duplicate medications found. Please discuss with provider. Time Spent on discharge is more than 1hr  in the examination, evaluation, counseling and review of medications and discharge plan. Signed: Crystal Do MD   2/16/2021      Thank you Judy Valdez MD for the opportunity to be involved in this patient's care.  If you have any questions or concerns please feel free to contact me at [phone].

## 2021-03-19 RX ORDER — LEVETIRACETAM 500 MG/1
TABLET ORAL
Qty: 180 TABLET | Refills: 3 | Status: SHIPPED | OUTPATIENT
Start: 2021-03-19 | End: 2021-07-09

## 2021-05-31 ENCOUNTER — HOSPITAL ENCOUNTER (INPATIENT)
Age: 86
LOS: 4 days | Discharge: NURSING, REHAB OR LONG-TERM CARE | DRG: 698 | End: 2021-06-04
Attending: EMERGENCY MEDICINE | Admitting: INTERNAL MEDICINE
Payer: MEDICARE

## 2021-05-31 ENCOUNTER — APPOINTMENT (OUTPATIENT)
Dept: GENERAL RADIOLOGY | Age: 86
DRG: 698 | End: 2021-05-31
Payer: MEDICARE

## 2021-05-31 DIAGNOSIS — R33.8 ACUTE URINARY RETENTION: Primary | ICD-10-CM

## 2021-05-31 DIAGNOSIS — R50.9 FEVER, UNSPECIFIED FEVER CAUSE: ICD-10-CM

## 2021-05-31 DIAGNOSIS — R31.0 GROSS HEMATURIA: ICD-10-CM

## 2021-05-31 DIAGNOSIS — N17.9 AKI (ACUTE KIDNEY INJURY) (HCC): ICD-10-CM

## 2021-05-31 LAB
A/G RATIO: 0.9 (ref 1.1–2.2)
ACANTHOCYTES: ABNORMAL
ALBUMIN SERPL-MCNC: 3.2 G/DL (ref 3.4–5)
ALP BLD-CCNC: 126 U/L (ref 40–129)
ALT SERPL-CCNC: 9 U/L (ref 10–40)
ANION GAP SERPL CALCULATED.3IONS-SCNC: 10 MMOL/L (ref 3–16)
ANISOCYTOSIS: ABNORMAL
AST SERPL-CCNC: 20 U/L (ref 15–37)
BANDED NEUTROPHILS RELATIVE PERCENT: 8 % (ref 0–7)
BASOPHILS ABSOLUTE: 0 K/UL (ref 0–0.2)
BASOPHILS ABSOLUTE: 0.1 K/UL (ref 0–0.2)
BASOPHILS RELATIVE PERCENT: 0 %
BASOPHILS RELATIVE PERCENT: 0.3 %
BILIRUB SERPL-MCNC: 1.1 MG/DL (ref 0–1)
BILIRUBIN URINE: ABNORMAL
BLOOD, URINE: ABNORMAL
BUN BLDV-MCNC: 42 MG/DL (ref 7–20)
BURR CELLS: ABNORMAL
CALCIUM SERPL-MCNC: 8.8 MG/DL (ref 8.3–10.6)
CHLORIDE BLD-SCNC: 99 MMOL/L (ref 99–110)
CLARITY: ABNORMAL
CO2: 27 MMOL/L (ref 21–32)
COLOR: ABNORMAL
COMMENT UA: ABNORMAL
CREAT SERPL-MCNC: 1.7 MG/DL (ref 0.8–1.3)
EOSINOPHILS ABSOLUTE: 0 K/UL (ref 0–0.6)
EOSINOPHILS ABSOLUTE: 0 K/UL (ref 0–0.6)
EOSINOPHILS RELATIVE PERCENT: 0 %
EOSINOPHILS RELATIVE PERCENT: 0 %
EPITHELIAL CELLS, UA: 0 /HPF (ref 0–5)
GFR AFRICAN AMERICAN: 47
GFR NON-AFRICAN AMERICAN: 38
GLOBULIN: 3.7 G/DL
GLUCOSE BLD-MCNC: 163 MG/DL (ref 70–99)
GLUCOSE BLD-MCNC: 164 MG/DL (ref 70–99)
GLUCOSE BLD-MCNC: 180 MG/DL (ref 70–99)
GLUCOSE URINE: 100 MG/DL
HCT VFR BLD CALC: 24.5 % (ref 40.5–52.5)
HCT VFR BLD CALC: 29.8 % (ref 40.5–52.5)
HEMOGLOBIN: 10.1 G/DL (ref 13.5–17.5)
HEMOGLOBIN: 8.3 G/DL (ref 13.5–17.5)
INR BLD: 2.26 (ref 0.86–1.14)
KETONES, URINE: 15 MG/DL
LACTIC ACID, SEPSIS: 1.9 MMOL/L (ref 0.4–1.9)
LACTIC ACID, SEPSIS: 2.5 MMOL/L (ref 0.4–1.9)
LEUKOCYTE ESTERASE, URINE: ABNORMAL
LYMPHOCYTES ABSOLUTE: 0.5 K/UL (ref 1–5.1)
LYMPHOCYTES ABSOLUTE: 0.7 K/UL (ref 1–5.1)
LYMPHOCYTES RELATIVE PERCENT: 2 %
LYMPHOCYTES RELATIVE PERCENT: 2.5 %
MCH RBC QN AUTO: 30.7 PG (ref 26–34)
MCH RBC QN AUTO: 30.9 PG (ref 26–34)
MCHC RBC AUTO-ENTMCNC: 33.8 G/DL (ref 31–36)
MCHC RBC AUTO-ENTMCNC: 34.1 G/DL (ref 31–36)
MCV RBC AUTO: 90.6 FL (ref 80–100)
MCV RBC AUTO: 90.8 FL (ref 80–100)
METAMYELOCYTES RELATIVE PERCENT: 1 %
MICROSCOPIC EXAMINATION: YES
MONOCYTES ABSOLUTE: 1.3 K/UL (ref 0–1.3)
MONOCYTES ABSOLUTE: 2.5 K/UL (ref 0–1.3)
MONOCYTES RELATIVE PERCENT: 5 %
MONOCYTES RELATIVE PERCENT: 9.4 %
NEUTROPHILS ABSOLUTE: 23 K/UL (ref 1.7–7.7)
NEUTROPHILS ABSOLUTE: 24.2 K/UL (ref 1.7–7.7)
NEUTROPHILS RELATIVE PERCENT: 84 %
NEUTROPHILS RELATIVE PERCENT: 87.8 %
NITRITE, URINE: POSITIVE
OVALOCYTES: ABNORMAL
PDW BLD-RTO: 16.3 % (ref 12.4–15.4)
PDW BLD-RTO: 16.9 % (ref 12.4–15.4)
PERFORMED ON: ABNORMAL
PERFORMED ON: ABNORMAL
PH UA: 6.5 (ref 5–8)
PLATELET # BLD: 339 K/UL (ref 135–450)
PLATELET # BLD: 403 K/UL (ref 135–450)
PLATELET SLIDE REVIEW: ADEQUATE
PMV BLD AUTO: 8.1 FL (ref 5–10.5)
PMV BLD AUTO: 8.1 FL (ref 5–10.5)
POTASSIUM SERPL-SCNC: 4.7 MMOL/L (ref 3.5–5.1)
PROTEIN UA: >=300 MG/DL
PROTHROMBIN TIME: 26.4 SEC (ref 10–13.2)
RBC # BLD: 2.7 M/UL (ref 4.2–5.9)
RBC # BLD: 3.28 M/UL (ref 4.2–5.9)
RBC UA: >100 /HPF (ref 0–4)
SODIUM BLD-SCNC: 136 MMOL/L (ref 136–145)
SPECIFIC GRAVITY UA: 1.01 (ref 1–1.03)
TOTAL PROTEIN: 6.9 G/DL (ref 6.4–8.2)
URINE REFLEX TO CULTURE: YES
URINE TYPE: ABNORMAL
UROBILINOGEN, URINE: 4 E.U./DL
WBC # BLD: 26 K/UL (ref 4–11)
WBC # BLD: 26.2 K/UL (ref 4–11)
WBC UA: 832 /HPF (ref 0–5)

## 2021-05-31 PROCEDURE — 51702 INSERT TEMP BLADDER CATH: CPT

## 2021-05-31 PROCEDURE — 51798 US URINE CAPACITY MEASURE: CPT

## 2021-05-31 PROCEDURE — 71045 X-RAY EXAM CHEST 1 VIEW: CPT

## 2021-05-31 PROCEDURE — 2700000000 HC OXYGEN THERAPY PER DAY

## 2021-05-31 PROCEDURE — 81001 URINALYSIS AUTO W/SCOPE: CPT

## 2021-05-31 PROCEDURE — 6370000000 HC RX 637 (ALT 250 FOR IP): Performed by: EMERGENCY MEDICINE

## 2021-05-31 PROCEDURE — 2000000000 HC ICU R&B

## 2021-05-31 PROCEDURE — 99285 EMERGENCY DEPT VISIT HI MDM: CPT

## 2021-05-31 PROCEDURE — 6370000000 HC RX 637 (ALT 250 FOR IP): Performed by: INTERNAL MEDICINE

## 2021-05-31 PROCEDURE — 80053 COMPREHEN METABOLIC PANEL: CPT

## 2021-05-31 PROCEDURE — C9132 KCENTRA, PER I.U.: HCPCS | Performed by: EMERGENCY MEDICINE

## 2021-05-31 PROCEDURE — 2580000003 HC RX 258: Performed by: EMERGENCY MEDICINE

## 2021-05-31 PROCEDURE — 87077 CULTURE AEROBIC IDENTIFY: CPT

## 2021-05-31 PROCEDURE — 02HV33Z INSERTION OF INFUSION DEVICE INTO SUPERIOR VENA CAVA, PERCUTANEOUS APPROACH: ICD-10-PCS | Performed by: EMERGENCY MEDICINE

## 2021-05-31 PROCEDURE — 87186 SC STD MICRODIL/AGAR DIL: CPT

## 2021-05-31 PROCEDURE — 36556 INSERT NON-TUNNEL CV CATH: CPT

## 2021-05-31 PROCEDURE — 96365 THER/PROPH/DIAG IV INF INIT: CPT

## 2021-05-31 PROCEDURE — 2500000003 HC RX 250 WO HCPCS: Performed by: EMERGENCY MEDICINE

## 2021-05-31 PROCEDURE — 36556 INSERT NON-TUNNEL CV CATH: CPT | Performed by: INTERNAL MEDICINE

## 2021-05-31 PROCEDURE — 83605 ASSAY OF LACTIC ACID: CPT

## 2021-05-31 PROCEDURE — 85025 COMPLETE CBC W/AUTO DIFF WBC: CPT

## 2021-05-31 PROCEDURE — 87086 URINE CULTURE/COLONY COUNT: CPT

## 2021-05-31 PROCEDURE — 85610 PROTHROMBIN TIME: CPT

## 2021-05-31 PROCEDURE — 2580000003 HC RX 258: Performed by: INTERNAL MEDICINE

## 2021-05-31 PROCEDURE — 36415 COLL VENOUS BLD VENIPUNCTURE: CPT

## 2021-05-31 PROCEDURE — 6360000002 HC RX W HCPCS: Performed by: EMERGENCY MEDICINE

## 2021-05-31 PROCEDURE — 51700 IRRIGATION OF BLADDER: CPT

## 2021-05-31 PROCEDURE — 87150 DNA/RNA AMPLIFIED PROBE: CPT

## 2021-05-31 PROCEDURE — 87040 BLOOD CULTURE FOR BACTERIA: CPT

## 2021-05-31 RX ORDER — ACETAMINOPHEN 650 MG/1
650 SUPPOSITORY RECTAL EVERY 6 HOURS PRN
Status: DISCONTINUED | OUTPATIENT
Start: 2021-05-31 | End: 2021-06-04 | Stop reason: HOSPADM

## 2021-05-31 RX ORDER — LITHIUM CARBONATE 300 MG/1
300 TABLET, FILM COATED, EXTENDED RELEASE ORAL ONCE
Status: DISCONTINUED | OUTPATIENT
Start: 2021-05-31 | End: 2021-05-31

## 2021-05-31 RX ORDER — LACTOBACILLUS RHAMNOSUS GG 10B CELL
1 CAPSULE ORAL
Status: DISCONTINUED | OUTPATIENT
Start: 2021-06-01 | End: 2021-06-04 | Stop reason: HOSPADM

## 2021-05-31 RX ORDER — TAMSULOSIN HYDROCHLORIDE 0.4 MG/1
0.4 CAPSULE ORAL DAILY
Status: DISCONTINUED | OUTPATIENT
Start: 2021-06-01 | End: 2021-06-04 | Stop reason: HOSPADM

## 2021-05-31 RX ORDER — MULTIVITAMIN WITH IRON
1 TABLET ORAL DAILY
Status: DISCONTINUED | OUTPATIENT
Start: 2021-06-01 | End: 2021-06-04 | Stop reason: HOSPADM

## 2021-05-31 RX ORDER — PROMETHAZINE HYDROCHLORIDE 25 MG/1
12.5 TABLET ORAL EVERY 6 HOURS PRN
Status: DISCONTINUED | OUTPATIENT
Start: 2021-05-31 | End: 2021-06-04 | Stop reason: HOSPADM

## 2021-05-31 RX ORDER — LIDOCAINE HYDROCHLORIDE 20 MG/ML
JELLY TOPICAL PRN
Status: DISCONTINUED | OUTPATIENT
Start: 2021-05-31 | End: 2021-05-31

## 2021-05-31 RX ORDER — ZINC SULFATE 50(220)MG
50 CAPSULE ORAL DAILY
Status: DISCONTINUED | OUTPATIENT
Start: 2021-06-01 | End: 2021-06-04 | Stop reason: HOSPADM

## 2021-05-31 RX ORDER — SODIUM CHLORIDE 9 MG/ML
INJECTION, SOLUTION INTRAVENOUS CONTINUOUS
Status: DISCONTINUED | OUTPATIENT
Start: 2021-05-31 | End: 2021-06-04

## 2021-05-31 RX ORDER — POLYETHYLENE GLYCOL 3350 17 G/17G
17 POWDER, FOR SOLUTION ORAL DAILY PRN
Status: DISCONTINUED | OUTPATIENT
Start: 2021-05-31 | End: 2021-06-04 | Stop reason: HOSPADM

## 2021-05-31 RX ORDER — ASCORBIC ACID 500 MG
1000 TABLET ORAL DAILY
Status: DISCONTINUED | OUTPATIENT
Start: 2021-06-01 | End: 2021-06-04 | Stop reason: HOSPADM

## 2021-05-31 RX ORDER — SODIUM CHLORIDE 9 MG/ML
25 INJECTION, SOLUTION INTRAVENOUS PRN
Status: DISCONTINUED | OUTPATIENT
Start: 2021-05-31 | End: 2021-06-04 | Stop reason: HOSPADM

## 2021-05-31 RX ORDER — ACETAMINOPHEN 325 MG/1
650 TABLET ORAL EVERY 6 HOURS PRN
Status: DISCONTINUED | OUTPATIENT
Start: 2021-05-31 | End: 2021-06-04 | Stop reason: HOSPADM

## 2021-05-31 RX ORDER — PANTOPRAZOLE SODIUM 40 MG/1
40 TABLET, DELAYED RELEASE ORAL 2 TIMES DAILY
Status: DISCONTINUED | OUTPATIENT
Start: 2021-05-31 | End: 2021-06-04 | Stop reason: HOSPADM

## 2021-05-31 RX ORDER — 0.9 % SODIUM CHLORIDE 0.9 %
1000 INTRAVENOUS SOLUTION INTRAVENOUS ONCE
Status: COMPLETED | OUTPATIENT
Start: 2021-05-31 | End: 2021-05-31

## 2021-05-31 RX ORDER — FUROSEMIDE 20 MG/1
20 TABLET ORAL EVERY MORNING
COMMUNITY
End: 2021-07-09

## 2021-05-31 RX ORDER — ACETAMINOPHEN 500 MG
1000 TABLET ORAL ONCE
Status: COMPLETED | OUTPATIENT
Start: 2021-05-31 | End: 2021-05-31

## 2021-05-31 RX ORDER — SODIUM CHLORIDE 9 MG/ML
50 INJECTION, SOLUTION INTRAVENOUS ONCE
Status: COMPLETED | OUTPATIENT
Start: 2021-05-31 | End: 2021-05-31

## 2021-05-31 RX ORDER — SODIUM CHLORIDE 0.9 % (FLUSH) 0.9 %
5-40 SYRINGE (ML) INJECTION EVERY 12 HOURS SCHEDULED
Status: DISCONTINUED | OUTPATIENT
Start: 2021-05-31 | End: 2021-06-04 | Stop reason: HOSPADM

## 2021-05-31 RX ORDER — GAUZE BANDAGE 2" X 2"
100 BANDAGE TOPICAL DAILY
Status: DISCONTINUED | OUTPATIENT
Start: 2021-06-01 | End: 2021-06-04 | Stop reason: HOSPADM

## 2021-05-31 RX ORDER — 0.9 % SODIUM CHLORIDE 0.9 %
30 INTRAVENOUS SOLUTION INTRAVENOUS ONCE
Status: COMPLETED | OUTPATIENT
Start: 2021-05-31 | End: 2021-05-31

## 2021-05-31 RX ORDER — LEVETIRACETAM 500 MG/1
500 TABLET ORAL 2 TIMES DAILY
Status: DISCONTINUED | OUTPATIENT
Start: 2021-05-31 | End: 2021-06-04 | Stop reason: HOSPADM

## 2021-05-31 RX ORDER — ONDANSETRON 2 MG/ML
4 INJECTION INTRAMUSCULAR; INTRAVENOUS EVERY 6 HOURS PRN
Status: DISCONTINUED | OUTPATIENT
Start: 2021-05-31 | End: 2021-06-04 | Stop reason: HOSPADM

## 2021-05-31 RX ORDER — SODIUM CHLORIDE 0.9 % (FLUSH) 0.9 %
5-40 SYRINGE (ML) INJECTION PRN
Status: DISCONTINUED | OUTPATIENT
Start: 2021-05-31 | End: 2021-06-04 | Stop reason: HOSPADM

## 2021-05-31 RX ADMIN — Medication 7 MCG/MIN: at 18:43

## 2021-05-31 RX ADMIN — PROTHROMBIN, COAGULATION FACTOR VII HUMAN, COAGULATION FACTOR IX HUMAN, COAGULATION FACTOR X HUMAN, PROTEIN C, PROTEIN S HUMAN, AND WATER 4701 UNITS: KIT at 18:36

## 2021-05-31 RX ADMIN — SODIUM CHLORIDE 1000 ML: 9 INJECTION, SOLUTION INTRAVENOUS at 12:52

## 2021-05-31 RX ADMIN — CEFTRIAXONE SODIUM 1000 MG: 1 INJECTION, POWDER, FOR SOLUTION INTRAMUSCULAR; INTRAVENOUS at 14:37

## 2021-05-31 RX ADMIN — Medication 5 MCG/MIN: at 18:08

## 2021-05-31 RX ADMIN — LEVETIRACETAM 500 MG: 500 TABLET ORAL at 20:03

## 2021-05-31 RX ADMIN — ACETAMINOPHEN 1000 MG: 500 TABLET ORAL at 16:38

## 2021-05-31 RX ADMIN — LIDOCAINE HYDROCHLORIDE: 20 JELLY TOPICAL at 12:40

## 2021-05-31 RX ADMIN — PANTOPRAZOLE SODIUM 40 MG: 40 TABLET, DELAYED RELEASE ORAL at 20:00

## 2021-05-31 RX ADMIN — SODIUM CHLORIDE: 9 INJECTION, SOLUTION INTRAVENOUS at 20:03

## 2021-05-31 RX ADMIN — SODIUM CHLORIDE 50 ML: 9 INJECTION, SOLUTION INTRAVENOUS at 20:00

## 2021-05-31 RX ADMIN — SODIUM CHLORIDE 1931 ML: 9 INJECTION, SOLUTION INTRAVENOUS at 14:18

## 2021-05-31 RX ADMIN — Medication 10 ML: at 20:04

## 2021-05-31 ASSESSMENT — PAIN SCALES - GENERAL
PAINLEVEL_OUTOF10: 0
PAINLEVEL_OUTOF10: 0

## 2021-05-31 NOTE — PROGRESS NOTES
Pharmacy Medication Reconciliation Note     List of medications patient is currently taking is complete. Source of information:   1. Per med list from [facility] + call to facility RN    Notes regarding home medications:   1. Per facility RN, pt had all AM meds PTA in the ED  2. Pt will need PM doses of maintenance meds  3. All meds given in AM except pantoprazole 40 mg given QHS.     Facility denies pt is taking any other OTC or herbal medications    Martinez Rincon, Pharmacy Intern  5/31/2021  6:44 PM

## 2021-05-31 NOTE — PROGRESS NOTES
4 Eyes Skin Assessment     NAME:  Maria Del Rosario Cintron  YOB: 1935  MEDICAL RECORD NUMBER:  [id number]    The patient is being assess for  Admission    I agree that 2 RN's have performed a thorough Head to Toe Skin Assessment on the patient. ALL assessment sites listed below have been assessed. Areas assessed by both nurses:    Head, Face, Ears, Shoulders, Back, Chest, Arms, Elbows, Hands, Sacrum. Buttock, Coccyx, Ischium and Legs. Feet and Heels        Does the Patient have a Wound? Yes wound(s) were present on assessment. LDA wound assessment was Initiated and completed        Leo Prevention initiated:  Yes   Wound Care Orders initiated:  Yes    Pressure Injury (Stage 3,4, Unstageable, DTI, NWPT, and Complex wounds) if present place consult order under [de-identified] No    New and Established Ostomies if present place consult order under : No      Nurse 1 eSignature: Electronically signed by Luisito Rivas RN on 5/31/21 at 7:04 PM EDT    **SHARE this note so that the co-signing nurse is able to place an eSignature**    Nurse 2 eSignature: . Electronically signed by Selene Rod RN on 5/31/2021 at 7:05 PM

## 2021-05-31 NOTE — ED NOTES
Attempted to bladder scan multiple times. Unable to obtain reading. ED MD made aware. Spann Cath placed via sterile technique. Patient tolerated well. Uro-jet was used prior to spann placement. 3 way spann place. Large amount of blood draining into spann bag once spann was placed. ED MD made aware. Spann clamped at this time. Per verbal order will wait 30 minutes then unclamp. Patient resting with eyes closed. Easily awoken at this time.  Denies pain      Giovana Zelaya RN  05/31/21 [id number]

## 2021-05-31 NOTE — ED NOTES
Output prior to leaving ED 3050 ml. Patient being transported to the floor by RN at this time.       Nelson Caraballo RN  05/31/21 [id number]

## 2021-05-31 NOTE — ED NOTES
Report given to [facility] Brochure. Denies questions at this time.       Amberly Hurtado RN  05/31/21 [id number]

## 2021-05-31 NOTE — ED NOTES
Patient arrived to ED via [facility]. Patient lives at [facility]. Patient was sent in to the ED because he pull out his spann cath then had a large about of bleeding from penis. Patient is at his normal baseline per report. Patient is disoriented to year, situation, or place. Patient believes it is 2002 and \"I don't know who a president is. \"      Rasheed Hampton RN  05/31/21 [phone] [facility], RN  05/31/21 2060

## 2021-05-31 NOTE — ED NOTES
[facility] EDT at bedside obtaining blood cultures. Blood culture one obtained at 1425. Blood culture 2 obtained 1435.       Melvin Miller RN  05/31/21 1431

## 2021-05-31 NOTE — H&P
Hospital Medicine History & Physical      PCP: Johnson Boateng MD    Date of Admission: 5/31/2021    Date of Service: Pt seen/examined on 5/31/2021    Chief Complaint:      Chief Complaint   Patient presents with    Urinary Catheter Problem     Pt from [facility] and report that patient pulled out his spann catheter out this AM and then \"gushed blood approx 500cc\" according to staff. Patient has no complaints at this time. History Of Present Illness:     80-year-old male with past medical history of seizure, benign prostatic hyperplasia, DVT, atrial fibrillation presented to the hospital after pulling out his Spann catheter this morning with approximately 500 cc of blood loss immediately. Patient is not really able to provide much history but he does state that he just pulled out his Spann. He does not really say why he did that. Arrival to the hospital patient was noted to be tachycardic. Lab work showed elevated white count 26. Creatinine was noted to be 1.7 much higher than his baseline of around 1.8. Since pulling out his Spann patient has not been able to urinate. On arrival he was noted to have a bladder distended up to his umbilicus. While in the ED an irrigating catheter was placed with return of grossly bloody urine which required around 5 to 6 L of irrigation before it cleared up. There was also passage of extensive clots. Patient was admitted to the hospital and neurology will be consulted. Past Medical History:        Diagnosis Date    GEOVANI (acute kidney injury) ([facility] [address] 2/11/2021    Alcohol abuse 12/22/2020    Atrial fibrillation (HCC)     BPH (benign prostatic hyperplasia)     Dehydration     [facility] ([facility])     Leg weakness, bilateral 2014, 2015    legs give out and pt collaspes    Seizure ([facility] [address] 7/20/2017    UTI (urinary tract infection) 8-    admitted with high fever and fainted.        Past Surgical History:        Procedure Laterality Date    CATARACT REMOVAL Bilateral     SPLENECTOMY, TOTAL      at age 15 y old. Medications Prior to Admission:    Prior to Admission medications    Medication Sig Start Date End Date Taking? Authorizing Provider   levETIRAcetam (KEPPRA) 500 MG tablet TAKE 1 TABLET BY MOUTH  TWICE DAILY 3/19/21   Kimberley Summers MD   apixaban (ELIQUIS) 5 MG TABS tablet Take 1 tablet by mouth 2 times daily 2/14/21   Roderick Keene MD   zinc gluconate 50 MG tablet Take 50 mg by mouth daily    Historical Provider, MD   tamsulosin (FLOMAX) 0.4 MG capsule Take 0.4 mg by mouth daily    Historical Provider, MD   ascorbic acid (ASCO-TABS-1000) 1000 MG tablet Take 1,000 mg by mouth daily    Historical Provider, MD   Cholecalciferol (VITAMIN D3) 125 MCG (5000 UT) TABS Take 5,000 Units by mouth daily     Historical Provider, MD   pantoprazole (PROTONIX) 40 MG tablet Take 40 mg by mouth 2 times daily    Historical Provider, MD   collagenase 250 UNIT/GM ointment Apply topically nightly Apply to left knee, right knee, and left wrist nightly    Historical Provider, MD   acetaminophen (TYLENOL) 325 MG tablet Take 650 mg by mouth every 4 hours as needed for Fever    Historical Provider, MD   acetaminophen (TYLENOL) 325 MG tablet Take 325 mg by mouth every 4 hours as needed for Pain    Historical Provider, MD   lactobacillus (CULTURELLE) capsule Take 1 capsule by mouth daily (with breakfast) 12/29/20   Guillermo Teague MD   Multiple Vitamin (MULTIVITAMIN) TABS tablet Take 1 tablet by mouth daily 12/26/20   Guillermo Teague MD   thiamine 100 MG tablet Take 1 tablet by mouth daily 12/25/20   Guillermo Teague MD       Allergies:  Patient has no known allergies. Social History:       reports that he has never smoked. He has never used smokeless tobacco. He reports current alcohol use. He reports that he does not use drugs.     Family History:  Reviewed in detail and Positive as follows:        Problem Relation Age of Onset    Cancer Mother hours.    U/A:    Lab Results   Component Value Date    NITRITE positive 06/12/2017    COLORU DK YELLOW 02/11/2021    WBCUA 5 02/11/2021    RBCUA >900 02/11/2021    MUCUS 2+ 12/22/2020    BACTERIA 4+ 08/10/2011    CLARITYU CLOUDY 02/11/2021    SPECGRAV 1.012 02/11/2021    LEUKOCYTESUR TRACE 02/11/2021    BLOODU LARGE 02/11/2021    GLUCOSEU Negative 02/11/2021    GLUCOSEU Negative 08/10/2011    AMORPHOUS 2+ 01/06/2013       ABG    Lab Results   Component Value Date    DUT3KRW 26.6 12/22/2020    BEART 2.2 12/22/2020    P5LVPNDZ 99.3 12/22/2020    PHART 7.434 12/22/2020    RLR0BNR 39.7 12/22/2020    PO2ART 112.0 12/22/2020    CNH5QAU 27.8 12/22/2020       UA:No results for input(s): NITRITE, COLORU, PHUR, LABCAST, WBCUA, RBCUA, MUCUS, TRICHOMONAS, YEAST, BACTERIA, CLARITYU, SPECGRAV, LEUKOCYTESUR, UROBILINOGEN, BILIRUBINUR, BLOODU, GLUCOSEU, KETUA, AMORPHOUS in the last 72 hours. Microbiology:  No results for input(s): LABGRAM, LABANAE, ORG, CXSURG in the last 72 hours. Nasal Culture: No results for input(s): ORG, MRSAPCR in the last 72 hours. Blood Culture: No results for input(s): BC, BLOODCULT2, ORG in the last 72 hours. Fungal Culture:   No results for input(s): FUNGSM in the last 72 hours. No results for input(s): FUNCXBLD in the last 72 hours. CSF Culture:  No results for input(s): COLORCSF, APPEARCSF, CFTUBE, CLOTCSF, WBCCSF, RBCCSF, NEUTCSF, NUMCELLSCSF, LYMPHSCSF, MONOCSF, GLUCCSF, VOLCSF in the last 72 hours. Respiratory Culture:  No results for input(s): Terrence Rede in the last 72 hours. AFB:No results for input(s): AFBSMEAR in the last 72 hours. Urine Culture  No results for input(s): LABURIN in the last 72 hours. RADIOLOGY:    XR CHEST PORTABLE   Final Result   Cardiomegaly with pulmonary vascular congestion.   No edema or pneumonia   demonstrated             Previous medical records personally reviewed and analyzed         PHYSICIAN CERTIFICATION    I certify that Jemma Kaur is

## 2021-05-31 NOTE — Clinical Note
Patient Class: Inpatient [101]   REQUIRED: [facility]: [facility] [[id number]]   Estimated Length of Stay: Estimated stay of more than 2 midnights

## 2021-05-31 NOTE — ED NOTES
Pt given incontinence care, clean sheets and gown provided. Bladder irrigation resumed per Dr Vilma Marie, for light pink urine output. Tylenol 1000 mg administered for 102 rectal temp.       Corazon Land RN  05/31/21 [id number]

## 2021-05-31 NOTE — ED PROVIDER NOTES
nausea vomiting. : No discomfort with urination. Per history he had a Ambrosio catheter in place it was pulled out and he had blood coming from his meatus. Neuro: Denies headache or dizziness. Except as noted above the remainder of the review of systems was reviewed and negative. PAST MEDICAL HISTORY     Past Medical History:   Diagnosis Date    [facility] ([facility] 2/11/2021    Alcohol abuse 12/22/2020    Atrial fibrillation ([facility])     BPH (benign prostatic hyperplasia)     Dehydration     [facility] ([facility])     Leg weakness, bilateral 2014, 2015    legs give out and pt collaspes    Seizure ([facility] 7/20/2017    UTI (urinary tract infection) 8-    admitted with high fever and fainted. SURGICAL HISTORY       Past Surgical History:   Procedure Laterality Date    CATARACT REMOVAL Bilateral     SPLENECTOMY, TOTAL      at age 15 y old.          CURRENT MEDICATIONS       Previous Medications    ACETAMINOPHEN (TYLENOL) 325 MG TABLET    Take 650 mg by mouth every 4 hours as needed for Fever    ACETAMINOPHEN (TYLENOL) 325 MG TABLET    Take 325 mg by mouth every 4 hours as needed for Pain    APIXABAN (ELIQUIS) 5 MG TABS TABLET    Take 1 tablet by mouth 2 times daily    ASCORBIC ACID (ASCO-TABS-1000) 1000 MG TABLET    Take 1,000 mg by mouth daily    CHOLECALCIFEROL (VITAMIN D3) 125 MCG (5000 UT) TABS    Take 5,000 Units by mouth daily     COLLAGENASE 250 UNIT/GM OINTMENT    Apply topically nightly Apply to left knee, right knee, and left wrist nightly    LACTOBACILLUS (CULTURELLE) CAPSULE    Take 1 capsule by mouth daily (with breakfast)    LEVETIRACETAM (KEPPRA) 500 MG TABLET    TAKE 1 TABLET BY MOUTH  TWICE DAILY    MULTIPLE VITAMIN (MULTIVITAMIN) TABS TABLET    Take 1 tablet by mouth daily    PANTOPRAZOLE (PROTONIX) 40 MG TABLET    Take 40 mg by mouth 2 times daily    TAMSULOSIN (FLOMAX) 0.4 MG CAPSULE    Take 0.4 mg by mouth daily    THIAMINE 100 MG TABLET    Take 1 tablet by mouth daily    ZINC GLUCONATE 50 MG TABLET    Take 50 mg by mouth daily       ALLERGIES     Patient has no known allergies. FAMILY HISTORY       Family History   Problem Relation Age of Onset    Cancer Mother         pancreatic    Diabetes Father     Cancer Brother           SOCIAL HISTORY       Social History     Socioeconomic History    Marital status:      Spouse name: None    Number of children: None    Years of education: None    Highest education level: None   Occupational History    None   Tobacco Use    Smoking status: Never Smoker    Smokeless tobacco: Never Used   Substance and Sexual Activity    Alcohol use: Yes     Alcohol/week: 0.0 standard drinks     Comment: occasionally     Drug use: No    Sexual activity: Not Currently   Other Topics Concern    None   Social History Narrative    None     Social Determinants of Health     Financial Resource Strain:     Difficulty of Paying Living Expenses:    Food Insecurity:     Worried About Running Out of Food in the Last Year:     Ran Out of Food in the Last Year:    Transportation Needs:     Lack of Transportation (Medical):      Lack of Transportation (Non-Medical):    Physical Activity:     Days of Exercise per Week:     Minutes of Exercise per Session:    Stress:     Feeling of Stress :    Social Connections:     Frequency of Communication with Friends and Family:     Frequency of Social Gatherings with Friends and Family:     Attends Yazidi Services:     Active Member of Clubs or Organizations:     Attends Club or Organization Meetings:     Marital Status:    Intimate Partner Violence:     Fear of Current or Ex-Partner:     Emotionally Abused:     Physically Abused:     Sexually Abused:          PHYSICAL EXAM    (up to 7 for level 4, 8 or more for level 5)     ED Triage Vitals [05/31/21 1203]   BP Temp Temp Source Pulse Resp SpO2 Height Weight   125/68 98.5 °F (36.9 °C) Oral 111 18 94 % -- 215 lb 6.2 oz (97.7 kg)       General: Alert elderly male, oriented to person, not to place or time. Head: Atraumatic and normocephalic. Eyes: No conjunctival injection. No pallor. Pupils equal round reactive. ENT: Ninfa Vilonia is clear. Oropharynx is dry. Neck: Supple without adenopathy, nontender. Heart: Regular rate and rhythm. No murmurs or gallops noted. Lungs: Breath sounds equal bilaterally and clear. Abdomen: Soft, moderately distended, the bladder is palpable up to the umbilicus. There is no tenderness. Bowel sounds are normal.  : Small amount of blood at the meatus. There is no obvious hernias. No testicular enlargement or tenderness. Skin: Warm and dry, good turgor. No pallor or cyanosis. No diaphoresis. Neuro: Awake, alert, oriented to person, not to place or time. Symmetrical reactive pupils. Intact extraocular movements. No facial asymmetry. Symmetrical motor function. DIFFERENTIAL DIAGNOSIS   Differential includes but is not limited to struct of uropathy, traumatic removal of catheter, urethral tear, gross hematuria      DIAGNOSTIC RESULTS     EKG: All EKG's are interpreted by Diane Galindo MD in the absence of a cardiologist.      RADIOLOGY:   Non-plain film images such as CT, Ultrasound and MRI are read by the radiologist. Plain radiographic images are visualized and preliminarily interpreted Diane Galindo MD with the below findings:      Interpretation per the Radiologist below, if available at the time of this note:    XR CHEST PORTABLE   Final Result   Cardiomegaly with pulmonary vascular congestion.   No edema or pneumonia   demonstrated               ED BEDSIDE ULTRASOUND:   Performed by ED Physician - none    LABS:  Labs Reviewed   CBC WITH AUTO DIFFERENTIAL - Abnormal; Notable for the following components:       Result Value    WBC 26.2 (*)     RBC 3.28 (*)     Hemoglobin 10.1 (*)     Hematocrit 29.8 (*)     RDW 16.9 (*)     Neutrophils Absolute 23.0 (*) Phone [phone]   CULTURE, BLOOD 1   CULTURE, BLOOD 2   LACTATE, SEPSIS    Narrative:     Performed at:  [facility]  [address]   Phone [phone]   URINE RT REFLEX TO CULTURE   HEMOGLOBIN AND HEMATOCRIT, BLOOD   BASIC METABOLIC PANEL       All other labs were within normal range or not returned as of this dictation. EMERGENCY DEPARTMENT COURSE and DIFFERENTIAL DIAGNOSIS/MDM:   Vitals:    Vitals:    05/31/21 1737 05/31/21 1742 05/31/21 1746 05/31/21 1751   BP: (!) 73/44 (!) 78/40 (!) 80/58 (!) 82/46   Pulse: 114 112 110 111   Resp: 29 30 29 28   Temp:       TempSrc:       SpO2: (!) 89% 92% 94% 94%   Weight: This patient presents from the nursing home with gross hematuria following his catheter being pulled out accidentally earlier today. He presents with urinary retention. He has a bladder distended up to his umbilicus. He denies pain. He has some blood at the meatus on exam.  An irrigating catheter was placed. His urine was grossly bloody. It required significant irrigation of 5 to 6 L before cleared. There was extensive clots that had to be irrigated out by hand. His hemoglobin is stable at 10.1. His platelet count is 326,326. His PT is 26.4 with an INR of 2.26. His GFR is 38 which is significantly decreased from the previous available values for comparison. He did spike a temperature of 102 degrees while he was here. His lactic acid was mildly elevated. Blood cultures were obtained. A chest x-ray was obtained which was negative for any acute findings. He was given IV Rocephin. The patient will require admission for GEOVANI, urinary retention, gross hematuria, fever. The hospitalist was consulted for admission. Urology was consulted. I spoke with Dr. Josephine Cameron. He was okay with continuing the bladder irrigation. I did not feel that reversing his Eliquis is indicated at this point in time.   His gross hematuria seems to be controlled by continuous bladder irrigation. His hemoglobin is stable. S results, diagnosis, and treatment plan were discussed with the patient. He understands the treatment plan and need for admission and is agreeable. 1745: This patient's already has admitting orders. While waiting to go upstairs his blood pressure dropped into the 83X systolic. He is already had most of his sepsis fluids. We did a repeat Accu-Chek. I did a repeat H&H. His hemoglobin is 8.3, his hematocrit is 24.5. This is a 2 g drop. I started a central venous line. I am going to start some Levophed. I will consult the hospitalist about reversing his Eliquis. 1755:  Per discussion with Hospitalist will reverse Eliquis with Mel Cho. CONSULTS:  IP CONSULT TO HOSPITALIST  IP CONSULT TO UROLOGY    PROCEDURES:  Central Line    Date/Time: 5/31/2021 5:45 PM  Performed by: Telma Reyse MD  Authorized by: Telma Reyes MD     Consent:     Consent obtained:  Emergent situation and verbal    Consent given by:  Patient    Risks discussed:  Arterial puncture, incorrect placement, nerve damage, bleeding and infection  Pre-procedure details:     Skin preparation:  Hibiclens  Anesthesia (see MAR for exact dosages): Anesthesia method:  Local infiltration    Local anesthetic:  Lidocaine 1% w/o epi  Procedure details:     Location:  R femoral    Patient position:  Flat    Procedural supplies:  Triple lumen    Catheter size:  7 Fr    Landmarks identified: yes      Ultrasound guidance: no      Number of attempts:  1    Successful placement: yes    Post-procedure details:     Post-procedure:  Dressing applied and line sutured    Assessment:  Blood return through all ports and free fluid flow    Patient tolerance of procedure: Tolerated well, no immediate complications          FINAL IMPRESSION      1. Acute urinary retention    2. Gross hematuria    3. Fever, unspecified fever cause    4.  GEOVANI (acute kidney injury)

## 2021-05-31 NOTE — ED NOTES
Oxygen sat decreased to 87% while patient roxanne flat. 2 liters oxygen placed on pt via nc.  Increased to 93%     Michel Sanderson RN  05/31/21 1792

## 2021-06-01 LAB
ANION GAP SERPL CALCULATED.3IONS-SCNC: 10 MMOL/L (ref 3–16)
BASOPHILS ABSOLUTE: 0.1 K/UL (ref 0–0.2)
BASOPHILS RELATIVE PERCENT: 0.3 %
BUN BLDV-MCNC: 29 MG/DL (ref 7–20)
CALCIUM SERPL-MCNC: 8.2 MG/DL (ref 8.3–10.6)
CHLORIDE BLD-SCNC: 105 MMOL/L (ref 99–110)
CO2: 25 MMOL/L (ref 21–32)
CREAT SERPL-MCNC: 1.1 MG/DL (ref 0.8–1.3)
EOSINOPHILS ABSOLUTE: 0 K/UL (ref 0–0.6)
EOSINOPHILS RELATIVE PERCENT: 0 %
ESTIMATED AVERAGE GLUCOSE: 108.3 MG/DL
GFR AFRICAN AMERICAN: >60
GFR NON-AFRICAN AMERICAN: >60
GLUCOSE BLD-MCNC: 123 MG/DL (ref 70–99)
GLUCOSE BLD-MCNC: 126 MG/DL (ref 70–99)
GLUCOSE BLD-MCNC: 128 MG/DL (ref 70–99)
GLUCOSE BLD-MCNC: 143 MG/DL (ref 70–99)
GLUCOSE BLD-MCNC: 163 MG/DL (ref 70–99)
HBA1C MFR BLD: 5.4 %
HCT VFR BLD CALC: 23.2 % (ref 40.5–52.5)
HCT VFR BLD CALC: 24 % (ref 40.5–52.5)
HCT VFR BLD CALC: 24.9 % (ref 40.5–52.5)
HEMOGLOBIN: 7.7 G/DL (ref 13.5–17.5)
HEMOGLOBIN: 8 G/DL (ref 13.5–17.5)
HEMOGLOBIN: 8.3 G/DL (ref 13.5–17.5)
LYMPHOCYTES ABSOLUTE: 1.1 K/UL (ref 1–5.1)
LYMPHOCYTES RELATIVE PERCENT: 3.8 %
MAGNESIUM: 2.1 MG/DL (ref 1.8–2.4)
MCH RBC QN AUTO: 30.6 PG (ref 26–34)
MCH RBC QN AUTO: 30.7 PG (ref 26–34)
MCHC RBC AUTO-ENTMCNC: 33.4 G/DL (ref 31–36)
MCHC RBC AUTO-ENTMCNC: 33.5 G/DL (ref 31–36)
MCV RBC AUTO: 91.6 FL (ref 80–100)
MCV RBC AUTO: 92.1 FL (ref 80–100)
MONOCYTES ABSOLUTE: 2.5 K/UL (ref 0–1.3)
MONOCYTES RELATIVE PERCENT: 8.8 %
NEUTROPHILS ABSOLUTE: 24.5 K/UL (ref 1.7–7.7)
NEUTROPHILS RELATIVE PERCENT: 87.1 %
PDW BLD-RTO: 16 % (ref 12.4–15.4)
PDW BLD-RTO: 16.1 % (ref 12.4–15.4)
PERFORMED ON: ABNORMAL
PHOSPHORUS: 3.8 MG/DL (ref 2.5–4.9)
PLATELET # BLD: 302 K/UL (ref 135–450)
PLATELET # BLD: 319 K/UL (ref 135–450)
PMV BLD AUTO: 8.3 FL (ref 5–10.5)
PMV BLD AUTO: 8.5 FL (ref 5–10.5)
POTASSIUM REFLEX MAGNESIUM: 4.2 MMOL/L (ref 3.5–5.1)
RBC # BLD: 2.52 M/UL (ref 4.2–5.9)
RBC # BLD: 2.72 M/UL (ref 4.2–5.9)
REPORT: NORMAL
SODIUM BLD-SCNC: 140 MMOL/L (ref 136–145)
WBC # BLD: 19.1 K/UL (ref 4–11)
WBC # BLD: 28.2 K/UL (ref 4–11)

## 2021-06-01 PROCEDURE — 36592 COLLECT BLOOD FROM PICC: CPT

## 2021-06-01 PROCEDURE — 2580000003 HC RX 258: Performed by: EMERGENCY MEDICINE

## 2021-06-01 PROCEDURE — 85027 COMPLETE CBC AUTOMATED: CPT

## 2021-06-01 PROCEDURE — 94761 N-INVAS EAR/PLS OXIMETRY MLT: CPT

## 2021-06-01 PROCEDURE — 97530 THERAPEUTIC ACTIVITIES: CPT

## 2021-06-01 PROCEDURE — 97162 PT EVAL MOD COMPLEX 30 MIN: CPT

## 2021-06-01 PROCEDURE — 97535 SELF CARE MNGMENT TRAINING: CPT

## 2021-06-01 PROCEDURE — 2580000003 HC RX 258: Performed by: NURSE PRACTITIONER

## 2021-06-01 PROCEDURE — 2700000000 HC OXYGEN THERAPY PER DAY

## 2021-06-01 PROCEDURE — 97110 THERAPEUTIC EXERCISES: CPT

## 2021-06-01 PROCEDURE — 2580000003 HC RX 258: Performed by: INTERNAL MEDICINE

## 2021-06-01 PROCEDURE — 6370000000 HC RX 637 (ALT 250 FOR IP): Performed by: NURSE PRACTITIONER

## 2021-06-01 PROCEDURE — 80048 BASIC METABOLIC PNL TOTAL CA: CPT

## 2021-06-01 PROCEDURE — 85014 HEMATOCRIT: CPT

## 2021-06-01 PROCEDURE — 51700 IRRIGATION OF BLADDER: CPT

## 2021-06-01 PROCEDURE — 83735 ASSAY OF MAGNESIUM: CPT

## 2021-06-01 PROCEDURE — 85025 COMPLETE CBC W/AUTO DIFF WBC: CPT

## 2021-06-01 PROCEDURE — 84100 ASSAY OF PHOSPHORUS: CPT

## 2021-06-01 PROCEDURE — 6360000002 HC RX W HCPCS: Performed by: STUDENT IN AN ORGANIZED HEALTH CARE EDUCATION/TRAINING PROGRAM

## 2021-06-01 PROCEDURE — 6360000002 HC RX W HCPCS: Performed by: NURSE PRACTITIONER

## 2021-06-01 PROCEDURE — 36415 COLL VENOUS BLD VENIPUNCTURE: CPT

## 2021-06-01 PROCEDURE — 85018 HEMOGLOBIN: CPT

## 2021-06-01 PROCEDURE — 97166 OT EVAL MOD COMPLEX 45 MIN: CPT

## 2021-06-01 PROCEDURE — 83036 HEMOGLOBIN GLYCOSYLATED A1C: CPT

## 2021-06-01 PROCEDURE — 2580000003 HC RX 258: Performed by: STUDENT IN AN ORGANIZED HEALTH CARE EDUCATION/TRAINING PROGRAM

## 2021-06-01 PROCEDURE — 6370000000 HC RX 637 (ALT 250 FOR IP): Performed by: INTERNAL MEDICINE

## 2021-06-01 PROCEDURE — APPNB15 APP NON BILLABLE TIME 0-15 MINS: Performed by: NURSE PRACTITIONER

## 2021-06-01 PROCEDURE — 99291 CRITICAL CARE FIRST HOUR: CPT | Performed by: INTERNAL MEDICINE

## 2021-06-01 PROCEDURE — 2000000000 HC ICU R&B

## 2021-06-01 RX ORDER — DEXTROSE MONOHYDRATE 25 G/50ML
12.5 INJECTION, SOLUTION INTRAVENOUS PRN
Status: DISCONTINUED | OUTPATIENT
Start: 2021-06-01 | End: 2021-06-04 | Stop reason: HOSPADM

## 2021-06-01 RX ORDER — DEXTROSE MONOHYDRATE 50 MG/ML
100 INJECTION, SOLUTION INTRAVENOUS PRN
Status: DISCONTINUED | OUTPATIENT
Start: 2021-06-01 | End: 2021-06-04 | Stop reason: HOSPADM

## 2021-06-01 RX ORDER — NICOTINE POLACRILEX 4 MG
15 LOZENGE BUCCAL PRN
Status: DISCONTINUED | OUTPATIENT
Start: 2021-06-01 | End: 2021-06-04 | Stop reason: HOSPADM

## 2021-06-01 RX ADMIN — ACETAMINOPHEN 650 MG: 325 TABLET ORAL at 11:08

## 2021-06-01 RX ADMIN — Medication 5000 UNITS: at 09:44

## 2021-06-01 RX ADMIN — SODIUM CHLORIDE: 9 INJECTION, SOLUTION INTRAVENOUS at 20:19

## 2021-06-01 RX ADMIN — ACETAMINOPHEN 650 MG: 325 TABLET ORAL at 00:48

## 2021-06-01 RX ADMIN — Medication 100 MG: at 09:44

## 2021-06-01 RX ADMIN — ACETAMINOPHEN 650 MG: 325 TABLET ORAL at 18:46

## 2021-06-01 RX ADMIN — PHENYLEPHRINE HYDROCHLORIDE 50 MCG/MIN: 10 INJECTION INTRAVENOUS at 12:43

## 2021-06-01 RX ADMIN — PIPERACILLIN AND TAZOBACTAM 3375 MG: 3; .375 INJECTION, POWDER, LYOPHILIZED, FOR SOLUTION INTRAVENOUS at 11:08

## 2021-06-01 RX ADMIN — PANTOPRAZOLE SODIUM 40 MG: 40 TABLET, DELAYED RELEASE ORAL at 09:44

## 2021-06-01 RX ADMIN — THERA TABS 1 TABLET: TAB at 09:44

## 2021-06-01 RX ADMIN — Medication 1 CAPSULE: at 09:52

## 2021-06-01 RX ADMIN — SODIUM CHLORIDE: 9 INJECTION, SOLUTION INTRAVENOUS at 10:18

## 2021-06-01 RX ADMIN — ZINC SULFATE 220 MG (50 MG) CAPSULE 50 MG: CAPSULE at 09:44

## 2021-06-01 RX ADMIN — Medication 10 ML: at 09:45

## 2021-06-01 RX ADMIN — LEVETIRACETAM 500 MG: 500 TABLET ORAL at 20:36

## 2021-06-01 RX ADMIN — PIPERACILLIN AND TAZOBACTAM 3375 MG: 3; .375 INJECTION, POWDER, LYOPHILIZED, FOR SOLUTION INTRAVENOUS at 02:37

## 2021-06-01 RX ADMIN — Medication 10 ML: at 20:40

## 2021-06-01 RX ADMIN — TAMSULOSIN HYDROCHLORIDE 0.4 MG: 0.4 CAPSULE ORAL at 09:44

## 2021-06-01 RX ADMIN — OXYCODONE HYDROCHLORIDE AND ACETAMINOPHEN 1000 MG: 500 TABLET ORAL at 09:44

## 2021-06-01 RX ADMIN — PANTOPRAZOLE SODIUM 40 MG: 40 TABLET, DELAYED RELEASE ORAL at 20:36

## 2021-06-01 RX ADMIN — PHENYLEPHRINE HYDROCHLORIDE 100 MCG/MIN: 10 INJECTION INTRAVENOUS at 01:33

## 2021-06-01 RX ADMIN — INSULIN LISPRO 1 UNITS: 100 INJECTION, SOLUTION INTRAVENOUS; SUBCUTANEOUS at 20:43

## 2021-06-01 RX ADMIN — PIPERACILLIN AND TAZOBACTAM 3375 MG: 3; .375 INJECTION, POWDER, LYOPHILIZED, FOR SOLUTION INTRAVENOUS at 18:46

## 2021-06-01 RX ADMIN — LEVETIRACETAM 500 MG: 500 TABLET ORAL at 09:44

## 2021-06-01 ASSESSMENT — PAIN SCALES - GENERAL
PAINLEVEL_OUTOF10: 0

## 2021-06-01 NOTE — CONSULTS
REASON FOR CONSULTATION/CC: sepsis / shock        PCP: Arno Bosworth, MD  Established Pulmonologist:  None    HISTORY OF PRESENT ILLNESS: Rosy Leiva is a 80y.o. year old male with a history of covid and Pe who presents with      History  Patient was last seen by pulmonary in December 2020 secondary to being admitted for COVID-19 with a pulmonary embolus. He has a history of seizure disorder and was admitted for rolling out of bed being found on the ground. Patient was discharged on Eliquis. Patient presented to the emergency room last night from [facility] after Ambrosio was removed with hematuria/bleeding \"gushing approximately 500 cc of blood \"      Assessment:        History of COVID-19 December 2020 with bilateral pulmonary embolism  Alcohol abuse  History of seizure disorder  Schizophrenia  Urinary retention with chronic Ambrosio with hematuria after accidental removal  BPH  Atrial fibrillation  Seizure disorder    Plan:      Hospital Day 1     Hematuria  -Occurred after accidental removal of chronic Ambrosio. Cleared with 5 to 6 L of irrigation with noted extensive clotting.  -Urology consulted.  -check CBI q12  - CBI      Sepsis with shock with lactic acidosis  -Patient has sepsis as defined by increased white blood cell count, fevers and hypotension. Hypotension is likely combination septic shock with blood loss/hemorrhagic shock.  -Hemoglobin decreased from 10 down to 8 x3 checks and therefore appears to be stable. Therefore, transfusion not required.  -Status post sepsis fluid bolus and continuous IV fluids. On Markie-Synephrine and weaning.  -Antibiotic started Rocephin changed to Zosyn  -Patient has a chronic Ambrosio therefore difficult to interpret nitrites and leukocytes from urine.  -Following lactic acidosis  -Blood culture positive for Proteus. Acute kidney injury  -Downtrending labs with IV fluid. BUN downtrending.       History of pulmonary embolism  -Admitted December 2020 with pulmonary embolism, bilateral, with concurrent diagnosis of COVID-19.  -Patient was admitted on Eliquis. With hematuria and shock, this was reversed with Kcentra. Atrial fibrillation with a history of DVT/PE concurrent with COVID-19 December 2020  - off Eliquis    Seizure disorder  -Keppra    BPH with hematuria   -Urology consultation  -Flomax  -Ambrosio  - CBI    Electrolytes  - K:   - Ca:  - Mg:  - Phos:    Prophylaxis     - DVT - scd       Nutrition  - DIET GENERAL;         Access       Arterial      PICC          CVC       CVC Triple Lumen 05/31/21 Right Femoral (Active)   $ Central line insertion $ Yes 05/31/21 1904   Continued need for line? Yes 06/01/21 0600   Site Assessment Intact 06/01/21 0600   Proximal Lumen Status Infusing;Capped 06/01/21 0600   Medial Lumen Status Infusing;Capped 06/01/21 0600   Distal Lumen Status Infusing;Capped 06/01/21 0600   Dressing Type Transparent 06/01/21 0600   Dressing Status Intact; Old drainage 06/01/21 0600   Dressing Intervention New 06/01/21 0600   Dressing Change Due 06/02/21 06/01/21 0600   Number of days: 0         I spent 35 minutes of critical care time with this patient excluding any procedures. This note was transcribed using [email or web address]. Please disregard any translational errors. Thank you for the consult    [facility]  [phone]             Data:     PAST MEDICAL HISTORY:  Past Medical History:   Diagnosis Date    [facility]ANI [facility]acute [facility]) ([address] 2/11/2021    Alcohol abuse 12/22/2020    Atrial fibrillation ([facility])     BPH (benign prostatic hyperplasia)     Dehydration     [facility]     Leg weakness, bilateral 2014, 2015    legs give out and pt collaspes    Seizure ([address] 7/20/2017    UTI (urinary tract infection) 8-    admitted with high fever and fainted.        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 FAMILY HISTORY:  family history includes Cancer in his brother and mother; Diabetes in his father. SOCIAL HISTORY:   reports that he has never smoked. He has never used smokeless tobacco.    Scheduled Meds:   piperacillin-tazobactam  3,375 mg Intravenous Q8H    multivitamin  1 tablet Oral Daily    thiamine mononitrate  100 mg Oral Daily    lactobacillus  1 capsule Oral Daily with breakfast    zinc sulfate  50 mg Oral Daily    tamsulosin  0.4 mg Oral Daily    ascorbic acid  1,000 mg Oral Daily    vitamin D  5,000 Units Oral Daily    pantoprazole  40 mg Oral BID    [Held by provider] apixaban  5 mg Oral BID    levETIRAcetam  500 mg Oral BID    sodium chloride flush  5-40 mL Intravenous 2 times per day       Continuous Infusions:   phenylephrine (MARK-SYNEPHRINE) 50mg/250mL infusion 100 mcg/min (06/01/21 0133)    sodium chloride      sodium chloride Stopped (05/31/21 2035)    norepinephrine Stopped (06/01/21 0137)       PRN Meds:  sodium chloride flush, sodium chloride, promethazine **OR** ondansetron, polyethylene glycol, acetaminophen **OR** acetaminophen    ALLERGIES:  Patient has No Known Allergies. REVIEW OF SYSTEMS:  Constitutional: Negative for fever    HENT: Negative for sore throat  Eyes: Negative for redness   Respiratory: Negative for dyspnea, cough  Cardiovascular: Negative for chest pain  Gastrointestinal: Negative for vomiting, diarrhea    Genitourinary: Negative for hematuria   Musculoskeletal: Negative for arthralgias   Skin: Negative for rash  Neurological: Negative for syncope  Hematological: Negative for adenopathy  Psychiatric/Behavorial: Negative for anxiety    Objective:   PHYSICAL EXAM:  Blood pressure 121/65, pulse 78, temperature 99.7 °F (37.6 °C), temperature source Rectal, resp. rate 17, weight 210 lb 1.6 oz (95.3 kg), SpO2 99 %.'  Gen: No distress. Eyes: PERRL. No sclera icterus.  No conjunctival injection. ENT: No discharge. Pharynx clear. External appearance of ears and nose normal.  Neck: Trachea midline. No obvious mass. Resp: No accessory muscle use. No crackles. No wheezes. No rhonchi. CV: Regular rate. Regular rhythm. No murmur or rub. No edema. GI: Non-tender. Non-distended. No hernia. Skin: Warm, dry, normal texture and turgor. No nodule on exposed extremities. Lymph: No cervical LAD. No supraclavicular LAD. M/S: No cyanosis. No clubbing. No joint deformity. Neuro: Moves all four extremities. Psych: Oriented x 3. No anxiety. Awake. Alert. Intact judgement and insight. Data Reviewed:   LABS:  CBC:   Recent Labs     05/31/21  1253 05/31/21  1730 06/01/21  0130 06/01/21  0405   WBC 26.2* 26.0*  --  28.2*   HGB 10.1* 8.3* 8.0* 8.3*   HCT 29.8* 24.5* 24.0* 24.9*   MCV 90.6 90.8  --  91.6    339  --  319     BMP:   Recent Labs     05/31/21  1253 06/01/21  0405    140   K 4.7 4.2   CL 99 105   CO2 27 25   BUN 42* 29*   CREATININE 1.7* 1.1     LIVER PROFILE:   Recent Labs     05/31/21  1253   AST 20   ALT 9*   BILITOT 1.1*   ALKPHOS 126     PT/INR:   Recent Labs     05/31/21  1254   PROTIME 26.4*   INR 2.26*     APTT: No results for input(s): APTT in the last 72 hours. UA:  Recent Labs     05/31/21 2015   COLORU DARK YELLOW*   PHUR 6.5   WBCUA 832*   RBCUA >100*   CLARITYU TURBID*   SPECGRAV 1.015   LEUKOCYTESUR LARGE*   UROBILINOGEN 4.0*   BILIRUBINUR LARGE*   BLOODU LARGE*   GLUCOSEU 100*     No results for input(s): PHART, FXR0PHW, PO2ART in the last 72 hours. Vent Information  SpO2: 99 %    Radiology Review:  Pertinent images / reports were reviewed as a part of this visit. CT Chest w/ contrast: No results found for this or any previous visit. CT Chest w/o contrast: No results found for this or any previous visit.       CTPA: Results for orders placed during the hospital encounter of 12/22/20    CT CHEST PULMONARY EMBOLISM W CONTRAST    Narrative  EXAMINATION:  CTA OF THE CHEST 12/22/2020 1:58 pm    TECHNIQUE:  CTA of the chest was performed after the administration of intravenous  contrast.  Multiplanar reformatted images are provided for review. MIP  images are provided for review. Dose modulation, iterative reconstruction,  and/or weight based adjustment of the mA/kV was utilized to reduce the  radiation dose to as low as reasonably achievable. COMPARISON:  None. HISTORY:  ORDERING SYSTEM PROVIDED HISTORY: hypoxic / covid positive / rule out PE  TECHNOLOGIST PROVIDED HISTORY:  Reason for exam:->hypoxic / covid positive / rule out PE  Reason for Exam: hypoxic / covid positive / rule out PE  Acuity: Acute  Type of Exam: Initial    FINDINGS:  Motion artifact degrades the study. Thyroid gland appears normal.  Trace pericardial fluid is seen. Small to  moderate size hiatal hernia is seen. There is nonspecific thickening at the  GE junction. Coronary artery calcification is seen. Aortic valve calcification is seen. Ascending aorta is mildly dilated measuring 4.4 cm    No embolus is seen in the central, right, or left main pulmonary artery. On  the right, small pulmonary emboli are seen in the right upper lobe. Small  embolus is seen in the right middle lobe pulmonary artery. .  Small emboli  embolus is seen peripherally in the right lower lobe pulmonary artery    On the left, small embolus is seen posteriorly in the left upper lobe. There  are also likely small emboli peripherally in the left lower lobe pulmonary  artery    Subtle ground-glass opacity seen in the right upper lobe. Bandlike opacities  in the right middle lobe. Bandlike opacities in the right lower lobe    On the left subtle ground-glass opacity is seen in the left upper lobe. Bandlike opacity seen in the left lower lobe. Spurring is seen in the spine. Spurring is seen in the shoulder joints. Gallstones are seen.   Contrast is seen in the

## 2021-06-01 NOTE — CARE COORDINATION
Per chart review, patient is from [address] [place] [address]. Call to Lynn Aceves at [facility], she advised he is LTC, but they would like to try to get him approved for skilled care as he was getting limited therapy with part B services and she thinks skilled is what he needs. Will need [employer]. Neha Coulter RN, BSN, Case Management  Phone: [phone]  Electronically signed by Neha Coulter RN on 6/1/2021 at 10:46 AM     Attempted to speak with the patient regarding return to facility, but the nurse was working with him at this time. Will try back later.   Neha Coluter RN, BSN, Case Management  Phone: [phone]  Electronically signed by Neha Coulter RN on 6/1/2021 at 5:05 PM

## 2021-06-01 NOTE — PLAN OF CARE
Problem: Skin Integrity:  Goal: Will show no infection signs and symptoms  Description: Will show no infection signs and symptoms  Outcome: Ongoing  Goal: Absence of new skin breakdown  Description: Absence of new skin breakdown  Outcome: Ongoing     Problem: Falls - Risk of:  Goal: Will remain free from falls  Description: Will remain free from falls  Outcome: Ongoing  Goal: Absence of physical injury  Description: Absence of physical injury  Outcome: Ongoing     Problem: Cardiac:  Goal: Ability to maintain vital signs within normal range will improve  Description: Ability to maintain vital signs within normal range will improve  Outcome: Ongoing  Goal: Cardiovascular alteration will improve  Description: Cardiovascular alteration will improve  Outcome: Ongoing

## 2021-06-01 NOTE — PROGRESS NOTES
Patient son, Lolly Pathak, and his wife visiting in room. Updated on plan of care. All questions answered. They will call patient's other son, Ingrid Fischer, with update.

## 2021-06-01 NOTE — PROGRESS NOTES
Occupational Therapy   Occupational Therapy Initial Assessment  Date: 2021   Patient Name: David Mattson  MRN: [id number]     : 1935    Date of Service: 2021    Discharge Recommendations:  ECF without OT, ECF with OT (facility discretion if pt not at baseline)  OT Equipment Recommendations  Equipment Needed: No    Hernandez Guard scored a  on the AM-PAC ADL Inpatient form. Current research shows that an AM-PAC score of 17 or less is typically not associated with a discharge to the patient's home setting. Based on the patient's AM-PAC score and their current ADL deficits, it is recommended that the patient have 3-5 sessions per week of Occupational Therapy at d/c to increase the patient's independence. Please see assessment section for further patient specific details. If patient discharges prior to next session this note will serve as a discharge summary. Please see below for the latest assessment towards goals. Assessment   Performance deficits / Impairments: Decreased functional mobility ; Decreased safe awareness;Decreased balance;Decreased ADL status; Decreased cognition;Decreased high-level IADLs;Decreased endurance;Decreased strength  Assessment: 81 y/o male admitted 2021 with acute urinary retention, gross hematuria, GEOVANI. Pt reportedly pulled out catheter at [facility], [facility]. PTA pt ECF resident at [facility]. Pt reports he primarily pivots for transfers with assist of 1-2, staff assist with all ADLs, pt can feed self with set up. Today, pt rigid with all movement. Pt required max A x 2 for bed mobility and min/mod A x 2 to stand to christy stedy with increased time. Pt with functional UE ROM for ADls however anticipate will require up to max A for standing components at this time. Pt will benefit from skilled therapy while in hospital with anticipation of returning to [facility] at discharge. Pt may benefit from skilled OT if not at baseline (facility discretion).   Prognosis: Good;Fair  Decision Making: Medium Complexity  OT Education: OT Role;Plan of Care;Transfer Training;Orientation  REQUIRES OT FOLLOW UP: Yes  Activity Tolerance  Activity Tolerance: Patient limited by fatigue;Patient Tolerated treatment well  Safety Devices  Safety Devices in place: Yes  Type of devices: Nurse notified; Left in chair;Call light within reach;Gait belt           Patient Diagnosis(es): The primary encounter diagnosis was Acute urinary retention. Diagnoses of Gross hematuria, Fever, unspecified fever cause, and GEOVANI (acute kidney injury) ([facility] [address] were also pertinent to this visit. has a past medical history of GEOVANI (acute kidney injury) ([address] Alcohol abuse, Atrial fibrillation ([address] BPH (benign prostatic hyperplasia), Dehydration, [facility] ([address] Leg weakness, bilateral, Seizure ([address] and UTI (urinary tract infection). has a past surgical history that includes Splenectomy, total and Cataract removal (Bilateral). Restrictions  Restrictions/Precautions  Restrictions/Precautions: Fall Risk  Position Activity Restriction  Other position/activity restrictions: 2L via NC. Ambrosio catheter. CBI. Transfers only, nonambulatory. Subjective   General  Chart Reviewed: Yes  Patient assessed for rehabilitation services?: Yes  Additional Pertinent Hx: 79 y/o male admitted 5/31/2021 with acute urinary retention, gross hematuria, GEOVANI. Pt reportedly pulled out catheter at [facility], [facility]. Family / Caregiver Present: No  Referring Practitioner: Dr. Julieta Pritchard  Diagnosis: Urinary retention, gross hematuria, GEOVANI  Subjective  Subjective: Pt seen bedside and agreeable to therapy. Per RN ok for therapy.  Pt with c/o being cold- shaking noticed in UEs  General Comment  Comments: Pt [facility] [place]       Social/Functional History  Social/Functional History  Type of Home: Facility [facility])  Home Layout: One level  Home Access: Level entry  ADL Assistance: Needs assistance  Homemaking [place])  Attention Span: Attends with cues to redirect  Memory: Decreased short term memory;Decreased recall of recent events  Safety Judgement: Decreased awareness of need for assistance  Insights: Decreased awareness of deficits  Initiation: Requires cues for some  Sequencing: Requires cues for some                 LUE AROM (degrees)  LUE General AROM: functional ROM however decreased overhead ROM  Left Hand AROM (degrees)  Left Hand AROM: WFL  RUE AROM (degrees)  RUE General AROM: functional ROM however decreased overhead ROM  Right Hand AROM (degrees)  Right Hand AROM: WFL    Plan   Plan  Times per week: 3-5  Current Treatment Recommendations: Strengthening, Endurance Training, Balance Training, Gait Training, Self-Care / ADL, Functional Mobility Training    AM-PAC Score  AM-PAC Inpatient Daily Activity Raw Score: 11 (06/01/21 0954)  AM-PAC Inpatient ADL T-Scale Score : 29.04 (06/01/21 0954)  ADL Inpatient CMS 0-100% Score: 70.42 (06/01/21 0954)  ADL Inpatient CMS G-Code Modifier : CL (06/01/21 4492)    Goals  Short term goals  Time Frame for Short term goals: Prior to DC: Short term goal 1: Pt will complete ADL transfers with min A  Short term goal 2: Pt will tolerate sitting EOB in prep for transfers with SBA  Short term goal 3: Pt will complete UE exercises in all planes to inc strength/endurance for ADLs  Short term goal 4: Pt will tolerate standing > 3 min for functional task with CGA  Short term goal 5: Pt will complete grooming tasks with SBA  Long term goals  Time Frame for Long term goals : stgs=ltgs  Patient Goals   Patient goals : no goal stated       Therapy Time   Individual Concurrent Group Co-treatment   Time In 0840         Time Out 0920         Minutes 40         Timed Code Treatment Minutes: 25 Minutes     This note to serve as OT d/c summary if pt is d/c-ed prior to next therapy session.     Jonna Hanna, OTR/L

## 2021-06-01 NOTE — PROGRESS NOTES
Physical Therapy    Facility/Department: [address] ICU  Initial Assessment  This note serves as patient discharge summary if pt discharges prior to next PT visit      NAME: Nenita Diego  : 1935  MRN: [id number]    Date of Service: 2021    Discharge Recommendations:  3-5 sessions per week, Patient would benefit from continued therapy after discharge, 24 hour supervision or assist   Nenita Diego scored a 9/ on the AM-PAC short mobility form. Current research shows that an AM-PAC score of 17 or less is typically not associated with a discharge to the patient's home setting. Based on the patient's AM-PAC score and their current functional mobility deficits, it is recommended that the patient have 3-5 sessions per week (at discretion of nursing facility)  of Physical Therapy at d/c to increase the patient's independence. Please see assessment section for further patient specific details. PT Equipment Recommendations  Equipment Needed: No    Assessment    Pt is 79 y/o M admitted 21 admitted with Acute Urinary Retention, Gross Hematuria, GEOVANI. Pt reportedly pulled out catheter at [address], which resulted in significant bleeding, \"gushed approximately 500cc of blood. \" Pt currently on continuous bladder irrigation. PMHx significant or GEOVANI, ETOH abuse, DVT, A-fib, Splenectomy, LE ulcers, Seizure disorder, LE weakness w/ frequent falls. PTA, patient resides at [facility] where he required assistance for dressing, bathing, bed mobility, and transfers. Patient was nonambulatory but performed stand pivot transfers with staff. Currently, patient requires max A of 2 for bed mobility and mod of 2 for transfers with lift equipment. Pt takes increased time to process simple cues and requires tactile cues/guiding assistance for proper UE/LE sequencing for mobility training.  Pt functioning below baseline and recommending patient D/C back to [facility] w/ 24 hour assist and cont PT Services at the discretion of the facility in order to improve ease, independence, and safety with functional mobility. Will continue to see and monitor progress in acute setting. Treatment Diagnosis: impaired functional mobility  Prognosis: Fair  Decision Making: Medium  Complexity  History: as noted  Exam: see below  Clinical Presentation: stable  PT Education: Goals;PT Role;Plan of Care; Functional Mobility Training;General Safety  Barriers to Learning: cognition  REQUIRES PT FOLLOW UP: Yes  Activity Tolerance  Activity Tolerance: Patient Tolerated treatment well       Patient Diagnosis(es): The primary encounter diagnosis was Acute urinary retention. Diagnoses of Gross hematuria, Fever, unspecified fever cause, and GEOVANI (acute kidney injury) ([address] were also pertinent to this visit. has a past medical history of GEOVANI (acute kidney injury) ([address] Alcohol abuse, Atrial fibrillation ([address] BPH (benign prostatic hyperplasia), Dehydration, [facility] ([address] Leg weakness, bilateral, Seizure ([address] and UTI (urinary tract infection). has a past surgical history that includes Splenectomy, total and Cataract removal (Bilateral). Restrictions  Restrictions/Precautions  Restrictions/Precautions: Fall Risk  Position Activity Restriction  Other position/activity restrictions: 2L via NC. Ambrosio catheter. Continuous Bladder Irrigation . PTA : Transfers only, nonambulatory. Vision/Hearing  Vision: Impaired  Vision Exceptions: Wears glasses at all times  Hearing: Exceptions to [facility]  Hearing Exceptions: Bilateral hearing aid     Subjective  General  Chart Reviewed: Yes  Patient assessed for rehabilitation services?: Yes  Additional Pertinent Hx: Pt is 79 y/o M admitted 5/31/21 admitted with acute urinary retention, gross hematuria, GEOVANI. Pt reportedly pulled out catheter at [address], which resulted in significant bleeding, \"gushed approximately 500cc of blood. \" Pt currently on continuous bladder irrigation. PMHx significant or GEOVANI, ETOH abuse, DVT, A-fib, Splenectomy, LE ulcers, Seizure disorder, LE weakness w/ frequent falls. Response To Previous Treatment: Not applicable  Family / Caregiver Present: No  Referring Practitioner: Mar Ravi MD  Referral Date : 05/31/21  Diagnosis: Acute urinary retention, gross hematuria, fever, GEOVANI. Follows Commands: Impaired  Other (Comment): increased time to process simple cues  Subjective  Subjective: Pt supine in bed with HOB elevated, pleasant and agreeable to PT Eval/Rx. Pain Screening  Patient Currently in Pain: Denies  Orientation  Orientation  Overall Orientation Status: Impaired  Orientation Level: Disoriented to time;Disoriented to situation  Social/Functional History  Social/Functional History  Type of Home: Facility [facility]  Home Layout: One level  Home Access: Level entry  ADL Assistance: Needs assistance  Homemaking Responsibilities: No  Ambulation Assistance:  (states he was only standing to walker with assist, wasn't ambulating)  Transfer Assistance: Needs assistance  Active : No  Additional Comments: Pt was living independently at home until fall of Dec 2020, currently lives at [facility], nonambulatory, performs pivot transfers    Objective  AROM RLE (degrees)  RLE General AROM: gross joint hypomobility with STR and possible joint contractures from being primarily WC bound  AROM LLE (degrees)  LLE General AROM: gross joint hypomobility with STR and possible joint contractures from being primarily WC bound  Strength RLE  Comment: functionally fair 3/5  Strength LLE  Comment: functionally fair 3/5  Bed mobility  Supine to Sit: Maximum assistance;2 Person assistance  Sit to Supine: Unable to assess (left in bedside chair end of session)  Scooting: Dependent/Total  Transfers  Sit to Stand:  Moderate Assistance;2 Person Assistance  Stand to sit: Moderate Assistance;2 Person Assistance  Bed to Chair: Dependent/Total María [place])  Comment: 2 person assist w/ Stedy; min A of 2 to stand from elevated bed, mod A of 2 to stand from bedside chair  Ambulation  Ambulation?: No  WB Status: nonambulatory at baseline     Balance  Posture: Fair  Sitting - Static: Fair;+ (close CGA after scooted to EOB with R lateral lean noted)  Sitting - Dynamic: Poor  Standing - Static: Poor (stands at [facility] with close CG-min A and R lateral lean noted)    Plan   Plan  Times per week: 3-5x/week in acute setting  Current Treatment Recommendations: Functional Mobility Training, ADL/Self-care Training, Safety Education & Training  Safety Devices  Type of devices: Call light within reach, Left in chair, Nurse notified    AM-PAC Score  AM-PAC Inpatient Mobility Raw Score : 9 (06/01/21 0922)  AM-PAC Inpatient T-Scale Score : 30.55 (06/01/21 0922)  Mobility Inpatient CMS 0-100% Score: 81.38 (06/01/21 0922)  Mobility Inpatient CMS G-Code Modifier : CM (06/01/21 2464)    Goals  Short term goals  Time Frame for Short term goals: by acute D/C  Short term goal 1: bed mobility w/ mod A  Short term goal 2: sitting EOB 2 minutes with SBA to set up for transfers  Short term goal 3: transfers w/ AD w/ min A bed<>chair  Patient Goals   Patient goals : none stated, assumed to return to PLOF       Therapy Time   Individual Concurrent Group Co-treatment   Time In 0840         Time Out 0920         Minutes [facility]

## 2021-06-01 NOTE — PROGRESS NOTES
1904: Pt arrived to ICU from ED, placed on continuous monitoring, see flowsheets for assessment    2022: call placed to hospitalist requesting to restart cbi. Orders to resume cbi until urine clear.      2315: critical care notified, no new orders    0120: pt hypotensive, order obtained to switch levo to tisha     0224: antibiotics switched from rocpehin to zosyn per Dr. Vane Prieto

## 2021-06-01 NOTE — PROGRESS NOTES
Hospitalist Progress Note      PCP: Chula Olivas MD    Date of Admission: 5/31/2021    Chief Complaint: Urinary catheter problem    Hospital Course: 27-year-old male with past medical history of seizure, benign prostatic hyperplasia, DVT, atrial fibrillation presented to the hospital after pulling out his Ambrosio catheter this morning with approximately 500 cc of blood loss immediately. Patient is not really able to provide much history but he does state that he just pulled out his Ambrosio. He does not really say why he did that. Arrival to the hospital patient was noted to be tachycardic. Lab work showed elevated white count 26. Creatinine was noted to be 1.7 much higher than his baseline of around 1.8. Since pulling out his Ambrosio patient has not been able to urinate. On arrival he was noted to have a bladder distended up to his umbilicus. While in the ED an irrigating catheter was placed with return of grossly bloody urine which required around 5 to 6 L of irrigation before it cleared up. There was also passage of extensive clots. Subjective: Patient seen and examined. Patient is alert and oriented x4. Bladder irrigation appears to have worked and urine is cleared up. Blood cultures growing Proteus and this is likely from indwelling Ambrosio catheter. Started on broad-spectrum IV antibiotics. Still requiring pressors for support.       Medications:  Reviewed    Infusion Medications    phenylephrine (MARK-SYNEPHRINE) 50mg/250mL infusion 25 mcg/min (06/01/21 0919)    dextrose      sodium chloride      sodium chloride 100 mL/hr at 05/31/21 2003     Scheduled Medications    piperacillin-tazobactam  3,375 mg Intravenous Q8H    insulin lispro  0-6 Units Subcutaneous TID     insulin lispro  0-3 Units Subcutaneous Nightly    multivitamin  1 tablet Oral Daily    thiamine mononitrate  100 mg Oral Daily    lactobacillus  1 capsule Oral Daily with breakfast    zinc sulfate  50 mg Oral Daily    06/01/21  0405    140   K 4.7 4.2   CL 99 105   CO2 27 25   BUN 42* 29*   CREATININE 1.7* 1.1   CALCIUM 8.8 8.2*   PHOS  --  3.8     Recent Labs     05/31/21  1253   AST 20   ALT 9*   BILITOT 1.1*   ALKPHOS 126     Recent Labs     05/31/21  1254   INR 2.26*     No results for input(s): Eduin Killings in the last 72 hours. Urinalysis:      Lab Results   Component Value Date    NITRU POSITIVE 05/31/2021    WBCUA 832 05/31/2021    BACTERIA 4+ 08/10/2011    RBCUA >100 05/31/2021    BLOODU LARGE 05/31/2021    SPECGRAV 1.015 05/31/2021    GLUCOSEU 100 05/31/2021    GLUCOSEU Negative 08/10/2011       Radiology:  XR CHEST PORTABLE   Final Result   Cardiomegaly with pulmonary vascular congestion. No edema or pneumonia   demonstrated                 Assessment/Plan:    Septic shock  Likely secondary to Proteus UTI and bacteremia  Continue IV fluids  Continue broad-spectrum IV antibiotics  Lactic trended to normal    Urinary tract infection  Secondary to indwelling Ambrosio catheter  Await culture and sensitivity    Proteus bacteremia  Likely secondary to UTI  Continue broad-spectrum IV antibiotics    Hematuria  Likely secondary to traumatic Ambrosio removal  Continue bladder irrigation per urology    GEOVANI  Resolved with IV fluids   creatinine 1.7 admission down to 1.1    Paroxysmal atrial fibrillation  Hold Eliquis given hematochezia    Seizure disorder  Continue home Keppra    BPH  Continue Flomax    DVT Prophylaxis: Hold  Diet: DIET GENERAL; Carb Control: 4 carb choices (60 gms)/meal  Code Status: Full Code    PT/OT Eval Status:  Will need    Dispo -ICU while requiring pressors    I spent greater than 30 minutes of critical care time with patient regards to his life-threatening septic shock requiring pressors    Corina Aguilar MD

## 2021-06-01 NOTE — PROGRESS NOTES
*Late entries  [phone] Lab notified critical blood culture positive for proteus species. To notify MD in rounds. 2942 Annette Pappas W rounding on patient. Orders updated to titrate Markie-synephrine for SBP>90 and check H+H tonight and then daily. 1973 PT/OT at bedside, patient up to chair with Stedy x2 assist. They noted to RN that he did have some more difficulty standing on the stedy from the chair and may need lift back to bed.     0900 Urology NP Ousmane Smith at bedside, CBI stopped, see note. 1230 Asked if patient would like to go back to bed, he declined at this time. [phone] Asked patient if he would like to go back to bed, declined at this time.

## 2021-06-02 LAB
ACANTHOCYTES: ABNORMAL
ANION GAP SERPL CALCULATED.3IONS-SCNC: 7 MMOL/L (ref 3–16)
BASOPHILS ABSOLUTE: 0 K/UL (ref 0–0.2)
BASOPHILS RELATIVE PERCENT: 0 %
BUN BLDV-MCNC: 21 MG/DL (ref 7–20)
BURR CELLS: ABNORMAL
CALCIUM SERPL-MCNC: 7.9 MG/DL (ref 8.3–10.6)
CHLORIDE BLD-SCNC: 104 MMOL/L (ref 99–110)
CO2: 25 MMOL/L (ref 21–32)
CREAT SERPL-MCNC: 0.9 MG/DL (ref 0.8–1.3)
CRENATED RBC'S: ABNORMAL
DOHLE BODIES: PRESENT
EOSINOPHILS ABSOLUTE: 0.2 K/UL (ref 0–0.6)
EOSINOPHILS RELATIVE PERCENT: 1 %
GFR AFRICAN AMERICAN: >60
GFR NON-AFRICAN AMERICAN: >60
GLUCOSE BLD-MCNC: 109 MG/DL (ref 70–99)
GLUCOSE BLD-MCNC: 112 MG/DL (ref 70–99)
GLUCOSE BLD-MCNC: 116 MG/DL (ref 70–99)
GLUCOSE BLD-MCNC: 118 MG/DL (ref 70–99)
GLUCOSE BLD-MCNC: 143 MG/DL (ref 70–99)
HCT VFR BLD CALC: 21.9 % (ref 40.5–52.5)
HEMOGLOBIN: 7.3 G/DL (ref 13.5–17.5)
LYMPHOCYTES ABSOLUTE: 1.3 K/UL (ref 1–5.1)
LYMPHOCYTES RELATIVE PERCENT: 7 %
MAGNESIUM: 1.9 MG/DL (ref 1.8–2.4)
MCH RBC QN AUTO: 30.6 PG (ref 26–34)
MCHC RBC AUTO-ENTMCNC: 33.3 G/DL (ref 31–36)
MCV RBC AUTO: 91.7 FL (ref 80–100)
MONOCYTES ABSOLUTE: 1.5 K/UL (ref 0–1.3)
MONOCYTES RELATIVE PERCENT: 8 %
NEUTROPHILS ABSOLUTE: 15.7 K/UL (ref 1.7–7.7)
NEUTROPHILS RELATIVE PERCENT: 84 %
ORGANISM: ABNORMAL
PDW BLD-RTO: 16.1 % (ref 12.4–15.4)
PERFORMED ON: ABNORMAL
PHOSPHORUS: 3.1 MG/DL (ref 2.5–4.9)
PLATELET # BLD: 290 K/UL (ref 135–450)
PMV BLD AUTO: 8.8 FL (ref 5–10.5)
POTASSIUM SERPL-SCNC: 3.7 MMOL/L (ref 3.5–5.1)
RBC # BLD: 2.39 M/UL (ref 4.2–5.9)
SODIUM BLD-SCNC: 136 MMOL/L (ref 136–145)
URINE CULTURE, ROUTINE: ABNORMAL
WBC # BLD: 18.7 K/UL (ref 4–11)

## 2021-06-02 PROCEDURE — 97530 THERAPEUTIC ACTIVITIES: CPT

## 2021-06-02 PROCEDURE — 2060000000 HC ICU INTERMEDIATE R&B

## 2021-06-02 PROCEDURE — 2580000003 HC RX 258: Performed by: INTERNAL MEDICINE

## 2021-06-02 PROCEDURE — 2580000003 HC RX 258: Performed by: EMERGENCY MEDICINE

## 2021-06-02 PROCEDURE — 94761 N-INVAS EAR/PLS OXIMETRY MLT: CPT

## 2021-06-02 PROCEDURE — 2700000000 HC OXYGEN THERAPY PER DAY

## 2021-06-02 PROCEDURE — 2580000003 HC RX 258: Performed by: NURSE PRACTITIONER

## 2021-06-02 PROCEDURE — APPNB15 APP NON BILLABLE TIME 0-15 MINS: Performed by: NURSE PRACTITIONER

## 2021-06-02 PROCEDURE — 6370000000 HC RX 637 (ALT 250 FOR IP): Performed by: NURSE PRACTITIONER

## 2021-06-02 PROCEDURE — 2580000003 HC RX 258: Performed by: STUDENT IN AN ORGANIZED HEALTH CARE EDUCATION/TRAINING PROGRAM

## 2021-06-02 PROCEDURE — 6360000002 HC RX W HCPCS: Performed by: NURSE PRACTITIONER

## 2021-06-02 PROCEDURE — 6370000000 HC RX 637 (ALT 250 FOR IP): Performed by: INTERNAL MEDICINE

## 2021-06-02 PROCEDURE — 83735 ASSAY OF MAGNESIUM: CPT

## 2021-06-02 PROCEDURE — 99232 SBSQ HOSP IP/OBS MODERATE 35: CPT | Performed by: INTERNAL MEDICINE

## 2021-06-02 PROCEDURE — 84100 ASSAY OF PHOSPHORUS: CPT

## 2021-06-02 PROCEDURE — 85025 COMPLETE CBC W/AUTO DIFF WBC: CPT

## 2021-06-02 PROCEDURE — 97110 THERAPEUTIC EXERCISES: CPT

## 2021-06-02 PROCEDURE — 6360000002 HC RX W HCPCS: Performed by: STUDENT IN AN ORGANIZED HEALTH CARE EDUCATION/TRAINING PROGRAM

## 2021-06-02 PROCEDURE — 2500000003 HC RX 250 WO HCPCS: Performed by: NURSE PRACTITIONER

## 2021-06-02 PROCEDURE — 36592 COLLECT BLOOD FROM PICC: CPT

## 2021-06-02 PROCEDURE — 80048 BASIC METABOLIC PNL TOTAL CA: CPT

## 2021-06-02 RX ORDER — MAGNESIUM SULFATE IN WATER 40 MG/ML
2000 INJECTION, SOLUTION INTRAVENOUS ONCE
Status: COMPLETED | OUTPATIENT
Start: 2021-06-02 | End: 2021-06-02

## 2021-06-02 RX ORDER — POTASSIUM CHLORIDE 29.8 MG/ML
20 INJECTION INTRAVENOUS ONCE
Status: COMPLETED | OUTPATIENT
Start: 2021-06-02 | End: 2021-06-02

## 2021-06-02 RX ORDER — CALCIUM GLUCONATE 20 MG/ML
1000 INJECTION, SOLUTION INTRAVENOUS ONCE
Status: COMPLETED | OUTPATIENT
Start: 2021-06-02 | End: 2021-06-02

## 2021-06-02 RX ADMIN — PIPERACILLIN AND TAZOBACTAM 3375 MG: 3; .375 INJECTION, POWDER, LYOPHILIZED, FOR SOLUTION INTRAVENOUS at 03:01

## 2021-06-02 RX ADMIN — PANTOPRAZOLE SODIUM 40 MG: 40 TABLET, DELAYED RELEASE ORAL at 21:54

## 2021-06-02 RX ADMIN — LEVETIRACETAM 500 MG: 500 TABLET ORAL at 08:41

## 2021-06-02 RX ADMIN — PHENYLEPHRINE HYDROCHLORIDE 75 MCG/MIN: 10 INJECTION INTRAVENOUS at 00:50

## 2021-06-02 RX ADMIN — PIPERACILLIN AND TAZOBACTAM 3375 MG: 3; .375 INJECTION, POWDER, LYOPHILIZED, FOR SOLUTION INTRAVENOUS at 18:12

## 2021-06-02 RX ADMIN — MAGNESIUM SULFATE HEPTAHYDRATE 2000 MG: 40 INJECTION, SOLUTION INTRAVENOUS at 08:42

## 2021-06-02 RX ADMIN — CALCIUM GLUCONATE 1000 MG: 20 INJECTION, SOLUTION INTRAVENOUS at 08:41

## 2021-06-02 RX ADMIN — PIPERACILLIN AND TAZOBACTAM 3375 MG: 3; .375 INJECTION, POWDER, LYOPHILIZED, FOR SOLUTION INTRAVENOUS at 12:00

## 2021-06-02 RX ADMIN — SODIUM CHLORIDE: 9 INJECTION, SOLUTION INTRAVENOUS at 06:26

## 2021-06-02 RX ADMIN — PANTOPRAZOLE SODIUM 40 MG: 40 TABLET, DELAYED RELEASE ORAL at 08:41

## 2021-06-02 RX ADMIN — Medication 10 ML: at 21:55

## 2021-06-02 RX ADMIN — TAMSULOSIN HYDROCHLORIDE 0.4 MG: 0.4 CAPSULE ORAL at 08:41

## 2021-06-02 RX ADMIN — LEVETIRACETAM 500 MG: 500 TABLET ORAL at 21:54

## 2021-06-02 RX ADMIN — Medication 1 CAPSULE: at 08:41

## 2021-06-02 RX ADMIN — POTASSIUM CHLORIDE 20 MEQ: 29.8 INJECTION, SOLUTION INTRAVENOUS at 08:41

## 2021-06-02 RX ADMIN — INSULIN LISPRO 1 UNITS: 100 INJECTION, SOLUTION INTRAVENOUS; SUBCUTANEOUS at 12:00

## 2021-06-02 RX ADMIN — Medication 10 ML: at 08:42

## 2021-06-02 RX ADMIN — SODIUM CHLORIDE: 9 INJECTION, SOLUTION INTRAVENOUS at 20:00

## 2021-06-02 ASSESSMENT — PAIN SCALES - GENERAL
PAINLEVEL_OUTOF10: 0

## 2021-06-02 NOTE — PROGRESS NOTES
1915: Report received from Karyn RN/Karolyn RN. Bedside handoff complete. Pt is awake and alert, sitting up in chair. In SR per monitor. SpO2 98% on 2l nasal canula. IVF and markie infusing via CVC per order. Will wean pressor as tolerated. Pt denies any distress/discomfort. Declines offer to return to bed. Call light in reach. 1930: Shift assessment complete-- see flow sheets. R PIV occluded, site d/c'd. Pt tolerated well.   2200: Returned to bed with use of dominick lift. Pt tolerated well. PM care/presley care complete. Pt incontinent large stool. Zinc oxide cream applied to MASD with open natasha at buttocks/celft/coccyx. Pt tolerated activity and assisted as able to turn in bed. 0015: Weaned to room air. Will watch for tolerance  0502: Occasional desaturation noted overnight to 87-88%. Now dropping to 80% on room air. Placed back on 2l nasal canula. Will refer to MD for evaluation  0606: Markie gtt off. Will watch  0630: UOP volume good overnight, sediment noted in tubing with occasional small clots noted.   0720:  Report given to [facility] PROVIDER:[TOKEN:[[id number]]]

## 2021-06-02 NOTE — ACP (ADVANCE CARE PLANNING)
orders.     Length of ACP Conversation in minutes: 4     Conversation Outcomes:  [x] ACP discussion completed  [] Existing advance directive reviewed with patient; no changes to patient's previously recorded wishes  [] New Advance Directive completed  [] Portable Do Not Rescitate prepared for Provider review and signature  [] POLST/POST/MOLST/MOST prepared for Provider review and signature      Follow-up plan:    [] Schedule follow-up conversation to continue planning  [x] Referred individual to Provider for additional questions/concerns   [] Advised patient/agent/surrogate to review completed ACP document and update if needed with changes in condition, patient preferences or care setting    [x] This note routed to one or more involved healthcare providers               Neto Juarez RN, BSN, Case Management  Phone: [phone]  Electronically signed by Neto Juarez RN on 6/2/2021 at 10:42 AM

## 2021-06-02 NOTE — PROGRESS NOTES
Occupational Therapy  Facility/Department: [facility] 4Y ICU  Daily Treatment Note  NAME: Arely Martin  : 1935  MRN: [id number]    Date of Service: 2021    Discharge Recommendations:  ECF without OT, ECF with OT  OT Equipment Recommendations  Equipment Needed: No    Arely Martin scored a  on the AM-PAC ADL Inpatient form. Assessment   Performance deficits / Impairments: Decreased functional mobility ; Decreased safe awareness;Decreased balance;Decreased ADL status; Decreased cognition;Decreased high-level IADLs;Decreased endurance;Decreased strength  Assessment: 79 y/o male admitted 2021 with acute urinary retention, gross hematuria, GEOVANI. Pt reportedly pulled out catheter at [facility], [facility]. PTA pt ECF resident at [facility]. Pt reports he primarily pivots for transfers with assist of 1-2, staff assist with all ADLs, pt can feed self with set up. Today, pt rigid with all movement. Pt required max A x 2 for bed mobility and mod/max A x 2 to stand to christy stedy with increased time and effort provided by patient. Pt progressing with standing tolerance/balance necessary for ADLs, however anticipate will require max A for ADLs at this time . Pt will benefit from skilled therapy while in hospital with anticipation of returning to [facility] at discharge. Pt may benefit from skilled OT if not at baseline (facility discretion). Prognosis: Fair  OT Education: OT Role;Transfer Training;Plan of Care  REQUIRES OT FOLLOW UP: Yes  Activity Tolerance  Activity Tolerance: Patient limited by fatigue  Activity Tolerance: motivated but easily fatigues, requires rest breaks  Safety Devices  Safety Devices in place: Yes  Type of devices: Nurse notified; Left in chair;Call light within reach;Gait belt         Patient Diagnosis(es): The primary encounter diagnosis was Acute urinary retention.  Diagnoses of Gross hematuria, Fever, unspecified fever cause, and GEOVANI (acute kidney injury) ([facility] [address] were also pertinent to Dependent/Total;2 Person assistance     Transfers  Sit to stand: 2 Person assistance; Moderate assistance;Minimal assistance  Stand to sit: Moderate assistance;2 Person assistance  Transfer Comments: stood from bed with max A x 2 (pt has difficulty getting feet under pt d/t rigid); stood 3x from chair to [address] [place] [address] with mod A x 2        Cognition  Overall Cognitive Status: Exceptions  Arousal/Alertness: Delayed responses to stimuli  Following Commands: Follows one step commands consistently; Follows one step commands with repetition  Attention Span: Attends with cues to redirect  Memory: Decreased short term memory;Decreased recall of recent events  Safety Judgement: Decreased awareness of need for assistance  Insights: Decreased awareness of deficits  Initiation: Requires cues for some  Sequencing: Requires cues for some           Type of ROM/Therapeutic Exercise  Comment: forward reaching with chandler UEs when standing in christy stedy 5x       Plan   Plan  Times per week: 3-5  Current Treatment Recommendations: Strengthening, Endurance Training, Balance Training, Gait Training, Self-Care / ADL, Functional Mobility Training    AM-PAC Score  AM-[facility] Inpatient Daily Activity Raw Score: 11 (06/02/21 1134)  AM-PAC Inpatient ADL T-Scale Score : 29.04 (06/02/21 1134)  ADL Inpatient CMS 0-100% Score: 70.42 (06/02/21 1134)  ADL Inpatient CMS G-Code Modifier : CL (06/02/21 1134)    Goals  Short term goals  Time Frame for Short term goals: Prior to DC: goals ongoing  Short term goal 1: Pt will complete ADL transfers with min A  Short term goal 2: Pt will tolerate sitting EOB in prep for transfers with SBA  Short term goal 3: Pt will complete UE exercises in all planes to inc strength/endurance for ADLs  Short term goal 4: Pt will tolerate standing > 3 min for functional task with CGA  Short term goal 5: Pt will complete grooming tasks with SBA  Long term goals  Time Frame for Long term goals : stgs=ltgs  Patient Goals   Patient goals : no goal stated       Therapy Time   Individual Concurrent Group Co-treatment   Time In 1000         Time Out 1040         Minutes 40         Timed Code Treatment Minutes: 40 Minutes     This note to serve as OT d/c summary if pt is d/c-ed prior to next therapy session.     Jasen Valdez, OTR/L

## 2021-06-02 NOTE — PROGRESS NOTES
Physical Therapy  Facility/Department: [address] ICU  Daily Treatment Note  NAME: Aldo Pope  : 1935  MRN: [id number]    Date of Service: 2021    Discharge Recommendations:  Continue to assess pending progress   PT Equipment Recommendations  Other: Defer to next level of care. Assessment   Body structures, Functions, Activity limitations: Decreased functional mobility ; Decreased strength;Decreased safe awareness;Decreased cognition;Decreased endurance  Assessment: 81 y/o male  admitted 21 with Acute Urinary Retention, Gross Hematuria, GEOVANI. Pt reportedly pulled out catheter at nursing facility, [facility], which resulted in significant bleeding, \"gushed approximately 500cc of blood. \" Pt currently on continuous bladder irrigation. PMHx significant or GEOVANI, ETOH abuse, DVT, A-fib, Splenectomy, LE ulcers, Seizure disorder, LE weakness w/ frequent falls. Pt admit from nursing facility (LTC); pt pivot transfers only (nonamb) with assist.  Pt currently requiring Mod/Max assist x 2 for oob via Stedy. Anticipate return to facility; potential cont PT Services at discretion of facility. Unsure extent progress with functional activities upon d/c. Will cont to monitor pt's progress. Prognosis: Fair  Decision Making: Medium Complexity  History: 81 y/o male  admitted 21 with Acute Urinary Retention, Gross Hematuria, GEOVANI. Pt reportedly pulled out catheter at nursing facility, [facility], which resulted in significant bleeding, \"gushed approximately 500cc of blood. \" Pt currently on continuous bladder irrigation. PMHx significant or GEOVANI, ETOH abuse, DVT, A-fib, Splenectomy, LE ulcers, Seizure disorder, LE weakness w/ frequent falls. Exam: See above. Clinical Presentation: See above. Patient Education: Role of PT, POC, Need to call for assist, OOB via Stedy. Barriers to Learning: Cognitive.   REQUIRES PT FOLLOW UP: Yes  Activity Tolerance  Activity Tolerance: Patient limited by cognitive status  Activity Tolerance: Pt with flucctuating efforts/slow following commands. Transfers cont Mod/Max assist x 2; improves with increase pt effort/engage during activity. Once upright, pt able to stand very briefly with Min assist during use of Stedy. Patient Diagnosis(es): The primary encounter diagnosis was Acute urinary retention. Diagnoses of Gross hematuria, Fever, unspecified fever cause, and GEOVANI (acute kidney injury) ([address] were also pertinent to this visit. has a past medical history of GEOVANI (acute kidney injury) ([address] Alcohol abuse, Atrial fibrillation ([address] BPH (benign prostatic hyperplasia), Dehydration, [facility] ([address] Leg weakness, bilateral, Seizure ([address] and UTI (urinary tract infection). has a past surgical history that includes Splenectomy, total and Cataract removal (Bilateral). Restrictions  Restrictions/Precautions  Restrictions/Precautions: Fall Risk  Position Activity Restriction  Other position/activity restrictions: O2 2L via NC. Subjective   General  Chart Reviewed: Yes  Additional Pertinent Hx: 79 y/o male  admitted 5/31/21 with Acute Urinary Retention, Gross Hematuria, GEOVANI. Pt reportedly pulled out catheter at nursing facility, [facility], which resulted in significant bleeding, \"gushed approximately 500cc of blood. \" Pt currently on continuous bladder irrigation. PMHx significant or GEOVANI, ETOH abuse, DVT, A-fib, Splenectomy, LE ulcers, Seizure disorder, LE weakness w/ frequent falls. Response To Previous Treatment: Patient with no complaints from previous session. Family / Caregiver Present: No  Referring Practitioner: Dr. Daiana Arreola: Pt agreeable to PT Rx.           Orientation  Orientation  Overall Orientation Status: Impaired  Orientation Level: Oriented to person;Oriented to place (Pt confused to recent events/situation.)  Cognition   Cognition  Overall Cognitive Status: Exceptions  Arousal/Alertness: Delayed responses to stimuli  Following Commands: Follows one step commands consistently; Follows one step commands with repetition  Attention Span: Attends with cues to redirect  Memory: Decreased short term memory;Decreased recall of recent events  Safety Judgement: Decreased awareness of need for assistance  Insights: Decreased awareness of deficits  Initiation: Requires cues for some  Sequencing: Requires cues for some  Objective   Bed mobility  Supine to Sit: Maximum assistance;2 Person assistance (HOB elevated. Pt very stiff/rigid with mvt.)  Comment: Ongoing cues to promote optimal pt effort. Transfers  Sit to Stand: Minimal Assistance; Moderate Assistance;2 Person Assistance (Via [place]gie. Seat height dependent; flucctuating pt effort.)  Stand to sit: Moderate Assistance;2 Person Assistance (Via [place]; diminished control descent to chair.)  Bed to Chair: Dependent/Total (Via Sedy.)  Comment: Transfer from EOB via [place] Max assist x 2. Transfers x 3 attempts from chair with Mod assist x 2 (difficulty obtaining optimal foot place due to general tight knees. Ambulation  Ambulation?:  (Pt attempt \"amb in place\" 10 steps during use of Stedy, min assist.  Ongoing cues for more upright posture. Cont cues/redirect to activity.)        Exercises  Comments: Initiated gentle Knee Flex Stretch at eob/during use of Stedy. Comment: Pt stood briefly during use of Stedy with Min assist  for care/linen change following small bowel incontinence. AM-PAC Score  -PAC Inpatient Mobility Raw Score : 9 (06/02/21 1312)  -PAC Inpatient T-Scale Score : 30.55 (06/02/21 1312)  Mobility Inpatient CMS 0-100% Score: 81.38 (06/02/21 1312)  Mobility Inpatient CMS G-Code Modifier : CM (06/02/21 1312)          Goals  Short term goals  Time Frame for Short term goals: Upon d/c acute care setting.   Short term goal 1: Bed Mob Mod assist.  Short term goal 2: Transfers via [employer] assist.  Short term goal 3: Attempt Transfers with assist device as approp. Patient Goals   Patient goals : None stated.     Plan    Plan  Times per week: 3-5x/week in acute setting  Current Treatment Recommendations: Strengthening, Functional Mobility Training, Transfer Training, Gait Training, Safety Education & Training, Patient/Caregiver Education & Training  Safety Devices  Type of devices: Call light within reach, Left in chair, Nurse notified     Therapy Time   Individual Concurrent Group Co-treatment   Time In 1000         Time Out 1040         Minutes 1200 [facility],

## 2021-06-02 NOTE — PROGRESS NOTES
Pt Name: Jania Ybarra  Medical Record Number: [id number]  Date of Birth 1935   Today's Date: 6/2/2021      Subjective:  Awake in bed, eating breakfast. CBI off x 24hrs. ROS: Constitutional: No fever    Vitals:  Vitals:    06/02/21 0715 06/02/21 0751 06/02/21 0801 06/02/21 1002   BP: (!) 103/55  (!) 97/58 120/76   Pulse: 68  71 87   Resp: 20 21 20 26   Temp:   98.8 °F (37.1 °C)    TempSrc:   Axillary    SpO2: 98% 98% 97% 99%   Weight:       Height:         I/O last 3 completed shifts: In: 4958.1 [P.O.:1800; I.V.:2858.1;  IV Piggyback:300]  Out: 2810 [Urine:2810]    Exam:  General: Awake, oriented x 2, no acute distress  Respiratory: Nonlabored breathing  Abdomen: Soft, non-tender, non-distended, no masses  : Ambrosio catheter intact  Skin: Skin color, texture, turgor normal, no rashes or lesions  Neurologic: no gross deficits    CURRENT MEDICATIONS   Scheduled Meds:   piperacillin-tazobactam  3,375 mg Intravenous Q8H    insulin lispro  0-6 Units Subcutaneous TID WC    insulin lispro  0-3 Units Subcutaneous Nightly    multivitamin  1 tablet Oral Daily    thiamine mononitrate  100 mg Oral Daily    lactobacillus  1 capsule Oral Daily with breakfast    zinc sulfate  50 mg Oral Daily    tamsulosin  0.4 mg Oral Daily    ascorbic acid  1,000 mg Oral Daily    vitamin D  5,000 Units Oral Daily    pantoprazole  40 mg Oral BID    [Held by provider] apixaban  5 mg Oral BID    levETIRAcetam  500 mg Oral BID    sodium chloride flush  5-40 mL Intravenous 2 times per day     Continuous Infusions:   dextrose      sodium chloride      sodium chloride 100 mL/hr at 06/02/21 0626     PRN Meds:.glucose, dextrose, glucagon (rDNA), dextrose, sodium chloride flush, sodium chloride, promethazine **OR** ondansetron, polyethylene glycol, acetaminophen **OR** acetaminophen    LABS     Recent Labs     05/31/21  1253 05/31/21  1254 06/01/21  0405 06/01/21  2203 06/02/21  0440   WBC 26.2*  --  28.2* 19.1* 18.7*   HGB 10.1*  --  8.3* 7.7* 7.3*   HCT 29.8*  --  24.9* 23.2* 21.9*     --  319 302 290     --  140  --  136   K 4.7  --  4.2  --  3.7   CL 99  --  105  --  104   CO2 27  --  25  --  25   BUN 42*  --  29*  --  21*   CREATININE 1.7*  --  1.1  --  0.9   MG  --   --  2.10  --  1.90   PHOS  --   --  3.8  --  3.1   CALCIUM 8.8  --  8.2*  --  7.9*   INR  --  2.26*  --   --   --    AST 20  --   --   --   --    ALT 9*  --   --   --   --    BILITOT 1.1*  --   --   --   --        ASSESSMENT   1. Hospital day # 2   2. 85y. o. male admitted for gross hematuria s/p traumatic catheter removal by patient. 3. Hematuria resolved off CBI. PLAN   1. Leave spann catheter long term. Already has f/u appt outpatient later this month. 2. Urine culture positive proteus- sensitivity pending, continue antibiotics     Will sign off, call with questions.     CHRISTINE Sargent CNP 6/2/2021 11:02 AM

## 2021-06-02 NOTE — CARE COORDINATION
Spoke with the patient in the room. He confirms he plans to return to [place] when D/C from the hospital.  Ember Garzon RN, BSN, Case Management  Phone: [phone]  Electronically signed by Ember Garzon RN on 6/2/2021 at 10:41 AM     Updated Manohar Millerr at [facility] of denial of skilled stay and will need to come back LTC.    Ember Garzon RN, BSN, Case Management  Phone: [phone]  Electronically signed by Ember Garzon RN on 6/2/2021 at 5:27 PM

## 2021-06-02 NOTE — PROGRESS NOTES
2046: Pt resting in bed w/eyes closed. No facial grimace, respirations easy/even on 2L via nasal cannula. 0745: FSBS 109, no coverage needed at this time. Temp 98.5,Lungs clear/diminished. Pt denies pain/nausea. No meatal drainage, urine output clear w/minimal sediment. 5720: Verbal orer to DC femoral line and patient can transfer out of ICU.    7427: Potassium/Calcium/Mg replacements started at this time per MD order. Will dc femoral line once completed. 1005: Pt up to chair w/physical therapy. 1100: Pt refuses to return to bed at this time. Will remove femoral line when patient returns to bed.    1205: Pt son at bedside. Pt requests to eat lunch in chair and will return to bed once completed. 1345: Pt stedy X2 back to bed at this time. Max assist. Pt positioned for comfort. Call light within reach. 1405: Rt femoral CVC triple lumen removed at this time. Sutures removed, pt follows instructions to hold breath for removal, and pressure applied for 5 minutes following removal. Gauze/tegarderm applied. Pt tolerates well. IVF/ABX to infuse per left forearm IV.    1626: Pt resting w/eyes closed, no facial grimace. Respirations easy/even on 2L via nasal cannula. Pt awakens easily to name. Alert/oriented X4. FSBS 112, pt requests to await dietary to order dinner. Pt refuses reposition. Denies any needs at this time. Call light within reach, Bed alarm intact. 1843: Pt in bed eating dinner at this time. Denies any needs. Call light within reach. Bed alarm intact.     Electronically signed by Minh Agudelo RN on 6/2/2021 at 6:43 PM

## 2021-06-02 NOTE — PROGRESS NOTES
Pulmonary Progress Note    Date of Admission: 5/31/2021   LOS: 2 days       CC:  Hematuria shock    Subjective:  Improving blood pressure. Feeling better. Hematuria resolving. ROS:   No nausea  No Vomiting  No chest pain    Assessment:     History of COVID-19 December 2020 with bilateral pulmonary embolism  Alcohol abuse  History of seizure disorder  Schizophrenia  Urinary retention with chronic Ambrosio with hematuria after accidental removal  BPH  Atrial fibrillation  Seizure disorder    Plan:        Hospital Day: 2     Hematuria  -wean to daily checks.   - CBI        Sepsis with shock with lactic acidosis  -Culture positive for Proteus. Sensitivities pending. Continue Zosyn.  -Blood pressure improved. Lactate downtrending.        Acute kidney injury  -Downtrending labs with IV fluid. BUN downtrending.        History of pulmonary embolism  -Admitted December 2020 with pulmonary embolism, bilateral, with concurrent diagnosis of COVID-19.  -Patient was admitted on Eliquis. With hematuria and shock, this was reversed with Kcentra.        Chart history atrial fibrillation with a history of DVT/PE concurrent with COVID-19 December 2020  - off Eliquis  -Discussed with hospitalist.  Has completed 3 months of treatment. If able to complete 6 months, would be ideal from PE standpoint otherwise, 3 months would be sufficient.  -Chart history of atrial fibrillation but not able to find this on EKG. Patient started on anticoagulation secondary to PE.   Therefore, does not appear patient needs anticoagulation for this history of atrial fibrillation.     Seizure disorder  -Keppra     BPH with hematuria   -Urology consultation  -Flomax  -Ambrosio  - CBI    Nutrition  - DIET GENERAL; Carb Control: 4 carb choices (60 gms)/meal  -     Intake/Output Summary (Last 24 hours) at 6/2/2021 0819  Last data filed at 6/2/2021 0802  Gross per 24 hour   Intake 4958.1 ml   Output 2435 ml   Net 2523.1 ml       Access  Arterial      PICC  sodium chloride      sodium chloride 100 mL/hr at 06/02/21 0626       PRN Meds:  glucose, dextrose, glucagon (rDNA), dextrose, sodium chloride flush, sodium chloride, promethazine **OR** ondansetron, polyethylene glycol, acetaminophen **OR** acetaminophen    Labs reviewed:  CBC:   Recent Labs     06/01/21  0405 06/01/21  2203 06/02/21  0440   WBC 28.2* 19.1* 18.7*   HGB 8.3* 7.7* 7.3*   HCT 24.9* 23.2* 21.9*   MCV 91.6 92.1 91.7    302 290     BMP:   Recent Labs     05/31/21  1253 06/01/21  0405 06/02/21  0440    140 136   K 4.7 4.2 3.7   CL 99 105 104   CO2 27 25 25   PHOS  --  3.8 3.1   BUN 42* 29* 21*   CREATININE 1.7* 1.1 0.9     LIVER PROFILE:   Recent Labs     05/31/21  1253   AST 20   ALT 9*   BILITOT 1.1*   ALKPHOS 126     PT/INR:   Recent Labs     05/31/21  1254   PROTIME 26.4*   INR 2.26*     APTT: No results for input(s): APTT in the last 72 hours. UA:  Recent Labs     05/31/21 2015   COLORU DARK YELLOW*   PHUR 6.5   WBCUA 832*   RBCUA >100*   CLARITYU TURBID*   SPECGRAV 1.015   LEUKOCYTESUR LARGE*   UROBILINOGEN 4.0*   BILIRUBINUR LARGE*   BLOODU LARGE*   GLUCOSEU 100*     No results for input(s): PH, PCO2, PO2 in the last 72 hours. Cx:      Films:  Radiology Review:  Pertinent images / reports were reviewed as a part of this visit. CT Chest w/ contrast: No results found for this or any previous visit. CT Chest w/o contrast: No results found for this or any previous visit. CTPA: Results for orders placed during the hospital encounter of 12/22/20    CT CHEST PULMONARY EMBOLISM W CONTRAST    Narrative  EXAMINATION:  CTA OF THE CHEST 12/22/2020 1:58 pm    TECHNIQUE:  CTA of the chest was performed after the administration of intravenous  contrast.  Multiplanar reformatted images are provided for review. MIP  images are provided for review.  Dose modulation, iterative reconstruction,  and/or weight based adjustment of the mA/kV was utilized to reduce the  radiation dose to as low as reasonably achievable. COMPARISON:  None. HISTORY:  ORDERING SYSTEM PROVIDED HISTORY: hypoxic / covid positive / rule out PE  TECHNOLOGIST PROVIDED HISTORY:  Reason for exam:->hypoxic / covid positive / rule out PE  Reason for Exam: hypoxic / covid positive / rule out PE  Acuity: Acute  Type of Exam: Initial    FINDINGS:  Motion artifact degrades the study. Thyroid gland appears normal.  Trace pericardial fluid is seen. Small to  moderate size hiatal hernia is seen. There is nonspecific thickening at the  GE junction. Coronary artery calcification is seen. Aortic valve calcification is seen. Ascending aorta is mildly dilated measuring 4.4 cm    No embolus is seen in the central, right, or left main pulmonary artery. On  the right, small pulmonary emboli are seen in the right upper lobe. Small  embolus is seen in the right middle lobe pulmonary artery. .  Small emboli  embolus is seen peripherally in the right lower lobe pulmonary artery    On the left, small embolus is seen posteriorly in the left upper lobe. There  are also likely small emboli peripherally in the left lower lobe pulmonary  artery    Subtle ground-glass opacity seen in the right upper lobe. Bandlike opacities  in the right middle lobe. Bandlike opacities in the right lower lobe    On the left subtle ground-glass opacity is seen in the left upper lobe. Bandlike opacity seen in the left lower lobe. Spurring is seen in the spine. Spurring is seen in the shoulder joints. Gallstones are seen. Contrast is seen in the gallbladder    Impression  Bilateral pulmonary emboli. No obvious right heart strain by CT scan. Scattered ground-glass opacity is seen throughout the lungs either due to  atelectasis, or post inflammatory-and infectious change. No focal  wedge-shaped area of consolidation is seen to suggest infarct    Mildly dilated ascending aorta. .  Trace aortic valve calcification is seen.    Cholelithiasis    Results discussed with Chava Jacques by Ermelinda Juan. Deangelo Gregory MD at 2:41  pm on 12/22/2020      CXR PA/LAT: Results for orders placed during the hospital encounter of 01/06/13    XR Chest Standard TWO VW    Narrative  AP \T\ LATERAL CHEST X-RAY    INDICATION-    WEAKNESS    COMPARISON- 8/10/2011    FINDINGS- Moderate cardiomegaly is present. The pulmonary  vascularity is borderline. The lungs are clear without  infiltrates, new nodules or pleural effusions. IMPRESSION- Moderate cardiomegaly with borderline pulmonary  vascularity. Dictated on- 01/07/2013 [id number]    Electronically Read By- Judson Gonzales  Electronically Released By- [employer]  Released Date Time- 01/07/13 [address] [place] [address]  ------------------------------------------------------------------------------      CXR portable: Results for orders placed during the hospital encounter of 05/31/21    XR CHEST PORTABLE    Narrative  EXAMINATION:  ONE XRAY VIEW OF THE CHEST    5/31/2021 2:18 pm    COMPARISON:  02/11/2021    HISTORY:  ORDERING SYSTEM PROVIDED HISTORY: fever  TECHNOLOGIST PROVIDED HISTORY:  Reason for exam:->fever  Reason for Exam: fever  Acuity: Acute  Type of Exam: Initial    FINDINGS:  Cardiomegaly. Prominent central pulmonary vessels. Tortuous thoracic aorta. Lungs grossly clear. .  Costophrenic angles sharp    Impression  Cardiomegaly with pulmonary vascular congestion. No edema or pneumonia  demonstrated             This note was transcribed using [id number]. Please disregard any translational errors.       John Huggins [facility], [facility] [phone]

## 2021-06-02 NOTE — PROGRESS NOTES
Hospitalist Progress Note      PCP: Malena Favre, MD    Date of Admission: 5/31/2021    Chief Complaint: Urinary catheter problem    Hospital Course: 80-year-old male with past medical history of seizure, benign prostatic hyperplasia, DVT, atrial fibrillation presented to the hospital after pulling out his Ambrosio catheter this morning with approximately 500 cc of blood loss immediately. Patient is not really able to provide much history but he does state that he just pulled out his Ambrosio. He does not really say why he did that. Arrival to the hospital patient was noted to be tachycardic. Lab work showed elevated white count 26. Creatinine was noted to be 1.7 much higher than his baseline of around 1.8. Since pulling out his Ambrosio patient has not been able to urinate. On arrival he was noted to have a bladder distended up to his umbilicus. While in the ED an irrigating catheter was placed with return of grossly bloody urine which required around 5 to 6 L of irrigation before it cleared up. There was also passage of extensive clots. 6/1 Patient is alert and oriented x4. Bladder irrigation appears to have worked and urine is cleared up. Blood cultures growing Proteus and this is likely from indwelling Ambrosio catheter. Started on broad-spectrum IV antibiotics. Still requiring pressors for support. Subjective: Patient seen and examined. Weaned off of phenylephrine last night. Still requiring supplemental oxygen as when taken off you desaturate down to 80. Renal function has gone back to normal.  Hemoglobin continues to trend down from 10.1 on admission to 7.3.   Urine now positive for Proteus awaiting sensitivity to narrow down antibiotics    Medications:  Reviewed    Infusion Medications    phenylephrine (MARK-SYNEPHRINE) 50mg/250mL infusion Stopped (06/02/21 0606)    dextrose      sodium chloride      sodium chloride 100 mL/hr at 06/02/21 0626     Scheduled Medications    Refill: Brisk,< 3 seconds   Peripheral Pulses: +2 palpable, equal bilaterally       Labs:   Recent Labs     06/01/21  0405 06/01/21  2203 06/02/21  0440   WBC 28.2* 19.1* 18.7*   HGB 8.3* 7.7* 7.3*   HCT 24.9* 23.2* 21.9*    302 290     Recent Labs     05/31/21  1253 06/01/21  0405 06/02/21  0440    140 136   K 4.7 4.2 3.7   CL 99 105 104   CO2 27 25 25   BUN 42* 29* 21*   CREATININE 1.7* 1.1 0.9   CALCIUM 8.8 8.2* 7.9*   PHOS  --  3.8 3.1     Recent Labs     05/31/21  1253   AST 20   ALT 9*   BILITOT 1.1*   ALKPHOS 126     Recent Labs     05/31/21  1254   INR 2.26*     No results for input(s): Krys Grumet in the last 72 hours. Urinalysis:      Lab Results   Component Value Date    NITRU POSITIVE 05/31/2021    WBCUA 832 05/31/2021    BACTERIA 4+ 08/10/2011    RBCUA >100 05/31/2021    BLOODU LARGE 05/31/2021    SPECGRAV 1.015 05/31/2021    GLUCOSEU 100 05/31/2021    GLUCOSEU Negative 08/10/2011       Radiology:  XR CHEST PORTABLE   Final Result   Cardiomegaly with pulmonary vascular congestion. No edema or pneumonia   demonstrated                 Assessment/Plan:    Septic shock  Likely secondary to Proteus UTI and bacteremia  Continue IV fluids  Continue broad-spectrum IV antibiotics with Pseudomonas  Lactic trended to normal  Resolved    Urinary tract infection  Secondary to indwelling Ambrosio catheter  Proteus mirabilis, await sensitivity    Proteus bacteremia  Likely secondary to UTI  Continue broad-spectrum IV antibiotics    Hematuria  Likely secondary to traumatic Ambrosio removal  Continue bladder irrigation per urology    GEOVANI  Resolved with IV fluids   creatinine 1.7 admission down to 0.9    Paroxysmal atrial fibrillation  Hold Eliquis given hematochezia    Seizure disorder  Continue home Keppra    BPH  Continue Flomax    DVT Prophylaxis: Hold  Diet: DIET GENERAL; Carb Control: 4 carb choices (60 gms)/meal  Code Status: Full Code    PT/OT Eval Status:  Will need    Dispo -can likely transfer

## 2021-06-03 LAB
ANION GAP SERPL CALCULATED.3IONS-SCNC: 8 MMOL/L (ref 3–16)
BASOPHILS ABSOLUTE: 0 K/UL (ref 0–0.2)
BASOPHILS RELATIVE PERCENT: 0.4 %
BUN BLDV-MCNC: 14 MG/DL (ref 7–20)
CALCIUM SERPL-MCNC: 8.3 MG/DL (ref 8.3–10.6)
CHLORIDE BLD-SCNC: 106 MMOL/L (ref 99–110)
CO2: 24 MMOL/L (ref 21–32)
CREAT SERPL-MCNC: 0.8 MG/DL (ref 0.8–1.3)
CULTURE, BLOOD 2: ABNORMAL
CULTURE, BLOOD 2: ABNORMAL
EOSINOPHILS ABSOLUTE: 0.5 K/UL (ref 0–0.6)
EOSINOPHILS RELATIVE PERCENT: 4.4 %
GFR AFRICAN AMERICAN: >60
GFR NON-AFRICAN AMERICAN: >60
GLUCOSE BLD-MCNC: 107 MG/DL (ref 70–99)
GLUCOSE BLD-MCNC: 111 MG/DL (ref 70–99)
GLUCOSE BLD-MCNC: 113 MG/DL (ref 70–99)
GLUCOSE BLD-MCNC: 122 MG/DL (ref 70–99)
GLUCOSE BLD-MCNC: 126 MG/DL (ref 70–99)
GLUCOSE BLD-MCNC: 131 MG/DL (ref 70–99)
GLUCOSE BLD-MCNC: 97 MG/DL (ref 70–99)
HCT VFR BLD CALC: 23.2 % (ref 40.5–52.5)
HEMOGLOBIN: 7.8 G/DL (ref 13.5–17.5)
LYMPHOCYTES ABSOLUTE: 1.8 K/UL (ref 1–5.1)
LYMPHOCYTES RELATIVE PERCENT: 17.5 %
MAGNESIUM: 2.1 MG/DL (ref 1.8–2.4)
MCH RBC QN AUTO: 31 PG (ref 26–34)
MCHC RBC AUTO-ENTMCNC: 33.8 G/DL (ref 31–36)
MCV RBC AUTO: 91.8 FL (ref 80–100)
MONOCYTES ABSOLUTE: 1.3 K/UL (ref 0–1.3)
MONOCYTES RELATIVE PERCENT: 13 %
NEUTROPHILS ABSOLUTE: 6.7 K/UL (ref 1.7–7.7)
NEUTROPHILS RELATIVE PERCENT: 64.7 %
ORGANISM: ABNORMAL
ORGANISM: ABNORMAL
PDW BLD-RTO: 15.8 % (ref 12.4–15.4)
PERFORMED ON: ABNORMAL
PERFORMED ON: NORMAL
PHOSPHORUS: 3 MG/DL (ref 2.5–4.9)
PLATELET # BLD: 300 K/UL (ref 135–450)
PMV BLD AUTO: 8.5 FL (ref 5–10.5)
POTASSIUM SERPL-SCNC: 4 MMOL/L (ref 3.5–5.1)
RBC # BLD: 2.53 M/UL (ref 4.2–5.9)
SODIUM BLD-SCNC: 138 MMOL/L (ref 136–145)
WBC # BLD: 10.4 K/UL (ref 4–11)

## 2021-06-03 PROCEDURE — 94761 N-INVAS EAR/PLS OXIMETRY MLT: CPT

## 2021-06-03 PROCEDURE — 2580000003 HC RX 258: Performed by: EMERGENCY MEDICINE

## 2021-06-03 PROCEDURE — 2580000003 HC RX 258: Performed by: STUDENT IN AN ORGANIZED HEALTH CARE EDUCATION/TRAINING PROGRAM

## 2021-06-03 PROCEDURE — 80048 BASIC METABOLIC PNL TOTAL CA: CPT

## 2021-06-03 PROCEDURE — 2580000003 HC RX 258: Performed by: INTERNAL MEDICINE

## 2021-06-03 PROCEDURE — 2060000000 HC ICU INTERMEDIATE R&B

## 2021-06-03 PROCEDURE — 84100 ASSAY OF PHOSPHORUS: CPT

## 2021-06-03 PROCEDURE — 6360000002 HC RX W HCPCS: Performed by: INTERNAL MEDICINE

## 2021-06-03 PROCEDURE — 2700000000 HC OXYGEN THERAPY PER DAY

## 2021-06-03 PROCEDURE — 6370000000 HC RX 637 (ALT 250 FOR IP): Performed by: INTERNAL MEDICINE

## 2021-06-03 PROCEDURE — 36415 COLL VENOUS BLD VENIPUNCTURE: CPT

## 2021-06-03 PROCEDURE — 6360000002 HC RX W HCPCS: Performed by: STUDENT IN AN ORGANIZED HEALTH CARE EDUCATION/TRAINING PROGRAM

## 2021-06-03 PROCEDURE — 85025 COMPLETE CBC W/AUTO DIFF WBC: CPT

## 2021-06-03 PROCEDURE — 83735 ASSAY OF MAGNESIUM: CPT

## 2021-06-03 RX ADMIN — Medication 5000 UNITS: at 08:56

## 2021-06-03 RX ADMIN — Medication 1 CAPSULE: at 08:57

## 2021-06-03 RX ADMIN — OXYCODONE HYDROCHLORIDE AND ACETAMINOPHEN 1000 MG: 500 TABLET ORAL at 08:56

## 2021-06-03 RX ADMIN — PANTOPRAZOLE SODIUM 40 MG: 40 TABLET, DELAYED RELEASE ORAL at 08:57

## 2021-06-03 RX ADMIN — PANTOPRAZOLE SODIUM 40 MG: 40 TABLET, DELAYED RELEASE ORAL at 21:32

## 2021-06-03 RX ADMIN — ZINC SULFATE 220 MG (50 MG) CAPSULE 50 MG: CAPSULE at 08:57

## 2021-06-03 RX ADMIN — CEFTRIAXONE SODIUM 2000 MG: 2 INJECTION, POWDER, FOR SOLUTION INTRAMUSCULAR; INTRAVENOUS at 08:56

## 2021-06-03 RX ADMIN — PIPERACILLIN AND TAZOBACTAM 3375 MG: 3; .375 INJECTION, POWDER, LYOPHILIZED, FOR SOLUTION INTRAVENOUS at 02:36

## 2021-06-03 RX ADMIN — TAMSULOSIN HYDROCHLORIDE 0.4 MG: 0.4 CAPSULE ORAL at 08:57

## 2021-06-03 RX ADMIN — CEFTRIAXONE SODIUM 2000 MG: 2 INJECTION, POWDER, FOR SOLUTION INTRAMUSCULAR; INTRAVENOUS at 21:32

## 2021-06-03 RX ADMIN — Medication 100 MG: at 08:57

## 2021-06-03 RX ADMIN — LEVETIRACETAM 500 MG: 500 TABLET ORAL at 21:32

## 2021-06-03 RX ADMIN — THERA TABS 1 TABLET: TAB at 08:57

## 2021-06-03 RX ADMIN — Medication 10 ML: at 08:58

## 2021-06-03 RX ADMIN — LEVETIRACETAM 500 MG: 500 TABLET ORAL at 08:57

## 2021-06-03 RX ADMIN — SODIUM CHLORIDE: 9 INJECTION, SOLUTION INTRAVENOUS at 06:20

## 2021-06-03 ASSESSMENT — PAIN SCALES - GENERAL
PAINLEVEL_OUTOF10: 0

## 2021-06-03 NOTE — PROGRESS NOTES
Hospitalist Progress Note      PCP: Sagar Alvarez MD    Date of Admission: 5/31/2021    Chief Complaint: Urinary catheter problem    Hospital Course: 80-year-old male with past medical history of seizure, benign prostatic hyperplasia, DVT, atrial fibrillation presented to the hospital after pulling out his Ambrosio catheter this morning with approximately 500 cc of blood loss immediately. Patient is not really able to provide much history but he does state that he just pulled out his Ambrosio. He does not really say why he did that. Arrival to the hospital patient was noted to be tachycardic. Lab work showed elevated white count 26. Creatinine was noted to be 1.7 much higher than his baseline of around 1.8. Since pulling out his Ambrosio patient has not been able to urinate. On arrival he was noted to have a bladder distended up to his umbilicus. While in the ED an irrigating catheter was placed with return of grossly bloody urine which required around 5 to 6 L of irrigation before it cleared up. There was also passage of extensive clots. 6/1 Patient is alert and oriented x4. Bladder irrigation appears to have worked and urine is cleared up. Blood cultures growing Proteus and this is likely from indwelling Ambrosio catheter. Started on broad-spectrum IV antibiotics. Still requiring pressors for support. 6/2 Weaned off of phenylephrine last night. Still requiring supplemental oxygen as when taken off you desaturate down to 80. Renal function has gone back to normal.  Hemoglobin continues to trend down from 10.1 on admission to 7.3. Urine now positive for Proteus awaiting sensitivity to narrow down antibiotics    Subjective: Patient seen and examined. Patient is nearing discharge. Physical and Occupational Therapy recommend continued assessment. He was denied SNF stay by his insurance company.   Will likely need long-term care    Medications:  Reviewed    Infusion Medications    dextrose      sodium chloride      sodium chloride 100 mL/hr at 06/03/21 0620     Scheduled Medications    piperacillin-tazobactam  3,375 mg Intravenous Q8H    insulin lispro  0-6 Units Subcutaneous TID WC    insulin lispro  0-3 Units Subcutaneous Nightly    multivitamin  1 tablet Oral Daily    thiamine mononitrate  100 mg Oral Daily    lactobacillus  1 capsule Oral Daily with breakfast    zinc sulfate  50 mg Oral Daily    tamsulosin  0.4 mg Oral Daily    ascorbic acid  1,000 mg Oral Daily    vitamin D  5,000 Units Oral Daily    pantoprazole  40 mg Oral BID    [Held by provider] apixaban  5 mg Oral BID    levETIRAcetam  500 mg Oral BID    sodium chloride flush  5-40 mL Intravenous 2 times per day     PRN Meds: glucose, dextrose, glucagon (rDNA), dextrose, sodium chloride flush, sodium chloride, promethazine **OR** ondansetron, polyethylene glycol, acetaminophen **OR** acetaminophen      Intake/Output Summary (Last 24 hours) at 6/3/2021 0756  Last data filed at 6/3/2021 0600  Gross per 24 hour   Intake 2076.1 ml   Output 1805 ml   Net 271.1 ml       Physical Exam Performed:    /68   Pulse 74   Temp 98 °F (36.7 °C) (Oral)   Resp 21   Ht 5' 10\" (1.778 m)   Wt 219 lb 5.7 oz (99.5 kg)   SpO2 100%   BMI 31.47 kg/m²     General appearance: No apparent distress, appears stated age and cooperative. HEENT: Pupils equal, round, and reactive to light. Conjunctivae/corneas clear. Neck: Supple, with full range of motion. No jugular venous distention. Trachea midline. Respiratory:  Normal respiratory effort. Clear to auscultation  Cardiovascular: Regular rate and rhythm with normal S1/S2   Abdomen: Soft, non-tender, non-distended with normal bowel sounds. Musculoskeletal: No clubbing, cyanosis or edema bilaterally. Skin: Skin color, texture, turgor normal.  No rashes or lesions. Neurologic:  Neurovascularly intact without any focal sensory/motor deficits.  Cranial nerves: II-XII intact, grossly non-focal.  Psychiatric: Alert and oriented, thought content appropriate, normal insight  Capillary Refill: Brisk,< 3 seconds   Peripheral Pulses: +2 palpable, equal bilaterally       Labs:   Recent Labs     06/01/21  2203 06/02/21  0440 06/03/21  0430   WBC 19.1* 18.7* 10.4   HGB 7.7* 7.3* 7.8*   HCT 23.2* 21.9* 23.2*    290 300     Recent Labs     06/01/21  0405 06/02/21  0440 06/03/21  0430    136 138   K 4.2 3.7 4.0    104 106   CO2 25 25 24   BUN 29* 21* 14   CREATININE 1.1 0.9 0.8   CALCIUM 8.2* 7.9* 8.3   PHOS 3.8 3.1 3.0     Recent Labs     05/31/21  1253   AST 20   ALT 9*   BILITOT 1.1*   ALKPHOS 126     Recent Labs     05/31/21  1254   INR 2.26*     No results for input(s): Marletta [place] in the last 72 hours. Urinalysis:      Lab Results   Component Value Date    NITRU POSITIVE 05/31/2021    WBCUA 832 05/31/2021    BACTERIA 4+ 08/10/2011    RBCUA >100 05/31/2021    BLOODU LARGE 05/31/2021    SPECGRAV 1.015 05/31/2021    GLUCOSEU 100 05/31/2021    GLUCOSEU Negative 08/10/2011       Radiology:  XR CHEST PORTABLE   Final Result   Cardiomegaly with pulmonary vascular congestion.   No edema or pneumonia   demonstrated                 Assessment/Plan:    Septic shock  Likely secondary to Proteus UTI and bacteremia  Continue IV fluids  Continue broad-spectrum IV antibiotics  Lactic trended to normal  Resolved    Urinary tract infection  Secondary to indwelling Ambrosio catheter  Proteus mirabilis, sensitive to Rocephin, transition to oral antibiotics at discharge    Proteus bacteremia sens to Rocephin/Zosyn  Likely secondary to UTI  Change zosyn to rocephin    Hematuria  Likely secondary to traumatic Ambrosio removal  Continue bladder irrigation per urology    GEOVANI  Resolved with IV fluids   creatinine 1.7 admission down to 0.9    Paroxysmal atrial fibrillation  Hold Eliquis given hematochezia    Seizure disorder  Continue home Keppra    BPH  Continue Flomax    DVT Prophylaxis: Hold  Diet: DIET GENERAL; Carb Control: 4 carb choices (60 gms)/meal  Code Status: Full Code    PT/OT Eval Status: Continue to assess    Dispo -likely stable for discharge tomorrow    Danita Moore MD

## 2021-06-04 VITALS
DIASTOLIC BLOOD PRESSURE: 94 MMHG | RESPIRATION RATE: 25 BRPM | SYSTOLIC BLOOD PRESSURE: 163 MMHG | OXYGEN SATURATION: 99 % | TEMPERATURE: 98.3 F | HEIGHT: 70 IN | WEIGHT: 218.48 LBS | BODY MASS INDEX: 31.28 KG/M2 | HEART RATE: 89 BPM

## 2021-06-04 LAB
ANION GAP SERPL CALCULATED.3IONS-SCNC: 6 MMOL/L (ref 3–16)
BASOPHILS ABSOLUTE: 0.1 K/UL (ref 0–0.2)
BASOPHILS RELATIVE PERCENT: 0.8 %
BLOOD CULTURE, ROUTINE: NORMAL
BUN BLDV-MCNC: 7 MG/DL (ref 7–20)
CALCIUM SERPL-MCNC: 8 MG/DL (ref 8.3–10.6)
CHLORIDE BLD-SCNC: 105 MMOL/L (ref 99–110)
CO2: 23 MMOL/L (ref 21–32)
CREAT SERPL-MCNC: 0.7 MG/DL (ref 0.8–1.3)
EOSINOPHILS ABSOLUTE: 0.6 K/UL (ref 0–0.6)
EOSINOPHILS RELATIVE PERCENT: 6 %
GFR AFRICAN AMERICAN: >60
GFR NON-AFRICAN AMERICAN: >60
GLUCOSE BLD-MCNC: 133 MG/DL (ref 70–99)
GLUCOSE BLD-MCNC: 91 MG/DL (ref 70–99)
GLUCOSE BLD-MCNC: 95 MG/DL (ref 70–99)
HCT VFR BLD CALC: 24.4 % (ref 40.5–52.5)
HEMOGLOBIN: 8.5 G/DL (ref 13.5–17.5)
LYMPHOCYTES ABSOLUTE: 1.6 K/UL (ref 1–5.1)
LYMPHOCYTES RELATIVE PERCENT: 16.3 %
MAGNESIUM: 1.9 MG/DL (ref 1.8–2.4)
MCH RBC QN AUTO: 31.5 PG (ref 26–34)
MCHC RBC AUTO-ENTMCNC: 34.8 G/DL (ref 31–36)
MCV RBC AUTO: 90.3 FL (ref 80–100)
MONOCYTES ABSOLUTE: 1.2 K/UL (ref 0–1.3)
MONOCYTES RELATIVE PERCENT: 12.5 %
NEUTROPHILS ABSOLUTE: 6.2 K/UL (ref 1.7–7.7)
NEUTROPHILS RELATIVE PERCENT: 64.4 %
PDW BLD-RTO: 15.6 % (ref 12.4–15.4)
PERFORMED ON: ABNORMAL
PERFORMED ON: NORMAL
PHOSPHORUS: 2.9 MG/DL (ref 2.5–4.9)
PLATELET # BLD: 321 K/UL (ref 135–450)
PMV BLD AUTO: 8.8 FL (ref 5–10.5)
POTASSIUM SERPL-SCNC: 3.4 MMOL/L (ref 3.5–5.1)
RBC # BLD: 2.7 M/UL (ref 4.2–5.9)
SODIUM BLD-SCNC: 134 MMOL/L (ref 136–145)
WBC # BLD: 9.6 K/UL (ref 4–11)

## 2021-06-04 PROCEDURE — 36415 COLL VENOUS BLD VENIPUNCTURE: CPT

## 2021-06-04 PROCEDURE — 6370000000 HC RX 637 (ALT 250 FOR IP): Performed by: INTERNAL MEDICINE

## 2021-06-04 PROCEDURE — 2580000003 HC RX 258: Performed by: EMERGENCY MEDICINE

## 2021-06-04 PROCEDURE — 84100 ASSAY OF PHOSPHORUS: CPT

## 2021-06-04 PROCEDURE — 85025 COMPLETE CBC W/AUTO DIFF WBC: CPT

## 2021-06-04 PROCEDURE — 83735 ASSAY OF MAGNESIUM: CPT

## 2021-06-04 PROCEDURE — 97530 THERAPEUTIC ACTIVITIES: CPT

## 2021-06-04 PROCEDURE — 80048 BASIC METABOLIC PNL TOTAL CA: CPT

## 2021-06-04 PROCEDURE — 97110 THERAPEUTIC EXERCISES: CPT

## 2021-06-04 PROCEDURE — 6360000002 HC RX W HCPCS: Performed by: INTERNAL MEDICINE

## 2021-06-04 PROCEDURE — 2580000003 HC RX 258: Performed by: INTERNAL MEDICINE

## 2021-06-04 PROCEDURE — 97535 SELF CARE MNGMENT TRAINING: CPT

## 2021-06-04 PROCEDURE — 94761 N-INVAS EAR/PLS OXIMETRY MLT: CPT

## 2021-06-04 RX ORDER — POTASSIUM CHLORIDE 20 MEQ/1
40 TABLET, EXTENDED RELEASE ORAL ONCE
Status: COMPLETED | OUTPATIENT
Start: 2021-06-04 | End: 2021-06-04

## 2021-06-04 RX ORDER — CEFUROXIME AXETIL 500 MG/1
500 TABLET ORAL 2 TIMES DAILY
Qty: 20 TABLET | Refills: 0 | DISCHARGE
Start: 2021-06-04 | End: 2021-06-14

## 2021-06-04 RX ORDER — FUROSEMIDE 10 MG/ML
40 INJECTION INTRAMUSCULAR; INTRAVENOUS ONCE
Status: COMPLETED | OUTPATIENT
Start: 2021-06-04 | End: 2021-06-04

## 2021-06-04 RX ADMIN — Medication 1 CAPSULE: at 09:27

## 2021-06-04 RX ADMIN — Medication 10 ML: at 09:25

## 2021-06-04 RX ADMIN — PANTOPRAZOLE SODIUM 40 MG: 40 TABLET, DELAYED RELEASE ORAL at 09:24

## 2021-06-04 RX ADMIN — SODIUM CHLORIDE: 9 INJECTION, SOLUTION INTRAVENOUS at 02:05

## 2021-06-04 RX ADMIN — Medication 100 MG: at 09:25

## 2021-06-04 RX ADMIN — OXYCODONE HYDROCHLORIDE AND ACETAMINOPHEN 1000 MG: 500 TABLET ORAL at 09:25

## 2021-06-04 RX ADMIN — Medication 5000 UNITS: at 09:25

## 2021-06-04 RX ADMIN — CEFTRIAXONE SODIUM 2000 MG: 2 INJECTION, POWDER, FOR SOLUTION INTRAMUSCULAR; INTRAVENOUS at 09:24

## 2021-06-04 RX ADMIN — FUROSEMIDE 40 MG: 10 INJECTION, SOLUTION INTRAMUSCULAR; INTRAVENOUS at 09:24

## 2021-06-04 RX ADMIN — TAMSULOSIN HYDROCHLORIDE 0.4 MG: 0.4 CAPSULE ORAL at 09:24

## 2021-06-04 RX ADMIN — ZINC SULFATE 220 MG (50 MG) CAPSULE 50 MG: CAPSULE at 09:25

## 2021-06-04 RX ADMIN — POTASSIUM CHLORIDE 40 MEQ: 1500 TABLET, EXTENDED RELEASE ORAL at 09:25

## 2021-06-04 RX ADMIN — THERA TABS 1 TABLET: TAB at 09:25

## 2021-06-04 RX ADMIN — LEVETIRACETAM 500 MG: 500 TABLET ORAL at 09:25

## 2021-06-04 ASSESSMENT — PAIN SCALES - GENERAL
PAINLEVEL_OUTOF10: 0

## 2021-06-04 NOTE — PROGRESS NOTES
BPH (benign prostatic hyperplasia), Dehydration, [facility] ([address] Leg weakness, bilateral, Seizure ([address] and UTI (urinary tract infection). has a past surgical history that includes Splenectomy, total and Cataract removal (Bilateral). Restrictions  Restrictions/Precautions  Restrictions/Precautions: Fall Risk  Position Activity Restriction  Other position/activity restrictions: room air     Subjective   General  Chart Reviewed: Yes  Patient assessed for rehabilitation services?: Yes  Additional Pertinent Hx: 81 y/o male admitted 5/31/2021 with acute urinary retention, gross hematuria, GEOVANI. Pt reportedly pulled out catheter at [facility]. Family / Caregiver Present: No  Referring Practitioner: Dr. Best Stanley  Diagnosis: Urinary retention, gross hematuria, GEOVANI  Subjective  Subjective: Pt seen bedside and agreeable to therapy. Per RN ok for therapy. Pt wheezy at rest, worse with activity despite spO2 above 90% on room air. RN aware  General Comment  Comments: Pt [place]- R hearing aid      Objective    ADL  Grooming:  (assisted pt with washing and combing hair)  Toileting: Dependent/Total (max A x 2 to roll R/L to clean after BM and change bed linens)        Balance  Sitting Balance:  (EOB in prep for transfer- CGA/min A once feet positioned on groung)  Standing Balance  Time: stood prn with christy stedy to transfer to chair + another min for sit<> stands  Activity: stood 5x from christy stedy seat with CGA  Functional Mobility  Functional Mobility Comments: bed > chair via christy stedy and 2 assist     Bed mobility  Rolling to Left: Dependent/Total  Rolling to Right: Dependent/Total  Supine to Sit: Maximum assistance;2 Person assistance (HOB elevated. Pt very stiff/rigid as move to eob.)  Sit to Supine:  (Pt in chair at end of session)  Comment: Rolled R/L for care following bowel incontinence.      Transfers  Sit to stand: 2 Person assistance;Minimal assistance  Stand to sit: 2 Person assistance;Minimal assistance  Transfer Comments: Transfer from EOB and chair via [employer] assist.  Completed Transfers x 5 from seat of Stedy CGA. Additional Transfer from chair with Min/Mod assist to stedy     Cognition  Overall Cognitive Status: Exceptions  Arousal/Alertness: Delayed responses to stimuli  Following Commands: Follows one step commands consistently; Follows one step commands with repetition  Attention Span: Attends with cues to redirect  Memory: Decreased short term memory;Decreased recall of recent events  Safety Judgement: Decreased awareness of need for assistance  Insights: Decreased awareness of deficits  Initiation: Requires cues for some  Sequencing: Requires cues for some           Plan   Plan  Times per week: 3-5  Current Treatment Recommendations: Strengthening, Endurance Training, Balance Training, Gait Training, Self-Care / ADL, Functional Mobility Training    AM-PAC Score  AM-[facility] Inpatient Daily Activity Raw Score: 11 (06/02/21 1134)  AM-PAC Inpatient ADL T-Scale Score : 29.04 (06/02/21 1134)  ADL Inpatient CMS 0-100% Score: 70.42 (06/02/21 1134)  ADL Inpatient CMS G-Code Modifier : CL (06/02/21 1134)    Goals  Short term goals  Time Frame for Short term goals: Prior to DC: goals ongoing  Short term goal 1: Pt will complete ADL transfers with min A  Short term goal 2: Pt will tolerate sitting EOB in prep for transfers with SBA  Short term goal 3: Pt will complete UE exercises in all planes to inc strength/endurance for ADLs  Short term goal 4: Pt will tolerate standing > 3 min for functional task with CGA  Short term goal 5: Pt will complete grooming tasks with SBA  Long term goals  Time Frame for Long term goals : stgs=ltgs  Patient Goals   Patient goals : no goal stated       Therapy Time   Individual Concurrent Group Co-treatment   Time In 0800         Time Out 0840         Minutes 40         Timed Code Treatment Minutes: 40 Minutes     This note to serve as OT d/c summary if pt is d/c-ed prior to next therapy session.     Sandip Maddox, OTR/L

## 2021-06-04 NOTE — PROGRESS NOTES
Patient discharged per order. IV removed. Report called to RN at [facility]. Transport [id number]      Electronically signed by Leonides Mejia RN on 6/4/2021 at 3:52 PM Yes

## 2021-06-04 NOTE — DISCHARGE SUMMARY
This too was eventually weaned off and he was on room air at the time of discharge. Patient was seen by physical and Occupational Therapy and recommended 3 to 5 sessions per week. Patient was denied to SNF stay by his insurance so will need long-term care. Septic shock  Likely secondary to Proteus UTI and bacteremia  Continue IV fluids  Continue broad-spectrum IV antibiotics  Lactic trended to normal  Resolved     Urinary tract infection  Secondary to indwelling Ambrosio catheter  Proteus mirabilis, sensitive to Rocephin, transition to oral antibiotics at discharge     Proteus bacteremia sens to Rocephin/Zosyn  Likely secondary to UTI  Change zosyn to rocephin  Discharge on cefuroxime for 10 days twice daily     Hematuria  Likely secondary to traumatic Ambrosio removal  Continue bladder irrigation per urology     GEOVANI  Resolved with IV fluids   creatinine 1.7 admission down to 0.9     Paroxysmal atrial fibrillation  Hold Eliquis given hematochezia  Cannot find documented cases of atrial fibrillation, was not previously on anticoagulation for this either  Likely does not need anticoagulation for this    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 HEENT: Pupils equal, round, and reactive to light. Conjunctivae/corneas clear. Neck: Supple, with full range of motion. No jugular venous distention. Trachea midline. Respiratory:  Normal respiratory effort.  Clear to auscultation  Cardiovascular: Regular rate and rhythm with normal S1/S2   Abdomen: Soft, non-tender, non-distended with normal bowel sounds. Musculoskeletal: No clubbing, cyanosis or edema bilaterally. Skin: Skin color, texture, turgor normal.  No rashes or lesions. Neurologic:  Neurovascularly intact without any focal sensory/motor deficits. Cranial nerves: II-XII intact, grossly non-focal.  Psychiatric: Alert and oriented, thought content appropriate, normal insight  Capillary Refill: Brisk,< 3 seconds   Peripheral Pulses: +2 palpable, equal bilaterally          Consults:     IP CONSULT TO HOSPITALIST  IP CONSULT TO UROLOGY    Significant Diagnostic Studies:     XR CHEST PORTABLE   Final Result   Cardiomegaly with pulmonary vascular congestion. No edema or pneumonia   demonstrated             Disposition:  SNF     Condition at Discharge: Stable    Discharge Instructions/Follow-up:  PCP, stop eliquis at end of June 2021    Code Status:  Full Code     Activity: activity as tolerated    Diet: diabetic diet      Labs:  For convenience and continuity at follow-up the following most recent labs are provided:      CBC:    Lab Results   Component Value Date    WBC 9.6 06/04/2021    HGB 8.5 06/04/2021    HCT 24.4 06/04/2021     06/04/2021       Renal:    Lab Results   Component Value Date     06/04/2021    K 3.4 06/04/2021    K 4.2 06/01/2021     06/04/2021    CO2 23 06/04/2021    BUN 7 06/04/2021    CREATININE 0.7 06/04/2021    CALCIUM 8.0 06/04/2021    PHOS 2.9 06/04/2021       Discharge Medications:     Current Discharge Medication List           Details   cefUROXime (CEFTIN) 500 MG tablet Take 1 tablet by mouth 2 times daily for 10 days  Qty: 20 tablet, Refills: 0              Details   furosemide (LASIX) 20 MG tablet Take 20 mg by mouth every morning      levETIRAcetam (KEPPRA) 500 MG tablet TAKE 1 TABLET BY MOUTH  TWICE DAILY  Qty: 180 tablet, Refills: 3    Comments: Requesting 1 year supply      apixaban (ELIQUIS) 5 MG TABS tablet Take 1 tablet by mouth 2 times daily  Qty: 60 tablet, Refills: 0      tamsulosin (FLOMAX) 0.4 MG capsule Take 0.4 mg by mouth every morning       pantoprazole (PROTONIX) 40 MG tablet Take 40 mg by mouth every evening       Multiple Vitamin (MULTIVITAMIN) TABS tablet Take 1 tablet by mouth daily  Qty: 30 tablet, Refills: 0      collagenase 250 UNIT/GM ointment Apply topically nightly Apply to left knee, right knee, and left wrist nightly      acetaminophen (TYLENOL) 325 MG tablet Take 650 mg by mouth every 4 hours as needed for Fever      lactobacillus (CULTURELLE) capsule Take 1 capsule by mouth daily (with breakfast)  Qty: 30 capsule, Refills: 0             No future appointments. Time Spent on discharge is more than 30 minutes in the examination, evaluation, counseling and review of medications and discharge plan. Signed:    Carol Ann Schaeffer MD   6/4/2021      Thank you Meri Beltran MD for the opportunity to be involved in this patient's care. If you have any questions or concerns please feel free to contact me at [phone].

## 2021-06-04 NOTE — CARE COORDINATION
CASE MANAGEMENT DISCHARGE SUMMARY:    DISCHARGE DATE: 6/4/2021    DISCHARGED TO: [facility]                REPORT NUMBER: [phone]                FAX NUMBER: [phone]    TRANSPORTATION: [facility]             TIME: 2 pm     PREFERRED PHARMACY: at facility               INSURANCE PRECERT OBTAINED: N/A, LTC    ALEXSANDER/PASAAR COMPLETED: N/A, LTC Resident    Laila Melton RN, BSN, Case Management  [phone]  Electronically signed by Laila Melton RN on 6/4/2021 at 1:37 PM

## 2021-06-04 NOTE — DISCHARGE INSTR - COC
Continuity of Care Form    Patient Name: Perlita Taveras   :  1935  MRN:  [id number]    Admit date:  2021  Discharge date:  21      Code Status Order: Full Code   Advance Directives:      Admitting Physician:  Anaid Shelby MD  PCP: Johnson Boateng MD    Discharging Nurse: [facility] Unit/Room#: B8I-1509/2123-01  Discharging Unit Phone Number: [phone]      Emergency Contact:   Extended Emergency Contact Information  Primary Emergency Contact: Dee,Ed   [address] Phone: [phone]  Relation: Child  Secondary Emergency Contact: [address] [facility], [address] Phone: [phone]  Mobile Phone: [phone]  Relation: Child    Past Surgical History:  Past Surgical History:   Procedure Laterality Date    CATARACT REMOVAL Bilateral     SPLENECTOMY, TOTAL      at age 15 y old. Immunization History:   Immunization History   Administered Date(s) Administered    Pneumococcal Conjugate 7-valent ([employer]) 2014       Active Problems:  Patient Active Problem List   Diagnosis Code    Seizure ([address] R56.9    Lactic acidosis E87.2    Sepsis ([address] A41.9    Pneumonia due to COVID-19 virus U07.1, J12.82    Bilateral pulmonary embolism (HCC) I26.99    Elevated LFTs R79.89    Rhabdomyolysis M62.82    Facial trauma S09. 93XA    Bacterial pneumonia J15.9    Alcohol abuse F10.10    GEOVANI (acute kidney injury) ([address] N17.9    Acute urinary retention R33.8    Gross hematuria R31.0       Isolation/Infection:   Isolation            No Isolation          Patient Infection Status       Infection Onset Added Last Indicated Last Indicated By Review Planned Expiration Resolved Resolved By    None active    Resolved    COVID-19 Rule Out 02/15/21 02/15/21 02/15/21 COVID-19, Rapid (Ordered)   02/15/21 Rule-Out Test Resulted    COVID-19 20 COVID-19   21     COVID-19 Rule Out 20 COVID-19 (Ordered)   20 Rule-Out Test Resulted            Nurse Assessment:  Last Vital Signs: BP (!) 150/80   Pulse 84   Temp 98.5 °F (36.9 °C) (Oral)   Resp 21   Ht 5' 10\" (1.778 m)   Wt 218 lb 7.6 oz (99.1 kg)   SpO2 94%   BMI 31.35 kg/m²     Last documented pain score (0-10 scale): Pain Level: 0  Last Weight:   Wt Readings from Last 1 Encounters:   06/04/21 218 lb 7.6 oz (99.1 kg)     Mental Status:  oriented and alert    IV Access:  - None    Nursing Mobility/ADLs:  Walking   Dependent  Transfer  Dependent  Bathing  Dependent  Dressing  Assisted  Toileting  Dependent  Feeding  Independent  Med Admin  Assisted  Med Delivery   whole    Wound Care Documentation and Therapy:  Wound 12/22/20 Knee Anterior;Left;Mid;Outer abrasion (Active)   Number of days: 163       Wound 12/22/20 Face Left;Upper (Active)   Number of days: 163       Wound 12/22/20 Knee Right; Inner (Active)   Number of days: 163       Wound 12/22/20 Toe (Comment  which one) Anterior; Left 2nd toe Abrasion (Active)   Number of days: 163       Wound 12/22/20 Knee Left (Active)   Number of days: 163       Wound 12/22/20 Nose Abrasion (Active)   Number of days: 163        Elimination:  Continence:   · Bowel: No  · Bladder: Yes (spann)  Urinary Catheter: Insertion Date: 5/31/21   Colostomy/Ileostomy/Ileal Conduit: No       Date of Last BM: 6/4/21      Intake/Output Summary (Last 24 hours) at 6/4/2021 0914  Last data filed at 6/4/2021 0800  Gross per 24 hour   Intake 2547.3 ml   Output 2125 ml   Net 422.3 ml     I/O last 3 completed shifts: In: 2547.3 [I.V.:2392.3; IV Piggyback:155]  Out: 4870 [Urine:2275]    Safety Concerns: At Risk for Falls    Impairments/Disabilities:      Hearing    Nutrition Therapy:  Current Nutrition Therapy:   - Oral Diet:  General    Routes of Feeding: Oral  Liquids:  Thin Liquids  Daily Fluid Restriction: no  Last Modified Barium Swallow with Video (Video Swallowing Test): not done    Treatments at the Time of Hospital Discharge:   Respiratory Treatments: NA  Oxygen Therapy:  is not on home oxygen therapy. Ventilator:    - No ventilator support    Rehab Therapies: Physical Therapy and Occupational Therapy  Weight Bearing Status/Restrictions: No weight bearing restirctions  Other Medical Equipment (for information only, NOT a DME order):  Maxisky lift, Stedy  Other Treatments:     Patient's personal belongings (please select all that are sent with patient):  Hearing Aides right    RN SIGNATURE:  Electronically signed by Gretchen Chacon RN on 6/4/21 at 1:44 PM EDT    CASE MANAGEMENT/SOCIAL WORK SECTION    Inpatient Status Date: 05/31/2021    Readmission Risk Assessment Score:  Readmission Risk              Risk of Unplanned Readmission:  24           Discharging to Facility/ Agency   · [facility]: [facility]  · Address:  [address] [place] [address]   · Phone:  [phone]  · Fax:  [phone]    / signature: Electronically signed by Edgardo Dhillon RN on 6/4/21 at 1:35 PM EDT    PHYSICIAN SECTION    Prognosis: Fair    Condition at Discharge: Stable    Rehab Potential (if transferring to Rehab): Fair    Recommended Labs or Other Treatments After Discharge: PT, OT, stop eliquis at end of June 2021, finished 6 months of eliquis after BL PE    Physician Certification: I certify the above information and transfer of Dion Roberts  is necessary for the continuing treatment of the diagnosis listed and that he requires [facility] for greater 30 days.      Update Admission H&P: No change in H&P    PHYSICIAN SIGNATURE:  Electronically signed by Mayme Leyden, MD on 6/4/21 at 9:14 AM EDT

## 2021-06-04 NOTE — PROGRESS NOTES
Commands: Follows one step commands consistently; Follows one step commands with repetition  Attention Span: Attends with cues to redirect  Memory: Decreased short term memory;Decreased recall of recent events  Safety Judgement: Decreased awareness of need for assistance  Insights: Decreased awareness of deficits  Initiation: Requires cues for some  Sequencing: Requires cues for some  Objective   Bed mobility  Rolling to Left: Dependent/Total  Rolling to Right: Dependent/Total  Supine to Sit: Maximum assistance;2 Person assistance (HOB elevated. Pt very stiff/rigid as move to eob.)  Comment: Dependent Rolling R/L for care following bowel incontinence. Transfers  Sit to Stand: Minimal Assistance ([place] [place].)  Stand to sit: Minimal Assistance ([place] [place].)  Bed to Chair: Dependent/Total ([employer] [place].)  Comment: Transfer from EOB via [employer] assist.  Completed Transfers x 5 from [employer]. Additional Transfer from chair with Min/Mod assist  (difficulty obtaining optimal foot place due to general tight knees). Ambulation  Ambulation?: No (Pt nonamb at baseline. Pt did complete marching 10x from Stedy (standing) Min assist.)        Exercises  Hip Flexion: 10x B LEs. Knee Long Arc Quad: 10x B LEs. Ankle Pumps: 10x B LEs. AM-PAC Score  AM-PAC Inpatient Mobility Raw Score : 9 (06/04/21 0946)  AM-PAC Inpatient T-Scale Score : 30.55 (06/04/21 0946)  Mobility Inpatient CMS 0-100% Score: 81.38 (06/04/21 0946)  Mobility Inpatient CMS G-Code Modifier : CM (06/04/21 5680)          Goals  Short term goals  Time Frame for Short term goals: Upon d/c acute care setting. Short term goal 1: Bed Mob Mod assist.  Short term goal 2: Transfers via [employer] assist.  Short term goal 3: Attempt Transfers with assist device as approp. Patient Goals   Patient goals : None stated.     Plan    Plan  Times per week: 3-5x/week in acute setting  Current Treatment Recommendations: Strengthening, Functional Mobility Training, Transfer Training, Gait Training, Safety Education & Training, Patient/Caregiver Education & Training  Safety Devices  Type of devices: Call light within reach, Chair alarm in place, Left in chair, Nurse notified     Therapy Time   Individual Concurrent Group Co-treatment   Time In 0800         Time Out 0840         Minutes 1200 [facility],

## 2021-06-04 NOTE — CARE COORDINATION
D/C order noted. IMM reviewed with patient who states he is feeling much better and is ready to return to [place]. Transportation arranged for 2 pm with [facility]. ORDAVID at [place] updated as well as the patient's nurse. Patient states he will notify his family.   Eladio Sterling RN, BSN, Case Management  Phone: [phone]  Electronically signed by Eladio Sterling RN on 6/4/2021 at 10:41 AM

## 2021-07-02 ENCOUNTER — TELEPHONE (OUTPATIENT)
Dept: FAMILY MEDICINE CLINIC | Age: 86
End: 2021-07-02

## 2021-07-02 NOTE — TELEPHONE ENCOUNTER
Lucy curiel/ Gregorio Amin stated that they are going to see pt for the next 60 days for   PT   [address]   Skilled nursing     Pt also has +1 pitted edema and would like to know if she could have an order for an ace bandage or compression stockings? Pl advise.    Makeda   [phone]

## 2021-07-09 ENCOUNTER — TELEPHONE (OUTPATIENT)
Dept: FAMILY MEDICINE CLINIC | Age: 86
End: 2021-07-09

## 2021-07-09 RX ORDER — FUROSEMIDE 20 MG/1
20 TABLET ORAL EVERY MORNING
Qty: 30 TABLET | Refills: 0 | Status: SHIPPED | OUTPATIENT
Start: 2021-07-09 | End: 2021-08-05

## 2021-07-09 RX ORDER — TAMSULOSIN HYDROCHLORIDE 0.4 MG/1
0.4 CAPSULE ORAL EVERY MORNING
Qty: 30 CAPSULE | Refills: 0 | Status: SHIPPED | OUTPATIENT
Start: 2021-07-09 | End: 2021-08-05

## 2021-07-09 RX ORDER — LEVETIRACETAM 500 MG/1
500 TABLET ORAL 2 TIMES DAILY
Qty: 60 TABLET | Refills: 0 | Status: SHIPPED | OUTPATIENT
Start: 2021-07-09 | End: 2022-03-08 | Stop reason: SDUPTHER

## 2021-07-09 RX ORDER — PANTOPRAZOLE SODIUM 40 MG/1
40 TABLET, DELAYED RELEASE ORAL EVERY EVENING
Qty: 30 TABLET | Refills: 0 | Status: SHIPPED | OUTPATIENT
Start: 2021-07-09 | End: 2021-08-05

## 2021-07-09 NOTE — TELEPHONE ENCOUNTER
Ok    Orders Placed This Encounter   Medications    levETIRAcetam (KEPPRA) 500 MG tablet     Sig: Take 1 tablet by mouth 2 times daily     Dispense:  60 tablet     Refill:  0    tamsulosin (FLOMAX) 0.4 MG capsule     Sig: Take 1 capsule by mouth every morning     Dispense:  30 capsule     Refill:  0    pantoprazole (PROTONIX) 40 MG tablet     Sig: Take 1 tablet by mouth every evening     Dispense:  30 tablet     Refill:  0    furosemide (LASIX) 20 MG tablet     Sig: Take 1 tablet by mouth every morning     Dispense:  30 tablet     Refill:  0

## 2021-07-09 NOTE — TELEPHONE ENCOUNTER
Patient family called, they had incorrect information from [facility] and took him to a specialist office today. I rescheduled patient for next week but he was only given a 7 day supply of medication from the hospital. He needs the following medications sent to [facility].     Levetiracetam 500 mg  Tamsulosin 0.4 mg  Pantoprazole 40 mg  Furosemide 20 mg    Please advise, [phone]

## 2021-07-13 ENCOUNTER — OFFICE VISIT (OUTPATIENT)
Dept: FAMILY MEDICINE CLINIC | Age: 86
End: 2021-07-13
Payer: MEDICARE

## 2021-07-13 VITALS — DIASTOLIC BLOOD PRESSURE: 84 MMHG | SYSTOLIC BLOOD PRESSURE: 122 MMHG | TEMPERATURE: 97.5 F | HEART RATE: 88 BPM

## 2021-07-13 DIAGNOSIS — Z23 NEED FOR VACCINATION WITH 13-POLYVALENT PNEUMOCOCCAL CONJUGATE VACCINE: ICD-10-CM

## 2021-07-13 DIAGNOSIS — N28.9 RENAL INSUFFICIENCY: ICD-10-CM

## 2021-07-13 DIAGNOSIS — R56.9 SEIZURE (HCC): ICD-10-CM

## 2021-07-13 DIAGNOSIS — I26.99 BILATERAL PULMONARY EMBOLISM (HCC): Primary | ICD-10-CM

## 2021-07-13 DIAGNOSIS — R33.9 URINARY RETENTION: ICD-10-CM

## 2021-07-13 DIAGNOSIS — Z79.01 CHRONIC ANTICOAGULATION: ICD-10-CM

## 2021-07-13 DIAGNOSIS — R60.9 DEPENDENT EDEMA: ICD-10-CM

## 2021-07-13 PROCEDURE — 99214 OFFICE O/P EST MOD 30 MIN: CPT | Performed by: FAMILY MEDICINE

## 2021-07-13 PROCEDURE — 90670 PCV13 VACCINE IM: CPT | Performed by: FAMILY MEDICINE

## 2021-07-13 PROCEDURE — 1036F TOBACCO NON-USER: CPT | Performed by: FAMILY MEDICINE

## 2021-07-13 PROCEDURE — G8427 DOCREV CUR MEDS BY ELIG CLIN: HCPCS | Performed by: FAMILY MEDICINE

## 2021-07-13 PROCEDURE — G8417 CALC BMI ABV UP PARAM F/U: HCPCS | Performed by: FAMILY MEDICINE

## 2021-07-13 PROCEDURE — G0009 ADMIN PNEUMOCOCCAL VACCINE: HCPCS | Performed by: FAMILY MEDICINE

## 2021-07-13 PROCEDURE — 4040F PNEUMOC VAC/ADMIN/RCVD: CPT | Performed by: FAMILY MEDICINE

## 2021-07-13 PROCEDURE — 1123F ACP DISCUSS/DSCN MKR DOCD: CPT | Performed by: FAMILY MEDICINE

## 2021-07-13 SDOH — ECONOMIC STABILITY: FOOD INSECURITY: WITHIN THE PAST 12 MONTHS, THE FOOD YOU BOUGHT JUST DIDN'T LAST AND YOU DIDN'T HAVE MONEY TO GET MORE.: NEVER TRUE

## 2021-07-13 SDOH — ECONOMIC STABILITY: FOOD INSECURITY: WITHIN THE PAST 12 MONTHS, YOU WORRIED THAT YOUR FOOD WOULD RUN OUT BEFORE YOU GOT MONEY TO BUY MORE.: NEVER TRUE

## 2021-07-13 ASSESSMENT — PATIENT HEALTH QUESTIONNAIRE - PHQ9
2. FEELING DOWN, DEPRESSED OR HOPELESS: 0
SUM OF ALL RESPONSES TO PHQ QUESTIONS 1-9: 0
1. LITTLE INTEREST OR PLEASURE IN DOING THINGS: 0
SUM OF ALL RESPONSES TO PHQ9 QUESTIONS 1 & 2: 0
SUM OF ALL RESPONSES TO PHQ QUESTIONS 1-9: 0
SUM OF ALL RESPONSES TO PHQ QUESTIONS 1-9: 0

## 2021-07-13 ASSESSMENT — ENCOUNTER SYMPTOMS
BLOOD IN STOOL: 0
CONSTIPATION: 0
NAUSEA: 0
VOMITING: 0
DIARRHEA: 0
ABDOMINAL PAIN: 0
SHORTNESS OF BREATH: 0

## 2021-07-13 ASSESSMENT — SOCIAL DETERMINANTS OF HEALTH (SDOH): HOW HARD IS IT FOR YOU TO PAY FOR THE VERY BASICS LIKE FOOD, HOUSING, MEDICAL CARE, AND HEATING?: NOT HARD AT ALL

## 2021-07-13 NOTE — LETTER
[email or web address] [facility] OF [facility] AND WOMEN'S [facility]  [address] [facility] [address]  Phone: [phone]  Fax: [phone]    Rob Meza MD         July 13, 2021     Patient: Anh Dorsey   YOB: 1935   Date of Visit: 7/13/2021       To Whom It May Concern: It is my medical opinion that Oliver Bush requires a disability parking placard for the following reasons:  He cannot walk without assistance from another person or the use of an assistance device (cane, crutch, prosthetic device, wheelchair, etc.). Duration of need: 2 years (expires 7-)     If you have any questions or concerns, please don't hesitate to call.     Sincerely,        Rob Meza MD It was a pleasure to see you! As discussed: It has been a pleasure caring for you! For your next appointment:     Milad Storm MD  Address: [address], [place], [address] [place] [address] Road   Phone: [phone]         Well Visit, Men 48 to 72: Care Instructions  Your Care Instructions    Physical exams can help you stay healthy. Your doctor has checked your overall health and may have suggested ways to take good care of yourself. He or she also may have recommended tests. At home, you can help prevent illness with healthy eating, regular exercise, and other steps. Follow-up care is a key part of your treatment and safety. Be sure to make and go to all appointments, and call your doctor if you are having problems. It's also a good idea to know your test results and keep a list of the medicines you take. How can you care for yourself at home? · Reach and stay at a healthy weight. This will lower your risk for many problems, such as obesity, diabetes, heart disease, and high blood pressure. · Get at least 30 minutes of exercise on most days of the week. Walking is a good choice. You also may want to do other activities, such as running, swimming, cycling, or playing tennis or team sports. · Do not smoke. Smoking can make health problems worse. If you need help quitting, talk to your doctor about stop-smoking programs and medicines. These can increase your chances of quitting for good. · Protect your skin from too much sun. When you're outdoors from 10 a.m. to 4 p.m., stay in the shade or cover up with clothing and a hat with a wide brim. Wear sunglasses that block UV rays. Even when it's cloudy, put broad-spectrum sunscreen (SPF 30 or higher) on any exposed skin. · See a dentist one or two times a year for checkups and to have your teeth cleaned. · Wear a seat belt in the car. · Limit alcohol to 2 drinks a day. Too much alcohol can cause health problems.   Follow your doctor's advice about when to have certain tests. These tests can spot problems early. · Cholesterol. Your doctor will tell you how often to have this done based on your overall health and other things that can increase your risk for heart attack and stroke. · Blood pressure. Have your blood pressure checked during a routine doctor visit. Your doctor will tell you how often to check your blood pressure based on your age, your blood pressure results, and other factors. · Prostate exam. Talk to your doctor about whether you should have a blood test (called a PSA test) for prostate cancer. Experts disagree on whether men should have this test. Some experts recommend that you discuss the benefits and risks of the test with your doctor. · Diabetes. Ask your doctor whether you should have tests for diabetes. · Vision. Some experts recommend that you have yearly exams for glaucoma and other age-related eye problems starting at age 48. · Hearing. Tell your doctor if you notice any change in your hearing. You can have tests to find out how well you hear. · Colon cancer. You should begin tests for colon cancer at age 48. You may have one of several tests. Your doctor will tell you how often to have tests based on your age and risk. Risks include whether you already had a precancerous polyp removed from your colon or whether your parent, brother, sister, or child has had colon cancer. · Heart attack and stroke risk. At least every 4 to 6 years, you should have your risk for heart attack and stroke assessed. Your doctor uses factors such as your age, blood pressure, cholesterol, and whether you smoke or have diabetes to show what your risk for a heart attack or stroke is over the next 10 years. · Abdominal aortic aneurysm. Ask your doctor whether you should have a test to check for an aneurysm. You may need a test if you ever smoked or if your parent, brother, sister, or child has had an aneurysm. When should you call for help?   Watch closely for changes in your health, and be sure to contact your doctor if you have any problems or symptoms that concern you. Where can you learn more? Go to http://sami-alex.info/. Enter O878 in the search box to learn more about \"Well Visit, Men 48 to 72: Care Instructions. \"  Current as of: March 28, 2018  Content Version: 11.9  © 9269-2060 [employer]. Care instructions adapted under license by [employer] (which disclaims liability or warranty for this information). If you have questions about a medical condition or this instruction, always ask your healthcare professional. [place] [address] any warranty or liability for your use of this information.

## 2021-07-13 NOTE — PROGRESS NOTES
mouth daily, Disp: 30 tablet, Rfl: 0    No current facility-administered medications for this visit.      ---------------------------               07/13/21                      1618         ---------------------------   BP:          122/84         Site:    Left Upper Arm     Position:     Sitting        Cuff Size:   Large Adult      Pulse:         88           Temp:   97.5 °F (36.4 °C)   TempSrc:    Temporal       ---------------------------  There is no height or weight on file to calculate BMI. Wt Readings from Last 3 Encounters:  06/04/21 : 218 lb 7.6 oz (99.1 kg)  02/15/21 : 215 lb 9.8 oz (97.8 kg)  12/28/20 : 205 lb 7.5 oz (93.2 kg)    BP Readings from Last 3 Encounters:  07/13/21 : 122/84  06/04/21 : Naomie Maurice 163/94  02/15/21 : 110/68                  Review of Systems   Constitutional: Negative for appetite change, chills, fever and unexpected weight change. Respiratory: Negative for shortness of breath. Cardiovascular: Negative for chest pain and palpitations. Gastrointestinal: Negative for abdominal pain, blood in stool, constipation, diarrhea, nausea and vomiting. Neurological: Negative for headaches. Objective:   Physical Exam  Constitutional:       General: He is not in acute distress. Appearance: Normal appearance. He is well-developed. He is not ill-appearing or diaphoretic. Comments: Elderly in wheelchair   HENT:      Head: Normocephalic and atraumatic. Neck:      Thyroid: No thyroid mass or thyromegaly. Cardiovascular:      Rate and Rhythm: Normal rate and regular rhythm. Heart sounds: Normal heart sounds. No murmur heard. No friction rub. No gallop. Pulmonary:      Effort: Pulmonary effort is normal. No tachypnea, accessory muscle usage or respiratory distress. Breath sounds: Normal breath sounds. No decreased breath sounds, wheezing, rhonchi or rales.    Abdominal:      General: Bowel sounds are normal. There is no abdominal bruit. Palpations: Abdomen is soft. There is no hepatomegaly, splenomegaly, mass or pulsatile mass. Tenderness: There is no abdominal tenderness. There is no guarding. Musculoskeletal:      Cervical back: Neck supple. Lymphadenopathy:      Cervical: No cervical adenopathy. Upper Body:      Right upper body: No supraclavicular adenopathy. Left upper body: No supraclavicular adenopathy. Skin:     General: Skin is warm and dry. Coloration: Skin is not pale. Neurological:      Mental Status: He is alert. Assessment:        Diagnosis Orders   1. Bilateral pulmonary embolism (HCC)  Basic Metabolic Panel    CBC Auto Differential   2. Seizure ([address]  TSH without Reflex    Vitamin B12 & Folate   3. Urinary retention  Basic Metabolic Panel    CBC Auto Differential    TSH without Reflex    AFL - Aimee Pascual MD, Urology, [facility] hospital - [facility]   4. Need for vaccination with 13-polyvalent pneumococcal conjugate vaccine  PREVNAR 13 IM (Pneumococcal conjugate vaccine 13-valent)   5. Chronic anticoagulation  CBC Auto Differential   6. Renal insufficiency  Basic Metabolic Panel    CBC Auto Differential    TSH without Reflex    Vitamin B12 & Folate   7. 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 Family agrees as does patient to be a dnr   They need parking placard   Immunization is updated as he did not get a prevnar 7 but rather a pneumovax.  I reviewed the communication from [facility] that we have received    urology note reviewed 2/2021 and he needs follow up     D/c note reviewed 6/4/21 for urine retention and uti with sepsis  I do see the recommendations to stop the eliquis end of June     D/c note reviewed 12/28/20 he had PE and covid and eliquis started then     egd done 1/8/21 ulcers noted      Plan:      See the urologist  See us back in 4 weeks   Do the blood test       His family will be called to stop the bia Levy MD

## 2021-07-14 DIAGNOSIS — R60.9 DEPENDENT EDEMA: ICD-10-CM

## 2021-07-14 DIAGNOSIS — N28.9 RENAL INSUFFICIENCY: ICD-10-CM

## 2021-07-14 DIAGNOSIS — R33.9 URINARY RETENTION: ICD-10-CM

## 2021-07-14 DIAGNOSIS — Z79.01 CHRONIC ANTICOAGULATION: ICD-10-CM

## 2021-07-14 DIAGNOSIS — I26.99 BILATERAL PULMONARY EMBOLISM (HCC): ICD-10-CM

## 2021-07-14 DIAGNOSIS — R56.9 SEIZURE (HCC): ICD-10-CM

## 2021-07-14 LAB
ANION GAP SERPL CALCULATED.3IONS-SCNC: 8 MMOL/L (ref 3–16)
BASOPHILS ABSOLUTE: 0.1 K/UL (ref 0–0.2)
BASOPHILS RELATIVE PERCENT: 0.6 %
BUN BLDV-MCNC: 12 MG/DL (ref 7–20)
CALCIUM SERPL-MCNC: 8.7 MG/DL (ref 8.3–10.6)
CHLORIDE BLD-SCNC: 102 MMOL/L (ref 99–110)
CO2: 30 MMOL/L (ref 21–32)
CREAT SERPL-MCNC: 0.8 MG/DL (ref 0.8–1.3)
EOSINOPHILS ABSOLUTE: 0.4 K/UL (ref 0–0.6)
EOSINOPHILS RELATIVE PERCENT: 3.2 %
FOLATE: 19.39 NG/ML (ref 4.78–24.2)
GFR AFRICAN AMERICAN: >60
GFR NON-AFRICAN AMERICAN: >60
GLUCOSE BLD-MCNC: 89 MG/DL (ref 70–99)
HCT VFR BLD CALC: 28.3 % (ref 40.5–52.5)
HEMOGLOBIN: 9.6 G/DL (ref 13.5–17.5)
LYMPHOCYTES ABSOLUTE: 2.1 K/UL (ref 1–5.1)
LYMPHOCYTES RELATIVE PERCENT: 18.4 %
MCH RBC QN AUTO: 30.7 PG (ref 26–34)
MCHC RBC AUTO-ENTMCNC: 33.8 G/DL (ref 31–36)
MCV RBC AUTO: 91 FL (ref 80–100)
MONOCYTES ABSOLUTE: 1.1 K/UL (ref 0–1.3)
MONOCYTES RELATIVE PERCENT: 10.1 %
NEUTROPHILS ABSOLUTE: 7.6 K/UL (ref 1.7–7.7)
NEUTROPHILS RELATIVE PERCENT: 67.7 %
PDW BLD-RTO: 16.8 % (ref 12.4–15.4)
PLATELET # BLD: 359 K/UL (ref 135–450)
PMV BLD AUTO: 8.3 FL (ref 5–10.5)
POTASSIUM SERPL-SCNC: 3.7 MMOL/L (ref 3.5–5.1)
RBC # BLD: 3.11 M/UL (ref 4.2–5.9)
SODIUM BLD-SCNC: 140 MMOL/L (ref 136–145)
TSH SERPL DL<=0.05 MIU/L-ACNC: 1.75 UIU/ML (ref 0.27–4.2)
VITAMIN B-12: 572 PG/ML (ref 211–911)
WBC # BLD: 11.2 K/UL (ref 4–11)

## 2021-08-12 ENCOUNTER — TELEPHONE (OUTPATIENT)
Dept: FAMILY MEDICINE CLINIC | Age: 86
End: 2021-08-12

## 2021-08-12 ENCOUNTER — OFFICE VISIT (OUTPATIENT)
Dept: FAMILY MEDICINE CLINIC | Age: 86
End: 2021-08-12
Payer: MEDICARE

## 2021-08-12 VITALS
WEIGHT: 217 LBS | DIASTOLIC BLOOD PRESSURE: 78 MMHG | BODY MASS INDEX: 31.07 KG/M2 | HEIGHT: 70 IN | SYSTOLIC BLOOD PRESSURE: 128 MMHG | TEMPERATURE: 98.7 F | HEART RATE: 72 BPM

## 2021-08-12 DIAGNOSIS — L98.9 SKIN LESION OF NECK: ICD-10-CM

## 2021-08-12 DIAGNOSIS — D50.0 BLOOD LOSS ANEMIA: Primary | ICD-10-CM

## 2021-08-12 DIAGNOSIS — R56.9 SEIZURE (HCC): ICD-10-CM

## 2021-08-12 PROCEDURE — 99213 OFFICE O/P EST LOW 20 MIN: CPT | Performed by: FAMILY MEDICINE

## 2021-08-12 PROCEDURE — G8417 CALC BMI ABV UP PARAM F/U: HCPCS | Performed by: FAMILY MEDICINE

## 2021-08-12 PROCEDURE — 4040F PNEUMOC VAC/ADMIN/RCVD: CPT | Performed by: FAMILY MEDICINE

## 2021-08-12 PROCEDURE — G8427 DOCREV CUR MEDS BY ELIG CLIN: HCPCS | Performed by: FAMILY MEDICINE

## 2021-08-12 PROCEDURE — 1123F ACP DISCUSS/DSCN MKR DOCD: CPT | Performed by: FAMILY MEDICINE

## 2021-08-12 PROCEDURE — 1036F TOBACCO NON-USER: CPT | Performed by: FAMILY MEDICINE

## 2021-08-12 NOTE — PROGRESS NOTES
Subjective:      Patient ID: Иван Roach is a 80 y.o. male. Chief Complaint   Patient presents with    1 Month Follow-Up     discuss lab results        Patient presents with:  1 Month Follow-Up: discuss lab results    Here for a recheck  Here with son  He is at home  No longer seeing pt/ot  He is seeing the nurse once a week     Off the blood thinner  No more bleeding    He does have a derm that he see    YOB: 1935    Date of Visit:  8/12/2021    No Known Allergies    Current Outpatient Medications:  tamsulosin (FLOMAX) 0.4 MG capsule, TAKE ONE CAPSULE BY MOUTH EVERY MORNING, Disp: 30 capsule, Rfl: 3  furosemide (LASIX) 20 MG tablet, TAKE ONE TABLET BY MOUTH EVERY MORNING, Disp: 30 tablet, Rfl: 3  pantoprazole (PROTONIX) 40 MG tablet, TAKE ONE TABLET BY MOUTH EVERY EVENING, Disp: 30 tablet, Rfl: 3  levETIRAcetam (KEPPRA) 500 MG tablet, Take 1 tablet by mouth 2 times daily, Disp: 60 tablet, Rfl: 0  collagenase 250 UNIT/GM ointment, Apply topically nightly Apply to left knee, right knee, and left wrist nightly, Disp: , Rfl:   acetaminophen (TYLENOL) 325 MG tablet, Take 650 mg by mouth every 4 hours as needed for Fever, Disp: , Rfl:   lactobacillus (CULTURELLE) capsule, Take 1 capsule by mouth daily (with breakfast), Disp: 30 capsule, Rfl: 0  Multiple Vitamin (MULTIVITAMIN) TABS tablet, Take 1 tablet by mouth daily, Disp: 30 tablet, Rfl: 0    No current facility-administered medications for this visit.      ---------------------------               08/12/21                      1348         ---------------------------   BP:          128/78         Site:    Left Upper Arm     Position:     Sitting        Cuff Size:  Medium Adult      Pulse:         72           Temp:   98.7 °F (37.1 °C)   TempSrc:    Temporal        Weight: 217 lb (98.4 kg)    Height: 5' 10\" (1.778 m)   ---------------------------  Body mass index is 31.14 kg/m².      Wt Readings from Last 3

## 2021-08-12 NOTE — TELEPHONE ENCOUNTER
----- Message from [facility] sent at 8/12/2021  3:33 PM EDT -----  Subject: Message to Provider    QUESTIONS  Information for Provider? Pt stats the office you referred him too for his   mole is booked up until next year. Would like advice on another office. Also will need the referral to do so  ---------------------------------------------------------------------------  --------------  CALL BACK INFO  What is the best way for the office to contact you? OK to leave message on   voicemail  Preferred Call Back Phone Number? [phone]  ---------------------------------------------------------------------------  --------------  SCRIPT ANSWERS  Relationship to Patient?  Self

## 2021-08-12 NOTE — PATIENT INSTRUCTIONS
See the neurologist  Do see the dermatologist for the skin lesion on the neck   Do see us back in about 2 months

## 2021-08-12 NOTE — TELEPHONE ENCOUNTER
Henri Palacios advised and [facility] given [facility] [phone].

## 2021-08-28 ENCOUNTER — APPOINTMENT (OUTPATIENT)
Dept: CT IMAGING | Age: 86
DRG: 698 | End: 2021-08-28
Payer: MEDICARE

## 2021-08-28 ENCOUNTER — HOSPITAL ENCOUNTER (INPATIENT)
Age: 86
LOS: 5 days | Discharge: NURSING, REHAB OR LONG-TERM CARE | DRG: 698 | End: 2021-09-02
Attending: EMERGENCY MEDICINE | Admitting: INTERNAL MEDICINE
Payer: MEDICARE

## 2021-08-28 DIAGNOSIS — N39.0 URINARY TRACT INFECTION ASSOCIATED WITH INDWELLING URETHRAL CATHETER, INITIAL ENCOUNTER (HCC): Primary | ICD-10-CM

## 2021-08-28 DIAGNOSIS — R41.0 CONFUSION: ICD-10-CM

## 2021-08-28 DIAGNOSIS — R53.1 GENERAL WEAKNESS: ICD-10-CM

## 2021-08-28 DIAGNOSIS — T83.511A URINARY TRACT INFECTION ASSOCIATED WITH INDWELLING URETHRAL CATHETER, INITIAL ENCOUNTER (HCC): Primary | ICD-10-CM

## 2021-08-28 LAB
A/G RATIO: 0.8 (ref 1.1–2.2)
ACANTHOCYTES: ABNORMAL
ALBUMIN SERPL-MCNC: 3.1 G/DL (ref 3.4–5)
ALP BLD-CCNC: 119 U/L (ref 40–129)
ALT SERPL-CCNC: 10 U/L (ref 10–40)
ANION GAP SERPL CALCULATED.3IONS-SCNC: 9 MMOL/L (ref 3–16)
ANISOCYTOSIS: ABNORMAL
AST SERPL-CCNC: 14 U/L (ref 15–37)
BASOPHILS ABSOLUTE: 0 K/UL (ref 0–0.2)
BASOPHILS RELATIVE PERCENT: 0 %
BILIRUB SERPL-MCNC: 1 MG/DL (ref 0–1)
BILIRUBIN URINE: NEGATIVE
BLOOD, URINE: ABNORMAL
BUN BLDV-MCNC: 22 MG/DL (ref 7–20)
CALCIUM SERPL-MCNC: 8.9 MG/DL (ref 8.3–10.6)
CHLORIDE BLD-SCNC: 99 MMOL/L (ref 99–110)
CLARITY: ABNORMAL
CO2: 27 MMOL/L (ref 21–32)
COLOR: YELLOW
CREAT SERPL-MCNC: 1.3 MG/DL (ref 0.8–1.3)
CRENATED RBC'S: ABNORMAL
EOSINOPHILS ABSOLUTE: 0 K/UL (ref 0–0.6)
EOSINOPHILS RELATIVE PERCENT: 0 %
EPITHELIAL CELLS, UA: 0 /HPF (ref 0–5)
GFR AFRICAN AMERICAN: >60
GFR NON-AFRICAN AMERICAN: 52
GLOBULIN: 3.7 G/DL
GLUCOSE BLD-MCNC: 103 MG/DL (ref 70–99)
GLUCOSE URINE: NEGATIVE MG/DL
HCT VFR BLD CALC: 29.1 % (ref 40.5–52.5)
HEMOGLOBIN: 9.6 G/DL (ref 13.5–17.5)
HYALINE CASTS: 6 /LPF (ref 0–8)
KETONES, URINE: NEGATIVE MG/DL
LACTIC ACID, SEPSIS: 0.9 MMOL/L (ref 0.4–1.9)
LEUKOCYTE ESTERASE, URINE: ABNORMAL
LYMPHOCYTES ABSOLUTE: 1 K/UL (ref 1–5.1)
LYMPHOCYTES RELATIVE PERCENT: 4 %
MCH RBC QN AUTO: 29.7 PG (ref 26–34)
MCHC RBC AUTO-ENTMCNC: 33 G/DL (ref 31–36)
MCV RBC AUTO: 90.3 FL (ref 80–100)
MICROSCOPIC EXAMINATION: YES
MONOCYTES ABSOLUTE: 2 K/UL (ref 0–1.3)
MONOCYTES RELATIVE PERCENT: 8 %
NEUTROPHILS ABSOLUTE: 21.7 K/UL (ref 1.7–7.7)
NEUTROPHILS RELATIVE PERCENT: 88 %
NITRITE, URINE: NEGATIVE
OVALOCYTES: ABNORMAL
PDW BLD-RTO: 16.4 % (ref 12.4–15.4)
PH UA: 5.5 (ref 5–8)
PLATELET # BLD: 357 K/UL (ref 135–450)
PLATELET SLIDE REVIEW: ADEQUATE
PMV BLD AUTO: 8.6 FL (ref 5–10.5)
POIKILOCYTES: ABNORMAL
POTASSIUM REFLEX MAGNESIUM: 3.8 MMOL/L (ref 3.5–5.1)
PROTEIN UA: 100 MG/DL
RBC # BLD: 3.22 M/UL (ref 4.2–5.9)
RBC UA: 11 /HPF (ref 0–4)
SCHISTOCYTES: ABNORMAL
SLIDE REVIEW: ABNORMAL
SODIUM BLD-SCNC: 135 MMOL/L (ref 136–145)
SPECIFIC GRAVITY UA: 1.02 (ref 1–1.03)
TOTAL PROTEIN: 6.8 G/DL (ref 6.4–8.2)
URINE REFLEX TO CULTURE: YES
URINE TYPE: ABNORMAL
UROBILINOGEN, URINE: 1 E.U./DL
WBC # BLD: 24.7 K/UL (ref 4–11)
WBC UA: 24 /HPF (ref 0–5)

## 2021-08-28 PROCEDURE — 6360000002 HC RX W HCPCS: Performed by: INTERNAL MEDICINE

## 2021-08-28 PROCEDURE — 83605 ASSAY OF LACTIC ACID: CPT

## 2021-08-28 PROCEDURE — 6360000002 HC RX W HCPCS: Performed by: EMERGENCY MEDICINE

## 2021-08-28 PROCEDURE — 2580000003 HC RX 258: Performed by: EMERGENCY MEDICINE

## 2021-08-28 PROCEDURE — 2580000003 HC RX 258: Performed by: INTERNAL MEDICINE

## 2021-08-28 PROCEDURE — 81001 URINALYSIS AUTO W/SCOPE: CPT

## 2021-08-28 PROCEDURE — 74177 CT ABD & PELVIS W/CONTRAST: CPT

## 2021-08-28 PROCEDURE — 96374 THER/PROPH/DIAG INJ IV PUSH: CPT

## 2021-08-28 PROCEDURE — 2500000003 HC RX 250 WO HCPCS: Performed by: INTERNAL MEDICINE

## 2021-08-28 PROCEDURE — 80053 COMPREHEN METABOLIC PANEL: CPT

## 2021-08-28 PROCEDURE — 87086 URINE CULTURE/COLONY COUNT: CPT

## 2021-08-28 PROCEDURE — 87077 CULTURE AEROBIC IDENTIFY: CPT

## 2021-08-28 PROCEDURE — 99284 EMERGENCY DEPT VISIT MOD MDM: CPT

## 2021-08-28 PROCEDURE — 6360000004 HC RX CONTRAST MEDICATION: Performed by: EMERGENCY MEDICINE

## 2021-08-28 PROCEDURE — 85025 COMPLETE CBC W/AUTO DIFF WBC: CPT

## 2021-08-28 PROCEDURE — 87040 BLOOD CULTURE FOR BACTERIA: CPT

## 2021-08-28 PROCEDURE — 36415 COLL VENOUS BLD VENIPUNCTURE: CPT

## 2021-08-28 PROCEDURE — 87186 SC STD MICRODIL/AGAR DIL: CPT

## 2021-08-28 PROCEDURE — 6370000000 HC RX 637 (ALT 250 FOR IP): Performed by: INTERNAL MEDICINE

## 2021-08-28 PROCEDURE — 71260 CT THORAX DX C+: CPT

## 2021-08-28 PROCEDURE — 1200000000 HC SEMI PRIVATE

## 2021-08-28 RX ORDER — LACTOBACILLUS RHAMNOSUS GG 10B CELL
1 CAPSULE ORAL
Status: DISCONTINUED | OUTPATIENT
Start: 2021-08-29 | End: 2021-09-02 | Stop reason: HOSPADM

## 2021-08-28 RX ORDER — PANTOPRAZOLE SODIUM 40 MG/1
40 TABLET, DELAYED RELEASE ORAL NIGHTLY
Status: DISCONTINUED | OUTPATIENT
Start: 2021-08-28 | End: 2021-09-02 | Stop reason: HOSPADM

## 2021-08-28 RX ORDER — SODIUM CHLORIDE 0.9 % (FLUSH) 0.9 %
5-40 SYRINGE (ML) INJECTION PRN
Status: DISCONTINUED | OUTPATIENT
Start: 2021-08-28 | End: 2021-09-02 | Stop reason: HOSPADM

## 2021-08-28 RX ORDER — FUROSEMIDE 10 MG/ML
40 INJECTION INTRAMUSCULAR; INTRAVENOUS DAILY
Status: DISCONTINUED | OUTPATIENT
Start: 2021-08-28 | End: 2021-08-31

## 2021-08-28 RX ORDER — ONDANSETRON 2 MG/ML
4 INJECTION INTRAMUSCULAR; INTRAVENOUS EVERY 6 HOURS PRN
Status: DISCONTINUED | OUTPATIENT
Start: 2021-08-28 | End: 2021-09-02 | Stop reason: HOSPADM

## 2021-08-28 RX ORDER — TAMSULOSIN HYDROCHLORIDE 0.4 MG/1
0.4 CAPSULE ORAL DAILY
Status: DISCONTINUED | OUTPATIENT
Start: 2021-08-29 | End: 2021-09-02 | Stop reason: HOSPADM

## 2021-08-28 RX ORDER — POLYETHYLENE GLYCOL 3350 17 G/17G
17 POWDER, FOR SOLUTION ORAL DAILY PRN
Status: DISCONTINUED | OUTPATIENT
Start: 2021-08-28 | End: 2021-09-02 | Stop reason: HOSPADM

## 2021-08-28 RX ORDER — ACETAMINOPHEN 325 MG/1
650 TABLET ORAL EVERY 6 HOURS PRN
Status: DISCONTINUED | OUTPATIENT
Start: 2021-08-28 | End: 2021-09-02 | Stop reason: HOSPADM

## 2021-08-28 RX ORDER — ACETAMINOPHEN 650 MG/1
650 SUPPOSITORY RECTAL EVERY 6 HOURS PRN
Status: DISCONTINUED | OUTPATIENT
Start: 2021-08-28 | End: 2021-09-02 | Stop reason: HOSPADM

## 2021-08-28 RX ORDER — SODIUM CHLORIDE 9 MG/ML
25 INJECTION, SOLUTION INTRAVENOUS PRN
Status: DISCONTINUED | OUTPATIENT
Start: 2021-08-28 | End: 2021-09-02 | Stop reason: HOSPADM

## 2021-08-28 RX ORDER — LEVETIRACETAM 500 MG/1
500 TABLET ORAL 2 TIMES DAILY
Status: DISCONTINUED | OUTPATIENT
Start: 2021-08-28 | End: 2021-09-02 | Stop reason: HOSPADM

## 2021-08-28 RX ORDER — ONDANSETRON 4 MG/1
4 TABLET, ORALLY DISINTEGRATING ORAL EVERY 8 HOURS PRN
Status: DISCONTINUED | OUTPATIENT
Start: 2021-08-28 | End: 2021-09-02 | Stop reason: HOSPADM

## 2021-08-28 RX ORDER — 0.9 % SODIUM CHLORIDE 0.9 %
1000 INTRAVENOUS SOLUTION INTRAVENOUS ONCE
Status: COMPLETED | OUTPATIENT
Start: 2021-08-28 | End: 2021-08-28

## 2021-08-28 RX ORDER — MULTIVITAMIN WITH IRON
1 TABLET ORAL DAILY
Status: DISCONTINUED | OUTPATIENT
Start: 2021-08-29 | End: 2021-09-02 | Stop reason: HOSPADM

## 2021-08-28 RX ORDER — SODIUM CHLORIDE 0.9 % (FLUSH) 0.9 %
5-40 SYRINGE (ML) INJECTION EVERY 12 HOURS SCHEDULED
Status: DISCONTINUED | OUTPATIENT
Start: 2021-08-28 | End: 2021-09-02 | Stop reason: HOSPADM

## 2021-08-28 RX ADMIN — LEVETIRACETAM 500 MG: 500 TABLET ORAL at 20:45

## 2021-08-28 RX ADMIN — Medication 1500 MG: at 19:51

## 2021-08-28 RX ADMIN — FUROSEMIDE 40 MG: 10 INJECTION, SOLUTION INTRAMUSCULAR; INTRAVENOUS at 16:55

## 2021-08-28 RX ADMIN — ENOXAPARIN SODIUM 40 MG: 40 INJECTION SUBCUTANEOUS at 20:45

## 2021-08-28 RX ADMIN — SODIUM CHLORIDE 75 ML: 9 INJECTION, SOLUTION INTRAVENOUS at 17:19

## 2021-08-28 RX ADMIN — CEFEPIME HYDROCHLORIDE 2000 MG: 2 INJECTION, POWDER, FOR SOLUTION INTRAVENOUS at 17:22

## 2021-08-28 RX ADMIN — CEFTRIAXONE 1000 MG: 1 INJECTION, POWDER, FOR SOLUTION INTRAMUSCULAR; INTRAVENOUS at 13:33

## 2021-08-28 RX ADMIN — PANTOPRAZOLE SODIUM 40 MG: 40 TABLET, DELAYED RELEASE ORAL at 20:45

## 2021-08-28 RX ADMIN — IOPAMIDOL 75 ML: 755 INJECTION, SOLUTION INTRAVENOUS at 14:14

## 2021-08-28 RX ADMIN — SODIUM CHLORIDE 1000 ML: 9 INJECTION, SOLUTION INTRAVENOUS at 11:59

## 2021-08-28 RX ADMIN — METRONIDAZOLE 500 MG: 500 INJECTION, SOLUTION INTRAVENOUS at 18:07

## 2021-08-28 ASSESSMENT — PAIN SCALES - GENERAL: PAINLEVEL_OUTOF10: 0

## 2021-08-28 NOTE — ED NOTES
Bed: D-38  Expected date: 8/28/21  Expected time: 8:56 AM  Means of arrival: [facility]  Comments:  AMS, possible UTI     Ricky Medellin RN  08/28/21 0694

## 2021-08-28 NOTE — H&P
Hospital Medicine History & Physical      PCP: Lionel Nice MD    Date of Admission: 8/28/2021    Date of Service: Pt seen/examined on 8/28/2021 and Admitted to Inpatient    Chief Complaint:  Feeling unwell       History Of Present Illness: The patient is a 80 y.o. male w remote Hx of splenectomy at age 15, Hx of Afib, Hx of bilateral PE deemed secondary to [facility] completed AC this July, Hx of proteus bacteremia with complicated UTI and sepsis, chronic spann catheter, who presents to [facility] with AMS and feeling unwell. Pt is a fairly poor historian. He reports his son comes to check on him regularly. He reports over the past few days he was not feeling very well - weak, tired. He was becoming confused and disoriented today and his son called EMS. Work up in ED shows WBC of 24K with +UA. LA normal. He was started on IV Abx and directed for admission. Pt denies pain anywhere - specifically denies any chest pain, no flank pain. Past Medical History:        Diagnosis Date    GEOVANI (acute kidney injury) ([facility] [address] 2/11/2021    Alcohol abuse 12/22/2020    Atrial fibrillation (HCC)     BPH (benign prostatic hyperplasia)     Dehydration     [facility] ([facility])     Leg weakness, bilateral 2014, 2015    legs give out and pt collaspes    Seizure ([address] 7/20/2017    UTI (urinary tract infection) 8-    admitted with high fever and fainted. Past Surgical History:        Procedure Laterality Date    CATARACT REMOVAL Bilateral     SPLENECTOMY, TOTAL      at age 15 y old. Medications Prior to Admission:    Prior to Admission medications    Medication Sig Start Date End Date Taking?  Authorizing Provider   tamsulosin (FLOMAX) 0.4 MG capsule TAKE ONE CAPSULE BY MOUTH EVERY MORNING 8/5/21  Yes Lionel Nice MD   furosemide (LASIX) 20 MG tablet TAKE ONE TABLET BY MOUTH EVERY MORNING 8/5/21  Yes Emelyn Vela MD   pantoprazole (PROTONIX) 40 MG tablet TAKE ONE TABLET BY MOUTH EVERY EVENING 8/5/21  Yes Emelyn Vela MD   levETIRAcetam (KEPPRA) 500 MG tablet Take 1 tablet by mouth 2 times daily 7/9/21  Yes Emelyn Vela MD   collagenase 250 UNIT/GM ointment Apply topically nightly Apply to left knee, right knee, and left wrist nightly   Yes Historical Provider, MD   acetaminophen (TYLENOL) 325 MG tablet Take 650 mg by mouth every 4 hours as needed for Fever   Yes Historical Provider, MD   lactobacillus (CULTURELLE) capsule Take 1 capsule by mouth daily (with breakfast) 12/29/20  Yes Guillermo Osorio MD   Multiple Vitamin (MULTIVITAMIN) TABS tablet Take 1 tablet by mouth daily 12/26/20  Yes Dahiana Gallego MD       Allergies:  Patient has no known allergies. Social History:  The patient currently lives at home. TOBACCO:   reports that he has never smoked. He has never used smokeless tobacco.  ETOH:   reports current alcohol use. Family History:  Reviewed in detail and negative for DM, Early CAD, Cancer, CVA. Positive as follows:        Problem Relation Age of Onset   Josette Santos Cancer Mother         pancreatic    Diabetes Father     Cancer Brother        REVIEW OF SYSTEMS:   As noted in the HPI. All other systems reviewed and negative. PHYSICAL EXAM:    BP (!) 114/51   Pulse 84   Temp 98.6 °F (37 °C) (Oral)   Resp 24   Ht 5' 10\" (1.778 m)   Wt 227 lb 1.6 oz (103 kg)   SpO2 94%   BMI 32.59 kg/m²     General appearance: ill appearing, No apparent distress appears stated age and cooperative. HEENT Normal cephalic, atraumatic without obvious deformity. Pupils equal, round, and reactive to light. Extra ocular muscles intact. Conjunctivae/corneas clear. Neck: Supple, No jugular venous distention/bruits. Trachea midline without thyromegaly or adenopathy with full range of motion.   Lungs: Clear to auscultation, bilaterally without Rales/Wheezes/Rhonchi with good respiratory effort. Heart: Regular rate and rhythm with Normal S1/S2 without murmurs, rubs or gallops, point of maximum impulse non-displaced  Abdomen: Soft, non-tender or non-distended without rigidity or guarding and positive bowel sounds all four quadrants. Extremities: tense 2+ pitting edema bilateral to well above the knee level. Skin: Skin color, texture, turgor normal.  No rashes or lesions. Neurologic: Alert and oriented X 2, neurovascularly intact with sensory/motor intact upper extremities/lower extremities, bilaterally. Cranial nerves: II-XII intact, grossly non-focal.  Mental status: Alert, oriented x2, very poor historian  Capillary Refill: Acceptable  < 3 seconds  Peripheral Pulses: +3 Easily felt, not easily obliterated with pressure        CBC   Recent Labs     08/28/21  0946   WBC 24.7*   HGB 9.6*   HCT 29.1*         RENAL  Recent Labs     08/28/21  0946   *   K 3.8   CL 99   CO2 27   BUN 22*   CREATININE 1.3     LFT'S  Recent Labs     08/28/21  0946   AST 14*   ALT 10   BILITOT 1.0   ALKPHOS 119     COAG  No results for input(s): INR in the last 72 hours. CARDIAC ENZYMES  No results for input(s): CKTOTAL, CKMB, CKMBINDEX, TROPONINI in the last 72 hours.     U/A:    Lab Results   Component Value Date    NITRITE positive 06/12/2017    COLORU YELLOW 08/28/2021    WBCUA 24 08/28/2021    RBCUA 11 08/28/2021    MUCUS 2+ 12/22/2020    BACTERIA 4+ 08/10/2011    CLARITYU CLOUDY 08/28/2021    SPECGRAV 1.020 08/28/2021    LEUKOCYTESUR MODERATE 08/28/2021    BLOODU MODERATE 08/28/2021    GLUCOSEU Negative 08/28/2021    GLUCOSEU Negative 08/10/2011    AMORPHOUS 2+ 01/06/2013       ABG    Lab Results   Component Value Date    XKU1MFZ 26.6 12/22/2020    BEART 2.2 12/22/2020    A0VUVDWG 99.3 12/22/2020    PHART 7.434 12/22/2020    JIP0PMU 39.7 12/22/2020    PO2ART 112.0 12/22/2020    RTK8FHA 27.8 12/22/2020           Active Hospital Problems    Diagnosis

## 2021-08-28 NOTE — ED NOTES
Pt requires hearing aids. Extra batteries and silver-like case are in belongings bag.      Suzanna Serrato RN  08/28/21 0023

## 2021-08-28 NOTE — ED PROVIDER NOTES
CHIEF COMPLAINT  Altered Mental Status (since last night, lives alone. son reports to EMS of weakness and confusion since last night. strong odor from spann cath)      HISTORY OF PRESENT ILLNESS  Rosa Paulson is a 80 y.o. male who  has a past medical history of GEOVANI (acute kidney injury) ([address] Alcohol abuse, Atrial fibrillation ([address] BPH (benign prostatic hyperplasia), Dehydration, [facility] ([address] Leg weakness, bilateral, Seizure ([address] and UTI (urinary tract infection). presents to the ED with EMS for concerns of general weakness, confusion since last night and possible urinary tract infection. Patient has a history of indwelling Spann catheter secondary to urinary retention. Patient son at bedside states that last night the patient was unable to stand up on his own and needed support to get from chair to chair. Patient patient denies any focal numbness or weakness. Denies any associate abdominal pain. Has had some nausea without any vomiting. No documented fevers at home. No recent antibiotics. Normal bowel movements. Tolerating oral intake. No other complaints, modifying factors or associated symptoms. Nursing notes reviewed. Past Medical History:   Diagnosis Date    GEOVANI (acute kidney injury) ([address] 2/11/2021    Alcohol abuse 12/22/2020    Atrial fibrillation (HCC)     BPH (benign prostatic hyperplasia)     Dehydration     [facility] ([facility])     Leg weakness, bilateral 2014, 2015    legs give out and pt collaspes    Seizure ([address] 7/20/2017    UTI (urinary tract infection) 8-    admitted with high fever and fainted. Past Surgical History:   Procedure Laterality Date    CATARACT REMOVAL Bilateral     SPLENECTOMY, TOTAL      at age 15 y old.      Family History   Problem Relation Age of Onset    Cancer Mother         pancreatic    Diabetes Father     Cancer Brother      Social History     Socioeconomic History    Marital status:  Spouse name: Not on file    Number of children: Not on file    Years of education: Not on file    Highest education level: Not on file   Occupational History    Not on file   Tobacco Use    Smoking status: Never Smoker    Smokeless tobacco: Never Used   Substance and Sexual Activity    Alcohol use: Yes     Alcohol/week: 0.0 standard drinks     Comment: occasionally     Drug use: No    Sexual activity: Not Currently   Other Topics Concern    Not on file   Social History Narrative    Not on file     Social Determinants of Health     Financial Resource Strain: Low Risk     Difficulty of Paying Living Expenses: Not hard at all   Food Insecurity: No Food Insecurity    Worried About Running Out of Food in the Last Year: Never true    [address] in the Last Year: Never true   Transportation Needs:     Lack of Transportation (Medical):      Lack of Transportation (Non-Medical):    Physical Activity:     Days of Exercise per Week:     Minutes of Exercise per Session:    Stress:     Feeling of Stress :    Social Connections:     Frequency of Communication with Friends and Family:     Frequency of Social Gatherings with Friends and Family:     Attends Taoist Services:     Active Member of Clubs or Organizations:     Attends Club or Organization Meetings:     Marital Status:    Intimate Partner Violence:     Fear of Current or Ex-Partner:     Emotionally Abused:     Physically Abused:     Sexually Abused:      Current Facility-Administered Medications   Medication Dose Route Frequency Provider Last Rate Last Admin    cefTRIAXone (ROCEPHIN) 1000 mg IVPB in 50 mL D5W minibag  1,000 mg IntraVENous Once Zonia Mensah MD         Current Outpatient Medications   Medication Sig Dispense Refill    tamsulosin (FLOMAX) 0.4 MG capsule TAKE ONE CAPSULE BY MOUTH EVERY MORNING 30 capsule 3    furosemide (LASIX) 20 MG tablet TAKE ONE TABLET BY MOUTH EVERY MORNING 30 tablet 3    pantoprazole (PROTONIX) 40 MG tablet TAKE ONE TABLET BY MOUTH EVERY EVENING 30 tablet 3    levETIRAcetam (KEPPRA) 500 MG tablet Take 1 tablet by mouth 2 times daily 60 tablet 0    collagenase 250 UNIT/GM ointment Apply topically nightly Apply to left knee, right knee, and left wrist nightly      acetaminophen (TYLENOL) 325 MG tablet Take 650 mg by mouth every 4 hours as needed for Fever      lactobacillus (CULTURELLE) capsule Take 1 capsule by mouth daily (with breakfast) 30 capsule 0    Multiple Vitamin (MULTIVITAMIN) TABS tablet Take 1 tablet by mouth daily 30 tablet 0     No Known Allergies      REVIEW OF SYSTEMS  10 systems reviewed, pertinent positives per HPI otherwise noted to be negative    PHYSICAL EXAM  BP (!) 114/51   Pulse 84   Temp 98.6 °F (37 °C) (Oral)   Resp 24   Ht 5' 10\" (1.778 m)   Wt 227 lb 1.6 oz (103 kg)   SpO2 94%   BMI 32.59 kg/m²      CONSTITUTIONAL:  Alert to person and place but not time, cooperative with exam, afebrile   HEAD: normocephalic, atraumatic   EYES: PERRL, EOMI, anicteric sclera   ENT: Moist mucous membranes, uvula midline   BACK: Symmetric, no deformity, no CVA tenderness   LUNGS: Bilateral breath sounds, CTAB, no rales/ronchi/wheezes   CARDIOVASCULAR: RRR, normal S1/S2, no m/r/g, 2+ pulses throughout   ABDOMEN: Soft, non-tender, non-distended, +BS   NEUROLOGIC:  MAEx4, 5/5 strength throughout; fine touch sensation intact throughout; GCS 15   MUSCULOSKELETAL: No clubbing, cyanosis or edema   SKIN: No rash, pallor or wounds on exposed surfaces   GENITAL:  Indwelling Ambrosio catheter, no bleeding from the urethral meatus, large amount of sediment in the catheter tubing, dark urine         RADIOLOGY  X-RAYS:  I have reviewed radiologic plain film image(s). ALL OTHER NON-PLAIN FILM IMAGES SUCH AS CT, ULTRASOUND AND MRI HAVE BEEN READ BY THE RADIOLOGIST.   No orders to display          EKG INTERPRETATION      PROCEDURES    ED COURSE/MDM  UTI, sepsis, catheter associated UTI  Patient seen and evaluated. History and physical as above. Nontoxic, afebrile. Patient arrives slightly confused alert to person and place but not time. Patient also having some general weakness but no focal weakness. Has history of indwelling Ambrosio catheter and likely does have urinary tract infection secondary to the color of the urine and the sediment in the catheter. There is also a foul smell to the catheter. Will obtain routine labs, urinalysis. ED Course as of Aug 28 1302   Sat Aug 28, 2021   1302 White count 24.7. Stable hemoglobin 9.6. Lactic acid normal 0.9. Patient started on Rocephin for antibiotic coverage. Consult placed to hospitalist for admission for confusion, UTI and general weakness. [DS]      ED Course User Index  [DS] Erich Severin, MD         Patient was given scripts for the following medications. I counseled patient how to take these medications. New Prescriptions    No medications on file           CLINICAL IMPRESSION  1. Urinary tract infection associated with indwelling urethral catheter, initial encounter ([facility] [address]    2. Confusion    3. General weakness        Blood pressure (!) 114/51, pulse 84, temperature 98.6 °F (37 °C), temperature source Oral, resp. rate 24, height 5' 10\" (1.778 m), weight 227 lb 1.6 oz (103 kg), SpO2 94 %. DISPOSITION  Admitted        Disclaimer: All medical record entries made by [address] [place] [address] [facility].       (Please note that this note was completed with a voice recognition program. Every attempt was made to edit the dictations, but inevitably there remain words that are mis-transcribed.)            Erich Severin, MD  08/28/21 1306

## 2021-08-28 NOTE — PROGRESS NOTES
Pt transferred by stretcher from ED to Rm [id number]. Pt admitted to the hospital with a complicated UTI and sepsis, pt has a new spann in place. Pt admitted with AMS and is now alert to self and place, this RN redirected pt to time and situation. Vitals taken, pt currently on RA and doing well. Jeanna, PCA to apply telemetry box on pt. Pt in bed with 2/4 siderails up, bed locked and in the lowest position and bed alarm on. Pt oriented to room and call light. Call light within reach.

## 2021-08-29 LAB
ACANTHOCYTES: ABNORMAL
ALBUMIN SERPL-MCNC: 2.9 G/DL (ref 3.4–5)
ALP BLD-CCNC: 111 U/L (ref 40–129)
ALT SERPL-CCNC: 8 U/L (ref 10–40)
ANION GAP SERPL CALCULATED.3IONS-SCNC: 10 MMOL/L (ref 3–16)
ANISOCYTOSIS: ABNORMAL
AST SERPL-CCNC: 12 U/L (ref 15–37)
BASOPHILS ABSOLUTE: 0 K/UL (ref 0–0.2)
BASOPHILS RELATIVE PERCENT: 0 %
BILIRUB SERPL-MCNC: 0.7 MG/DL (ref 0–1)
BILIRUBIN DIRECT: 0.3 MG/DL (ref 0–0.3)
BILIRUBIN, INDIRECT: 0.4 MG/DL (ref 0–1)
BUN BLDV-MCNC: 20 MG/DL (ref 7–20)
CALCIUM SERPL-MCNC: 8.3 MG/DL (ref 8.3–10.6)
CHLORIDE BLD-SCNC: 97 MMOL/L (ref 99–110)
CO2: 27 MMOL/L (ref 21–32)
CREAT SERPL-MCNC: 1 MG/DL (ref 0.8–1.3)
CRENATED RBC'S: ABNORMAL
EOSINOPHILS ABSOLUTE: 0.2 K/UL (ref 0–0.6)
EOSINOPHILS RELATIVE PERCENT: 1 %
GFR AFRICAN AMERICAN: >60
GFR NON-AFRICAN AMERICAN: >60
GLUCOSE BLD-MCNC: 137 MG/DL (ref 70–99)
HCT VFR BLD CALC: 27.6 % (ref 40.5–52.5)
HEMOGLOBIN: 9.5 G/DL (ref 13.5–17.5)
LIPASE: 18 U/L (ref 13–60)
LYMPHOCYTES ABSOLUTE: 0.7 K/UL (ref 1–5.1)
LYMPHOCYTES RELATIVE PERCENT: 4 %
MAGNESIUM: 1.9 MG/DL (ref 1.8–2.4)
MCH RBC QN AUTO: 30.6 PG (ref 26–34)
MCHC RBC AUTO-ENTMCNC: 34.3 G/DL (ref 31–36)
MCV RBC AUTO: 89.4 FL (ref 80–100)
MONOCYTES ABSOLUTE: 1.4 K/UL (ref 0–1.3)
MONOCYTES RELATIVE PERCENT: 8 %
NEUTROPHILS ABSOLUTE: 15.1 K/UL (ref 1.7–7.7)
NEUTROPHILS RELATIVE PERCENT: 87 %
NUCLEATED RED BLOOD CELLS: 1 /100 WBC
OVALOCYTES: ABNORMAL
PDW BLD-RTO: 16.1 % (ref 12.4–15.4)
PHOSPHORUS: 3.2 MG/DL (ref 2.5–4.9)
PLATELET # BLD: 372 K/UL (ref 135–450)
PLATELET SLIDE REVIEW: ADEQUATE
PMV BLD AUTO: 8.3 FL (ref 5–10.5)
POIKILOCYTES: ABNORMAL
POTASSIUM SERPL-SCNC: 3.5 MMOL/L (ref 3.5–5.1)
RBC # BLD: 3.09 M/UL (ref 4.2–5.9)
SCHISTOCYTES: ABNORMAL
SLIDE REVIEW: ABNORMAL
SODIUM BLD-SCNC: 134 MMOL/L (ref 136–145)
TOTAL PROTEIN: 6.5 G/DL (ref 6.4–8.2)
WBC # BLD: 17.4 K/UL (ref 4–11)

## 2021-08-29 PROCEDURE — 2500000003 HC RX 250 WO HCPCS: Performed by: INTERNAL MEDICINE

## 2021-08-29 PROCEDURE — 99222 1ST HOSP IP/OBS MODERATE 55: CPT | Performed by: SURGERY

## 2021-08-29 PROCEDURE — 80069 RENAL FUNCTION PANEL: CPT

## 2021-08-29 PROCEDURE — 83690 ASSAY OF LIPASE: CPT

## 2021-08-29 PROCEDURE — 2580000003 HC RX 258: Performed by: INTERNAL MEDICINE

## 2021-08-29 PROCEDURE — 80076 HEPATIC FUNCTION PANEL: CPT

## 2021-08-29 PROCEDURE — 85025 COMPLETE CBC W/AUTO DIFF WBC: CPT

## 2021-08-29 PROCEDURE — 6360000002 HC RX W HCPCS: Performed by: INTERNAL MEDICINE

## 2021-08-29 PROCEDURE — 94760 N-INVAS EAR/PLS OXIMETRY 1: CPT

## 2021-08-29 PROCEDURE — 36415 COLL VENOUS BLD VENIPUNCTURE: CPT

## 2021-08-29 PROCEDURE — 83735 ASSAY OF MAGNESIUM: CPT

## 2021-08-29 PROCEDURE — 6370000000 HC RX 637 (ALT 250 FOR IP): Performed by: INTERNAL MEDICINE

## 2021-08-29 PROCEDURE — 1200000000 HC SEMI PRIVATE

## 2021-08-29 RX ADMIN — Medication 1500 MG: at 20:00

## 2021-08-29 RX ADMIN — CEFEPIME HYDROCHLORIDE 2000 MG: 2 INJECTION, POWDER, FOR SOLUTION INTRAVENOUS at 05:03

## 2021-08-29 RX ADMIN — SODIUM CHLORIDE 25 ML: 9 INJECTION, SOLUTION INTRAVENOUS at 01:42

## 2021-08-29 RX ADMIN — Medication 1 CAPSULE: at 10:22

## 2021-08-29 RX ADMIN — THERA TABS 1 TABLET: TAB at 10:22

## 2021-08-29 RX ADMIN — METRONIDAZOLE 500 MG: 500 INJECTION, SOLUTION INTRAVENOUS at 18:19

## 2021-08-29 RX ADMIN — SODIUM CHLORIDE 25 ML: 9 INJECTION, SOLUTION INTRAVENOUS at 05:03

## 2021-08-29 RX ADMIN — ENOXAPARIN SODIUM 40 MG: 40 INJECTION SUBCUTANEOUS at 22:31

## 2021-08-29 RX ADMIN — CEFEPIME HYDROCHLORIDE 2000 MG: 2 INJECTION, POWDER, FOR SOLUTION INTRAVENOUS at 17:44

## 2021-08-29 RX ADMIN — FUROSEMIDE 40 MG: 10 INJECTION, SOLUTION INTRAMUSCULAR; INTRAVENOUS at 10:27

## 2021-08-29 RX ADMIN — TAMSULOSIN HYDROCHLORIDE 0.4 MG: 0.4 CAPSULE ORAL at 10:22

## 2021-08-29 RX ADMIN — LEVETIRACETAM 500 MG: 500 TABLET ORAL at 10:22

## 2021-08-29 RX ADMIN — Medication 10 ML: at 10:28

## 2021-08-29 RX ADMIN — SODIUM CHLORIDE 25 ML: 9 INJECTION, SOLUTION INTRAVENOUS at 17:43

## 2021-08-29 RX ADMIN — PANTOPRAZOLE SODIUM 40 MG: 40 TABLET, DELAYED RELEASE ORAL at 22:31

## 2021-08-29 RX ADMIN — LEVETIRACETAM 500 MG: 500 TABLET ORAL at 22:31

## 2021-08-29 RX ADMIN — METRONIDAZOLE 500 MG: 500 INJECTION, SOLUTION INTRAVENOUS at 01:43

## 2021-08-29 RX ADMIN — SODIUM CHLORIDE 25 ML: 9 INJECTION, SOLUTION INTRAVENOUS at 10:35

## 2021-08-29 RX ADMIN — METRONIDAZOLE 500 MG: 500 INJECTION, SOLUTION INTRAVENOUS at 10:36

## 2021-08-29 ASSESSMENT — PAIN SCALES - GENERAL
PAINLEVEL_OUTOF10: 0

## 2021-08-29 NOTE — PROGRESS NOTES
Hospitalist Progress Note      PCP: Rashaad Souza MD    Date of Admission: 8/28/2021    Chief Complaint: from home feeling unwell. Subjective:     Much better today. Denies any pain. Tolerated diet ok - no RUQ pain, no nausea or emesis. Favorable response to diuretic at this time. Medications:  Reviewed    Infusion Medications    sodium chloride 25 mL (08/29/21 1035)     Scheduled Medications    lactobacillus  1 capsule Oral Daily with breakfast    levETIRAcetam  500 mg Oral BID    multivitamin  1 tablet Oral Daily    pantoprazole  40 mg Oral Nightly    tamsulosin  0.4 mg Oral Daily    sodium chloride flush  5-40 mL IntraVENous 2 times per day    enoxaparin  40 mg Subcutaneous Nightly    furosemide  40 mg IntraVENous Daily    cefepime  2,000 mg IntraVENous Q12H    metroNIDAZOLE  500 mg IntraVENous Q8H    vancomycin  1,500 mg IntraVENous Q24H    vancomycin (VANCOCIN) intermittent dosing (placeholder)   Other RX Placeholder     PRN Meds: sodium chloride flush, sodium chloride, ondansetron **OR** ondansetron, polyethylene glycol, acetaminophen **OR** acetaminophen      Intake/Output Summary (Last 24 hours) at 8/29/2021 1337  Last data filed at 8/29/2021 1327  Gross per 24 hour   Intake 1182.04 ml   Output 4100 ml   Net -2917.96 ml       Physical Exam Performed:    BP (!) 94/53   Pulse 82   Temp 99.6 °F (37.6 °C) (Oral)   Resp 18   Ht 5' 10\" (1.778 m)   Wt 227 lb 1.6 oz (103 kg)   SpO2 93%   BMI 32.59 kg/m²     General appearance: ill appearing, No apparent distress appears stated age and cooperative. HEENT Normal cephalic, atraumatic without obvious deformity. Pupils equal, round, and reactive to light. Extra ocular muscles intact. Conjunctivae/corneas clear. Neck: Supple, No jugular venous distention/bruits. Trachea midline without thyromegaly or adenopathy with full range of motion.   Lungs: Clear to auscultation, bilaterally without Rales/Wheezes/Rhonchi with good respiratory effort. Heart: Regular rate and rhythm with Normal S1/S2 without murmurs, rubs or gallops, point of maximum impulse non-displaced  Abdomen: Soft, non-tender or non-distended without rigidity or guarding and positive bowel sounds all four quadrants. Extremities: tense 2+ pitting edema bilateral to well above the knee level. Skin: Skin color, texture, turgor normal.  No rashes or lesions. Neurologic: Alert and oriented X 2, neurovascularly intact with sensory/motor intact upper extremities/lower extremities, bilaterally. Cranial nerves: II-XII intact, grossly non-focal.  Mental status: Alert, oriented x2, very poor historian  Capillary Refill: Acceptable  < 3 seconds  Peripheral Pulses: +3 Easily felt, not easily obliterated with pressure         Labs:   Recent Labs     08/28/21  0946 08/29/21  0843   WBC 24.7* 17.4*   HGB 9.6* 9.5*   HCT 29.1* 27.6*    372     Recent Labs     08/28/21  0946 08/29/21  0843   * 134*   K 3.8 3.5   CL 99 97*   CO2 27 27   BUN 22* 20   CREATININE 1.3 1.0   CALCIUM 8.9 8.3   PHOS  --  3.2     Recent Labs     08/28/21  0946 08/29/21  0843   AST 14* 12*   ALT 10 8*   BILIDIR  --  0.3   BILITOT 1.0 0.7   ALKPHOS 119 111     No results for input(s): INR in the last 72 hours. No results for input(s): Rosalino [place] in the last 72 hours. Urinalysis:      Lab Results   Component Value Date    NITRU Negative 08/28/2021    WBCUA 24 08/28/2021    BACTERIA 4+ 08/10/2011    RBCUA 11 08/28/2021    BLOODU MODERATE 08/28/2021    SPECGRAV 1.020 08/28/2021    GLUCOSEU Negative 08/28/2021    GLUCOSEU Negative 08/10/2011       Radiology:  CT CHEST PULMONARY EMBOLISM W CONTRAST   Final Result   1. CT CHEST: No acute vascular abnormality. No acute pulmonary embolism. 2. Main pulmonary artery dilatation can be seen with pulmonary hypertension   but is nonspecific. Indeterminate spiculated soft tissue nodule left upper   lobe, 11 x 14 mm axial series 3, image 107. Consider a non-contrast Chest CT   at 3 months, a PET/CT, or tissue sampling. *   3. Proximal and mid esophageal wall thickening can be seen with esophagitis. 4. CT ABDOMEN/PELVIS: Acute cholecystitis. Cholelithiasis and reactive   pericholecystic ascites. 5. Indeterminate calcification at the pancreatic head measures 3 mm axial   series 4 possibly related to granulomatous disease, sequela chronic   pancreatitis choledocholithiasis. Consider MRCP correlation. 6. Mild inflammatory changes about the pancreatic head may be reactive,   likely adjacent cholecystitis. Acute pancreatitis is a consideration. 7. Prostate gland enlargement typical of BPH. 8. Small, fat containing umbilical hernia.   ______________________________________________________________________________   ____      *      These guidelines do not apply to immunocompromised patients and patients with   cancer. Follow up in patients with significant comorbidities as clinically   warranted. For lung cancer screening, adhere to Lung-RADS guidelines. Reference: Radiology. 2017; 284(1):228-43. RECOMMENDATIONS:   13 mm suspicious left solid pulmonary nodule within the upper lobe. Consider   a non-contrast Chest CT at 3 months, a PET/CT, or tissue sampling. These guidelines do not apply to immunocompromised patients and patients with   cancer. Follow up in patients with significant comorbidities as clinically   warranted. For lung cancer screening, adhere to Lung-RADS guidelines. Reference: Radiology. 2017; 284(1):228-43. CT ABDOMEN PELVIS W IV CONTRAST Additional Contrast? None   Final Result   1. CT CHEST: No acute vascular abnormality. No acute pulmonary embolism. 2. Main pulmonary artery dilatation can be seen with pulmonary hypertension   but is nonspecific. Indeterminate spiculated soft tissue nodule left upper   lobe, 11 x 14 mm axial series 3, image 107.  Consider a non-contrast Chest CT   at 3 months, a PET/CT, or tissue sampling. *   3. Proximal and mid esophageal wall thickening can be seen with esophagitis. 4. CT ABDOMEN/PELVIS: Acute cholecystitis. Cholelithiasis and reactive   pericholecystic ascites. 5. Indeterminate calcification at the pancreatic head measures 3 mm axial   series 4 possibly related to granulomatous disease, sequela chronic   pancreatitis choledocholithiasis. Consider MRCP correlation. 6. Mild inflammatory changes about the pancreatic head may be reactive,   likely adjacent cholecystitis. Acute pancreatitis is a consideration. 7. Prostate gland enlargement typical of BPH. 8. Small, fat containing umbilical hernia.   ______________________________________________________________________________   ____      *      These guidelines do not apply to immunocompromised patients and patients with   cancer. Follow up in patients with significant comorbidities as clinically   warranted. For lung cancer screening, adhere to Lung-RADS guidelines. Reference: Radiology. 2017; 284(1):228-43. RECOMMENDATIONS:   13 mm suspicious left solid pulmonary nodule within the upper lobe. Consider   a non-contrast Chest CT at 3 months, a PET/CT, or tissue sampling. These guidelines do not apply to immunocompromised patients and patients with   cancer. Follow up in patients with significant comorbidities as clinically   warranted. For lung cancer screening, adhere to Lung-RADS guidelines. Reference: Radiology. 2017; 284(1):228-43. VL Extremity Venous Bilateral    (Results Pending)   MRI ABDOMEN WO CONTRAST MRCP    (Results Pending)   NM HEPATOBILIARY    (Results Pending)           Assessment/Plan:    Active Hospital Problems    Diagnosis     UTI (urinary tract infection) [N39.0]        UTI due to chronic indwelling spann  Sepsis. Recent admission for proteus bacteremia. Plan:               - empiric Vanc and Cefepime    - cultures pending.        SOB, Mild Hypoxia - on RA today.    Hx of bilateral PE COVID related - completed AC in July. CTPA no PE this admission.         Chronic Diastolic CHF with acute exacerbation - Grade II DD. Lasix IV with caution to BP changes. Very good response so far.         B/l severe LE edema - improved. Signs of severe venous stasis dermatitis with possible underlying cellulitis. Vanc and cefepime empiric - see above. Will check thomas dopplers - pending. Lasix as above - edema improving.             Acute likely metabolic encephalopathy on underlying vascular dementia. Supportive care. Treating underlying conditions as above.            Possible Cholecystitis - reported on CT abd pelvis. + cholelithiasis. His LFTs remain normal.   He has no RUQ pain and tolerating diet without issues. GI and Surgery involved. HIDA and MRCP pending.         DVT Prophylaxis: lovenox  Diet: gen diet.    Code Status: Prior Full code        Dispo - Inpatient.          Ann Ventura MD

## 2021-08-29 NOTE — CONSULTS
General Surgery Consult Note      Reji Harris   : 1935 MRN: [id number]  Date of Admission: 2021  Patricia Pat MD  Primary Care Physician: Lionel Nice MD    Chief Complaint   Patient presents with    Altered Mental Status     since last night, lives alone. son reports to EMS of weakness and confusion since last night. strong odor from spann cath       Reason for Consult: possible cholecystitis    Diagnosis Present on Admission:   UTI (urinary tract infection)      History of Present Illness  Reji Harris is a 80 y.o. male with hx of splenectomy, alcohol abuse, diastolic congestive heart failure, UTIs from chronic indwelling catheter who presented from home after not feeling well. He states that his home nurse stated that his urine appeared dark so he was sent in for further evaluation. He denies any nausea, vomiting, abdominal pain, fevers or chills. No change in bowel habits. Past Medical History:   Diagnosis Date    GEOVANI (acute kidney injury) ([facility] [address] 2021    Alcohol abuse 2020    Atrial fibrillation (HCC)     BPH (benign prostatic hyperplasia)     Dehydration     [facility]     Leg weakness, bilateral ,     legs give out and pt collaspes    Seizure ([address] 2017    UTI (urinary tract infection) 8-    admitted with high fever and fainted. Past Surgical History:   Procedure Laterality Date    CATARACT REMOVAL Bilateral     SPLENECTOMY, TOTAL      at age 15 y old. Family history reviewed and otherwise negative.   Family History   Problem Relation Age of Onset    Cancer Mother         pancreatic    Diabetes Father     Cancer Brother      Social History     Socioeconomic History    Marital status:      Spouse name: Not on file    Number of children: Not on file    Years of education: Not on file    Highest education level: Not on file   Occupational History    Not on file   Tobacco acetaminophen (TYLENOL) 325 MG tablet Take 650 mg by mouth every 4 hours as needed for Fever   Yes Historical Provider, MD   lactobacillus (CULTURELLE) capsule Take 1 capsule by mouth daily (with breakfast) 12/29/20  Yes Guillermo Franco MD   Multiple Vitamin (MULTIVITAMIN) TABS tablet Take 1 tablet by mouth daily 12/26/20  Yes Guillermo Franco MD       Review of Systems  Pertinent positives and negatives are in HPI, otherwise all systems reviewed and negative    Physical Exam  Vitals:    08/29/21 1026   BP: 115/71   Pulse: 79   Resp: 18   Temp: 97.6 °F (36.4 °C)   SpO2: 91%         Intake/Output Summary (Last 24 hours) at 8/29/2021 1101  Last data filed at 8/29/2021 0841  Gross per 24 hour   Intake 942.04 ml   Output 3400 ml   Net -2457.96 ml       Body mass index is 32.59 kg/m². General/Appearance: NAD, appears stated age  [de-identified]: PERRLA, Conjunctiva non injected, no scleral icterus. Mucous membranes pink and moist.  Neck is symmetrical. Trachea appears midline. Lung: normal respiratory effort, no accessory muscle use  Cardiac: Regular rate and rhythm  Abdomen: soft, ND, NT, reducible umbilical hernia  Neuro: No gross motor or sensory deficits. Skin: No open wounds or rashes. Labs  Recent Labs     08/28/21  0946 08/29/21  0843   WBC 24.7* 17.4*   HGB 9.6* 9.5*   HCT 29.1* 27.6*    372     Recent Labs     08/28/21  0946 08/29/21  0843   * 134*   K 3.8 3.5   CL 99 97*   CO2 27 27   BUN 22* 20   GFRAA >60 >60   MG  --  1.90   PHOS  --  3.2     Recent Labs     08/28/21  0946 08/29/21  0843   GLOB 3.7  --    AST 14* 12*   ALT 10 8*     Invalid input(s): [facility], [facility] [place], [facility], [place], [facility], [place], [place], [place], [place], EOS    Imaging  CT CHEST PULMONARY EMBOLISM W CONTRAST   Final Result   1. CT CHEST: No acute vascular abnormality. No acute pulmonary embolism. 2. Main pulmonary artery dilatation can be seen with pulmonary hypertension   but is nonspecific.   Indeterminate spiculated soft tissue nodule left upper   lobe, 11 x 14 mm axial series 3, image 107. Consider a non-contrast Chest CT   at 3 months, a PET/CT, or tissue sampling. *   3. Proximal and mid esophageal wall thickening can be seen with esophagitis. 4. CT ABDOMEN/PELVIS: Acute cholecystitis. Cholelithiasis and reactive   pericholecystic ascites. 5. Indeterminate calcification at the pancreatic head measures 3 mm axial   series 4 possibly related to granulomatous disease, sequela chronic   pancreatitis choledocholithiasis. Consider MRCP correlation. 6. Mild inflammatory changes about the pancreatic head may be reactive,   likely adjacent cholecystitis. Acute pancreatitis is a consideration. 7. Prostate gland enlargement typical of BPH. 8. Small, fat containing umbilical hernia.   ______________________________________________________________________________   ____      *      These guidelines do not apply to immunocompromised patients and patients with   cancer. Follow up in patients with significant comorbidities as clinically   warranted. For lung cancer screening, adhere to Lung-RADS guidelines. Reference: Radiology. 2017; 284(1):228-43. RECOMMENDATIONS:   13 mm suspicious left solid pulmonary nodule within the upper lobe. Consider   a non-contrast Chest CT at 3 months, a PET/CT, or tissue sampling. These guidelines do not apply to immunocompromised patients and patients with   cancer. Follow up in patients with significant comorbidities as clinically   warranted. For lung cancer screening, adhere to Lung-RADS guidelines. Reference: Radiology. 2017; 284(1):228-43. CT ABDOMEN PELVIS W IV CONTRAST Additional Contrast? None   Final Result   1. CT CHEST: No acute vascular abnormality. No acute pulmonary embolism. 2. Main pulmonary artery dilatation can be seen with pulmonary hypertension   but is nonspecific. Indeterminate spiculated soft tissue nodule left upper   lobe, 11 x 14 mm axial series 3, image 107. Consider a non-contrast Chest CT   at 3 months, a PET/CT, or tissue sampling. *   3. Proximal and mid esophageal wall thickening can be seen with esophagitis. 4. CT ABDOMEN/PELVIS: Acute cholecystitis. Cholelithiasis and reactive   pericholecystic ascites. 5. Indeterminate calcification at the pancreatic head measures 3 mm axial   series 4 possibly related to granulomatous disease, sequela chronic   pancreatitis choledocholithiasis. Consider MRCP correlation. 6. Mild inflammatory changes about the pancreatic head may be reactive,   likely adjacent cholecystitis. Acute pancreatitis is a consideration. 7. Prostate gland enlargement typical of BPH. 8. Small, fat containing umbilical hernia.   ______________________________________________________________________________   ____      *      These guidelines do not apply to immunocompromised patients and patients with   cancer. Follow up in patients with significant comorbidities as clinically   warranted. For lung cancer screening, adhere to Lung-RADS guidelines. Reference: Radiology. 2017; 284(1):228-43. RECOMMENDATIONS:   13 mm suspicious left solid pulmonary nodule within the upper lobe. Consider   a non-contrast Chest CT at 3 months, a PET/CT, or tissue sampling. These guidelines do not apply to immunocompromised patients and patients with   cancer. Follow up in patients with significant comorbidities as clinically   warranted. For lung cancer screening, adhere to Lung-RADS guidelines. Reference: Radiology. 2017; 284(1):228-43.          VL Extremity Venous Bilateral    (Results Pending)   MRI ABDOMEN WO CONTRAST MRCP    (Results Pending)   NM HEPATOBILIARY    (Results Pending)    CT abd/pelvis personally reviewed, showing distended GB, GB wall thickening and stones; it appears as if a stone may be lodged in the neck of the gallbadder    Assessment  Sabiha Medina is a 80 y.o. male admitted sepsis, UTI, diastolic CHF with acute exacerbation, possible cholecystitis    Plan    1. Possible cholecystitis  · CT shows distended gallbladder, gallstones, thickening of GB wall. Possible stone lodged in neck of the gallbladder  · Non-tender on exam  · On IV vancomycin, cefepime, flagyl. · HIDA planned for tomorrow. If positive, will discuss timing with hospitalist.  As he is asymptomatic, would ideally wait for cholecystectomy until medically optimized as he is not an ideal surgical candidate. He would need cardiac clearance as well. 2. UTI  · Indwelling cathter  · abx as above  3. sepsis  · Related to #1 and 2  · WBC 17.4 from 24.7  · Continue broad spectrum antibiotics  4.  Diastolic CHF with acute exacerbation  · Fluid overload  · May be contributing to GB wall thickening  · Diuresis per hospitalist      Rolanda Pickett MD

## 2021-08-29 NOTE — CONSULTS
[address] [place] [address]  GI Consultation                                                                 Patient: Erwin Dillard  : 1935       Date:  2021    Subjective:       History of Present Illness  Patient is a 80 y.o. male admitted with Confusion [R41.0]  UTI (urinary tract infection) [N39.0]  General weakness [R53.1]  Urinary tract infection associated with indwelling urethral catheter, initial encounter ([facility] [address] [N56.559S, N39.0] who is seen in consult for possible acute pancreatitis. He has remote Hx of splenectomy at age 15, Hx of Afib, Hx of bilateral PE deemed secondary to [facility] completed AC this July, Hx of proteus bacteremia with complicated UTI and sepsis, chronic spann catheter, who presents to [facility] with AMS and feeling unwell.      Pt is a fairly poor historian. He reports his son comes to check on him regularly. He reports over the past few days he was not feeling very well - weak, tired. He was becoming confused and disoriented and his son called EMS. He denies any abdominal pain. CT showed acute cholecystitis. Past Medical History:   Diagnosis Date    GEOVANI (acute kidney injury) ([facility] [address] 2021    Alcohol abuse 2020    Atrial fibrillation (HCC)     BPH (benign prostatic hyperplasia)     Dehydration     [facility] (HCC)     Leg weakness, bilateral ,     legs give out and pt collaspes    Seizure ([facility] [address] 2017    UTI (urinary tract infection) -10-    admitted with high fever and fainted. Past Surgical History:   Procedure Laterality Date    CATARACT REMOVAL Bilateral     SPLENECTOMY, TOTAL      at age 15 y old. Past Endoscopic History EGD in 2021 showing duodenitis at [facility]. E    Admission Meds  No current facility-administered medications on file prior to encounter.      Current Outpatient Medications on File Prior to Encounter   Medication Sig Dispense Refill    tamsulosin (FLOMAX) 0.4 MG capsule TAKE ONE CAPSULE BY MOUTH EVERY MORNING 30 capsule 3    furosemide (LASIX) 20 MG tablet TAKE ONE TABLET BY MOUTH EVERY MORNING 30 tablet 3    pantoprazole (PROTONIX) 40 MG tablet TAKE ONE TABLET BY MOUTH EVERY EVENING 30 tablet 3    levETIRAcetam (KEPPRA) 500 MG tablet Take 1 tablet by mouth 2 times daily 60 tablet 0    collagenase 250 UNIT/GM ointment Apply topically nightly Apply to left knee, right knee, and left wrist nightly      acetaminophen (TYLENOL) 325 MG tablet Take 650 mg by mouth every 4 hours as needed for Fever      lactobacillus (CULTURELLE) capsule Take 1 capsule by mouth daily (with breakfast) 30 capsule 0    Multiple Vitamin (MULTIVITAMIN) TABS tablet Take 1 tablet by mouth daily 30 tablet 0       Patient denies ASA, NSAID use. Allergies  No Known Allergies   Social   Social History     Tobacco Use    Smoking status: Never Smoker    Smokeless tobacco: Never Used   Substance Use Topics    Alcohol use: Yes     Alcohol/week: 0.0 standard drinks     Comment: occasionally         Family History   Problem Relation Age of Onset    Cancer Mother         pancreatic    Diabetes Father     Cancer Brother       No family history of colon cancer, Crohn's disease, or ulcerative colitis. Review of Systems  Review of systems not obtained due to patient factors.        Physical Exam    /71   Pulse 79   Temp 97.6 °F (36.4 °C) (Oral)   Resp 18   Ht 5' 10\" (1.778 m)   Wt 227 lb 1.6 oz (103 kg)   SpO2 91%   BMI 32.59 kg/m²   General appearance: alert, cooperative, no distress, appears stated age  Anicteric, No Jaundice  Head: Normocephalic, without obvious abnormality  Lungs: clear to auscultation bilaterally, Normal Effort  Heart: regular rate and rhythm, normal S1 and S2, no murmurs or rubs  Abdomen: soft, non-tender; bowel sounds normal; no masses,  no organomegaly  Extremities: atraumatic, no cyanosis or edema  Skin: warm and dry  Neuro: intact  AAOX3      Data Review:    Recent Labs 08/28/21  0946 08/29/21  0843   WBC 24.7* 17.4*   HGB 9.6* 9.5*   HCT 29.1* 27.6*   MCV 90.3 89.4    372     Recent Labs     08/28/21  0946 08/29/21  0843   * 134*   K 3.8 3.5   CL 99 97*   CO2 27 27   PHOS  --  3.2   BUN 22* 20   CREATININE 1.3 1.0     Recent Labs     08/28/21  0946 08/29/21  0843   AST 14* 12*   ALT 10 8*   BILIDIR  --  0.3   BILITOT 1.0 0.7   ALKPHOS 119 111     No results for input(s): LIPASE, AMYLASE in the last 72 hours. No results for input(s): PROTIME, INR in the last 72 hours. No results for input(s): PTT in the last 72 hours. No results for input(s): OCCULTBLD in the last 72 hours. Imaging Studies:                         CT-scan of abdomen and pelvis:  Acute cholecystitis.  Cholelithiasis and reactive   pericholecystic ascites. 5. Indeterminate calcification at the pancreatic head measures 3 mm axial   series 4 possibly related to granulomatous disease, sequela chronic   pancreatitis choledocholithiasis.  Consider MRCP correlation. 6. Mild inflammatory changes about the pancreatic head may be reactive,   likely adjacent cholecystitis.  Acute pancreatitis is a consideration. 7. Prostate gland enlargement typical of BPH. 8. Small, fat containing umbilical hernia. Assessment:     Active Problems:    UTI (urinary tract infection)  Resolved Problems:    * No resolved hospital problems. *      Acute Cholecystitis. No clinical evidence of acute pancreatitis. LFTs normal.  No pancreatic enzymes available. Recommendations: Will check lipase. Agree with MRCP and HIDA and cholecystectomy. PPI for esophagitis. Will follow. Thank you for the opportunity to participate in Tita Price's care.     Kodi Castillo MD

## 2021-08-29 NOTE — PROGRESS NOTES
Medication Reconciliation    List of medications patient is currently taking is complete. Source of information: 1. Dispense history                                      2. Office visit 8/12/21                                      3. EPIC records      Allergies  Patient has no known allergies.

## 2021-08-30 ENCOUNTER — APPOINTMENT (OUTPATIENT)
Dept: MRI IMAGING | Age: 86
DRG: 698 | End: 2021-08-30
Payer: MEDICARE

## 2021-08-30 ENCOUNTER — APPOINTMENT (OUTPATIENT)
Dept: NUCLEAR MEDICINE | Age: 86
DRG: 698 | End: 2021-08-30
Payer: MEDICARE

## 2021-08-30 LAB
ALBUMIN SERPL-MCNC: 2.8 G/DL (ref 3.4–5)
ANION GAP SERPL CALCULATED.3IONS-SCNC: 10 MMOL/L (ref 3–16)
BUN BLDV-MCNC: 15 MG/DL (ref 7–20)
CALCIUM SERPL-MCNC: 8.8 MG/DL (ref 8.3–10.6)
CHLORIDE BLD-SCNC: 99 MMOL/L (ref 99–110)
CO2: 28 MMOL/L (ref 21–32)
CREAT SERPL-MCNC: 0.9 MG/DL (ref 0.8–1.3)
GFR AFRICAN AMERICAN: >60
GFR NON-AFRICAN AMERICAN: >60
GLUCOSE BLD-MCNC: 114 MG/DL (ref 70–99)
MAGNESIUM: 1.9 MG/DL (ref 1.8–2.4)
ORGANISM: ABNORMAL
ORGANISM: ABNORMAL
PHOSPHORUS: 3.4 MG/DL (ref 2.5–4.9)
POTASSIUM SERPL-SCNC: 3.4 MMOL/L (ref 3.5–5.1)
SODIUM BLD-SCNC: 137 MMOL/L (ref 136–145)
URINE CULTURE, ROUTINE: ABNORMAL
URINE CULTURE, ROUTINE: ABNORMAL
VANCOMYCIN TROUGH: 10.4 UG/ML (ref 10–20)

## 2021-08-30 PROCEDURE — 36415 COLL VENOUS BLD VENIPUNCTURE: CPT

## 2021-08-30 PROCEDURE — XW033N5 INTRODUCTION OF MEROPENEM-VABORBACTAM ANTI-INFECTIVE INTO PERIPHERAL VEIN, PERCUTANEOUS APPROACH, NEW TECHNOLOGY GROUP 5: ICD-10-PCS | Performed by: INTERNAL MEDICINE

## 2021-08-30 PROCEDURE — 51702 INSERT TEMP BLADDER CATH: CPT

## 2021-08-30 PROCEDURE — 2500000003 HC RX 250 WO HCPCS: Performed by: INTERNAL MEDICINE

## 2021-08-30 PROCEDURE — APPSS15 APP SPLIT SHARED TIME 0-15 MINUTES: Performed by: PHYSICIAN ASSISTANT

## 2021-08-30 PROCEDURE — 80202 ASSAY OF VANCOMYCIN: CPT

## 2021-08-30 PROCEDURE — 97161 PT EVAL LOW COMPLEX 20 MIN: CPT

## 2021-08-30 PROCEDURE — 97530 THERAPEUTIC ACTIVITIES: CPT

## 2021-08-30 PROCEDURE — 6360000002 HC RX W HCPCS: Performed by: INTERNAL MEDICINE

## 2021-08-30 PROCEDURE — A9537 TC99M MEBROFENIN: HCPCS | Performed by: INTERNAL MEDICINE

## 2021-08-30 PROCEDURE — 78226 HEPATOBILIARY SYSTEM IMAGING: CPT

## 2021-08-30 PROCEDURE — APPNB30 APP NON BILLABLE TIME 0-30 MINS: Performed by: PHYSICIAN ASSISTANT

## 2021-08-30 PROCEDURE — 93970 EXTREMITY STUDY: CPT

## 2021-08-30 PROCEDURE — 1200000000 HC SEMI PRIVATE

## 2021-08-30 PROCEDURE — 6370000000 HC RX 637 (ALT 250 FOR IP): Performed by: INTERNAL MEDICINE

## 2021-08-30 PROCEDURE — 97116 GAIT TRAINING THERAPY: CPT

## 2021-08-30 PROCEDURE — 83735 ASSAY OF MAGNESIUM: CPT

## 2021-08-30 PROCEDURE — 80069 RENAL FUNCTION PANEL: CPT

## 2021-08-30 PROCEDURE — 3430000000 HC RX DIAGNOSTIC RADIOPHARMACEUTICAL: Performed by: INTERNAL MEDICINE

## 2021-08-30 PROCEDURE — 2580000003 HC RX 258: Performed by: INTERNAL MEDICINE

## 2021-08-30 PROCEDURE — 74181 MRI ABDOMEN W/O CONTRAST: CPT

## 2021-08-30 PROCEDURE — 99232 SBSQ HOSP IP/OBS MODERATE 35: CPT | Performed by: SURGERY

## 2021-08-30 RX ADMIN — THERA TABS 1 TABLET: TAB at 14:50

## 2021-08-30 RX ADMIN — Medication 1 CAPSULE: at 14:50

## 2021-08-30 RX ADMIN — TAMSULOSIN HYDROCHLORIDE 0.4 MG: 0.4 CAPSULE ORAL at 14:50

## 2021-08-30 RX ADMIN — LEVETIRACETAM 500 MG: 500 TABLET ORAL at 20:55

## 2021-08-30 RX ADMIN — MEROPENEM 500 MG: 500 INJECTION, POWDER, FOR SOLUTION INTRAVENOUS at 14:50

## 2021-08-30 RX ADMIN — METRONIDAZOLE 500 MG: 500 INJECTION, SOLUTION INTRAVENOUS at 08:50

## 2021-08-30 RX ADMIN — PANTOPRAZOLE SODIUM 40 MG: 40 TABLET, DELAYED RELEASE ORAL at 20:55

## 2021-08-30 RX ADMIN — ENOXAPARIN SODIUM 40 MG: 40 INJECTION SUBCUTANEOUS at 20:55

## 2021-08-30 RX ADMIN — Medication 6 MILLICURIE: at 10:00

## 2021-08-30 RX ADMIN — FUROSEMIDE 40 MG: 10 INJECTION, SOLUTION INTRAMUSCULAR; INTRAVENOUS at 08:44

## 2021-08-30 RX ADMIN — METRONIDAZOLE 500 MG: 500 INJECTION, SOLUTION INTRAVENOUS at 01:07

## 2021-08-30 RX ADMIN — LEVETIRACETAM 500 MG: 500 TABLET ORAL at 14:51

## 2021-08-30 RX ADMIN — MEROPENEM 500 MG: 500 INJECTION, POWDER, FOR SOLUTION INTRAVENOUS at 23:21

## 2021-08-30 RX ADMIN — CEFEPIME HYDROCHLORIDE 2000 MG: 2 INJECTION, POWDER, FOR SOLUTION INTRAVENOUS at 04:55

## 2021-08-30 RX ADMIN — Medication 10 ML: at 20:56

## 2021-08-30 ASSESSMENT — PAIN SCALES - GENERAL
PAINLEVEL_OUTOF10: 0
PAINLEVEL_OUTOF10: 0

## 2021-08-30 NOTE — PLAN OF CARE
Problem: Falls - Risk of:  Goal: Will remain free from falls  Description: Will remain free from falls  8/30/2021 0750 by Johnson Cuba RN  Outcome: Ongoing     Problem: Falls - Risk of:  Goal: Absence of physical injury  Description: Absence of physical injury  8/30/2021 0750 by Johnson Cuba RN  Outcome: Ongoing     Problem: Skin Integrity:  Goal: Will show no infection signs and symptoms  Description: Will show no infection signs and symptoms  8/30/2021 0750 by Johnson Cuba RN  Outcome: Ongoing     Problem: Skin Integrity:  Goal: Absence of new skin breakdown  Description: Absence of new skin breakdown  8/30/2021 0750 by Johnson Cuba RN  Outcome: Ongoing

## 2021-08-30 NOTE — PROGRESS NOTES
Pt's spann catheter was pulled out with balloon intact during MRI. Pt with some blood clots noted to end of penis on return to room. New spann catheter inserted by this RN, pt tolerated well. Urine blood tinged, but yellow and clear.  Electronically signed by Geovanny Ballesteros RN on 8/30/2021 at 5:02 PM

## 2021-08-30 NOTE — CARE COORDINATION
INITIAL CASE MANAGEMENT ASSESSMENT    Reviewed chart, met with patient's son, Ed, to assess possible discharge needs. Explained Case Management role/services. Living Situation: verified address, lives in a split level house alone, but son is just a few streets over, [address] from garage & about 7 [address] from front    ADLs: son assists with ADLs, sits in wheelchair most of the day & sleeps I a recliner     DME: wheelchair, chair lifts    PT/OT Recs: pending     Active Services: active with [facility]     Transportation: does not drive, family does all driving for him     Medications: no barriers, uses [employer] in [place]    PCP: Claudette Coronado MD    PLAN/COMMENTS: unsure of dc needs, pending therapy recs, family agreeable to SNF if recommended    CM provided contact information for patient or family to call with any questions. CM will follow and assist as needed.     Gerald España RN, BSN,   [phone]    Electronically signed by Gerald España RN on 8/30/2021 at 2:34 PM

## 2021-08-30 NOTE — CARE COORDINATION
Advance Care Planning     Advance Care Planning Activator (Inpatient)  Conversation Note      Date of ACP Conversation: 8/30/2021     Conversation Conducted with: Patient with [address]: Named in Advance Directive or Healthcare Power of  (name) Ed    ACP Activator: Ally Rice, [id number] [id number] Maker:     Current Designated Health Care Decision Maker:     Primary Decision Maker: Scott Price - Jamila - [phone]    Secondary Decision Maker: Griffinabiodun Rajesh - [phone]    Today we documented Decision Maker(s) consistent with ACP documents on file. Care Preferences    Ventilation: \"If you were in your present state of health and suddenly became very ill and were unable to breathe on your own, what would your preference be about the use of a ventilator (breathing machine) if it were available to you? \"      Would the patient desire the use of ventilator (breathing machine)?: no    \"If your health worsens and it becomes clear that your chance of recovery is unlikely, what would your preference be about the use of a ventilator (breathing machine) if it were available to you? \"     Would the patient desire the use of ventilator (breathing machine)?: No      Resuscitation  \"CPR works best to restart the heart when there is a sudden event, like a heart attack, in someone who is otherwise healthy. Unfortunately, CPR does not typically restart the heart for people who have serious health conditions or who are very sick. \"    \"In the event your heart stopped as a result of an underlying serious health condition, would you want attempts to be made to restart your heart (answer \"yes\" for attempt to resuscitate) or would you prefer a natural death (answer \"no\" for do not attempt to resuscitate)? \" no       [] Yes   [] No   Educated Patient / Aletha Rosales regarding differences between Advance Directives and portable DNR orders.     Length of ACP Conversation in minutes:  4 min    Conversation Outcomes:  [x] ACP discussion completed  [] Existing advance directive reviewed with patient; no changes to patient's previously recorded wishes  [] New Advance Directive completed  [] Portable Do Not Rescitate prepared for Provider review and signature  [] POLST/POST/MOLST/MOST prepared for Provider review and signature      Follow-up plan:    [] Schedule follow-up conversation to continue planning  [] Referred individual to Provider for additional questions/concerns   [] Advised patient/agent/surrogate to review completed ACP document and update if needed with changes in condition, patient preferences or care setting    [] This note routed to one or more involved healthcare providers        Electronically signed by Jonne Phalen, RN on 8/30/21 at 2:45 PM EDT

## 2021-08-30 NOTE — PROGRESS NOTES
Gastroenterology Progress Note    Dean Gilbert is a 80 y.o. male patient. Active Problems:    UTI (urinary tract infection)  Resolved Problems:    * No resolved hospital problems. *      SUBJECTIVE:  Denies abdominal pain, nausea, vomiting. Tolerating diet. No fevers. No bowel movements. Low grade fevers. Current Facility-Administered Medications: lactobacillus (CULTURELLE) capsule 1 capsule, 1 capsule, Oral, Daily with breakfast  levETIRAcetam (KEPPRA) tablet 500 mg, 500 mg, Oral, BID  multivitamin 1 tablet, 1 tablet, Oral, Daily  pantoprazole (PROTONIX) tablet 40 mg, 40 mg, Oral, Nightly  tamsulosin (FLOMAX) capsule 0.4 mg, 0.4 mg, Oral, Daily  sodium chloride flush 0.9 % injection 5-40 mL, 5-40 mL, IntraVENous, 2 times per day  sodium chloride flush 0.9 % injection 5-40 mL, 5-40 mL, IntraVENous, PRN  0.9 % sodium chloride infusion, 25 mL, IntraVENous, PRN  enoxaparin (LOVENOX) injection 40 mg, 40 mg, Subcutaneous, Nightly  ondansetron (ZOFRAN-ODT) disintegrating tablet 4 mg, 4 mg, Oral, Q8H PRN **OR** ondansetron (ZOFRAN) injection 4 mg, 4 mg, IntraVENous, Q6H PRN  polyethylene glycol (GLYCOLAX) packet 17 g, 17 g, Oral, Daily PRN  acetaminophen (TYLENOL) tablet 650 mg, 650 mg, Oral, Q6H PRN **OR** acetaminophen (TYLENOL) suppository 650 mg, 650 mg, Rectal, Q6H PRN  furosemide (LASIX) injection 40 mg, 40 mg, IntraVENous, Daily  cefepime (MAXIPIME) 2000 mg IVPB minibag, 2,000 mg, IntraVENous, Q12H  metronidazole (FLAGYL) 500 mg in NaCl 100 mL IVPB premix, 500 mg, IntraVENous, Q8H  vancomycin (VANCOCIN) 1500 mg in dextrose 5 % 250 mL IVPB, 1,500 mg, IntraVENous, Q24H  vancomycin (VANCOCIN) intermittent dosing (placeholder), , Other, RX Placeholder    Physical    VITALS:  /69   Pulse 85   Temp 100.4 °F (38 °C) (Oral)   Resp 18   Ht 5' 10\" (1.778 m)   Wt 227 lb 1.6 oz (103 kg)   SpO2 90%   BMI 32.59 kg/m²   TEMPERATURE:  Current - Temp: 100.4 °F (38 °C);  Max - Temp  Av.9 °F (37.2 °C)  Min: 97.6 °F (36.4 °C)  Max: 100.4 °F (38 °C)    NAD  Eyes: No icterus  RRR  Lungs CTA Bilaterally, normal effort  Abdomen soft, ND, NT, Bowel sounds normal.  Ext: no edema  Neuro: No tremor  Psych: A&Ox3    Data    Data Review:    Recent Labs     08/28/21  0946 08/29/21  0843   WBC 24.7* 17.4*   HGB 9.6* 9.5*   HCT 29.1* 27.6*   MCV 90.3 89.4    372     Recent Labs     08/28/21  0946 08/29/21  0843   * 134*   K 3.8 3.5   CL 99 97*   CO2 27 27   PHOS  --  3.2   BUN 22* 20   CREATININE 1.3 1.0     Recent Labs     08/28/21  0946 08/29/21  0843   AST 14* 12*   ALT 10 8*   BILIDIR  --  0.3   BILITOT 1.0 0.7   ALKPHOS 119 111     Recent Labs     08/29/21  0843   LIPASE 18.0     No results for input(s): PROTIME, INR in the last 72 hours. No results for input(s): PTT in the last 72 hours. ASSESSMENT:  80year old with history of seizures, DVT, BPH, atrial fibrillation, etoh abuse, splenectomy at age 15, bilateral cataract removal who presents with altered mental status, weakness, fatigue. Evaluation showed BUN 22 and Cr 1.3. LFT's normal. CBC 24.7, 9.6/29.1, 357. Recent iron studies, B12, folate all normal.  UA with 24 WBC's. CT chest showed 11 x 14mm nodule JOSEPH , gallstones, pericholecystic fluid, 3mm calcification in the pancreatic head, mild inflammatory chnages around the pancreatic head, BPH, fat-containing umbilical hernia. Lipase 18. Started on vancomycin and cefepime empirically, IV lasix for severe LE edema. Imp:  1. Abnormal calcification in the head of the pancreas, 3mm. No ductal dilation so likely parenchymal.  Normal LFTs making biliary stone unlikely. I suspect from chronic pancreatitis. 2. Gallstones with pericholecystic edema. HIDA ordered and surgery following. 3.  Stranding around head of pancreas but no pain and normal pancreas enzymes. Need 2 out of 3 so does not have pancreatitis at this point. 4.  JOSEPH nodule per medicine  5. LE edema per medicine, on lasix  6. Leukocytosis: on vanc/zosyn per medicine. Plan: Follow up on MRCP and HIDA. Thank you for allowing me to participate in the care of your patient. Please feel free to contact me with any concerns.   [facility], MD

## 2021-08-30 NOTE — PROGRESS NOTES
General and Vascular Surgery                                                           Daily Progress Note                                                             Ney Bo PA-C     Pt Name: Anuradha Hester  Medical Record Number: [id number]  Date of Birth 1935   Today's Date: 8/30/2021    ASSESSMENT/PLAN  1. Possible cholecystitis  · CT shows distended gallbladder, gallstones, thickening of GB wall. Possible stone lodged in neck of the gallbladder  · Non-tender on exam  · On IV vancomycin, cefepime, flagyl. · HIDA scan and MRCP this AM.  If positive, will discuss timing with hospitalist.  As he is asymptomatic, would ideally wait for cholecystectomy until medically optimized as he is not an ideal surgical candidate. He would need cardiac clearance as well. 2. UTI  · Indwelling cathter  · abx as above  3. sepsis  · Related to #1 and 2  · WBC 17.4 (yesterday) from 24. 7. · Continue broad spectrum antibiotics  4. Diastolic CHF with acute exacerbation  · Fluid overload  · May be contributing to GB wall thickening  · Diuresis per hospitalist  EDUCATION  Patient educated about their illness/diagnosis, stated above, and all questions answered. We discussed the importance of nutrition, medications they are taking, and healthy lifestyle. OBJECTIVE  VITALS:  height is 5' 10\" (1.778 m) and weight is 227 lb 1.6 oz (103 kg). His oral temperature is 98.1 °F (36.7 °C). His blood pressure is 122/64 and his pulse is 90. His respiration is 16 and oxygen saturation is 91%. VITALS:  /64   Pulse 90   Temp 98.1 °F (36.7 °C) (Oral)   Resp 16   Ht 5' 10\" (1.778 m)   Wt 227 lb 1.6 oz (103 kg)   SpO2 91%   BMI 32.59 kg/m²   GENERAL: alert, no distress  ABDOMEN: soft, non-tender and non-distended  I/O last 3 completed shifts: In: 876.1 [P.O.:240; IV Piggyback:636.1]  Out: 1897 [Urine:1350]  No intake/output data recorded.     LABS  Recent Labs Patient scheduled for congenital diaphragmatic hernia repair on 3/12/21 with Dr. Regan.  Patient is inpatient NICU 27   08/28/21  0946 08/28/21  1021 08/29/21  0843 08/29/21  0843 08/30/21  1453   WBC   < >  --  17.4*  --   --    HGB   < >  --  9.5*  --   --    HCT   < >  --  27.6*  --   --    PLT   < >  --  372  --   --    NA   < >  --  134*   < > 137   K  --   --  3.5   < > 3.4*   CL   < >  --  97*   < > 99   CO2   < >  --  27   < > 28   BUN   < >  --  20   < > 15   CREATININE   < >  --  1.0   < > 0.9   MG  --   --  1.90   < > 1.90   PHOS  --   --  3.2   < > 3.4   CALCIUM   < >  --  8.3   < > 8.8   AST   < >  --  12*  --   --    ALT   < >  --  8*  --   --    BILITOT   < >  --  0.7  --   --    BILIDIR  --   --  0.3  --   --    NITRU  --  Negative  --   --   --    COLORU  --  YELLOW  --   --   --     < > = values in this interval not displayed. CBC:   Lab Results   Component Value Date    WBC 17.4 08/29/2021    RBC 3.09 08/29/2021    HGB 9.5 08/29/2021    HCT 27.6 08/29/2021    MCV 89.4 08/29/2021    MCH 30.6 08/29/2021    MCHC 34.3 08/29/2021    RDW 16.1 08/29/2021     08/29/2021    MPV 8.3 08/29/2021     CMP:    Lab Results   Component Value Date     08/30/2021    K 3.4 08/30/2021    K 3.8 08/28/2021    CL 99 08/30/2021    CO2 28 08/30/2021    BUN 15 08/30/2021    CREATININE 0.9 08/30/2021    GFRAA >60 08/30/2021    GFRAA >60 01/06/2013    AGRATIO 0.8 08/28/2021    LABGLOM >60 08/30/2021    GLUCOSE 114 08/30/2021    PROT 6.5 08/29/2021    PROT 6.7 01/06/2013    LABALBU 2.8 08/30/2021    CALCIUM 8.8 08/30/2021    BILITOT 0.7 08/29/2021    ALKPHOS 111 08/29/2021    AST 12 08/29/2021    ALT 8 08/29/2021         Jayden Guevara PA-C  Electronically signed 8/30/2021 at 3:55 PM    Agree with above note. The patient was personally seen and examined. Anh Dorsey is doing well. Denies any abdominal pain. Tolerating diet.  0 intake recorded for today .  +BM    Abd soft, NT, ND    WBC 17.4  Cr 0.9     HIDA scan + for cholecystitis    A/P: 79 yo male with cholecystitis, UTI, sepsis, diastolic CHF    HIDA + for cholecystitis. As he is asymptomatic, continue treatment with antibiotics. Will have to weigh risks/benefits as an outpatient. Would need full optimization beforehand.     Monitor po intake    PT/OT    UTI - continue antibiotics per primary    Diastolic CHF - per primary team    Caitlyn Reyes MD

## 2021-08-30 NOTE — PROGRESS NOTES
or non-distended without rigidity or guarding and positive bowel sounds all four quadrants. Extremities: tense 2+ pitting edema bilateral to well above the knee level. Skin: Skin color, texture, turgor normal.  No rashes or lesions. Neurologic: Alert and oriented X 2, neurovascularly intact with sensory/motor intact upper extremities/lower extremities, bilaterally. Cranial nerves: II-XII intact, grossly non-focal.  Mental status: Alert, oriented x2, very poor historian  Capillary Refill: Acceptable  < 3 seconds  Peripheral Pulses: +3 Easily felt, not easily obliterated with pressure         Labs:   Recent Labs     08/28/21  0946 08/29/21  0843   WBC 24.7* 17.4*   HGB 9.6* 9.5*   HCT 29.1* 27.6*    372     Recent Labs     08/28/21  0946 08/29/21  0843   * 134*   K 3.8 3.5   CL 99 97*   CO2 27 27   BUN 22* 20   CREATININE 1.3 1.0   CALCIUM 8.9 8.3   PHOS  --  3.2     Recent Labs     08/28/21  0946 08/29/21  0843   AST 14* 12*   ALT 10 8*   BILIDIR  --  0.3   BILITOT 1.0 0.7   ALKPHOS 119 111     No results for input(s): INR in the last 72 hours. No results for input(s): Linn Jessenia in the last 72 hours. Urinalysis:      Lab Results   Component Value Date    NITRU Negative 08/28/2021    WBCUA 24 08/28/2021    BACTERIA 4+ 08/10/2011    RBCUA 11 08/28/2021    BLOODU MODERATE 08/28/2021    SPECGRAV 1.020 08/28/2021    GLUCOSEU Negative 08/28/2021    GLUCOSEU Negative 08/10/2011       Radiology:  VL Extremity Venous Bilateral   Final Result      CT CHEST PULMONARY EMBOLISM W CONTRAST   Final Result   1. CT CHEST: No acute vascular abnormality. No acute pulmonary embolism. 2. Main pulmonary artery dilatation can be seen with pulmonary hypertension   but is nonspecific. Indeterminate spiculated soft tissue nodule left upper   lobe, 11 x 14 mm axial series 3, image 107. Consider a non-contrast Chest CT   at 3 months, a PET/CT, or tissue sampling. *   3.  Proximal and mid esophageal wall thickening can be seen with esophagitis. 4. CT ABDOMEN/PELVIS: Acute cholecystitis. Cholelithiasis and reactive   pericholecystic ascites. 5. Indeterminate calcification at the pancreatic head measures 3 mm axial   series 4 possibly related to granulomatous disease, sequela chronic   pancreatitis choledocholithiasis. Consider MRCP correlation. 6. Mild inflammatory changes about the pancreatic head may be reactive,   likely adjacent cholecystitis. Acute pancreatitis is a consideration. 7. Prostate gland enlargement typical of BPH. 8. Small, fat containing umbilical hernia.   ______________________________________________________________________________   ____      *      These guidelines do not apply to immunocompromised patients and patients with   cancer. Follow up in patients with significant comorbidities as clinically   warranted. For lung cancer screening, adhere to Lung-RADS guidelines. Reference: Radiology. 2017; 284(1):228-43. RECOMMENDATIONS:   13 mm suspicious left solid pulmonary nodule within the upper lobe. Consider   a non-contrast Chest CT at 3 months, a PET/CT, or tissue sampling. These guidelines do not apply to immunocompromised patients and patients with   cancer. Follow up in patients with significant comorbidities as clinically   warranted. For lung cancer screening, adhere to Lung-RADS guidelines. Reference: Radiology. 2017; 284(1):228-43. CT ABDOMEN PELVIS W IV CONTRAST Additional Contrast? None   Final Result   1. CT CHEST: No acute vascular abnormality. No acute pulmonary embolism. 2. Main pulmonary artery dilatation can be seen with pulmonary hypertension   but is nonspecific. Indeterminate spiculated soft tissue nodule left upper   lobe, 11 x 14 mm axial series 3, image 107. Consider a non-contrast Chest CT   at 3 months, a PET/CT, or tissue sampling. *   3. Proximal and mid esophageal wall thickening can be seen with esophagitis.    4. CT ABDOMEN/PELVIS: Acute cholecystitis. Cholelithiasis and reactive   pericholecystic ascites. 5. Indeterminate calcification at the pancreatic head measures 3 mm axial   series 4 possibly related to granulomatous disease, sequela chronic   pancreatitis choledocholithiasis. Consider MRCP correlation. 6. Mild inflammatory changes about the pancreatic head may be reactive,   likely adjacent cholecystitis. Acute pancreatitis is a consideration. 7. Prostate gland enlargement typical of BPH. 8. Small, fat containing umbilical hernia.   ______________________________________________________________________________   ____      *      These guidelines do not apply to immunocompromised patients and patients with   cancer. Follow up in patients with significant comorbidities as clinically   warranted. For lung cancer screening, adhere to Lung-RADS guidelines. Reference: Radiology. 2017; 284(1):228-43. RECOMMENDATIONS:   13 mm suspicious left solid pulmonary nodule within the upper lobe. Consider   a non-contrast Chest CT at 3 months, a PET/CT, or tissue sampling. These guidelines do not apply to immunocompromised patients and patients with   cancer. Follow up in patients with significant comorbidities as clinically   warranted. For lung cancer screening, adhere to Lung-RADS guidelines. Reference: Radiology. 2017; 284(1):228-43. MRI ABDOMEN WO CONTRAST MRCP    (Results Pending)   NM HEPATOBILIARY    (Results Pending)           Assessment/Plan:    Active Hospital Problems    Diagnosis     UTI (urinary tract infection) [N39.0]        UTI due to chronic indwelling spann  Sepsis. Recent admission for proteus bacteremia. Plan:               - empiric Vanc and Cefepime    - urine culture with ESBL proteus - Abx changed to IV Merrem on 8/30   - blood culture has been negative.        SOB, Mild Hypoxia - on RA today. Hx of bilateral PE COVID related - completed AC in July. CTPA no PE this admission.       Chronic Diastolic CHF with acute exacerbation - Grade II DD. Lasix IV with caution to BP changes. Very good response so far - cont IV lasix.         B/l severe LE edema - improved. Signs of severe venous stasis dermatitis with possible underlying cellulitis. Vanc and cefepime empiric - Abx as above. 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             Acute likely metabolic encephalopathy on underlying vascular dementia. Supportive care. Treating underlying conditions as above.    Improved.         Possible Cholecystitis - reported on CT abd pelvis. + cholelithiasis. His LFTs remain normal.   He has no RUQ pain and tolerating diet without issues. GI and Surgery involved. HIDA and MRCP pending. On Abx. Per family report pt with DNR wishes - code status changed accordingly in Epic.         DVT Prophylaxis: lovenox  Diet: gen diet.    Code Status: Kelleen Runner - Inpatient.          Renetta Agee MD

## 2021-08-30 NOTE — PROGRESS NOTES
Comprehensive Nutrition Assessment    Type and Reason for Visit:  Initial, Positive Nutrition Screen, Wound    Nutrition Recommendations/Plan:   - Begin Ensure Enlive BID once diet resumes  - Monitor meal and supplement intakes    Nutrition Assessment:  Positive nutrition screen for wounds. Pt with multiple stage II pressure injuries to buttocks. Hx CHF. No wt loss per EMR. Currently NPO. Previously on regular diet with variable intakes. Will begin Ensure Enlive once diet resumes. Malnutrition Assessment:  Malnutrition Status:  No malnutrition    Context:  Chronic Illness       Estimated Daily Nutrient Needs:  Energy (kcal):  8330-6259 (15-18kcal/103kg); Weight Used for Energy Requirements:  Current     Protein (g):   (1.2-1.4g/75kg); Weight Used for Protein Requirements:  Ideal        Fluid (ml/day): 1 ml/kcal      Nutrition Related Findings:  +2 BLE edema. Wounds:  Multiple, Stage II, Pressure Injury       Current Nutrition Therapies:    Diet NPO    Anthropometric Measures:  · Height: 5' 10\" (177.8 cm)  · Current Body Weight: 227 lb (103 kg)   · Admission Body Weight: 227 lb (103 kg)    · Ideal Body Weight: 166 lbs; % Ideal Body Weight 136.7 %   · BMI: 32.6   · BMI Categories: Obese Class 1 (BMI 30.0-34. 9)       Nutrition Diagnosis:   · Inadequate oral intake related to inadequate protein-energy intake as evidenced by other (comment) (Variable intakes)    Nutrition Interventions:   Food and/or Nutrient Delivery:   (Begin ONS once diet resumes)  Nutrition Education/Counseling:  Education not indicated   Coordination of Nutrition Care:  Continue to monitor while inpatient    Goals:  PO intake greater than 50%       Nutrition Monitoring and Evaluation:   Behavioral-Environmental Outcomes:  None Identified   Food/Nutrient Intake Outcomes:  Diet Advancement/Tolerance, Food and Nutrient Intake, Supplement Intake  Physical Signs/Symptoms Outcomes:  Skin, Weight, Fluid Status or Edema     Discharge Planning:     Too soon to determine     Electronically signed by Mando Farmer RD, LD on 8/30/21 at 1:58 PM EDT    Contact: [phone]

## 2021-08-30 NOTE — PROGRESS NOTES
Physical Therapy    Facility/Department: [address] MED SURG  Initial Assessment    This note serves as patient discharge summary if pt discharges prior to next PT visit      NAME: Ranjana Leon  : 1935  MRN: [id number]    Date of Service: 2021    Discharge Recommendations:  Continue to assess pending progress    Ranjana Leon scored a  on the AM-PAC short mobility form. Current research shows that an AM-PAC score of 17 or less is typically not associated with a discharge to the patient's home setting. Based on the patient's AM-PAC score and their current functional mobility deficits, it is recommended that the patient have 3-5 sessions per week of Physical Therapy at d/c to increase the patient's independence. Please see assessment section for further patient specific details. PT Equipment Recommendations  Equipment Needed: No    Assessment   Body structures, Functions, Activity limitations: Decreased functional mobility ; Decreased ADL status    Assessment: The patient is a 80 y.o. male per chart review of Dr. Taylor Rain H&P note \"w remote Hx of splenectomy at age 15, Hx of Afib, Hx of bilateral PE deemed secondary to [place] completed AC this July, Hx of proteus bacteremia with complicated UTI and sepsis, chronic spann catheter, who presents to [facility] with AMS and feeling unwell\" on 2021 by his son. Status 2021: Bed mobility Mod A x2 to move LE oob, and trunk up right. Mod A to scoot to EOB. Transfers: Mod A x2 to RW from EOB. Min A at the [facility]. Pt able to stand at the [facility] for 10 min to preform pericare. Pt has an inital posterior lean when transitioning to stance, he is able to briefly correct in standing, but able to fully correct with ambulation. Gait: Pt ambulated to bathroom 10' Mod A x2 with RW with Fair quality and tolerance. Pt was dependent with pericare. Required 2 person to provide adequete personal care for a loose BM  for 10 min.  Pt was unaware of previous bowel movement but had the sensation he needed to go after the BM. Pt required mod A assitance with mobility and Max A with personal care at this time. He is functioning below baseline. He would benefit from continued skilled therapy services at a gentle pace to progress his level of function. Will continue to asses. Treatment Diagnosis: Impaired functional mobility  Prognosis: Good  Decision Making: Low Complexity  History: see medical chart  Clinical Presentation: stable  PT Education: Goals;PT Role;Plan of Care;General Safety; Functional Mobility Training  Barriers to Learning: [place]  REQUIRES PT FOLLOW UP: Yes  Activity Tolerance  Activity Tolerance: Patient Tolerated treatment well       Patient Diagnosis(es): The primary encounter diagnosis was Urinary tract infection associated with indwelling urethral catheter, initial encounter ([facility] [address] Diagnoses of Confusion and General weakness were also pertinent to this visit. has a past medical history of GEOVANI (acute kidney injury) ([facility] [address] Alcohol abuse, Atrial fibrillation ([facility] [address] BPH (benign prostatic hyperplasia), Dehydration, [facility] ([facility] [address] ESBL (extended spectrum beta-lactamase) producing bacteria infection, Leg weakness, bilateral, Seizure ([address] and UTI (urinary tract infection). has a past surgical history that includes Splenectomy, total and Cataract removal (Bilateral).     Vision/Hearing  Vision: Impaired  Vision Exceptions: Wears glasses for distance (wears glasses while driving)  Hearing: Exceptions to [facility]  Hearing Exceptions: Bilateral hearing aid       Subjective  General  Chart Reviewed: Yes  Patient assessed for rehabilitation services?: Yes  Additional Pertinent Hx: The patient is a 80 y.o. male per chart review of Dr. Easley Son H&P note \"w remote Hx of splenectomy at age 15, Hx of Afib, [facility], Hx of proteus bacteremia with complicated UTI and sepsis, chronic spann catheter, who presents to [facility] with AMS and feeling unwell\" on 8- by his son. Response To Previous Treatment: Not applicable  Family / Caregiver Present: Yes (son and daughter in law)  Referring Practitioner: Mamadou Perdomo MD  Referral Date : 08/30/21  Follows Commands: Within Functional Limits  Subjective  Subjective: Pt in bed, awake. He denies pain at this time. Slow verbal response and at times pleasently confused but aware of his situation. Pt agreeable to therapy services. Pain Screening  Patient Currently in Pain: Denies          Orientation  Orientation  Overall Orientation Status: Impaired  Orientation Level: Oriented to person;Oriented to place; Disoriented to time (pt able to identify month with clues, per son he has had difficulty identifying the current date and day of the week PTA)     Social/Functional History  Social/Functional History  Lives With: Alone (son is available most of the day, he lives 3 blocks down)  Type of Home: House  Home Layout: Two level, Able to Live on Main level with bedroom/bathroom, Laundry in basement  Home Access:  (pt has chair lift to go up stairs)  Bathroom Shower/Tub: Walk-in shower, Shower chair without back  [employer]: Bedside commode (Over the toilet  elevated toilet seat)  Bathroom Equipment: Grab bars in shower, Grab bars around toilet  Bathroom Accessibility: Accessible  Home Equipment: Standard walker, Rolling walker, 4 wheeled walker, Grab bars, [employer] Help From: Family  ADL Assistance: Independent (pt recieves help form family to us the restroom)  Ambulation Assistance: Independent  Transfer Assistance: Independent  Active : No (Pt has not driven since 59/67/3080)  Mode of Transportation: Car     Cognition   Cognition  Overall Cognitive Status: Exceptions  Arousal/Alertness: Appropriate responses to stimuli  Following Commands: Follows one step commands consistently; Follows multistep commands with repitition; Follows one step commands with increased time  Attention Span: Appears intact  Problem Solving: Assistance required to identify errors made  Insights: Decreased awareness of deficits  Initiation: Requires cues for some  Sequencing: Requires cues for some    Objective  Observation/Palpation  Posture: Good  Observation: blood in stool, blood around the penis, open wounds on L/R glute/sacrum, edema, redness, scaling of the B lower extremities below the knees, wound on L lateral knee that son notes occured PTA  Sensation  Overall Sensation Status: WFL  Bed mobility  Supine to Sit: Moderate assistance;2 Person assistance  Sit to Supine: 2 Person assistance; Moderate assistance  Scooting: Minimal assistance;2 Person assistance  Comment: pt is able to follow commands, but requires assistance, direction, and cueing frequently  Transfers  Sit to Stand: Moderate Assistance (to RW, Min A with the STEDY)  Stand to sit: Minimal Assistance;2 Person Assistance  Ambulation  Ambulation?: Yes  WB Status: WBAT  More Ambulation?: No  Ambulation 1  Surface: level tile  Device: [address]:  Moderate assistance;2 Person assistance  Quality of Gait: Discontious very slow step to gait pattern, B LE external rotation, heels touching with ambulation, small steps, frequent cueing to initiate movement  Gait Deviations: Slow Ros;Decreased step height;Shuffles;Decreased step length  Distance: 10' to bathroom  Comments: ambulation is very effortful secondary to weakness and poor coordination  Balance  Posture: Good  Sitting - Static: Fair (B hand placement on bed)  Sitting - Dynamic: Fair (able to reach outside REYES B)  Standing - Static: Fair (CGA x1 at RW)  Standing - Dynamic: Fair;- (Min A x1 pt occasionally had a posterior lean with ambulation and upon standing)    Plan   Plan  Times per week: 3-5x while in acute care  Current Treatment Recommendations: Functional Mobility Training  Safety Devices  Type of devices: All fall risk precautions in place, Gait belt, Call light within reach, Left in bed, Patient at risk for falls, Bed alarm in place, Nurse notified (RN Margareth Mccord notified of pt BM, blood in stool and open wounds on the sacrum on either side)     AM-PAC Score  AM-PAC Inpatient Mobility Raw Score : 11 (08/30/21 1649)  AM-PAC Inpatient T-Scale Score : 33.86 (08/30/21 1649)  Mobility Inpatient CMS 0-100% Score: 72.57 (08/30/21 1649)  Mobility Inpatient CMS G-Code Modifier : CL (08/30/21 1649)      Goals  Short term goals  Time Frame for Short term goals: upon acute care d/c  Short term goal 1: bed mobility CGA x1  Short term goal 2: Transfers Min A to [employer] [place] term goal 3: Ambulation 13' with RW Min A  Short term goal 4: 5-10 reps of each exercise for lower body strengthening with minimal cueing  Patient Goals   Patient goals : none stated       Therapy Time   Individual Concurrent Group Co-treatment   Time In 1500         Time Out 1630         Minutes 90             Ev 15; TA 54; Gt 21    Reesa Cogan, SPT  I attest that I was present for and made a skilled & mindful clinical judgement during the evaluation and/or treatment of this patient on 8/30/2021  Electronically signed by Noemy Hart, [address] [place] [address] (#[id number])  on 8/30/2021 at 5:28 PM

## 2021-08-30 NOTE — PLAN OF CARE
Nutrition Problem #1: Inadequate oral intake  Intervention: Food and/or Nutrient Delivery:  (Begin ONS once diet resumes)  Nutritional Goals: PO intake greater than 50%

## 2021-08-31 LAB
A/G RATIO: 0.7 (ref 1.1–2.2)
ALBUMIN SERPL-MCNC: 2.5 G/DL (ref 3.4–5)
ALP BLD-CCNC: 105 U/L (ref 40–129)
ALT SERPL-CCNC: 7 U/L (ref 10–40)
ANION GAP SERPL CALCULATED.3IONS-SCNC: 10 MMOL/L (ref 3–16)
AST SERPL-CCNC: 11 U/L (ref 15–37)
BILIRUB SERPL-MCNC: 0.5 MG/DL (ref 0–1)
BUN BLDV-MCNC: 13 MG/DL (ref 7–20)
CALCIUM SERPL-MCNC: 8.1 MG/DL (ref 8.3–10.6)
CHLORIDE BLD-SCNC: 103 MMOL/L (ref 99–110)
CO2: 26 MMOL/L (ref 21–32)
CREAT SERPL-MCNC: 0.8 MG/DL (ref 0.8–1.3)
GFR AFRICAN AMERICAN: >60
GFR NON-AFRICAN AMERICAN: >60
GLOBULIN: 3.6 G/DL
GLUCOSE BLD-MCNC: 99 MG/DL (ref 70–99)
HCT VFR BLD CALC: 26.5 % (ref 40.5–52.5)
HEMOGLOBIN: 9.1 G/DL (ref 13.5–17.5)
MAGNESIUM: 2 MG/DL (ref 1.8–2.4)
MCH RBC QN AUTO: 30.1 PG (ref 26–34)
MCHC RBC AUTO-ENTMCNC: 34.2 G/DL (ref 31–36)
MCV RBC AUTO: 88 FL (ref 80–100)
PDW BLD-RTO: 15.9 % (ref 12.4–15.4)
PHOSPHORUS: 2.9 MG/DL (ref 2.5–4.9)
PLATELET # BLD: 355 K/UL (ref 135–450)
PMV BLD AUTO: 8.8 FL (ref 5–10.5)
POTASSIUM SERPL-SCNC: 3.4 MMOL/L (ref 3.5–5.1)
RBC # BLD: 3.02 M/UL (ref 4.2–5.9)
SODIUM BLD-SCNC: 139 MMOL/L (ref 136–145)
TOTAL PROTEIN: 6.1 G/DL (ref 6.4–8.2)
WBC # BLD: 12.1 K/UL (ref 4–11)

## 2021-08-31 PROCEDURE — 84100 ASSAY OF PHOSPHORUS: CPT

## 2021-08-31 PROCEDURE — 94761 N-INVAS EAR/PLS OXIMETRY MLT: CPT

## 2021-08-31 PROCEDURE — 97535 SELF CARE MNGMENT TRAINING: CPT

## 2021-08-31 PROCEDURE — 80053 COMPREHEN METABOLIC PANEL: CPT

## 2021-08-31 PROCEDURE — 97166 OT EVAL MOD COMPLEX 45 MIN: CPT

## 2021-08-31 PROCEDURE — 6370000000 HC RX 637 (ALT 250 FOR IP): Performed by: INTERNAL MEDICINE

## 2021-08-31 PROCEDURE — 2700000000 HC OXYGEN THERAPY PER DAY

## 2021-08-31 PROCEDURE — 2580000003 HC RX 258: Performed by: INTERNAL MEDICINE

## 2021-08-31 PROCEDURE — 85027 COMPLETE CBC AUTOMATED: CPT

## 2021-08-31 PROCEDURE — 6360000002 HC RX W HCPCS: Performed by: INTERNAL MEDICINE

## 2021-08-31 PROCEDURE — 83735 ASSAY OF MAGNESIUM: CPT

## 2021-08-31 PROCEDURE — 1200000000 HC SEMI PRIVATE

## 2021-08-31 PROCEDURE — 99232 SBSQ HOSP IP/OBS MODERATE 35: CPT | Performed by: SURGERY

## 2021-08-31 PROCEDURE — 97530 THERAPEUTIC ACTIVITIES: CPT

## 2021-08-31 PROCEDURE — 36415 COLL VENOUS BLD VENIPUNCTURE: CPT

## 2021-08-31 RX ORDER — POTASSIUM CHLORIDE 20 MEQ/1
40 TABLET, EXTENDED RELEASE ORAL
Status: DISCONTINUED | OUTPATIENT
Start: 2021-08-31 | End: 2021-09-02 | Stop reason: HOSPADM

## 2021-08-31 RX ORDER — TORSEMIDE 20 MG/1
20 TABLET ORAL DAILY
Status: DISCONTINUED | OUTPATIENT
Start: 2021-08-31 | End: 2021-09-02 | Stop reason: HOSPADM

## 2021-08-31 RX ADMIN — Medication 1 CAPSULE: at 09:19

## 2021-08-31 RX ADMIN — TORSEMIDE 20 MG: 20 TABLET ORAL at 09:20

## 2021-08-31 RX ADMIN — LEVETIRACETAM 500 MG: 500 TABLET ORAL at 09:19

## 2021-08-31 RX ADMIN — POTASSIUM CHLORIDE 40 MEQ: 20 TABLET, EXTENDED RELEASE ORAL at 09:20

## 2021-08-31 RX ADMIN — TAMSULOSIN HYDROCHLORIDE 0.4 MG: 0.4 CAPSULE ORAL at 09:19

## 2021-08-31 RX ADMIN — ACETAMINOPHEN 650 MG: 325 TABLET ORAL at 20:31

## 2021-08-31 RX ADMIN — MEROPENEM 500 MG: 500 INJECTION, POWDER, FOR SOLUTION INTRAVENOUS at 11:46

## 2021-08-31 RX ADMIN — MEROPENEM 500 MG: 500 INJECTION, POWDER, FOR SOLUTION INTRAVENOUS at 23:42

## 2021-08-31 RX ADMIN — LEVETIRACETAM 500 MG: 500 TABLET ORAL at 20:31

## 2021-08-31 RX ADMIN — Medication 10 ML: at 09:20

## 2021-08-31 RX ADMIN — ENOXAPARIN SODIUM 40 MG: 40 INJECTION SUBCUTANEOUS at 20:31

## 2021-08-31 RX ADMIN — THERA TABS 1 TABLET: TAB at 09:20

## 2021-08-31 RX ADMIN — Medication 10 ML: at 20:33

## 2021-08-31 RX ADMIN — MEROPENEM 500 MG: 500 INJECTION, POWDER, FOR SOLUTION INTRAVENOUS at 17:42

## 2021-08-31 RX ADMIN — MEROPENEM 500 MG: 500 INJECTION, POWDER, FOR SOLUTION INTRAVENOUS at 05:24

## 2021-08-31 RX ADMIN — PANTOPRAZOLE SODIUM 40 MG: 40 TABLET, DELAYED RELEASE ORAL at 20:31

## 2021-08-31 ASSESSMENT — PAIN SCALES - GENERAL
PAINLEVEL_OUTOF10: 2
PAINLEVEL_OUTOF10: 0
PAINLEVEL_OUTOF10: 0

## 2021-08-31 NOTE — PROGRESS NOTES
Gastroenterology Progress Note    Chidi Godfrey is a 80 y.o. male patient. Active Problems:    UTI (urinary tract infection)  Resolved Problems:    * No resolved hospital problems. *      SUBJECTIVE:  Denies abdominal pain, nausea, vomiting. Tolerating diet. No fevers. No diarrhea. TM 99.4. Current Facility-Administered Medications: meropenem (MERREM) 500 mg IVPB (mini-bag), 500 mg, IntraVENous, Q6H  lactobacillus (CULTURELLE) capsule 1 capsule, 1 capsule, Oral, Daily with breakfast  levETIRAcetam (KEPPRA) tablet 500 mg, 500 mg, Oral, BID  multivitamin 1 tablet, 1 tablet, Oral, Daily  pantoprazole (PROTONIX) tablet 40 mg, 40 mg, Oral, Nightly  tamsulosin (FLOMAX) capsule 0.4 mg, 0.4 mg, Oral, Daily  sodium chloride flush 0.9 % injection 5-40 mL, 5-40 mL, IntraVENous, 2 times per day  sodium chloride flush 0.9 % injection 5-40 mL, 5-40 mL, IntraVENous, PRN  0.9 % sodium chloride infusion, 25 mL, IntraVENous, PRN  enoxaparin (LOVENOX) injection 40 mg, 40 mg, SubCUTAneous, Nightly  ondansetron (ZOFRAN-ODT) disintegrating tablet 4 mg, 4 mg, Oral, Q8H PRN **OR** ondansetron (ZOFRAN) injection 4 mg, 4 mg, IntraVENous, Q6H PRN  polyethylene glycol (GLYCOLAX) packet 17 g, 17 g, Oral, Daily PRN  acetaminophen (TYLENOL) tablet 650 mg, 650 mg, Oral, Q6H PRN **OR** acetaminophen (TYLENOL) suppository 650 mg, 650 mg, Rectal, Q6H PRN  furosemide (LASIX) injection 40 mg, 40 mg, IntraVENous, Daily    Physical    VITALS:  /75   Pulse 88   Temp 99.1 °F (37.3 °C) (Axillary)   Resp 18   Ht 5' 10\" (1.778 m)   Wt 216 lb 0.8 oz (98 kg)   SpO2 92%   BMI 31.00 kg/m²   TEMPERATURE:  Current - Temp: 99.1 °F (37.3 °C);  Max - Temp  Av.7 °F (37.1 °C)  Min: 98.1 °F (36.7 °C)  Max: 99.4 °F (37.4 °C)    NAD  Hard of hearing  Eyes: No icterus  RRR  Lungs CTA Bilaterally, normal effort  Abdomen soft, ND, NT, Bowel sounds normal.  Ext: no edema  Neuro: No tremor  Psych: A&Ox3    Data    Data Review:    Recent Labs 08/28/21  0946 08/29/21  0843 08/31/21  0556   WBC 24.7* 17.4* 12.1*   HGB 9.6* 9.5* 9.1*   HCT 29.1* 27.6* 26.5*   MCV 90.3 89.4 88.0    372 355     Recent Labs     08/29/21  0843 08/30/21  1453 08/31/21  0556   * 137 139   K 3.5 3.4* 3.4*   CL 97* 99 103   CO2 27 28 26   PHOS 3.2 3.4 2.9   BUN 20 15 13   CREATININE 1.0 0.9 0.8     Recent Labs     08/28/21  0946 08/29/21  0843 08/31/21  0556   AST 14* 12* 11*   ALT 10 8* 7*   BILIDIR  --  0.3  --    BILITOT 1.0 0.7 0.5   ALKPHOS 119 111 105     Recent Labs     08/29/21  0843   LIPASE 18.0     No results for input(s): PROTIME, INR in the last 72 hours. No results for input(s): PTT in the last 72 hours. ASSESSMENT:  80year old with history of seizures, DVT, BPH, atrial fibrillation, etoh abuse, splenectomy at age 15, bilateral cataract removal who presents with altered mental status, weakness, fatigue. Evaluation showed BUN 22 and Cr 1.3. LFT's normal. CBC 24.7, 9.6/29.1, 357. Recent iron studies, B12, folate all normal.  UA with 24 WBC's. CT chest showed 11 x 14mm nodule JOSEPH , gallstones, pericholecystic fluid, 3mm calcification in the pancreatic head, mild inflammatory chnages around the pancreatic head, BPH, fat-containing umbilical hernia. Lipase 18. Started on vancomycin and cefepime empirically, IV lasix for severe LE edema. MRCP showed gallstones with wall thickening of the gallbladder. No stone in the bile duct, though somewhat limited by motion artifact. Bile duct 4-5mm in size. HIDA showed non-visualization of the gallbladder suggesting acute cholecystitis. Imp:  1. Abnormal calcification in the head of the pancreas, 3mm. No ductal dilation so likely parenchymal.  Normal LFTs making biliary stone unlikely. I suspect from chronic pancreatitis. No ductal stone on MRCP. 2. Gallstones with pericholecystic edema. HIDA showed cholecystitis. Asymptomatic so treating with antibiotics.     3.  Stranding around head of pancreas but no pain and normal pancreas enzymes. Need 2 out of 3 so does not have pancreatitis at this point. 4.  JOSEPH nodule per medicine  5. LE edema per medicine, on lasix  6. Leukocytosis: on vanc/zosyn per medicine. Plan: No biliary ductal dilation and no filling defects in bile duct on MRCP.  LFT's normal.  No pain. No plans for ERCP. Defer management of cholecystitis to surgery. Will sign off. Please call with questions. Thank you for allowing me to participate in the care of your patient. Please feel free to contact me with any concerns.   [address], MD

## 2021-08-31 NOTE — PROGRESS NOTES
General and Vascular Surgery                                                           Daily Progress Note                                                             Beatriz Lennox, PA-C     Pt Name: Marie Islas  Medical Record Number: [id number]  Date of Birth 1935   Today's Date: 8/31/2021    ASSESSMENT/PLAN  1. Possible cholecystitis  · CT shows distended gallbladder, gallstones, thickening of GB wall. Possible stone lodged in neck of the gallbladder  · Non-tender on exam  · On IV vancomycin, cefepime, flagyl. · HIDA scan and MRCP done yesterday. The HIDA showed that the gallbladder was not well visualized suggesting acute cholecystitis. The MRCP showed cholelithiasis with gallbladder wall thickening suggesting early cholecystitis. No filling defect noted. · Asymptomatic. Continue to treat with antibiotics. Not a great surgical candidate. Would need cardiac clearance prior to any intervention. Possible intervention as an outpatient if at that time the benefits outweigh any risk of surgery. 2. UTI  · Indwelling cathter  · abx as above  3. sepsis  · Related to #1 and 2  · WBC count: 24.7-->17.4-->12.1  · Continue broad spectrum antibiotics  4. Diastolic CHF with acute exacerbation  · Fluid overload  · May be contributing to GB wall thickening  · Diuresis per hospitalist  EDUCATION  Patient educated about their illness/diagnosis, stated above, and all questions answered. We discussed the importance of nutrition, medications they are taking, and healthy lifestyle. OBJECTIVE  VITALS:  height is 5' 10\" (1.778 m) and weight is 216 lb 0.8 oz (98 kg). His oral temperature is 97.3 °F (36.3 °C). His blood pressure is 127/67 and his pulse is 82. His respiration is 20 and oxygen saturation is 95%.  VITALS:  /67   Pulse 82   Temp 97.3 °F (36.3 °C) (Oral)   Resp 20   Ht 5' 10\" (1.778 m)   Wt 216 lb 0.8 oz (98 kg)   SpO2 95%   BMI 31.00 kg/m² GENERAL: alert, no distress  ABDOMEN: soft, non-tender and non-distended  I/O last 3 completed shifts: In: 934.7 [P.O.:120; IV Piggyback:814.7]  Out: 2100 [Urine:2100]  I/O this shift: In: 480 [P.O.:480]  Out: -     LABS  Recent Labs     08/28/21  0946 08/28/21  1021 08/29/21  0843 08/30/21  1453 08/31/21  0556   WBC   < >  --  17.4*  --  12.1*   HGB   < >  --  9.5*  --  9.1*   HCT   < >  --  27.6*  --  26.5*   PLT   < >  --  372  --  355   NA   < >  --  134*   < > 139   K  --   --  3.5   < > 3.4*   CL   < >  --  97*   < > 103   CO2   < >  --  27   < > 26   BUN   < >  --  20   < > 13   CREATININE   < >  --  1.0   < > 0.8   MG  --   --  1.90   < > 2.00   PHOS  --   --  3.2   < > 2.9   CALCIUM   < >  --  8.3   < > 8.1*   AST   < >  --  12*  --  11*   ALT   < >  --  8*  --  7*   BILITOT   < >  --  0.7  --  0.5   BILIDIR  --   --  0.3  --   --    NITRU  --  Negative  --   --   --    COLORU  --  YELLOW  --   --   --     < > = values in this interval not displayed. CBC:   Lab Results   Component Value Date    WBC 12.1 08/31/2021    RBC 3.02 08/31/2021    HGB 9.1 08/31/2021    HCT 26.5 08/31/2021    MCV 88.0 08/31/2021    MCH 30.1 08/31/2021    MCHC 34.2 08/31/2021    RDW 15.9 08/31/2021     08/31/2021    MPV 8.8 08/31/2021     CMP:    Lab Results   Component Value Date     08/31/2021    K 3.4 08/31/2021    K 3.8 08/28/2021     08/31/2021    CO2 26 08/31/2021    BUN 13 08/31/2021    CREATININE 0.8 08/31/2021    GFRAA >60 08/31/2021    GFRAA >60 01/06/2013    AGRATIO 0.7 08/31/2021    LABGLOM >60 08/31/2021    GLUCOSE 99 08/31/2021    PROT 6.1 08/31/2021    PROT 6.7 01/06/2013    LABALBU 2.5 08/31/2021    CALCIUM 8.1 08/31/2021    BILITOT 0.5 08/31/2021    ALKPHOS 105 08/31/2021    AST 11 08/31/2021    ALT 7 08/31/2021         Peg Novoa PA-C  Electronically signed 8/31/2021 at 9:44 AM    Agree with above note. The patient was personally seen and examined.   Alley Spencer is doing better today.  Denies abdominal pain. He states that he is eating better. BM x 1 yesterday. Abd soft, NT, ND    WBC 12.1 from 17.4    A/P: 81 yo male with acute cholecystitis, UTI, sepsis, diastolic CHF with acute exacerbation    Continue IV abx. Leukocytosis improving  Continue regular diet  Asymptomatic with regards to gallbladder. No plans for surgical intervention    UTI - continue abx    Diastolic CHF with acute exacerbation - per primary team    Discussed with son Ed at [phone] about game plan.     Enrico Royal MD

## 2021-08-31 NOTE — PROGRESS NOTES
infection associated with indwelling urethral catheter, initial encounter ([facility] [address] Diagnoses of Confusion and General weakness were also pertinent to this visit. has a past medical history of GEOVANI (acute kidney injury) ([facility] [address] Alcohol abuse, Atrial fibrillation ([facility] [address] BPH (benign prostatic hyperplasia), Dehydration, [facility] ([facility] [address] ESBL (extended spectrum beta-lactamase) producing bacteria infection, Leg weakness, bilateral, Seizure ([facility] [address] and UTI (urinary tract infection). has a past surgical history that includes Splenectomy, total and Cataract removal (Bilateral). Treatment Diagnosis: UTI      Restrictions  Restrictions/Precautions  Restrictions/Precautions: Fall Risk    Subjective   General  Chart Reviewed: Yes  Patient assessed for rehabilitation services?: Yes  Family / Caregiver Present: Yes (son/daughter)  Diagnosis: UTI  Subjective  Subjective: Pt initally lethargic, become more alert and awake as session progressed.   Patient Currently in Pain: Denies  Vital Signs  Patient Currently in Pain: Denies  Social/Functional History  Social/Functional History  Lives With: Alone (son is available most of the day, he lives 3 blocks down)  Type of Home: House  Home Layout: Two level, Able to Live on Main level with bedroom/bathroom, Laundry in basement  Home Access:  (pt has chair lift to go up stairs)  Bathroom Shower/Tub: Walk-in shower, Shower chair without back  [employer]: Bedside commode (Over the toilet  elevated toilet seat)  Bathroom Equipment: Grab bars in shower, Grab bars around toilet  Bathroom Accessibility: Accessible  Home Equipment: Standard walker, Rolling walker, 4 wheeled walker, Grab bars, Distant [employer] Help From: Family  ADL Assistance: Independent (pt recieves help form family to us the restroom)  Ambulation Assistance: Independent  Transfer Assistance: Independent  Active : No (Pt has not driven since 51/77/7400)  Mode of Transportation: Car       Objective Observation/Palpation  Posture: Poor (unable to complete full stance in steady and RW, flexed forward posture throughout.)  Balance  Sitting Balance: Minimal assistance  Standing Balance: Dependent/Total (Mod A x2)  Standing Balance  Time: 30 sec x5  Activity: sit to stand to RW and steady  Comment: cueing for upright posture. Improved with use of christy steady and UE support. Functional Mobility  Functional - Mobility Device: Other  Assist Level: Dependent/Total  Functional Mobility Comments: Attempted to use RW, pt unable to complete full stand, posterior lean. [facility] steady utlized. ADL  UE Dressing: Moderate assistance  LE Dressing: Dependent/Total  Toileting: Dependent/Total  Tone RUE  RUE Tone: Normotonic  Tone LUE  LUE Tone: Normotonic  Coordination  Movements Are Fluid And Coordinated: No  Coordination and Movement description: Decreased speed        Transfers  Sit to stand: Moderate assistance;2 Person assistance;Maximum assistance  Stand to sit: Maximum assistance;2 Person assistance; Moderate assistance     Cognition  Overall Cognitive Status: Exceptions  Arousal/Alertness: Appropriate responses to stimuli  Following Commands: Follows one step commands consistently; Follows one step commands with increased time  Attention Span: Difficulty attending to directions  Memory: Decreased recall of biographical Information;Decreased short term memory;Decreased recall of recent events  Safety Judgement: Decreased awareness of need for assistance;Decreased awareness of need for safety  Problem Solving: Assistance required to identify errors made  Insights: Decreased awareness of deficits  Initiation: Requires cues for some  Sequencing: Requires cues for some                 LUE AROM (degrees)  LUE AROM : WFL  Left Hand AROM (degrees)  Left Hand AROM: WFL  RUE AROM (degrees)  RUE AROM : WFL  Right Hand AROM (degrees)  Right Hand AROM: WFL  LUE Strength  Gross LUE Strength: WFL  LUE Strength Comment: weak but functional  RUE Strength  Gross RUE Strength: WFL  RUE Strength Comment: weak but functional                   Plan   Plan  Times per week: 3-5x  Current Treatment Recommendations: Strengthening, Balance Training, Functional Mobility Training, Safety Education & Training, Self-Care / ADL, Endurance Training    AM-[facility] Inpatient Daily Activity Raw Score: 10 (08/31/21 1356)  AM-PAC Inpatient ADL T-Scale Score : 27.31 (08/31/21 1356)  ADL Inpatient CMS 0-100% Score: 74.7 (08/31/21 1356)  ADL Inpatient CMS G-Code Modifier : CL (08/31/21 1356)    Goals  Short term goals  Time Frame for Short term goals: d/c  Short term goal 1: Pt will complete toileting with Mod A  Short term goal 2: Pt will complete UBD with Min A  Short term goal 3: Pt will complete LB bathing with Mod A  Short term goal 4: Pt will complete toilet transfer with Max A x1 w/ LRAD  Long term goals  Time Frame for Long term goals : STGs=LTGs       Therapy Time   Individual Concurrent Group Co-treatment   Time In 1315         Time Out 1342         Minutes 27              Timed Code Treatment Minutes: 13 min   Total Treatment Minutes:  27 min       Erik Aceves OT

## 2021-08-31 NOTE — PROGRESS NOTES
Pt SpO2= 81% on room air. Placed on 2L O2. Increased to 92%. No signs or symptoms of distress noted. Will continue to monitor.

## 2021-08-31 NOTE — PROGRESS NOTES
Physical Therapy  Facility/Department: [address] MED SURG  Daily Treatment Note  NAME: Maikel Mathew  : 1935  MRN: [id number]    Date of Service: 2021    Discharge Recommendations:  Patient would benefit from continued therapy after discharge, 3-5 sessions per week     Maikel Mathew scored a  on the AM-PAC short mobility form. Current research shows that an AM-PAC score of 17 or less is typically not associated with a discharge to the patient's home setting. Based on the patient's AM-PAC score and their current functional mobility deficits, it is recommended that the patient have 3-5 sessions per week of Physical Therapy at d/c to increase the patient's independence. Please see assessment section for further patient specific details. If patient discharges prior to next session this note will serve as a discharge summary. Please see below for the latest assessment towards goals. Assessment   Assessment: The patient is a 80 y.o. male per chart review of Dr. Prerna Broderick H&P note \"w remote Hx of splenectomy at age 15, Hx of Afib, Hx of bilateral PE deemed secondary to [place] completed AC this July, Hx of proteus bacteremia with complicated UTI and sepsis, chronic spann catheter, who presents to [facility] with AMS and feeling unwell\" on 2021 by his son. Pt is currently functioning well below baseline, requiring 2 person assist and LIFT equipment for mobility. Recommend continued skilled therapy 3-5x/week to maximize independence and safety with functional mobility. Treatment Diagnosis: Impaired functional mobility  Prognosis: Good  PT Education: Goals;PT Role;Plan of Care;General Safety; Functional Mobility Training  REQUIRES PT FOLLOW UP: Yes  Activity Tolerance  Activity Tolerance: Patient Tolerated treatment well     Patient Diagnosis(es): The primary encounter diagnosis was Urinary tract infection associated with indwelling urethral catheter, initial encounter ([facility] [address]  Diagnoses of Confusion and General weakness were also pertinent to this visit. has a past medical history of GEOVANI (acute kidney injury) ([address] Alcohol abuse, Atrial fibrillation ([address] BPH (benign prostatic hyperplasia), Dehydration, [facility] ([address] ESBL (extended spectrum beta-lactamase) producing bacteria infection, Leg weakness, bilateral, Seizure ([address] and UTI (urinary tract infection). has a past surgical history that includes Splenectomy, total and Cataract removal (Bilateral). Restrictions  Restrictions/Precautions  Restrictions/Precautions: Fall Risk     Subjective   General  Chart Reviewed: Yes  Additional Pertinent Hx: The patient is a 80 y.o. male per chart review of Dr. Booker Ramos H&P note \"w remote Hx of splenectomy at age 15, Hx of Afib, Hx of bilateral PE deemed secondary to [place] completed AC this July, Hx of proteus bacteremia with complicated UTI and sepsis, chronic spann catheter, who presents to [facility] with AMS and feeling unwell\" on 8- by his son. Response To Previous Treatment: Patient with no complaints from previous session. Family / Caregiver Present: Yes (son and dtr in law)  Referring Practitioner: Renetta Agee MD  Subjective  Subjective: Pt agreeable to PT. Orientation  Orientation  Overall Orientation Status: Impaired  Orientation Level: Oriented to person;Oriented to place; Disoriented to time (pt able to identify month with clues, per son he has had difficulty identifying the current date and day of the week PTA)     Cognition   Cognition  Overall Cognitive Status: Exceptions  Arousal/Alertness: Appropriate responses to stimuli  Following Commands: Follows one step commands consistently; Follows one step commands with increased time  Attention Span: Difficulty attending to directions  Memory: Decreased recall of biographical Information;Decreased short term memory;Decreased recall of recent events  Safety Judgement: Decreased awareness of need for assistance;Decreased awareness of need for safety  Problem Solving: Assistance required to identify errors made  Insights: Decreased awareness of deficits  Initiation: Requires cues for some  Sequencing: Requires cues for some     Objective   Bed mobility  Supine to Sit: Moderate assistance;2 Person assistance  Sit to Supine: Unable to assess (pt in recliner at end of session)  Scooting: Moderate assistance;Maximal assistance  Transfers  Sit to Stand: Moderate Assistance;Maximum Assistance;2 Person Assistance (@ RW and stedy)  Stand to sit: Moderate Assistance;Maximum Assistance;2 Person Assistance (@ RW and stedy)  Bed to Chair: Dependent/Total (with Stedy)  Comment: Pt with significant posterior lean with multiple standing trials from EOB to RW - unable to follow commands for balance/posture. Pt was able to stand from recliner to stedy with CGA/min A for 5 trials.   Ambulation  Ambulation?: No (significant posterior lean)     Balance  Posture: Fair  Sitting - Static: Fair;-  Sitting - Dynamic: Poor;+  Standing - Static: Poor         AM-PAC Score  AM-PAC Inpatient Mobility Raw Score : 9 (08/31/21 [facility])  AM-PAC Inpatient T-Scale Score : 30.55 (08/31/21 [facility])  Mobility Inpatient CMS 0-100% Score: 81.38 (08/31/21 [facility])  Mobility Inpatient CMS G-Code Modifier : CM (08/31/21 [facility])          Goals  Short term goals  Time Frame for Short term goals: upon acute care d/c - all goals ongoing as of 8/31/21  Short term goal 1: bed mobility CGA x1  Short term goal 2: Transfers Min A to [facility] term goal 3: Ambulation 13' with RW Min A  Short term goal 4: 5-10 reps of each exercise for lower body strengthening with minimal cueing  Patient Goals   Patient goals : none stated    Plan    Plan  Times per week: 3-5x/week  Current Treatment Recommendations: Strengthening, Functional Mobility Training, Transfer Training, Balance Training, Gait Training, Patient/Caregiver Education & Training, Safety Education & Training, Cognitive Reorientation, Equipment Evaluation, Education, & procurement, Neuromuscular Re-education  Safety Devices  Type of devices:  All fall risk precautions in place, Call light within reach, Gait belt, Patient at risk for falls, Left in chair, Chair alarm in place, Nurse notified     Therapy Time   Individual Concurrent Group Co-treatment   Time In 1300         Time Out 1354         Minutes 54         Timed Code Treatment Minutes: [facility], PT

## 2021-08-31 NOTE — PROGRESS NOTES
rigidity or guarding and positive bowel sounds all four quadrants. Extremities: tense 2+ pitting edema bilateral to well above the knee level. Skin: Skin color, texture, turgor normal.  No rashes or lesions. Neurologic: Alert and oriented X 2, neurovascularly intact with sensory/motor intact upper extremities/lower extremities, bilaterally. Cranial nerves: II-XII intact, grossly non-focal.  Mental status: Alert, oriented x2, very poor historian  Capillary Refill: Acceptable  < 3 seconds  Peripheral Pulses: +3 Easily felt, not easily obliterated with pressure         Labs:   Recent Labs     08/29/21  0843 08/31/21  0556   WBC 17.4* 12.1*   HGB 9.5* 9.1*   HCT 27.6* 26.5*    355     Recent Labs     08/29/21  0843 08/30/21  1453 08/31/21  0556   * 137 139   K 3.5 3.4* 3.4*   CL 97* 99 103   CO2 27 28 26   BUN 20 15 13   CREATININE 1.0 0.9 0.8   CALCIUM 8.3 8.8 8.1*   PHOS 3.2 3.4 2.9     Recent Labs     08/29/21  0843 08/31/21  0556   AST 12* 11*   ALT 8* 7*   BILIDIR 0.3  --    BILITOT 0.7 0.5   ALKPHOS 111 105     No results for input(s): INR in the last 72 hours. No results for input(s): Иван Snowball in the last 72 hours. Urinalysis:      Lab Results   Component Value Date    NITRU Negative 08/28/2021    WBCUA 24 08/28/2021    BACTERIA 4+ 08/10/2011    RBCUA 11 08/28/2021    BLOODU MODERATE 08/28/2021    SPECGRAV 1.020 08/28/2021    GLUCOSEU Negative 08/28/2021    GLUCOSEU Negative 08/10/2011       Radiology:  NM HEPATOBILIARY   Final Result   Gallbladder is not visualized by the end of the examination suggesting acute   cholecystitis. MRI ABDOMEN WO CONTRAST MRCP   Final Result   Cholelithiasis with wall thickening of the gallbladder suggesting early   cholecystitis. No focal filling defect is seen in the extrahepatic common   duct with the reservation that the bile ducts overall are not well evaluated   secondary to motion.          VL Extremity Venous Bilateral   Final Result CT CHEST PULMONARY EMBOLISM W CONTRAST   Final Result   1. CT CHEST: No acute vascular abnormality. No acute pulmonary embolism. 2. Main pulmonary artery dilatation can be seen with pulmonary hypertension   but is nonspecific. Indeterminate spiculated soft tissue nodule left upper   lobe, 11 x 14 mm axial series 3, image 107. Consider a non-contrast Chest CT   at 3 months, a PET/CT, or tissue sampling. *   3. Proximal and mid esophageal wall thickening can be seen with esophagitis. 4. CT ABDOMEN/PELVIS: Acute cholecystitis. Cholelithiasis and reactive   pericholecystic ascites. 5. Indeterminate calcification at the pancreatic head measures 3 mm axial   series 4 possibly related to granulomatous disease, sequela chronic   pancreatitis choledocholithiasis. Consider MRCP correlation. 6. Mild inflammatory changes about the pancreatic head may be reactive,   likely adjacent cholecystitis. Acute pancreatitis is a consideration. 7. Prostate gland enlargement typical of BPH. 8. Small, fat containing umbilical hernia.   ______________________________________________________________________________   ____      *      These guidelines do not apply to immunocompromised patients and patients with   cancer. Follow up in patients with significant comorbidities as clinically   warranted. For lung cancer screening, adhere to Lung-RADS guidelines. Reference: Radiology. 2017; 284(1):228-43. RECOMMENDATIONS:   13 mm suspicious left solid pulmonary nodule within the upper lobe. Consider   a non-contrast Chest CT at 3 months, a PET/CT, or tissue sampling. These guidelines do not apply to immunocompromised patients and patients with   cancer. Follow up in patients with significant comorbidities as clinically   warranted. For lung cancer screening, adhere to Lung-RADS guidelines. Reference: Radiology. 2017; 284(1):228-43. CT ABDOMEN PELVIS W IV CONTRAST Additional Contrast? None   Final Result   1. CT CHEST: No acute vascular abnormality. No acute pulmonary embolism. 2. Main pulmonary artery dilatation can be seen with pulmonary hypertension   but is nonspecific. Indeterminate spiculated soft tissue nodule left upper   lobe, 11 x 14 mm axial series 3, image 107. Consider a non-contrast Chest CT   at 3 months, a PET/CT, or tissue sampling. *   3. Proximal and mid esophageal wall thickening can be seen with esophagitis. 4. CT ABDOMEN/PELVIS: Acute cholecystitis. Cholelithiasis and reactive   pericholecystic ascites. 5. Indeterminate calcification at the pancreatic head measures 3 mm axial   series 4 possibly related to granulomatous disease, sequela chronic   pancreatitis choledocholithiasis. Consider MRCP correlation. 6. Mild inflammatory changes about the pancreatic head may be reactive,   likely adjacent cholecystitis. Acute pancreatitis is a consideration. 7. Prostate gland enlargement typical of BPH. 8. Small, fat containing umbilical hernia.   ______________________________________________________________________________   ____      *      These guidelines do not apply to immunocompromised patients and patients with   cancer. Follow up in patients with significant comorbidities as clinically   warranted. For lung cancer screening, adhere to Lung-RADS guidelines. Reference: Radiology. 2017; 284(1):228-43. RECOMMENDATIONS:   13 mm suspicious left solid pulmonary nodule within the upper lobe. Consider   a non-contrast Chest CT at 3 months, a PET/CT, or tissue sampling. These guidelines do not apply to immunocompromised patients and patients with   cancer. Follow up in patients with significant comorbidities as clinically   warranted. For lung cancer screening, adhere to Lung-RADS guidelines. Reference: Radiology. 2017; 284(1):228-43.                  Assessment/Plan:    Active Hospital Problems    Diagnosis     UTI (urinary tract infection) [N39.0]        UTI due to chronic indwelling spann  Sepsis. Recent admission for proteus bacteremia. Plan:               - empiric Vanc and Cefepime    - urine culture with ESBL proteus - Abx changed to IV Merrem on 8/30 - will need to finish the course IV   - blood culture has been negative.        SOB, Mild Hypoxia - on RA today. Hx of bilateral PE COVID related - completed AC in July. CTPA no PE this admission.          Chronic Diastolic CHF with acute exacerbation - Grade II DD. Lasix IV with caution to BP changes. Very good response so far - will transition to PO torsemide.         B/l severe LE edema - improved. Signs of severe venous stasis dermatitis with possible underlying cellulitis. Vanc and cefepime empiric - Abx as above. 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             Acute likely metabolic encephalopathy on underlying vascular dementia. Supportive care. Treating underlying conditions as above.    Much Improved.         Probable Cholecystitis - reported on CT abd pelvis. + cholelithiasis. His LFTs remain normal. And he has no pain and tolerates diet ok. GI and Surgery involved. - GI signed off.    - MRCP reassuring.    - but HIDA shows + cholecystitis. On Abx. If surgery pursued, I would recommend at least 48hr of IV meropenem prior to OR. Steffen Connors was started on 8/30. Per family report pt with DNR wishes - code status changed accordingly in Epic.         DVT Prophylaxis: lovenox  Diet: gen diet.    Code Status: Judie Lance - Inpatient.          Fanny Diop MD

## 2021-08-31 NOTE — PLAN OF CARE
Problem: Falls - Risk of:  Goal: Will remain free from falls  Description: Will remain free from falls  Outcome: Ongoing  Goal: Absence of physical injury  Description: Absence of physical injury  Outcome: Ongoing  Fall risk assessed. Precautions in place. Bed low and locked with side rails up x2. Nonskid socks on when OOB. Bed/chair alarm utilized. Call light within reach. Frequent checks performed. No falls at this time. Problem: Skin Integrity:  Goal: Will show no infection signs and symptoms  Description: Will show no infection signs and symptoms  Outcome: Ongoing  Goal: Absence of new skin breakdown  Description: Absence of new skin breakdown  Outcome: Ongoing  Skin assessed per protocol. Leo score obtained. Skin kept clean, dry and warm. Encouraged pt to reposition to reduce the risk of PUs. Pillow support in place. Will continue to monitor.       Problem: Nutrition  Goal: Optimal nutrition therapy  Outcome: Ongoing     Problem: Sensory:  Goal: General experience of comfort will improve  Description: General experience of comfort will improve  Outcome: Ongoing     Problem: Urinary Elimination:  Goal: Signs and symptoms of infection will decrease  Description: Signs and symptoms of infection will decrease  Outcome: Ongoing  Goal: Ability to reestablish a normal urinary elimination pattern will improve - after catheter removal  Description: Ability to reestablish a normal urinary elimination pattern will improve  Outcome: Ongoing  Goal: Complications related to the disease process, condition or treatment will be avoided or minimized  Description: Complications related to the disease process, condition or treatment will be avoided or minimized  Outcome: Ongoing

## 2021-08-31 NOTE — PLAN OF CARE
Problem: Falls - Risk of:  Goal: Will remain free from falls  Description: Will remain free from falls  Outcome: Ongoing  Goal: Absence of physical injury  Description: Absence of physical injury  Outcome: Ongoing     Problem: Skin Integrity:  Goal: Will show no infection signs and symptoms  Description: Will show no infection signs and symptoms  Outcome: Ongoing  Goal: Absence of new skin breakdown  Description: Absence of new skin breakdown  Outcome: Ongoing     Problem: Nutrition  Goal: Optimal nutrition therapy  Outcome: Ongoing     Problem: Sensory:  Goal: General experience of comfort will improve  Description: General experience of comfort will improve  Outcome: Ongoing     Problem: Urinary Elimination:  Goal: Signs and symptoms of infection will decrease  Description: Signs and symptoms of infection will decrease  Outcome: Ongoing  Goal: Ability to reestablish a normal urinary elimination pattern will improve - after catheter removal  Description: Ability to reestablish a normal urinary elimination pattern will improve  Outcome: Ongoing  Goal: Complications related to the disease process, condition or treatment will be avoided or minimized  Description: Complications related to the disease process, condition or treatment will be avoided or minimized  Outcome: Ongoing

## 2021-08-31 NOTE — PROGRESS NOTES
Patient alert and oriented with some forgetfulness, VSS, sitting up in bed. Patient denies n/v, diarrhea, SOB, pain, O2 WNL on 2L NC. Patient [place], has hearing aids. Ambrosio catheter in place draining clear, yellow urine to gravity. Assisted patient with repositioning, and brief change, set patient up with breakfast. Patient denies further needs at this time. Bed in lowest and locked position, bed alarm on. Call light within reach. Will continue to monitor pt needs.

## 2021-09-01 LAB
ALBUMIN SERPL-MCNC: 2.9 G/DL (ref 3.4–5)
ANION GAP SERPL CALCULATED.3IONS-SCNC: 7 MMOL/L (ref 3–16)
BLOOD CULTURE, ROUTINE: NORMAL
BUN BLDV-MCNC: 17 MG/DL (ref 7–20)
CALCIUM SERPL-MCNC: 8.8 MG/DL (ref 8.3–10.6)
CHLORIDE BLD-SCNC: 99 MMOL/L (ref 99–110)
CO2: 31 MMOL/L (ref 21–32)
CREAT SERPL-MCNC: 0.9 MG/DL (ref 0.8–1.3)
CULTURE, BLOOD 2: NORMAL
GFR AFRICAN AMERICAN: >60
GFR NON-AFRICAN AMERICAN: >60
GLUCOSE BLD-MCNC: 109 MG/DL (ref 70–99)
MAGNESIUM: 2 MG/DL (ref 1.8–2.4)
PHOSPHORUS: 3.2 MG/DL (ref 2.5–4.9)
POTASSIUM SERPL-SCNC: 3.6 MMOL/L (ref 3.5–5.1)
SODIUM BLD-SCNC: 137 MMOL/L (ref 136–145)

## 2021-09-01 PROCEDURE — 1200000000 HC SEMI PRIVATE

## 2021-09-01 PROCEDURE — 6360000002 HC RX W HCPCS: Performed by: INTERNAL MEDICINE

## 2021-09-01 PROCEDURE — 94761 N-INVAS EAR/PLS OXIMETRY MLT: CPT

## 2021-09-01 PROCEDURE — 2700000000 HC OXYGEN THERAPY PER DAY

## 2021-09-01 PROCEDURE — 36415 COLL VENOUS BLD VENIPUNCTURE: CPT

## 2021-09-01 PROCEDURE — 97530 THERAPEUTIC ACTIVITIES: CPT

## 2021-09-01 PROCEDURE — 6370000000 HC RX 637 (ALT 250 FOR IP): Performed by: INTERNAL MEDICINE

## 2021-09-01 PROCEDURE — APPNB30 APP NON BILLABLE TIME 0-30 MINS: Performed by: PHYSICIAN ASSISTANT

## 2021-09-01 PROCEDURE — 83735 ASSAY OF MAGNESIUM: CPT

## 2021-09-01 PROCEDURE — 80069 RENAL FUNCTION PANEL: CPT

## 2021-09-01 PROCEDURE — 99231 SBSQ HOSP IP/OBS SF/LOW 25: CPT | Performed by: SURGERY

## 2021-09-01 PROCEDURE — 2580000003 HC RX 258: Performed by: INTERNAL MEDICINE

## 2021-09-01 PROCEDURE — 99223 1ST HOSP IP/OBS HIGH 75: CPT | Performed by: INTERNAL MEDICINE

## 2021-09-01 RX ADMIN — LEVETIRACETAM 500 MG: 500 TABLET ORAL at 21:03

## 2021-09-01 RX ADMIN — MEROPENEM 500 MG: 500 INJECTION, POWDER, FOR SOLUTION INTRAVENOUS at 10:29

## 2021-09-01 RX ADMIN — MEROPENEM 500 MG: 500 INJECTION, POWDER, FOR SOLUTION INTRAVENOUS at 04:30

## 2021-09-01 RX ADMIN — THERA TABS 1 TABLET: TAB at 10:05

## 2021-09-01 RX ADMIN — PANTOPRAZOLE SODIUM 40 MG: 40 TABLET, DELAYED RELEASE ORAL at 21:03

## 2021-09-01 RX ADMIN — ENOXAPARIN SODIUM 40 MG: 40 INJECTION SUBCUTANEOUS at 21:03

## 2021-09-01 RX ADMIN — Medication 10 ML: at 21:04

## 2021-09-01 RX ADMIN — LEVETIRACETAM 500 MG: 500 TABLET ORAL at 10:05

## 2021-09-01 RX ADMIN — Medication 10 ML: at 10:28

## 2021-09-01 RX ADMIN — Medication 1 CAPSULE: at 10:05

## 2021-09-01 RX ADMIN — MEROPENEM 500 MG: 500 INJECTION, POWDER, FOR SOLUTION INTRAVENOUS at 15:59

## 2021-09-01 RX ADMIN — TORSEMIDE 20 MG: 20 TABLET ORAL at 10:05

## 2021-09-01 RX ADMIN — TAMSULOSIN HYDROCHLORIDE 0.4 MG: 0.4 CAPSULE ORAL at 10:05

## 2021-09-01 RX ADMIN — POTASSIUM CHLORIDE 40 MEQ: 20 TABLET, EXTENDED RELEASE ORAL at 10:24

## 2021-09-01 ASSESSMENT — PAIN SCALES - GENERAL: PAINLEVEL_OUTOF10: 0

## 2021-09-01 NOTE — PROGRESS NOTES
Occupational Therapy  Facility/Department: [address] MED SURG  Daily Treatment Note  NAME: Dario Banks  : 1935  MRN: [id number]    Date of Service: 2021    Discharge Recommendations:  3-5 sessions per week       Assessment   Performance deficits / Impairments: Decreased functional mobility ; Decreased strength;Decreased safe awareness;Decreased ADL status; Decreased cognition  Assessment: Pt more alert this date and able to follow directions. He compeleted bed mobility with mod/max assist.  He was able to complete stands to the stedy this date with min/CGA. He requires cues to lean forward due to posterior lean. Pt would benefit from continued acute OT therapy to pregress functional outcomes. Treatment Diagnosis: UTI  Prognosis: Fair  REQUIRES OT FOLLOW UP: Yes  Activity Tolerance  Activity Tolerance: Patient Tolerated treatment well  Safety Devices  Type of devices: Call light within reach; Chair alarm in place; Left in chair;Gait belt         Patient Diagnosis(es): The primary encounter diagnosis was Urinary tract infection associated with indwelling urethral catheter, initial encounter ([facility] [address] Diagnoses of Confusion and General weakness were also pertinent to this visit. has a past medical history of GEOVANI (acute kidney injury) ([address] Alcohol abuse, Atrial fibrillation ([address] BPH (benign prostatic hyperplasia), Dehydration, [facility] ([address] ESBL (extended spectrum beta-lactamase) producing bacteria infection, Leg weakness, bilateral, Seizure ([address] and UTI (urinary tract infection). has a past surgical history that includes Splenectomy, total and Cataract removal (Bilateral).     Restrictions  Restrictions/Precautions  Restrictions/Precautions: Fall Risk  Subjective   General  Chart Reviewed: Yes, Progress Notes  Patient assessed for rehabilitation services?: Yes  Family / Caregiver Present: No  Diagnosis: UTI  Subjective  Subjective: Pt seen bedside with PT for a cotx and agreed to OOB. Objective    ADL  Toileting: Dependent/Total        Wheelchair Bed Transfers  Equipment Used: Other (recliner)  Level of Asssistance: Dependent/Total  Wheelchair Transfers Comments: Used stedy to transfer to the chair. Bed mobility  Supine to Sit: Moderate assistance;2 Person assistance  Scooting: Moderate assistance;Maximal assistance  Transfers  Sit to stand: Minimal assistance (To the stedy from the bed. Completed Stands to the stedy from the recliner x 8 with SBA. Moved his hands to the side bar and required min assist to stand.)                       Cognition  Overall Cognitive Status: Exceptions  Arousal/Alertness: Appropriate responses to stimuli  Following Commands: Follows one step commands consistently; Follows one step commands with increased time  Attention Span: Appears intact  Memory: Decreased recall of biographical Information;Decreased short term memory;Decreased recall of recent events  Safety Judgement: Decreased awareness of need for assistance;Decreased awareness of need for safety  Problem Solving: Assistance required to identify errors made  Insights: Decreased awareness of deficits  Initiation: Requires cues for some  Sequencing: Requires cues for some                                         Plan   Plan  Times per week: 3-5x  Current Treatment Recommendations: Strengthening, Balance Training, Functional Mobility Training, Safety Education & Training, Self-Care / ADL, Endurance Training    AM-PAC Score        AM-[facility] Inpatient Daily Activity Raw Score: 10 (09/01/21 1452)  AM-PAC Inpatient ADL T-Scale Score : 27.31 (09/01/21 1452)  ADL Inpatient CMS 0-100% Score: 74.7 (09/01/21 1452)  ADL Inpatient CMS G-Code Modifier : CL (09/01/21 1452)    Goals  Short term goals  Time Frame for Short term goals: d/c  Short term goal 1: Pt will complete toileting with Mod A  Short term goal 2: Pt will complete UBD with Min A  Short term goal 3: Pt will complete LB bathing with Mod A  Short term goal 4: Pt will complete toilet transfer with Max A x1 w/ LRAD  Long term goals  Time Frame for Long term goals : STGs=LTGs       Therapy Time   Individual Concurrent Group Co-treatment   Time In 8278         Time Out 1450         Minutes 55              This note to serve as OT d/c summary if pt is d/c-ed from hospital prior to next OT session.       Lupe Arenas, [address]

## 2021-09-01 NOTE — PROGRESS NOTES
Physical Therapy  Facility/Department: [address] MED SURG  Daily Treatment Note  NAME: Oracio Cohen  : 1935  MRN: [id number]    Date of Service: 2021    Discharge Recommendations:  Patient would benefit from continued therapy after discharge, 3-5 sessions per week     Oracio Cohen scored a 9/ on the AM-PAC short mobility form. Current research shows that an AM-PAC score of 17 or less is typically not associated with a discharge to the patient's home setting. Based on the patient's AM-PAC score and their current functional mobility deficits, it is recommended that the patient have 3-5 sessions per week of Physical Therapy at d/c to increase the patient's independence. Please see assessment section for further patient specific details. If patient discharges prior to next session this note will serve as a discharge summary. Please see below for the latest assessment towards goals. Assessment   Assessment: The patient is a 80 y.o. male per chart review of Dr. Martins Come H&P note \"w remote Hx of splenectomy at age 15, Hx of Afib, Hx of bilateral PE deemed secondary to [place] completed AC this July, Hx of proteus bacteremia with complicated UTI and sepsis, chronic spann catheter, who presents to [facility] with AMS and feeling unwell\" on 2021 by his son. Pt is currently functioning well below baseline, requiring 2 person assist and LIFT equipment for mobility - majority of difficulty is due to inability to bring to bring COG far enough anteriorly to prevent posterior LOB. Recommend continued skilled therapy 3-5x/week to maximize independence and safety with functional mobility. Treatment Diagnosis: Impaired functional mobility  Prognosis: Good  PT Education: Goals;PT Role;Plan of Care;General Safety; Functional Mobility Training  REQUIRES PT FOLLOW UP: Yes  Activity Tolerance  Activity Tolerance: Patient Tolerated treatment well     Patient Diagnosis(es): The primary encounter diagnosis was Urinary tract infection associated with indwelling urethral catheter, initial encounter ([facility] [address] Diagnoses of Confusion and General weakness were also pertinent to this visit. has a past medical history of GEOVANI (acute kidney injury) ([facility] [address] Alcohol abuse, Atrial fibrillation ([facility] [address] BPH (benign prostatic hyperplasia), Dehydration, [facility] ([facility] [address] ESBL (extended spectrum beta-lactamase) producing bacteria infection, Leg weakness, bilateral, Seizure ([facility] [address] and UTI (urinary tract infection). has a past surgical history that includes Splenectomy, total and Cataract removal (Bilateral). Restrictions  Restrictions/Precautions  Restrictions/Precautions: Fall Risk     Subjective   General  Chart Reviewed: Yes  Additional Pertinent Hx: The patient is a 80 y.o. male per chart review of Dr. Guillermo Arriaza H&P note \"w remote Hx of splenectomy at age 15, Hx of Afib, [facility], Hx of proteus bacteremia with complicated UTI and sepsis, chronic spann catheter, who presents to [facility] with AMS and feeling unwell\" on 8- by his son. Response To Previous Treatment: Patient with no complaints from previous session. Family / Caregiver Present: No  Referring Practitioner: Melania Venegas MD  Subjective  Subjective: Pt agreeable to PT. Cognition   Cognition  Overall Cognitive Status: Exceptions  Arousal/Alertness: Appropriate responses to stimuli  Following Commands: Follows one step commands consistently; Follows one step commands with increased time  Attention Span: Appears intact  Memory: Decreased recall of biographical Information;Decreased short term memory;Decreased recall of recent events  Safety Judgement: Decreased awareness of need for assistance;Decreased awareness of need for safety  Problem Solving: Assistance required to identify errors made  Insights: Decreased awareness of deficits  Initiation: Requires cues for some  Sequencing: Requires cues for some     Objective   Bed mobility  Supine to Sit: Moderate assistance;2 Person assistance  Sit to Supine: Unable to assess (pt in recliner at end of session)  Scooting: Moderate assistance;Maximal assistance  Transfers  Sit to Stand: Minimal Assistance (@stedy)  Stand to sit: Minimal Assistance (@stedy)  Bed to Chair: Dependent/Total (@stedy)  Comment: 3 standing trrials from EOB to stedy for pericare - heavy posterior lean which required mod/max A to maintain upright stance. Pt then able to perform 10 reps of sit<>stand from recliner to stedy with SBA, however he has signifcantly more difficulty when hands are brought down the stedy to limit their ability to assist - but does eventually stand when his trunk is pushed forward.   Ambulation  Ambulation?: No (significant posterior lean)     Balance  Posture: Fair  Sitting - Static: Fair;-  Sitting - Dynamic: Poor;+  Standing - Static: Poor        AM-PAC Score  AM-PAC Inpatient Mobility Raw Score : 9 (09/01/21 1449)  AM-PAC Inpatient T-Scale Score : 30.55 (09/01/21 1449)  Mobility Inpatient CMS 0-100% Score: 81.38 (09/01/21 1449)  Mobility Inpatient CMS G-Code Modifier : CM (09/01/21 1449)          Goals  Short term goals  Time Frame for Short term goals: upon acute care d/c - all goals ongoing as of 9/1/21  Short term goal 1: bed mobility CGA x1  Short term goal 2: Transfers Min A to [facility] term goal 3: Ambulation 13' with RW Min A  Short term goal 4: 5-10 reps of each exercise for lower body strengthening with minimal cueing  Patient Goals   Patient goals : none stated    Plan    Plan  Times per week: 3-5x/week  Current Treatment Recommendations: Strengthening, Functional Mobility Training, Transfer Training, Balance Training, Gait Training, Patient/Caregiver Education & Training, Safety Education & Training, Cognitive Reorientation, Equipment Evaluation, Education, & procurement, Neuromuscular Re-education  Safety Devices  Type of devices:  All fall risk precautions in place, Call light within reach, Gait belt, Patient at risk for falls, Left in chair, Chair alarm in place, Nurse notified     Therapy Time   Individual Concurrent Group Co-treatment   Time In 1355         Time Out 1450         Minutes 55         Timed Code Treatment Minutes: Birgit [address], PT

## 2021-09-01 NOTE — PROGRESS NOTES
impulse non-displaced  Abdomen: Soft, non-tender or non-distended without rigidity or guarding and positive bowel sounds all four quadrants. Extremities:  Bilateral lower extremity edema noted  Skin: Skin color, texture, turgor normal.  No rashes or lesions. Neurologic:  Generalized weakness without any focal motor deficits  Mental status: Alert, oriented x2, very poor historian  Capillary Refill: Acceptable  < 3 seconds  Peripheral Pulses: +3 Easily felt, not easily obliterated with pressure         Labs:   Recent Labs     08/31/21  0556   WBC 12.1*   HGB 9.1*   HCT 26.5*        Recent Labs     08/30/21  1453 08/31/21  0556 09/01/21  0712    139 137   K 3.4* 3.4* 3.6   CL 99 103 99   CO2 28 26 31   BUN 15 13 17   CREATININE 0.9 0.8 0.9   CALCIUM 8.8 8.1* 8.8   PHOS 3.4 2.9 3.2     Recent Labs     08/31/21  0556   AST 11*   ALT 7*   BILITOT 0.5   ALKPHOS 105     No results for input(s): INR in the last 72 hours. No results for input(s): Les Tory in the last 72 hours. Urinalysis:      Lab Results   Component Value Date    NITRU Negative 08/28/2021    WBCUA 24 08/28/2021    BACTERIA 4+ 08/10/2011    RBCUA 11 08/28/2021    BLOODU MODERATE 08/28/2021    SPECGRAV 1.020 08/28/2021    GLUCOSEU Negative 08/28/2021    GLUCOSEU Negative 08/10/2011       Radiology:  NM HEPATOBILIARY   Final Result   Gallbladder is not visualized by the end of the examination suggesting acute   cholecystitis. MRI ABDOMEN WO CONTRAST MRCP   Final Result   Cholelithiasis with wall thickening of the gallbladder suggesting early   cholecystitis. No focal filling defect is seen in the extrahepatic common   duct with the reservation that the bile ducts overall are not well evaluated   secondary to motion. VL Extremity Venous Bilateral   Final Result      CT CHEST PULMONARY EMBOLISM W CONTRAST   Final Result   1. CT CHEST: No acute vascular abnormality. No acute pulmonary embolism.    2. Main pulmonary artery dilatation can be seen with pulmonary hypertension   but is nonspecific. Indeterminate spiculated soft tissue nodule left upper   lobe, 11 x 14 mm axial series 3, image 107. Consider a non-contrast Chest CT   at 3 months, a PET/CT, or tissue sampling. *   3. Proximal and mid esophageal wall thickening can be seen with esophagitis. 4. CT ABDOMEN/PELVIS: Acute cholecystitis. Cholelithiasis and reactive   pericholecystic ascites. 5. Indeterminate calcification at the pancreatic head measures 3 mm axial   series 4 possibly related to granulomatous disease, sequela chronic   pancreatitis choledocholithiasis. Consider MRCP correlation. 6. Mild inflammatory changes about the pancreatic head may be reactive,   likely adjacent cholecystitis. Acute pancreatitis is a consideration. 7. Prostate gland enlargement typical of BPH. 8. Small, fat containing umbilical hernia.   ______________________________________________________________________________   ____      *      These guidelines do not apply to immunocompromised patients and patients with   cancer. Follow up in patients with significant comorbidities as clinically   warranted. For lung cancer screening, adhere to Lung-RADS guidelines. Reference: Radiology. 2017; 284(1):228-43. RECOMMENDATIONS:   13 mm suspicious left solid pulmonary nodule within the upper lobe. Consider   a non-contrast Chest CT at 3 months, a PET/CT, or tissue sampling. These guidelines do not apply to immunocompromised patients and patients with   cancer. Follow up in patients with significant comorbidities as clinically   warranted. For lung cancer screening, adhere to Lung-RADS guidelines. Reference: Radiology. 2017; 284(1):228-43. CT ABDOMEN PELVIS W IV CONTRAST Additional Contrast? None   Final Result   1. CT CHEST: No acute vascular abnormality. No acute pulmonary embolism.    2. Main pulmonary artery dilatation can be seen with pulmonary hypertension   but has been consulted for discharge antibiotic recommendations given prior infections as well    Acute on chronic diastolic heart failure-resolved  -On oral torsemide now    Acute likely metabolic encephalopathy   -Likely due to UTI on top of vascular dementia  -Improving     Possible cholecystitis  -General surgery was consulted  -Recommend nonoperative management. Continue antibiotic course for 10 days    History of splenectomy  -Continue to monitor    DVT Prophylaxis: lovenox  Diet: gen diet. Code Status: [facility]     Dispo - patient will likely need SNF placement.   Will likely be medically ready for discharge by tomorrow  Genny Latif MD

## 2021-09-01 NOTE — CARE COORDINATION
Talked with son, Dejon Daily, re: dc plan & therapy recommendations. Pt was just at [facility] & son would like him to go back there; if they do not have beds then he would like to look into [facility]. Reached out to [facility] & they are able to accept. Verified no pre-cert needed. Called Ed back & relayed acceptance & possible dc tomorrow.     Electronically signed by Amarjit Reddy RN on 9/1/21 at 2:47 PM EDT

## 2021-09-01 NOTE — PLAN OF CARE
Problem: Falls - Risk of:  Goal: Will remain free from falls  Description: Will remain free from falls  9/1/2021 0016 by Melania Helms RN  Outcome: Ongoing  8/31/2021 1651 by Roxi Gonzales RN  Outcome: Ongoing  Goal: Absence of physical injury  Description: Absence of physical injury  9/1/2021 0016 by Melania Helms RN  Outcome: Ongoing  8/31/2021 1651 by Roxi Gonzales RN  Outcome: Ongoing     Problem: Skin Integrity:  Goal: Will show no infection signs and symptoms  Description: Will show no infection signs and symptoms  9/1/2021 0016 by Melania Helms RN  Outcome: Ongoing  8/31/2021 1651 by Roxi Gonzales RN  Outcome: Ongoing  Goal: Absence of new skin breakdown  Description: Absence of new skin breakdown  9/1/2021 0016 by Melania Helms RN  Outcome: Ongoing  8/31/2021 1651 by Roxi Gonzales RN  Outcome: Ongoing     Problem: Nutrition  Goal: Optimal nutrition therapy  9/1/2021 0016 by Melania Helms RN  Outcome: Ongoing  8/31/2021 1651 by Roxi Gonzales RN  Outcome: Ongoing     Problem: Sensory:  Goal: General experience of comfort will improve  Description: General experience of comfort will improve  9/1/2021 0016 by Melania Helms RN  Outcome: Ongoing  8/31/2021 1651 by Roxi Gonzales RN  Outcome: Ongoing     Problem: Urinary Elimination:  Goal: Signs and symptoms of infection will decrease  Description: Signs and symptoms of infection will decrease  9/1/2021 0016 by Melania Helms RN  Outcome: Ongoing  8/31/2021 1651 by Roxi Gonzaels RN  Outcome: Ongoing  Goal: Ability to reestablish a normal urinary elimination pattern will improve - after catheter removal  Description: Ability to reestablish a normal urinary elimination pattern will improve  9/1/2021 0016 by Melania Helms RN  Outcome: Ongoing  8/31/2021 1651 by Roxi Gonzales RN  Outcome: Ongoing  Goal: Complications related to the disease process, condition or treatment will be avoided or minimized  Description: Complications related

## 2021-09-01 NOTE — PLAN OF CARE
Problem: Falls - Risk of:  Goal: Will remain free from falls  Description: Will remain free from falls  Outcome: Ongoing  Note: Fall precautions in place. Bed in lowest position, wheels locked, call light in reach, non slip socks on, alarm armed. Pt educated on using call light for assistance when needing to get up. Pt agreeable. Problem: Skin Integrity:  Goal: Will show no infection signs and symptoms  Description: Will show no infection signs and symptoms  Note: Skin assessment per shift. Pt educated on turning and repositioning every two hours. Pt agreeable. Pillow support offered. Problem: Urinary Elimination:  Goal: Signs and symptoms of infection will decrease  Description: Signs and symptoms of infection will decrease  Note: Pt educated on output and symptoms.

## 2021-09-01 NOTE — PROGRESS NOTES
General and Vascular Surgery                                                           Daily Progress Note                                                             Vernon Rubalcava MD    Pt Name: Anh Dorsye  Medical Record Number: [id number]  Date of Birth 1935   Today's Date: 9/1/2021    ASSESSMENT/PLAN  1. Cholecystitis  · CT shows distended gallbladder, gallstones, thickening of GB wall. Possible stone lodged in neck of the gallbladder  · HIDA scan without visualization of GB consistent with acute cholecystitis. · MRCP showed cholelithiasis with gallbladder wall thickening suggesting early cholecystitis. No filling defect noted. · Non-tender on exam  · On IV meropenem  · Asymptomatic. Ate better yesterday. Continue to treat with antibiotics. Not a great surgical candidate. Will have him follow up with me in 2 weeks. OK for discharge from surgical standpoint. Recommend to continue oral antibiotics to complete a 10 day course total  2. UTI  · Indwelling cathter  · abx as above  3. sepsis  · Related to #1 and 2  · WBC count: 24.7-->17.4-->12.1  · Continue broad spectrum antibiotics  4. Diastolic CHF with acute exacerbation  · Fluid overload  · May be contributing to GB wall thickening  · Diuresis per hospitalist    Subjective:  Mr. Windy Nelson is doing well. No overnight events. Tolerated more po intake yesterday. Denies abdominal pain or nausea. OBJECTIVE  VITALS:  height is 5' 10\" (1.778 m) and weight is 214 lb (97.1 kg). His oral temperature is 98 °F (36.7 °C). His blood pressure is 126/64 and his pulse is 81. His respiration is 18 and oxygen saturation is 94%.  VITALS:  /64   Pulse 81   Temp 98 °F (36.7 °C) (Oral)   Resp 18   Ht 5' 10\" (1.778 m)   Wt 214 lb (97.1 kg)   SpO2 94%   BMI 30.71 kg/m²   GENERAL: alert, no distress  Pulmonary: normal respiratory effort, no accessory muscle use  Cardiac: RRR  ABDOMEN: soft, non-tender and non-distended  Ext: no cyanosis or clubbing  I/O last 3 completed shifts: In: 1524.1 [P.O.:960; I.V.:137.1; IV Piggyback:426.9]  Out: 2000 [Urine:2000]  No intake/output data recorded. LABS  Recent Labs     08/31/21  0556 08/31/21  0556 09/01/21  0712   WBC 12.1*  --   --    HGB 9.1*  --   --    HCT 26.5*  --   --      --   --       < > 137   K 3.4*   < > 3.6      < > 99   CO2 26   < > 31   BUN 13   < > 17   CREATININE 0.8   < > 0.9   MG 2.00   < > 2.00   PHOS 2.9   < > 3.2   CALCIUM 8.1*   < > 8.8   AST 11*  --   --    ALT 7*  --   --    BILITOT 0.5  --   --     < > = values in this interval not displayed.      CBC:   Lab Results   Component Value Date    WBC 12.1 08/31/2021    RBC 3.02 08/31/2021    HGB 9.1 08/31/2021    HCT 26.5 08/31/2021    MCV 88.0 08/31/2021    MCH 30.1 08/31/2021    MCHC 34.2 08/31/2021    RDW 15.9 08/31/2021     08/31/2021    MPV 8.8 08/31/2021     CMP:    Lab Results   Component Value Date     09/01/2021    K 3.6 09/01/2021    K 3.8 08/28/2021    CL 99 09/01/2021    CO2 31 09/01/2021    BUN 17 09/01/2021    CREATININE 0.9 09/01/2021    GFRAA >60 09/01/2021    GFRAA >60 01/06/2013    AGRATIO 0.7 08/31/2021    LABGLOM >60 09/01/2021    GLUCOSE 109 09/01/2021    PROT 6.1 08/31/2021    PROT 6.7 01/06/2013    LABALBU 2.9 09/01/2021    CALCIUM 8.8 09/01/2021    BILITOT 0.5 08/31/2021    ALKPHOS 105 08/31/2021    AST 11 08/31/2021    ALT 7 08/31/2021         Rosas Andrea MD  Electronically signed 9/1/2021 at 9:26 AM

## 2021-09-01 NOTE — CONSULTS
Infectious Diseases Inpatient Consult Note      Reason for Consult:  Acute Cholecystitis and MDRO UTI     Requesting Physician:       Primary Care Physician:  Elba Jasso MD    History Obtained From:  Epic     CHIEF COMPLAINT:     Chief Complaint   Patient presents with    Altered Mental Status     since last night, lives alone. son reports to EMS of weakness and confusion since last night. strong odor from spann cath         HISTORY OF PRESENT ILLNESS:  80 y.o. man admitted through ED with confusion and concern for UTI given the Spann and urine appearance. He was very weak and tired on admit. Fevers T max 100.4,. He has a history of splenectomy and transferred, atrial fibrillation, history of PE suspected secondary to COVID-19 in the past on anticoagulation. Family noticed that he was getting more confused given the presentation was brought into the hospital for evaluation. Admission labs indicate WBC 24.7 hemoglobin 9.6 , creatinine 1.3 lipase was normal urine analysis abnormal urine culture positive Proteus ESBL as well as Pseudomonas, blood cultures have been negative CT abd/pelvist with acute cholecystitis cholelithiasis with reactive pericholecystic fluid  Intermittent calcification of the pancreatic head with mild inflammatory changes noted. He underwent MRCP scan there was cholelithiasis but no focal defect noted in the extrahepatic CBD. We are consulted for antibiotic recommendation. Patient is unable to provide any details    Past Medical History:    Past Medical History:   Diagnosis Date    GEOVANI (acute kidney injury) ([facility] [address] 02/11/2021    Alcohol abuse 12/22/2020    Atrial fibrillation (HCC)     BPH (benign prostatic hyperplasia)     Dehydration     [facility]     ESBL (extended spectrum beta-lactamase) producing bacteria infection 08/28/2021    Proteus mirabilis ESBL.  Urine    Leg weakness, bilateral 2014, 2015    legs give out and pt collaspes    Seizure ([address] 07/20/2017    UTI (urinary tract infection) 08/10/2011    admitted with high fever and fainted. Past Surgical History:    Past Surgical History:   Procedure Laterality Date    CATARACT REMOVAL Bilateral     SPLENECTOMY, TOTAL      at age 15 y old. Current Medications:    Outpatient Medications Marked as Taking for the 8/28/21 encounter [facility] Encounter)   Medication Sig Dispense Refill    tamsulosin (FLOMAX) 0.4 MG capsule TAKE ONE CAPSULE BY MOUTH EVERY MORNING 30 capsule 3    furosemide (LASIX) 20 MG tablet TAKE ONE TABLET BY MOUTH EVERY MORNING 30 tablet 3    pantoprazole (PROTONIX) 40 MG tablet TAKE ONE TABLET BY MOUTH EVERY EVENING 30 tablet 3    levETIRAcetam (KEPPRA) 500 MG tablet Take 1 tablet by mouth 2 times daily 60 tablet 0    collagenase 250 UNIT/GM ointment Apply topically nightly Apply to left knee, right knee, and left wrist nightly      acetaminophen (TYLENOL) 325 MG tablet Take 650 mg by mouth every 4 hours as needed for Fever      lactobacillus (CULTURELLE) capsule Take 1 capsule by mouth daily (with breakfast) 30 capsule 0    Multiple Vitamin (MULTIVITAMIN) TABS tablet Take 1 tablet by mouth daily 30 tablet 0       Allergies:  Patient has no known allergies. Immunizations :   Immunization History   Administered Date(s) Administered    COVID-19, Moderna, PF, 100mcg/0.5mL 03/19/2021, 04/16/2021    Influenza, High Dose (Fluzone 65 yrs and older) 09/27/2018, 10/01/2019    Influenza, High-dose, Quadv, 65 yrs +, IM (Fluzone) 09/02/2020    Pneumococcal Conjugate 13-valent ([id number]) 07/13/2021    Pneumococcal Polysaccharide ([id number]) 01/16/2014         Social History:    Social History     Tobacco Use    Smoking status: Never Smoker    Smokeless tobacco: Never Used   Substance Use Topics    Alcohol use:  Yes     Alcohol/week: 0.0 standard drinks     Comment: occasionally     Drug use: No     Social History     Tobacco Use   Smoking Status Never Smoker   Smokeless Tobacco Never Used      Family History   Problem Relation Age of Onset   [facility] Cancer Mother         pancreatic    Diabetes Father     Cancer Brother         REVIEW OF SYSTEMS:     Not possible due to mental status    PHYSICAL EXAM:      Vitals:  T max  100.4   /64   Pulse 81   Temp 98 °F (36.7 °C) (Oral)   Resp 18   Ht 5' 10\" (1.778 m)   Wt 214 lb (97.1 kg)   SpO2 95%   BMI 30.71 kg/m²     General Appearance: alert,in some  acute distress, +  pallor, no icterus   Skin: warm and dry, no rash or erythema  Head: normocephalic and atraumatic  Eyes: pupils equal, round, and reactive to light, conjunctivae normal  ENT: tympanic membrane, external ear and ear canal normal bilaterally, nose without deformity, nasal mucosa and turbinates normal without polyps  Neck: supple and non-tender without mass, no thyromegaly  no cervical lymphadenopathy  Pulmonary/Chest: clear to auscultation bilaterally- no wheezes, rales or rhonchi, normal air movement, no respiratory distress  Cardiovascular: normal rate, regular rhythm, normal S1 and S2, no murmurs, rubs, clicks, or gallops, no carotid bruits  Abdomen: soft, non-tender, non-distended, normal bowel sounds, no masses or organomegaly  Extremities: no cyanosis, clubbing or edema  Musculoskeletal: normal range of motion, no joint swelling, deformity or tenderness  Integumentary: No rashes, no abnormal skin lesions, no petechiae  Neurologic: reflexes normal and symmetric, no cranial nerve deficit  Lines:  Ambrosio+  IV+    DATA:    CBC:   Lab Results   Component Value Date    WBC 12.1 (H) 08/31/2021    HGB 9.1 (L) 08/31/2021    HCT 26.5 (L) 08/31/2021    MCV 88.0 08/31/2021     08/31/2021     RENAL:   Lab Results   Component Value Date    CREATININE 0.9 09/01/2021    BUN 17 09/01/2021     09/01/2021    K 3.6 09/01/2021    CL 99 09/01/2021    CO2 31 09/01/2021     SED RATE: No results found for: SEDRATE  CK:   Lab Results   Component Value Date CKTOTAL 550 12/26/2020     CRP:   Lab Results   Component Value Date    CRP 34.7 02/13/2021     Hepatic Function Panel:   Lab Results   Component Value Date    ALKPHOS 105 08/31/2021    ALT 7 08/31/2021    AST 11 08/31/2021    PROT 6.1 08/31/2021    PROT 6.7 01/06/2013    BILITOT 0.5 08/31/2021    BILIDIR 0.3 08/29/2021    IBILI 0.4 08/29/2021    LABALBU 2.9 09/01/2021     UA:  Lab Results   Component Value Date    COLORU YELLOW 08/28/2021    CLARITYU CLOUDY 08/28/2021    GLUCOSEU Negative 08/28/2021    GLUCOSEU Negative 08/10/2011    BILIRUBINUR Negative 08/28/2021    BILIRUBINUR n 06/12/2017    BILIRUBINUR Negative 08/10/2011    KETUA Negative 08/28/2021    SPECGRAV 1.020 08/28/2021    BLOODU MODERATE 08/28/2021    PHUR 5.5 08/28/2021    PROTEINU 100 08/28/2021    UROBILINOGEN 1.0 08/28/2021    NITRU Negative 08/28/2021    LEUKOCYTESUR MODERATE 08/28/2021    LABMICR YES 08/28/2021    URINETYPE NotGiven 08/28/2021      Urine Microscopic:   Lab Results   Component Value Date    LABCAST 1-3 Hyaline 01/06/2013    BACTERIA 4+ 08/10/2011    COMU see below 05/31/2021    HYALCAST 6 08/28/2021    WBCUA 24 08/28/2021    RBCUA 11 08/28/2021    EPIU 0 08/28/2021     Urine Reflex to Culture:   Lab Results   Component Value Date    URRFLXCULT Yes 08/28/2021     WBC  24.7       MICRO: cultures reviewed and updated by me            Culture, Urine [[id number]] (Abnormal)  Collected: 08/28/21 1021   Order Status: Completed Specimen: Urine, clean catch Updated: 08/30/21 0900    Organism Proteus mirabilis ESBLAbnormal     Urine Culture, Routine --Abnormal     50,000 CFU/ml   CONTACT PRECAUTIONS INDICATED   Carbapenem antibiotics are the best option for infections caused   by ESBL producing organisms.  Other antibiotic classes are   likely to result in treatment failure, even for organisms   demonstrating in vitro susceptibility.    Abnormal     Organism Pseudomonas aeruginosaAbnormal     Urine Culture, Routine 50,000 CFU/ml Narrative:     ORDER#: [id number]                          ORDERED BY: Christine Urena   SOURCE: Urine Clean Catch                  COLLECTED:  08/28/21 10:21   [facility]. :                      RECEIVED :  08/28/21 11:12   CALL  [facility]  [id number] tel. [phone],   Microbiology results called to and read back by ROSENDA Rosales, 08/30/2021   09:00, by Moni Clark   Performed at:   [facility]   [address], [place] [address]   Phone [phone]   Culture, Blood 2 [[id number]] Collected: 08/28/21 1338   Order Status: Completed Specimen: Blood Updated: 08/29/21 1615    Culture, Blood 2 No Growth to date.  Any change in status will be called. Narrative:     ORDER#: [id number]                          ORDERED BY: Christine Urena   SOURCE: Blood                              COLLECTED:  08/28/21 13:38   [facility]. :                      RECEIVED :  08/28/21 13:38   If child <=2 yrs old please draw pediatric bottle. ~Blood Culture #2   Performed at:   [facility]   [address], [place] [address] [address]   Phone [phone]   Culture, Blood 1 [[id number]] Collected: 08/28/21 1333   Order Status: Completed Specimen: Blood Updated: 08/29/21 1615    Blood Culture, Routine No Growth to date.  Any change in status will be called. Narrative:     ORDER#: [id number]                          ORDERED BY: Christine Urena   SOURCE: Blood                              COLLECTED:  08/28/21 13:33   [facility]ICS AT [facility]. :                      RECEIVED :  08/28/21 13:33   If child <=2 yrs old please draw pediatric bottle. ~Blood Culture 1   Performed at:   [facility]   [address], [place] [address]   Phone [phone]       Blood Culture:   Lab Results   Component Value Date    [facility]  08/28/2021     No Growth to date. Any change in status will be called. BLOODCULT2  08/28/2021     No Growth to date.   Any change in status will be called. Lipase  Lipase  Collected: 08/29/21 0843   Result status: Final   Resulting lab: [facility]   Reference range: 13.0 - 60.0 U/L   Value: 18.0   *Additional information available - narrative       Viral Culture:    Lab Results   Component Value Date    COVID19 Not Detected 02/15/2021     Urine Culture: No results for input(s): Niesha Brooms in the last 72 hours. Scheduled Meds:   potassium chloride  40 mEq Oral Daily with breakfast    torsemide  20 mg Oral Daily    meropenem  500 mg IntraVENous Q6H    lactobacillus  1 capsule Oral Daily with breakfast    levETIRAcetam  500 mg Oral BID    multivitamin  1 tablet Oral Daily    pantoprazole  40 mg Oral Nightly    tamsulosin  0.4 mg Oral Daily    sodium chloride flush  5-40 mL IntraVENous 2 times per day    enoxaparin  40 mg SubCUTAneous Nightly       Continuous Infusions:   sodium chloride Stopped (08/31/21 0648)       PRN Meds:  sodium chloride flush, sodium chloride, ondansetron **OR** ondansetron, polyethylene glycol, acetaminophen **OR** acetaminophen    Imaging:   NM HEPATOBILIARY   Final Result   Gallbladder is not visualized by the end of the examination suggesting acute   cholecystitis. MRI ABDOMEN WO CONTRAST MRCP   Final Result   Cholelithiasis with wall thickening of the gallbladder suggesting early   cholecystitis. No focal filling defect is seen in the extrahepatic common   duct with the reservation that the bile ducts overall are not well evaluated   secondary to motion. VL Extremity Venous Bilateral   Final Result      CT CHEST PULMONARY EMBOLISM W CONTRAST   Final Result   1. CT CHEST: No acute vascular abnormality. No acute pulmonary embolism. 2. Main pulmonary artery dilatation can be seen with pulmonary hypertension   but is nonspecific. Indeterminate spiculated soft tissue nodule left upper   lobe, 11 x 14 mm axial series 3, image 107.  Consider a non-contrast Chest CT   at 3 months, a PET/CT, or tissue sampling. *   3. Proximal and mid esophageal wall thickening can be seen with esophagitis. 4. CT ABDOMEN/PELVIS: Acute cholecystitis. Cholelithiasis and reactive   pericholecystic ascites. 5. Indeterminate calcification at the pancreatic head measures 3 mm axial   series 4 possibly related to granulomatous disease, sequela chronic   pancreatitis choledocholithiasis. Consider MRCP correlation. 6. Mild inflammatory changes about the pancreatic head may be reactive,   likely adjacent cholecystitis. Acute pancreatitis is a consideration. 7. Prostate gland enlargement typical of BPH. 8. Small, fat containing umbilical hernia.   ______________________________________________________________________________   ____      *      These guidelines do not apply to immunocompromised patients and patients with   cancer. Follow up in patients with significant comorbidities as clinically   warranted. For lung cancer screening, adhere to Lung-RADS guidelines. Reference: Radiology. 2017; 284(1):228-43. RECOMMENDATIONS:   13 mm suspicious left solid pulmonary nodule within the upper lobe. Consider   a non-contrast Chest CT at 3 months, a PET/CT, or tissue sampling. These guidelines do not apply to immunocompromised patients and patients with   cancer. Follow up in patients with significant comorbidities as clinically   warranted. For lung cancer screening, adhere to Lung-RADS guidelines. Reference: Radiology. 2017; 284(1):228-43. CT ABDOMEN PELVIS W IV CONTRAST Additional Contrast? None   Final Result   1. CT CHEST: No acute vascular abnormality. No acute pulmonary embolism. 2. Main pulmonary artery dilatation can be seen with pulmonary hypertension   but is nonspecific. Indeterminate spiculated soft tissue nodule left upper   lobe, 11 x 14 mm axial series 3, image 107. Consider a non-contrast Chest CT   at 3 months, a PET/CT, or tissue sampling. *   3.  Proximal and mid esophageal wall thickening can be seen with esophagitis. 4. CT ABDOMEN/PELVIS: Acute cholecystitis. Cholelithiasis and reactive   pericholecystic ascites. 5. Indeterminate calcification at the pancreatic head measures 3 mm axial   series 4 possibly related to granulomatous disease, sequela chronic   pancreatitis choledocholithiasis. Consider MRCP correlation. 6. Mild inflammatory changes about the pancreatic head may be reactive,   likely adjacent cholecystitis. Acute pancreatitis is a consideration. 7. Prostate gland enlargement typical of BPH. 8. Small, fat containing umbilical hernia.   ______________________________________________________________________________   ____      *      These guidelines do not apply to immunocompromised patients and patients with   cancer. Follow up in patients with significant comorbidities as clinically   warranted. For lung cancer screening, adhere to Lung-RADS guidelines. Reference: Radiology. 2017; 284(1):228-43. RECOMMENDATIONS:   13 mm suspicious left solid pulmonary nodule within the upper lobe. Consider   a non-contrast Chest CT at 3 months, a PET/CT, or tissue sampling. These guidelines do not apply to immunocompromised patients and patients with   cancer. Follow up in patients with significant comorbidities as clinically   warranted. For lung cancer screening, adhere to Lung-RADS guidelines. Reference: Radiology. 2017; 284(1):228-43. All pertinent images and reports for the current Hospitalization were reviewed by me.     IMPRESSION:    Patient Active Problem List   Diagnosis    Seizure ([address]    Lactic acidosis    Sepsis ([address]    Pneumonia due to COVID-19 virus    Bilateral pulmonary embolism (HCC)    Elevated LFTs    Rhabdomyolysis    Facial trauma    Bacterial pneumonia    Alcohol abuse    GEOVANI (acute kidney injury) ([address]    Acute urinary retention    Gross hematuria    UTI (urinary tract infection)    Acute cholecystitis     Sepsis on admit  Confusion - change in mentation from above  WBC elevation  UTI  Urine cx ESBL Proteus and PSEUDOMONAS  Acute cholecystitis   MRCP scan -ve  Some changed on the pancreas Head  H/o PE completed anticoagulation   Ambrosio in place    Suspect sepsis from left urinary tract infection as well as from acute cholecystitis. WBC seems to be improving on IV antibiotic therapy. Given the MDRO colonization and infection will likely need IV antibiotic at discharge    Labs, Microbiology, Radiology and pertinent results from current hospitalization and care every where were reviewed by me as a part of the consultation. PLAN :  1. PICC line   2. IV Meropenem x 500 mg x q 8 hrs at d/c  3. JUAN MIGUEL done   4. Anticipate x 14 days of therapy   5. Surgery notes reviewed not an ideal candidate for cholecystectomy ? Medical management   6. May need placement to complete IV abx     Discussed with patient/Family and Nursing   Risk of Complications/Morbidity: High      · Illness(es)/ Infection present that pose threat to bodily function. · There is potential for severe exacerbation of infection/side effects of treatment. · Therapy requires intensive monitoring for antimicrobial agent toxicity. Thanks for allowing me to participate in your patient's care please call me with any questions or concerns.     Dr. Mark Cordova MD  [facility]  Phone: [phone]   Fax : [phone]

## 2021-09-02 VITALS
WEIGHT: 214 LBS | BODY MASS INDEX: 30.64 KG/M2 | DIASTOLIC BLOOD PRESSURE: 61 MMHG | TEMPERATURE: 98.2 F | OXYGEN SATURATION: 95 % | RESPIRATION RATE: 18 BRPM | SYSTOLIC BLOOD PRESSURE: 110 MMHG | HEIGHT: 70 IN | HEART RATE: 90 BPM

## 2021-09-02 LAB
ALBUMIN SERPL-MCNC: 2.9 G/DL (ref 3.4–5)
ANION GAP SERPL CALCULATED.3IONS-SCNC: 9 MMOL/L (ref 3–16)
BUN BLDV-MCNC: 16 MG/DL (ref 7–20)
CALCIUM SERPL-MCNC: 9.1 MG/DL (ref 8.3–10.6)
CHLORIDE BLD-SCNC: 99 MMOL/L (ref 99–110)
CO2: 32 MMOL/L (ref 21–32)
CREAT SERPL-MCNC: 0.9 MG/DL (ref 0.8–1.3)
GFR AFRICAN AMERICAN: >60
GFR NON-AFRICAN AMERICAN: >60
GLUCOSE BLD-MCNC: 119 MG/DL (ref 70–99)
INR BLD: 1.14 (ref 0.88–1.12)
MAGNESIUM: 2 MG/DL (ref 1.8–2.4)
PHOSPHORUS: 3.3 MG/DL (ref 2.5–4.9)
PLATELET # BLD: 524 K/UL (ref 135–450)
POTASSIUM SERPL-SCNC: 4.3 MMOL/L (ref 3.5–5.1)
PROTHROMBIN TIME: 12.9 SEC (ref 9.9–12.7)
SODIUM BLD-SCNC: 140 MMOL/L (ref 136–145)

## 2021-09-02 PROCEDURE — 76937 US GUIDE VASCULAR ACCESS: CPT

## 2021-09-02 PROCEDURE — 36569 INSJ PICC 5 YR+ W/O IMAGING: CPT

## 2021-09-02 PROCEDURE — 6370000000 HC RX 637 (ALT 250 FOR IP): Performed by: INTERNAL MEDICINE

## 2021-09-02 PROCEDURE — 36415 COLL VENOUS BLD VENIPUNCTURE: CPT

## 2021-09-02 PROCEDURE — 2580000003 HC RX 258: Performed by: INTERNAL MEDICINE

## 2021-09-02 PROCEDURE — C1751 CATH, INF, PER/CENT/MIDLINE: HCPCS

## 2021-09-02 PROCEDURE — 99233 SBSQ HOSP IP/OBS HIGH 50: CPT | Performed by: INTERNAL MEDICINE

## 2021-09-02 PROCEDURE — 85610 PROTHROMBIN TIME: CPT

## 2021-09-02 PROCEDURE — 83735 ASSAY OF MAGNESIUM: CPT

## 2021-09-02 PROCEDURE — 05HY33Z INSERTION OF INFUSION DEVICE INTO UPPER VEIN, PERCUTANEOUS APPROACH: ICD-10-PCS | Performed by: INTERNAL MEDICINE

## 2021-09-02 PROCEDURE — 6360000002 HC RX W HCPCS: Performed by: INTERNAL MEDICINE

## 2021-09-02 PROCEDURE — 85049 AUTOMATED PLATELET COUNT: CPT

## 2021-09-02 PROCEDURE — 80069 RENAL FUNCTION PANEL: CPT

## 2021-09-02 RX ORDER — SODIUM CHLORIDE 0.9 % (FLUSH) 0.9 %
5-40 SYRINGE (ML) INJECTION PRN
Status: DISCONTINUED | OUTPATIENT
Start: 2021-09-02 | End: 2021-09-02 | Stop reason: SDUPTHER

## 2021-09-02 RX ORDER — LIDOCAINE HYDROCHLORIDE 10 MG/ML
5 INJECTION, SOLUTION EPIDURAL; INFILTRATION; INTRACAUDAL; PERINEURAL ONCE
Status: DISCONTINUED | OUTPATIENT
Start: 2021-09-02 | End: 2021-09-02 | Stop reason: HOSPADM

## 2021-09-02 RX ORDER — SODIUM CHLORIDE 9 MG/ML
25 INJECTION, SOLUTION INTRAVENOUS PRN
Status: DISCONTINUED | OUTPATIENT
Start: 2021-09-02 | End: 2021-09-02 | Stop reason: SDUPTHER

## 2021-09-02 RX ORDER — SODIUM CHLORIDE 0.9 % (FLUSH) 0.9 %
5-40 SYRINGE (ML) INJECTION EVERY 12 HOURS SCHEDULED
Status: DISCONTINUED | OUTPATIENT
Start: 2021-09-02 | End: 2021-09-02 | Stop reason: SDUPTHER

## 2021-09-02 RX ORDER — TORSEMIDE 20 MG/1
20 TABLET ORAL DAILY
Qty: 30 TABLET | Refills: 3
Start: 2021-09-03 | End: 2022-03-08

## 2021-09-02 RX ADMIN — Medication 1 CAPSULE: at 07:49

## 2021-09-02 RX ADMIN — MEROPENEM 500 MG: 500 INJECTION, POWDER, FOR SOLUTION INTRAVENOUS at 05:16

## 2021-09-02 RX ADMIN — TAMSULOSIN HYDROCHLORIDE 0.4 MG: 0.4 CAPSULE ORAL at 07:49

## 2021-09-02 RX ADMIN — THERA TABS 1 TABLET: TAB at 07:49

## 2021-09-02 RX ADMIN — LEVETIRACETAM 500 MG: 500 TABLET ORAL at 07:49

## 2021-09-02 RX ADMIN — MEROPENEM 500 MG: 500 INJECTION, POWDER, FOR SOLUTION INTRAVENOUS at 11:33

## 2021-09-02 RX ADMIN — Medication 10 ML: at 07:50

## 2021-09-02 RX ADMIN — TORSEMIDE 20 MG: 20 TABLET ORAL at 07:49

## 2021-09-02 RX ADMIN — POTASSIUM CHLORIDE 40 MEQ: 20 TABLET, EXTENDED RELEASE ORAL at 07:49

## 2021-09-02 RX ADMIN — MEROPENEM 500 MG: 500 INJECTION, POWDER, FOR SOLUTION INTRAVENOUS at 00:04

## 2021-09-02 NOTE — FLOWSHEET NOTE
New order for PICC placement per Dr. Angelia Junior. Pt rested well overnight in chair. Antibiotics given per orders with no adverse effects. Very pleasant and cooperative. Uses call light as needed.

## 2021-09-02 NOTE — CARE COORDINATION
CASE MANAGEMENT DISCHARGE SUMMARY:    DISCHARGE DATE: 9/2/21    Discharging to Facility/ Agency   · Name: [facility]  · Address:  [address] [place] [address]   · Phone:  [phone]  · Fax:  [phone]    TRANSPORTATION: [facility]             TIME: 1400    HENS/PASAAR COMPLETED: 9/2/21    Son, Ajit Ferraro at [facility] notified of pickup time.     Yonny Morris RN, BSN, Case Management  [phone]    Electronically signed by Yonny Morris RN on 9/2/2021 at 10:47 AM

## 2021-09-02 NOTE — CARE COORDINATION
09/02/21 1117   IMM Letter   IMM Letter given to Patient/Family/Significant other/Guardian/POA/by: Gerald España RN   IMM Letter date given: 09/02/21   IMM Letter time given: 1045     Electronically signed by Gerald España RN on 9/2/21 at 11:19 AM EDT

## 2021-09-02 NOTE — DISCHARGE SUMMARY
Hospitalist Discharge Summary    Patient ID:  Ju Mederos  [id number]  61 y.o.  1935    Admit date: 8/28/2021    Discharge date: 9/2/2021    Disposition: SNF    Admission Diagnoses:   Patient Active Problem List   Diagnosis    Seizure ([address]    Lactic acidosis    Sepsis ([address]    Pneumonia due to COVID-19 virus    Bilateral pulmonary embolism (HCC)    Elevated LFTs    Rhabdomyolysis    Facial trauma    Bacterial pneumonia    Alcohol abuse    GEOVANI (acute kidney injury) ([address]    Acute urinary retention    Gross hematuria    UTI (urinary tract infection)    Acute cholecystitis       Discharge Diagnoses: Active Problems:    UTI (urinary tract infection)    Acute cholecystitis  Resolved Problems:    * No resolved hospital problems. *      Code Status:  DNR-CCA    Condition:  Stable    Discharge Diet: Diet:  ADULT DIET; Regular  Adult Oral Nutrition Supplement; Standard High Calorie/High Protein Oral Supplement    PCP to do list: Follow up for improvement of symptoms    Hospital Course: As per HPI:85 y.o. male w remote Hx of splenectomy at age 15, Hx of Afib, Hx of bilateral PE deemed secondary to [facility] completed AC this July, Hx of proteus bacteremia with complicated UTI and sepsis, chronic spann catheter, who presents to [facility] with AMS and feeling unwell.      Pt is a fairly poor historian. He reports his son comes to check on him regularly. He reports over the past few days he was not feeling very well - weak, tired. He was becoming confused and disoriented today and his son called EMS.    Work up in ED shows WBC of 24K with +UA. LA normal. He was started on IV Abx and directed for admission. Patient was admitted and following problems were addressed    Sepsis secondary to UTI related to chronic indwelling Spann  -Urine culture positive for ESBL Proteus and Pseudomonas  -Continue IV meropenem  -ID was consulted. PICC line was placed.   And patient will be discharged on IV meropenem for 14 days     Acute on chronic diastolic heart failure-resolved  -Continue torsemide     Acute likely metabolic encephalopathy   -Likely due to UTI on top of vascular dementia  -Improved     Possible cholecystitis  -General surgery was consulted  -Recommend nonoperative management.   Continue antibiotic course for 10 days     History of splenectomy  -Continue to monitor    Discharge Medications:   Current Discharge Medication List      START taking these medications    Details   torsemide (DEMADEX) 20 MG tablet Take 1 tablet by mouth daily  Qty: 30 tablet, Refills: 3           Current Discharge Medication List        Current Discharge Medication List      CONTINUE these medications which have NOT CHANGED    Details   tamsulosin (FLOMAX) 0.4 MG capsule TAKE ONE CAPSULE BY MOUTH EVERY MORNING  Qty: 30 capsule, Refills: 3      pantoprazole (PROTONIX) 40 MG tablet TAKE ONE TABLET BY MOUTH EVERY EVENING  Qty: 30 tablet, Refills: 3      levETIRAcetam (KEPPRA) 500 MG tablet Take 1 tablet by mouth 2 times daily  Qty: 60 tablet, Refills: 0      acetaminophen (TYLENOL) 325 MG tablet Take 650 mg by mouth every 4 hours as needed for Fever      lactobacillus (CULTURELLE) capsule Take 1 capsule by mouth daily (with breakfast)  Qty: 30 capsule, Refills: 0      Multiple Vitamin (MULTIVITAMIN) TABS tablet Take 1 tablet by mouth daily  Qty: 30 tablet, Refills: 0           Current Discharge Medication List      STOP taking these medications       furosemide (LASIX) 20 MG tablet Comments:   Reason for Stopping:         collagenase 250 UNIT/GM ointment Comments:   Reason for Stopping:                   Procedures: none    Assessment on Discharge: Stable, improved     Discharge ROS:  A complete review of systems was asked and negative except for none    Discharge Exam:  /64   Pulse 84   Temp 98.2 °F (36.8 °C) (Oral)   Resp 18   Ht 5' 10\" (1.778 m)   Wt 214 lb (97.1 kg)   SpO2 92%   BMI 30.71 kg/m²     General appearance:  No apparent distress appears stated age and cooperative. HEENT Normal cephalic, atraumatic without obvious deformity.  Pupils equal, round, and reactive to light.  Extra ocular muscles intact.  Conjunctivae/corneas clear. Neck: Supple, No jugular venous distention/bruits.  Trachea midline without thyromegaly or adenopathy with full range of motion. Lungs: Clear to auscultation, bilaterally without Rales/Wheezes/Rhonchi with good respiratory effort. Heart: Regular rate and rhythm with Normal S1/S2 without murmurs, rubs or gallops, point of maximum impulse non-displaced  Abdomen: Soft, non-tender or non-distended without rigidity or guarding and positive bowel sounds all four quadrants. Extremities:  Bilateral lower extremity edema noted  Skin: Skin color, texture, turgor normal.  No rashes or lesions. Neurologic:  Generalized weakness without any focal motor deficits  Mental status: Alert, oriented x2, very poor historian  Capillary Refill: Acceptable  < 3 seconds  Peripheral Pulses: +3 Easily felt, not easily obliterated with pressure    Pertinent Studies During Hospital Stay:  Radiology:  NM HEPATOBILIARY    Result Date: 8/30/2021  EXAMINATION: NUCLEAR MEDICINE HEPATOBILIARY SCINTIGRAPHY (HIDA SCAN). 8/30/2021 10:10 am TECHNIQUE: Approximately 4.8 [id number] Mebrofenin (Choletec) was administered IV. Then, dynamic images of the abdomen were obtained in the anterior projection for 60 mins. Delayed images are obtained at 4 hours COMPARISON: CT scan 08/28/2021 HISTORY: ORDERING SYSTEM PROVIDED HISTORY: possible cholecystitis. TECHNOLOGIST PROVIDED HISTORY: Reason for exam:->possible cholecystitis.  Reason for Exam: r/o cholecystitis--abn CT Acuity: Unknown Type of Exam: Initial Additional signs and symptoms: no c/o Relevant Medical/Surgical History: admitted with UTI and altered mental status FINDINGS: Prompt, homogenous uptake by the liver is noted with normal appearance of radiotracer excretion into the biliary system. Clearance of bloodpool activity appears appropriate. Normal bowel activity is seen. The gallbladder is not visualized by the end of the examination. Gallbladder is not visualized by the end of the examination suggesting acute cholecystitis. CT ABDOMEN PELVIS W IV CONTRAST Additional Contrast? None    Result Date: 8/28/2021  EXAMINATION: CT OF THE ABDOMEN AND PELVIS WITH CONTRAST; CTA OF THE CHEST 8/28/2021 2:14 pm TECHNIQUE: CT of the abdomen and pelvis was performed with the administration of intravenous contrast. Multiplanar reformatted images are provided for review. Dose modulation, iterative reconstruction, and/or weight based adjustment of the mA/kV was utilized to reduce the radiation dose to as low as reasonably achievable.; CTA of the chest was performed after the administration of intravenous contrast.  Multiplanar reformatted images are provided for review. MIP images are provided for review. Dose modulation, iterative reconstruction, and/or weight based adjustment of the mA/kV was utilized to reduce the radiation dose to as low as reasonably achievable. COMPARISON: CT abdomen and pelvis 02/12/2021 HISTORY: ORDERING SYSTEM PROVIDED HISTORY: Complicated UTI with sepsis. Hx of b/l hydronephrosis. Short of breath Acuity: Acute Type of Exam: Initial FINDINGS: Chest vasculature: Main pulmonary artery is dilated at 4 cm. Pulmonary arteries are well opacified. No thromboembolic disease evident. Unremarkable appearance of the aortic arch, ascending thoracic aorta 3.9 cm diameter descending thoracic aorta 2.8 cm diameter. Normal aortic arch branching and unremarkable appearance of the aortic arch branches. Coronary artery calcifications are present. Mediastinum: Moderate size hiatal hernia. Cardiac structures appear unremarkable. Diffuse mild thickening the proximal and mid portions of the thoracic esophagus. No adenopathy evident. Lungs/pleura: Indeterminate spiculated soft tissue nodule left upper lobe, 11 x 14 mm axial series 3, image 107. Minimal bibasilar subsegmental atelectasis. No inspissated secretions or endobronchial lesion evident. Organs: Gallbladder appears mildly distended and thick walled. Cholelithiasis. Pericholecystic fluid is seen. Unremarkable appearance of the liver, adrenal glands and kidneys. Absent spleen. Indeterminate calcification at the pancreatic head measures 3 mm axial series 4 possibly related to granulomatous disease, sequela chronic pancreatitis choledocholithiasis. Inflammatory changes about the pancreatic head may be reactive related to acute cholecystitis. GI/Bowel: The small bowel and stomach appear unremarkable. No diffuse or focal bowel wall thickening evident. No inflammatory changes evident. No obstruction is seen. The appendix is visualized right lower quadrant, unremarkable in appearance. Pelvis: Partially filled urinary bladder appears unremarkable. No pelvic adenopathy or free fluid is seen. Urinary bladder is decompressed by Ambrosio catheter. Prostate gland appears enlarged. Peritoneum/Retroperitoneum: Calcific atherosclerotic disease aorta. No aneurysm. Unremarkable appearance of the IVC. Shotty right upper quadrant lymph nodes are almost certainly reactive. Right upper quadrant fluid and prominent inflammatory changes in fat of the right upper quadrant. No pneumoperitoneum. Bones/Soft Tissues: Small, fat containing umbilical hernia. No acute superficial soft tissue or osseous structure abnormality evident. 1.  CT CHEST: No acute vascular abnormality. No acute pulmonary embolism. 2. Main pulmonary artery dilatation can be seen with pulmonary hypertension but is nonspecific. Indeterminate spiculated soft tissue nodule left upper lobe, 11 x 14 mm axial series 3, image 107. Consider a non-contrast Chest CT at 3 months, a PET/CT, or tissue sampling. * 3.  Proximal and mid reformatted images are provided for review. MIP images are provided for review. Dose modulation, iterative reconstruction, and/or weight based adjustment of the mA/kV was utilized to reduce the radiation dose to as low as reasonably achievable. COMPARISON: CT abdomen and pelvis 02/12/2021 HISTORY: ORDERING SYSTEM PROVIDED HISTORY: Complicated UTI with sepsis. Hx of b/l hydronephrosis. Short of breath Acuity: Acute Type of Exam: Initial FINDINGS: Chest vasculature: Main pulmonary artery is dilated at 4 cm. Pulmonary arteries are well opacified. No thromboembolic disease evident. Unremarkable appearance of the aortic arch, ascending thoracic aorta 3.9 cm diameter descending thoracic aorta 2.8 cm diameter. Normal aortic arch branching and unremarkable appearance of the aortic arch branches. Coronary artery calcifications are present. Mediastinum: Moderate size hiatal hernia. Cardiac structures appear unremarkable. Diffuse mild thickening the proximal and mid portions of the thoracic esophagus. No adenopathy evident. Lungs/pleura: Indeterminate spiculated soft tissue nodule left upper lobe, 11 x 14 mm axial series 3, image 107. Minimal bibasilar subsegmental atelectasis. No inspissated secretions or endobronchial lesion evident. Organs: Gallbladder appears mildly distended and thick walled. Cholelithiasis. Pericholecystic fluid is seen. Unremarkable appearance of the liver, adrenal glands and kidneys. Absent spleen. Indeterminate calcification at the pancreatic head measures 3 mm axial series 4 possibly related to granulomatous disease, sequela chronic pancreatitis choledocholithiasis. Inflammatory changes about the pancreatic head may be reactive related to acute cholecystitis. GI/Bowel: The small bowel and stomach appear unremarkable. No diffuse or focal bowel wall thickening evident. No inflammatory changes evident. No obstruction is seen.   The appendix is visualized right lower quadrant, unremarkable in appearance. Pelvis: Partially filled urinary bladder appears unremarkable. No pelvic adenopathy or free fluid is seen. Urinary bladder is decompressed by Ambrosio catheter. Prostate gland appears enlarged. Peritoneum/Retroperitoneum: Calcific atherosclerotic disease aorta. No aneurysm. Unremarkable appearance of the IVC. Shotty right upper quadrant lymph nodes are almost certainly reactive. Right upper quadrant fluid and prominent inflammatory changes in fat of the right upper quadrant. No pneumoperitoneum. Bones/Soft Tissues: Small, fat containing umbilical hernia. No acute superficial soft tissue or osseous structure abnormality evident. 1.  CT CHEST: No acute vascular abnormality. No acute pulmonary embolism. 2. Main pulmonary artery dilatation can be seen with pulmonary hypertension but is nonspecific. Indeterminate spiculated soft tissue nodule left upper lobe, 11 x 14 mm axial series 3, image 107. Consider a non-contrast Chest CT at 3 months, a PET/CT, or tissue sampling. * 3. Proximal and mid esophageal wall thickening can be seen with esophagitis. 4. CT ABDOMEN/PELVIS: Acute cholecystitis. Cholelithiasis and reactive pericholecystic ascites. 5. Indeterminate calcification at the pancreatic head measures 3 mm axial series 4 possibly related to granulomatous disease, sequela chronic pancreatitis choledocholithiasis. Consider MRCP correlation. 6. Mild inflammatory changes about the pancreatic head may be reactive, likely adjacent cholecystitis. Acute pancreatitis is a consideration. 7. Prostate gland enlargement typical of BPH. 8. Small, fat containing umbilical hernia. ______________________________________________________________________________ ____ * These guidelines do not apply to immunocompromised patients and patients with cancer. Follow up in patients with significant comorbidities as clinically warranted. For lung cancer screening, adhere to Lung-RADS guidelines.  Reference: Radiology. 2017; 284(1):228-43. RECOMMENDATIONS: 13 mm suspicious left solid pulmonary nodule within the upper lobe. Consider a non-contrast Chest CT at 3 months, a PET/CT, or tissue sampling. These guidelines do not apply to immunocompromised patients and patients with cancer. Follow up in patients with significant comorbidities as clinically warranted. For lung cancer screening, adhere to Lung-RADS guidelines. Reference: Radiology. 2017; 284(1):228-43. MRI ABDOMEN WO CONTRAST MRCP    Result Date: 8/30/2021  EXAMINATION: MRI OF THE ABDOMEN WITHOUT CONTRAST AND MRCP 8/30/2021 12:21 pm TECHNIQUE: Multiplanar multisequence MRI of the abdomen was performed without the administration of intravenous contrast.  After initial T2 axial and coronal images, thick slab, thin slab and 3D coronal MRCP sequences were obtained without the administration of intravenous contrast.  MIP images are provided for review. COMPARISON: None HISTORY: ORDERING SYSTEM PROVIDED HISTORY: abnormal CT abdomen. TECHNOLOGIST PROVIDED HISTORY: Reason for exam:->abnormal CT abdomen. Reason for Exam: abnormal CT abdomen. Acuity: Acute Type of Exam: Subsequent/Follow-up FINDINGS: Motion artifact degrades study. .  This decreases sensitivity and specificity of the study. Small hiatal hernia seen. There is nonspecific thickening at the GE junction Gallbladder: Gallbladder is contracted. Stones are seen in the gallbladder. There is inflammatory change surrounding the gallbladder. There is gallbladder wall thickening. Bile Ducts: No intra or extrahepatic biliary ductal dilatation. Extrahepatic common duct is not well evaluated by motion. Extrahepatic common duct measures approximately 4-5 mm. No focal filling defects are seen in motion degraded extrahepatic common duct. Pancreatic Duct: No pancreatic ductal dilatation. .  No peripancreatic fluid Other:  No hydronephrosis on the right.   No hydronephrosis on the left T2 hyperintense foci are seen in the right kidney, measuring 2.5 cm in size or less, likely cyst There is scarring of the left kidney. 6 mm T2 hyperintense focus seen in left kidney, compatible with cyst No retroperitoneal adenopathy. No aortic aneurysm No significant small bowel distention noted. Moderate stool is seen in the colon. Spurring is seen in the spine. Cholelithiasis with wall thickening of the gallbladder suggesting early cholecystitis. No focal filling defect is seen in the extrahepatic common duct with the reservation that the bile ducts overall are not well evaluated secondary to motion. VL Extremity Venous Bilateral    Result Date: 8/30/2021  Lower Extremities DVT Study  Demographics   Patient Name      [facility]   Date of Study     08/30/2021          Gender              Male   Patient Number    [id number]          Date of Birth       1935   Visit Number      [id number]           Age                 80 year(s)   Accession Number  [id number]          Room Number         4268   Corporate ID      [id number]             Cassy Gill CHAPO   Ordering          Bacanurschi,        Interpreting        Carmen Davis  Physician         Susie Roy MD        Physician           Surg                                                            Lyudmila Banda MD  Procedure Type of Study:   Veins:Lower Extremities DVT Study, VASC EXTREMITY VENOUS DUPLEX BILATERAL. Vascular Sonographer Report  Indications for Study:Edema. Impressions Right Impression Limited visualization due to swelling, bandage, and patient unable to bend leg. No evidence of deep vein or superficial vein thrombosis involving the right lower extremity in vessels clearly visualized. Mid Superficial Femoral Vein and Greater Saphenous Vein at Zone 3 were not visualized due to bandage.  Anterior Tibial Veins, Peroneal Veins, Small Saphenous Vein, Popliteal Vein, Tibioperoneal Trunk, and proximal and mid Posterior Tibial Veins were not visualized due to swelling. Left Impression Limited visualization due to swelling. 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 No other evidence of deep vein or superficial vein thrombosis involving the left lower extremity in vessels clearly visualized. Posterior Tibial Veins, Peroneal Veins, Soleal Vein, and Greater Saphenous Vein at Zone 3 through 8 were not visualized. Conclusions   Summary   Limited visualization due to swelling, bandage, and patient unable to bend  leg. No evidence of deep vein or superficial vein thrombosis involving the right  lower extremity in vessels clearly visualized. Mid Superficial Femoral Vein and Greater Saphenous Vein at Zone 3 were not  visualized due to bandage. Anterior Tibial Veins, Peroneal Veins, Small Saphenous Vein, Popliteal Vein,  Tibioperoneal Trunk, and proximal and mid Posterior Tibial Veins were not  visualized due to swelling. Limited visualization due to swelling. Subacute versus chronic totally occluding deep vein thrombosis involving the  left Gastrocnemius Vein. No other evidence of deep vein or superficial vein thrombosis involving the  left lower extremity in vessels clearly visualized. Posterior Tibial Veins, Peroneal Veins, Soleal Vein, and Greater Saphenous  Vein at Zone 3 through 8 were not visualized. Signature   ------------------------------------------------------------------  Electronically signed by Verneita Cockayne, MD (Interpreting  physician) on 08/30/2021 at 10:51 AM  ------------------------------------------------------------------  Patient Status:Routine. Study [place] [id number] - Vascular Lab. Technical Quality:Limited visualization due to swelling.   - Results were reported to:Patient's nurse.  Velocities are measured in cm/s ; Diameters are measured in mm Right Lower Extremities DVT Study Measurements Right 2D Measurements +------------------------+----------+---------------+----------+ ! Location                ! Visualized! Compressibility! Thrombosis! +------------------------+----------+---------------+----------+ ! Sapheno Femoral Junction! Yes       ! Yes            ! None      ! +------------------------+----------+---------------+----------+ ! GSV Thigh               ! Partial   !Yes            ! None      ! +------------------------+----------+---------------+----------+ ! Common Femoral          !Yes       ! Yes            ! None      ! +------------------------+----------+---------------+----------+ ! Prox Femoral            !Yes       ! Yes            ! None      ! +------------------------+----------+---------------+----------+ ! Mid Femoral             !No        !               !          ! +------------------------+----------+---------------+----------+ ! Dist Femoral            !Yes       ! Yes            ! None      ! +------------------------+----------+---------------+----------+ ! Deep Femoral            !Yes       ! Yes            ! None      ! +------------------------+----------+---------------+----------+ ! Popliteal               !No        !               !          ! +------------------------+----------+---------------+----------+ ! Gastroc                 ! Yes       ! Yes            ! None      ! +------------------------+----------+---------------+----------+ ! Soleal                  !Yes       ! Yes            ! None      ! +------------------------+----------+---------------+----------+ ! PTV                     ! Partial   !Yes            ! None      ! +------------------------+----------+---------------+----------+ ! ATV                     ! No        !               !          ! +------------------------+----------+---------------+----------+ ! Peroneal                !No        !               !          ! +------------------------+----------+---------------+----------+ ! GSV Calf                ! Yes       ! Yes !None      ! +------------------------+----------+---------------+----------+ ! SSV                     ! No        !               !          ! +------------------------+----------+---------------+----------+ Right Doppler Measurements +--------------+------+------+------------+ ! Location      ! Signal!Reflux! Reflux (sec)! +--------------+------+------+------------+ ! Common Femoral!Phasic! No    !            ! +--------------+------+------+------------+ Left Lower Extremities DVT Study Measurements Left 2D Measurements +------------------------+----------+---------------+----------+ ! Location                ! Visualized! Compressibility! Thrombosis! +------------------------+----------+---------------+----------+ ! Sapheno Femoral Junction! Yes       ! Yes            ! None      ! +------------------------+----------+---------------+----------+ ! GSV Thigh               ! Partial   !Yes            ! None      ! +------------------------+----------+---------------+----------+ ! Common Femoral          !Yes       ! Yes            ! None      ! +------------------------+----------+---------------+----------+ ! Prox Femoral            !Yes       ! Yes            ! None      ! +------------------------+----------+---------------+----------+ ! Mid Femoral             !Yes       ! Yes            ! None      ! +------------------------+----------+---------------+----------+ ! Dist Femoral            !Yes       ! Yes            ! None      ! +------------------------+----------+---------------+----------+ ! Deep Femoral            !Yes       ! Yes            ! None      ! +------------------------+----------+---------------+----------+ ! Popliteal               !Yes       ! Yes            ! None      ! +------------------------+----------+---------------+----------+ ! Gastroc                 ! Yes       ! No             !AI        ! +------------------------+----------+---------------+----------+ ! Soleal                  !No        !               ! ! +------------------------+----------+---------------+----------+ ! PTV                     ! No        !               !          ! +------------------------+----------+---------------+----------+ ! ATV                     ! Yes       ! Yes            ! None      ! +------------------------+----------+---------------+----------+ ! Peroneal                !No        !               !          ! +------------------------+----------+---------------+----------+ ! GSV Calf                ! No        !               !          ! +------------------------+----------+---------------+----------+ ! SSV                     ! Yes       ! Yes            ! None      ! +------------------------+----------+---------------+----------+ Left Doppler Measurements +--------------+------+------+------------+ ! Location      ! Signal!Reflux! Reflux (sec)! +--------------+------+------+------------+ ! Common Femoral!Phasic! No    !            ! +--------------+------+------+------------+ ! Popliteal     !Phasic! No    !            ! +--------------+------+------+------------+          Last Labs on Discharge:     Recent Results (from the past 24 hour(s))   Renal Function Panel    Collection Time: 09/02/21  8:20 AM   Result Value Ref Range    Sodium 140 136 - 145 mmol/L    Potassium 4.3 3.5 - 5.1 mmol/L    Chloride 99 99 - 110 mmol/L    CO2 32 21 - 32 mmol/L    Anion Gap 9 3 - 16    Glucose 119 (H) 70 - 99 mg/dL    BUN 16 7 - 20 mg/dL    CREATININE 0.9 0.8 - 1.3 mg/dL    GFR Non-African American >60 >60    GFR African American >60 >60    Calcium 9.1 8.3 - 10.6 mg/dL    Phosphorus 3.3 2.5 - 4.9 mg/dL    Albumin 2.9 (L) 3.4 - 5.0 g/dL   Magnesium    Collection Time: 09/02/21  8:20 AM   Result Value Ref Range    Magnesium 2.00 1.80 - 2.40 mg/dL   Platelet Count    Collection Time: 09/02/21 11:17 AM   Result Value Ref Range    Platelets 273 (H) 584 - 450 K/uL   Protime-INR    Collection Time: 09/02/21 11:17 AM   Result Value Ref Range    Protime 12.9 (H) 9.9 - 12.7 sec    INR 1.14 (H) 0.88 - 1.12         Follow up: with Loni Anaya MD    Note that over 30 minutes was spent in preparing discharge papers, discussing discharge with patient, medication review, etc.    Thank you Loni Anaya MD for the opportunity to be involved in this patient's care. If you have any questions or concerns please feel free to contact me at [phone].     Electronically signed by Jesus Rico MD on 9/2/2021 at 12:56 PM

## 2021-09-02 NOTE — PROGRESS NOTES
Infectious Disease Follow up Notes  Admit Date: 8/28/2021  Hospital Day: 6    Antibiotics : IV Meropenem     CHIEF COMPLAINT:       WBC elevation  UTI  Acute cholecystitis  esbl    Subjective interval History :  80 y.o.  man admitted through ED with confusion and concern for UTI given the Spann and urine appearance. He was very weak and tired on admit. Fevers T max 100.4,. He has a history of splenectomy and transferred, atrial fibrillation, history of PE suspected secondary to COVID-19 in the past on anticoagulation. Family noticed that he was getting more confused given the presentation was brought into the hospital for evaluation. Admission labs indicate WBC 24.7 hemoglobin 9.6 , creatinine 1.3 lipase was normal urine analysis abnormal urine culture positive Proteus ESBL as well as Pseudomonas, blood cultures have been negative CT abd/pelvist with acute cholecystitis cholelithiasis with reactive pericholecystic fluid Intermittent calcification of the pancreatic head with mild inflammatory changes noted. He underwent MRCP scan there was cholelithiasis but no focal defect noted in the extrahepatic CBD. We are consulted for antibiotic recommendation. Patient is unable to provide any details    Interval History : s/p pICC line for IV abx and no abd  Pain spann in place tolerating IV abx ok OPAT d/w pt , Mentation is better       Past Medical History:    Past Medical History:   Diagnosis Date    GEOVANI (acute kidney injury) ([facility] [address] 02/11/2021    Alcohol abuse 12/22/2020    Atrial fibrillation (HCC)     BPH (benign prostatic hyperplasia)     Dehydration     [facility]     ESBL (extended spectrum beta-lactamase) producing bacteria infection 08/28/2021    Proteus mirabilis ESBL.  Urine    Leg weakness, bilateral 2014, 2015    legs give out and pt collaspes    Seizure ([address] 07/20/2017    UTI (urinary tract infection) 08/10/2011    admitted with high fever and fainted. Past Surgical History:    Past Surgical History:   Procedure Laterality Date    CATARACT REMOVAL Bilateral     SPLENECTOMY, TOTAL      at age 15 y old. Current Medications:    Outpatient Medications Marked as Taking for the 8/28/21 encounter [facility] Encounter)   Medication Sig Dispense Refill    [START ON 9/3/2021] torsemide (DEMADEX) 20 MG tablet Take 1 tablet by mouth daily 30 tablet 3    tamsulosin (FLOMAX) 0.4 MG capsule TAKE ONE CAPSULE BY MOUTH EVERY MORNING 30 capsule 3    pantoprazole (PROTONIX) 40 MG tablet TAKE ONE TABLET BY MOUTH EVERY EVENING 30 tablet 3    levETIRAcetam (KEPPRA) 500 MG tablet Take 1 tablet by mouth 2 times daily 60 tablet 0    acetaminophen (TYLENOL) 325 MG tablet Take 650 mg by mouth every 4 hours as needed for Fever      lactobacillus (CULTURELLE) capsule Take 1 capsule by mouth daily (with breakfast) 30 capsule 0    Multiple Vitamin (MULTIVITAMIN) TABS tablet Take 1 tablet by mouth daily 30 tablet 0       Allergies:  Patient has no known allergies. Immunizations :   Immunization History   Administered Date(s) Administered    COVID-19, Moderna, PF, 100mcg/0.5mL 03/19/2021, 04/16/2021    Influenza, High Dose (Fluzone 65 yrs and older) 09/27/2018, 10/01/2019    Influenza, High-dose, Quadv, 65 yrs +, IM (Fluzone) 09/02/2020    Pneumococcal Conjugate 13-valent ([id number]) 07/13/2021    Pneumococcal Polysaccharide ([id number]) 01/16/2014       Social History:     Social History     Tobacco Use    Smoking status: Never Smoker    Smokeless tobacco: Never Used   Substance Use Topics    Alcohol use:  Yes     Alcohol/week: 0.0 standard drinks     Comment: occasionally     Drug use: No     Social History     Tobacco Use   Smoking Status Never Smoker   Smokeless Tobacco Never Used      Family History   Problem Relation Age of Onset    Cancer Mother         pancreatic    Diabetes Father     Cancer Brother           REVIEW OF SYSTEMS:     Constitutional:  negative for fevers, chills, night sweats  Eyes:  negative for blurred vision, eye discharge, visual disturbance   HEENT:  negative for hearing loss, ear drainage,nasal congestion  Respiratory:  negative for cough, shortness of breath or hemoptysis   Cardiovascular:  negative for chest pain, palpitations, syncope  Gastrointestinal:  negative for nausea, vomiting, diarrhea, constipation, abdominal pain +   Genitourinary:  negative for frequency, dysuria, urinary incontinence, hematuria  Hematologic/Lymphatic:  negative for easy bruising, bleeding and lymphadenopathy  Allergic/Immunologic:  negative for recurrent infections, angioedema, anaphylaxis   Endocrine:  negative for weight changes, polyuria, polydipsia and polyphagia  Musculoskeletal:  negative for joint  pain, swelling, decreased range of motion  Integumentary: No rashes, skin lesions  Neurological:  negative for headaches, slurred speech, unilateral weakness  Psychiatric: negative for hallucinations,confusion,agitation.                 PHYSICAL EXAM:      Vitals:    /64   Pulse 84   Temp 98.2 °F (36.8 °C) (Oral)   Resp 18   Ht 5' 10\" (1.778 m)   Wt 214 lb (97.1 kg)   SpO2 92%   BMI 30.71 kg/m²     General Appearance: alert,in some  acute distress, +  pallor, no icterus   Skin: warm and dry, no rash or erythema  Head: normocephalic and atraumatic  Eyes: pupils equal, round, and reactive to light, conjunctivae normal  ENT: tympanic membrane, external ear and ear canal normal bilaterally, nose without deformity, nasal mucosa and turbinates normal without polyps  Neck: supple and non-tender without mass, no thyromegaly  no cervical lymphadenopathy  Pulmonary/Chest: clear to auscultation bilaterally- no wheezes, rales or rhonchi, normal air movement, no respiratory distress  Cardiovascular: normal rate, regular rhythm, normal S1 and S2, no murmurs, rubs, clicks, or gallops, no carotid bruits  Abdomen: soft, non-tender, non-distended, normal bowel sounds, no masses or organomegaly  Extremities: no cyanosis, clubbing or edema  Musculoskeletal: normal range of motion, no joint swelling, deformity or tenderness  Integumentary: No rashes, no abnormal skin lesions, no petechiae  Neurologic: reflexes normal and symmetric, no cranial nerve deficit  Lines:  Ambrosio+  PICC     Data Review:    CBC:   Lab Results   Component Value Date    WBC 12.1 (H) 08/31/2021    HGB 9.1 (L) 08/31/2021    HCT 26.5 (L) 08/31/2021    MCV 88.0 08/31/2021     08/31/2021     RENAL:   Lab Results   Component Value Date    CREATININE 0.9 09/02/2021    BUN 16 09/02/2021     09/02/2021    K 4.3 09/02/2021    CL 99 09/02/2021    CO2 32 09/02/2021     SED RATE: No results found for: SEDRATE  CK:   Lab Results   Component Value Date    CKTOTAL 550 12/26/2020     CRP:   Lab Results   Component Value Date    CRP 34.7 02/13/2021     Hepatic Function Panel:   Lab Results   Component Value Date    ALKPHOS 105 08/31/2021    ALT 7 08/31/2021    AST 11 08/31/2021    PROT 6.1 08/31/2021    PROT 6.7 01/06/2013    BILITOT 0.5 08/31/2021    BILIDIR 0.3 08/29/2021    IBILI 0.4 08/29/2021    LABALBU 2.9 09/02/2021     UA:  Lab Results   Component Value Date    COLORU YELLOW 08/28/2021    CLARITYU CLOUDY 08/28/2021    GLUCOSEU Negative 08/28/2021    GLUCOSEU Negative 08/10/2011    BILIRUBINUR Negative 08/28/2021    BILIRUBINUR n 06/12/2017    BILIRUBINUR Negative 08/10/2011    KETUA Negative 08/28/2021    SPECGRAV 1.020 08/28/2021    BLOODU MODERATE 08/28/2021    PHUR 5.5 08/28/2021    PROTEINU 100 08/28/2021    UROBILINOGEN 1.0 08/28/2021    NITRU Negative 08/28/2021    LEUKOCYTESUR MODERATE 08/28/2021    LABMICR YES 08/28/2021    URINETYPE NotGiven 08/28/2021      Urine Microscopic:   Lab Results   Component Value Date    LABCAST 1-3 Hyaline 01/06/2013    BACTERIA 4+ 08/10/2011    COMU see below 05/31/2021    HYALCAST 6 08/28/2021 WBCUA 24 08/28/2021    RBCUA 11 08/28/2021    EPIU 0 08/28/2021     Urine Reflex to Culture:   Lab Results   Component Value Date    URRFLXCULT Yes 08/28/2021         MICRO: cultures reviewed and updated by me   Blood Culture:          Culture, Blood 1 [[id number]] Collected: 08/28/21 1333   Order Status: Completed Specimen: Blood Updated: 09/01/21 1615    Blood Culture, Routine No Growth after 4 days of incubation. Narrative:     ORDER#: [id number]                          ORDERED BY: Lawyer Andrea   SOURCE: Blood                              COLLECTED:  08/28/21 13:33   ANTIBIOTICS AT [facility]. :                      RECEIVED :  08/28/21 13:33   If child <=2 yrs old please draw pediatric bottle. ~Blood Culture 1   Performed at:   [facility]   [address]   Phone [phone]   Culture, Blood 2 [[id number]] Collected: 08/28/21 1338   Order Status: Completed Specimen: Blood Updated: 09/01/21 1615    Culture, Blood 2 No Growth after 4 days of incubation. Narrative:     ORDER#: [id number]                          ORDERED BY: Lawyer Andrea   SOURCE: Blood                              COLLECTED:  08/28/21 13:38   ANTIBIOTICS AT [facility]. :                      RECEIVED :  08/28/21 13:38   If child <=2 yrs old please draw pediatric bottle. ~Blood Culture #2   Performed at:   [facility]   [address] [facility] [address]   Phone [phone]   Culture, Urine [[id number]] (Abnormal)  Collected: 08/28/21 1021   Order Status: Completed Specimen: Urine, clean catch Updated: 08/30/21 0900    Organism Proteus mirabilis ESBLAbnormal     Urine Culture, Routine --Abnormal     50,000 CFU/ml   CONTACT PRECAUTIONS INDICATED   Carbapenem antibiotics are the best option for infections caused   by ESBL producing organisms.  Other antibiotic classes are   likely to result in treatment failure, even for organisms   demonstrating in vitro susceptibility. Abnormal     Organism Pseudomonas aeruginosaAbnormal     Urine Culture, Routine 50,000 CFU/ml   Narrative:     ORDER#: [id number]                          ORDERED BY: Laureen Johnson   SOURCE: Urine Clean Catch                  COLLECTED:  08/28/21 10:21   ANTIBIOTICS AT [facility]. :                      RECEIVED :  08/28/21 11:12   CALL  [facility]  [address] tel. [phone],   Microbiology results called to and read back by ROSENDA Yeager, 08/30/2021   09:00, by Zully Nunez   Performed at:   [place] [address]   Phone [phone]     Susceptibility    Proteus mirabilis (esbl) (1)    Antibiotic Interpretation AHMET Status    amoxicillin-clavulanate Sensitive <=8/4 mcg/mL     ampicillin Resistant >16 mcg/mL     cefepime Resistant >16 mcg/mL     cefTRIAXone Resistant >32 mcg/mL     cefuroxime Resistant >16 mcg/mL     ciprofloxacin Resistant >2 mcg/mL     ertapenem Sensitive <=0.5 mcg/mL     gentamicin Sensitive <=4 mcg/mL     meropenem Sensitive <=1 mcg/mL     nitrofurantoin Resistant >64 mcg/mL     trimethoprim-sulfamethoxazole Sensitive <=2/38 mcg/mL     Pseudomonas aeruginosa (2)    Antibiotic Interpretation AHMET Status    cefepime Sensitive <=2 mcg/mL     ciprofloxacin Sensitive <=1 mcg/mL     gentamicin Sensitive <=4 mcg/mL     meropenem Sensitive <=1 mcg/mL     piperacillin-tazobactam Sensitive <=16 mcg/mL     tobramycin Sensitive <=4 mcg/mL      Condensed View   Lab and Collection    Culture, Urine - 8/28/2021  Result History    Culture, Urine on 8/30/2021 - Result Edited       Lab Results   Component Value Date    BC No Growth after 4 days of incubation. 08/28/2021    BLOODCULT2 No Growth after 4 days of incubation.  08/28/2021       Respiratory Culture:  No results found for: Sylvie Peters  AFB:[facility] results found for: [facility]  Viral Culture:  Lab Results   Component Value Date    COVID19 Not Detected 02/15/2021     Urine Culture: No results for input(s): LABURIN in the last 72 hours. IMAGING:    NM HEPATOBILIARY   Final Result   Gallbladder is not visualized by the end of the examination suggesting acute   cholecystitis. MRI ABDOMEN WO CONTRAST MRCP   Final Result   Cholelithiasis with wall thickening of the gallbladder suggesting early   cholecystitis. No focal filling defect is seen in the extrahepatic common   duct with the reservation that the bile ducts overall are not well evaluated   secondary to motion. VL Extremity Venous Bilateral   Final Result      CT CHEST PULMONARY EMBOLISM W CONTRAST   Final Result   1. CT CHEST: No acute vascular abnormality. No acute pulmonary embolism. 2. Main pulmonary artery dilatation can be seen with pulmonary hypertension   but is nonspecific. Indeterminate spiculated soft tissue nodule left upper   lobe, 11 x 14 mm axial series 3, image 107. Consider a non-contrast Chest CT   at 3 months, a PET/CT, or tissue sampling. *   3. Proximal and mid esophageal wall thickening can be seen with esophagitis. 4. CT ABDOMEN/PELVIS: Acute cholecystitis. Cholelithiasis and reactive   pericholecystic ascites. 5. Indeterminate calcification at the pancreatic head measures 3 mm axial   series 4 possibly related to granulomatous disease, sequela chronic   pancreatitis choledocholithiasis. Consider MRCP correlation. 6. Mild inflammatory changes about the pancreatic head may be reactive,   likely adjacent cholecystitis. Acute pancreatitis is a consideration. 7. Prostate gland enlargement typical of BPH. 8. Small, fat containing umbilical hernia.   ______________________________________________________________________________   ____      *      These guidelines do not apply to immunocompromised patients and patients with   cancer. Follow up in patients with significant comorbidities as clinically   warranted. For lung cancer screening, adhere to Lung-RADS guidelines. Reference: Radiology. 2017; 284(1):228-43. RECOMMENDATIONS:   13 mm suspicious left solid pulmonary nodule within the upper lobe. Consider   a non-contrast Chest CT at 3 months, a PET/CT, or tissue sampling. These guidelines do not apply to immunocompromised patients and patients with   cancer. Follow up in patients with significant comorbidities as clinically   warranted. For lung cancer screening, adhere to Lung-RADS guidelines. Reference: Radiology. 2017; 284(1):228-43. CT ABDOMEN PELVIS W IV CONTRAST Additional Contrast? None   Final Result   1. CT CHEST: No acute vascular abnormality. No acute pulmonary embolism. 2. Main pulmonary artery dilatation can be seen with pulmonary hypertension   but is nonspecific. Indeterminate spiculated soft tissue nodule left upper   lobe, 11 x 14 mm axial series 3, image 107. Consider a non-contrast Chest CT   at 3 months, a PET/CT, or tissue sampling. *   3. Proximal and mid esophageal wall thickening can be seen with esophagitis. 4. CT ABDOMEN/PELVIS: Acute cholecystitis. Cholelithiasis and reactive   pericholecystic ascites. 5. Indeterminate calcification at the pancreatic head measures 3 mm axial   series 4 possibly related to granulomatous disease, sequela chronic   pancreatitis choledocholithiasis. Consider MRCP correlation. 6. Mild inflammatory changes about the pancreatic head may be reactive,   likely adjacent cholecystitis. Acute pancreatitis is a consideration. 7. Prostate gland enlargement typical of BPH. 8. Small, fat containing umbilical hernia.   ______________________________________________________________________________   ____      *      These guidelines do not apply to immunocompromised patients and patients with   cancer. Follow up in patients with significant comorbidities as clinically   warranted. For lung cancer screening, adhere to Lung-RADS guidelines. Reference: Radiology. 2017; 284(1):228-43.       RECOMMENDATIONS:   13 mm suspicious left urinary tract infection as well as from acute cholecystitis. WBC seems to be improving on IV antibiotic therapy. Given the MDRO colonization and infection will likely need IV antibiotic at discharge    PICC placed for IV abx and JUAN MIGUEL update     Labs, Microbiology, Radiology and all the pertinent results from current hospitalization and  care every where were reviewed  by me as a part of the evaluation   Plan:   1. PICC line  placed  2. IV Meropenem x 500 mg x q 8 hrs at d/c x stop date  9/14  3. JUAN MIGUEL done   4. Anticipate x 14 days of therapy   5. Surgery notes reviewed not an ideal candidate for cholecystectomy ? Medical management   6. May need placement to complete IV abx  d/w pt      Discussed with patient/Family and Nursing   Risk of Complications/Morbidity: High      · Illness(es)/ Infection present that pose threat to bodily function. · There is potential for severe exacerbation of infection/side effects of treatment. · Therapy requires intensive monitoring for antimicrobial agent toxicity. Discussed with patient/Family and Nursing staff     Thanks for allowing me to participate in your patient's care and please call me with any questions or concerns.     Keny Rangel MD  Infectious Disease  [facility]) Physician  Phone: [phone]   Fax : [phone]

## 2021-09-02 NOTE — PROGRESS NOTES
Order for PICC line per Dr. Oliva Sinha. Pre procedure and timeout done with pt's RN. Successful insertion of a single lumen PICC line into pt's right brachial vein. No issues gaining access or advancing guidewire/introducer/PICC line. PICC tip terminates in the SVC according to [employer] 3CG tip confirmation system. PICC was seen dropping into SVC with tip tracking technology and discernable peaked p waves were noted without negative deflection. A printout will be in pt's chart. PICC is cut at 39 cm and out externally 0cm (hub). single lumens flush without resistance and draw back brisk blood return. PICC site CDI with hemostasis maintained and a biopatch applied to site. Pt instructed to stay in bed and keep arm flat and still for 30 minutes  to promote hemostasis.      Asad RN given handoff report

## 2021-09-02 NOTE — DISCHARGE INSTR - COC
Continuity of Care Form    Patient Name: Juancho Lopez   :  1935  MRN:  [id number]    Admit date:  2021  Discharge date:  21    Code Status Order: DNR-CCA   Advance Directives:      Admitting Physician:  Ronnie Fong MD  PCP: Sheri Castillo MD    Discharging Nurse: [facility] Unit/Room#: K5R-2238/1910  Discharging Unit Phone Number: [phone]    Emergency Contact:   Extended Emergency Contact Information  Primary Emergency Contact: Dee,Ed   [address] Phone: [phone]  Relation: Child  Secondary Emergency Contact: [address] [facility], [address] Phone: [phone]  Mobile Phone: [phone]  Relation: Child    Past Surgical History:  Past Surgical History:   Procedure Laterality Date    CATARACT REMOVAL Bilateral     SPLENECTOMY, TOTAL      at age 15 y old. Immunization History:   Immunization History   Administered Date(s) Administered    COVID-19, Moderna, PF, 100mcg/0.5mL 2021, 2021    Influenza, High Dose (Fluzone 65 yrs and older) 2018, 10/01/2019    Influenza, High-dose, Quadv, 65 yrs +, IM (Fluzone) 2020    Pneumococcal Conjugate 13-valent ([id number]) 2021    Pneumococcal Polysaccharide (Gsacnjteh70) 2014       Active Problems:  Patient Active Problem List   Diagnosis Code    Seizure ([address] R56.9    Lactic acidosis E87.2    Sepsis ([address] A41.9    Pneumonia due to COVID-19 virus U07.1, J12.82    Bilateral pulmonary embolism (HCC) I26.99    Elevated LFTs R79.89    Rhabdomyolysis M62.82    Facial trauma S09. 93XA    Bacterial pneumonia J15.9    Alcohol abuse F10.10    GEOVANI (acute kidney injury) ([address] N17.9    Acute urinary retention R33.8    Gross hematuria R31.0    UTI (urinary tract infection) N39.0    Acute cholecystitis K81.0       Isolation/Infection:   Isolation            Contact          Patient Infection Status       Infection Onset Added Last Indicated Last Indicated By Review Planned Expiration Resolved Resolved By    ESBL (Extended Spectrum Beta Lactamase) 21 Culture, Urine        Resolved    COVID-19 Rule Out 02/15/21 02/15/21 02/15/21 COVID-19, Rapid (Ordered)   02/15/21 Rule-Out Test Resulted    COVID-19 20 COVID-19   21     COVID-19 Rule Out 20 COVID-19 (Ordered)   20 Rule-Out Test Resulted            Nurse Assessment:  Last Vital Signs: /64   Pulse 84   Temp 98.2 °F (36.8 °C) (Oral)   Resp 18   Ht 5' 10\" (1.778 m)   Wt 214 lb (97.1 kg)   SpO2 94%   BMI 30.71 kg/m²     Last documented pain score (0-10 scale): Pain Level: 0  Last Weight:   Wt Readings from Last 1 Encounters:   21 214 lb (97.1 kg)     Mental Status:  oriented and alert    IV Access:  - PICC - site  {Anatomy; iv placement site:[id number]}, insertion date: 21    Nursing Mobility/ADLs:  Walking   Dependent  Transfer  Dependent  Bathing  Dependent  Dressing  Dependent  Toileting  Dependent  Feeding  Independent  Med Admin  Dependent  Med Delivery   whole and one at a time    Wound Care Documentation and Therapy:  Wound 20 Knee Anterior;Left;Mid;Outer abrasion (Active)   Number of days: 253       Wound 20 Face Left;Upper (Active)   Number of days: 253       Wound 20 Knee Right; Inner (Active)   Number of days: 253       Wound 20 Toe (Comment  which one) Anterior; Left 2nd toe Abrasion (Active)   Number of days: 253       Wound 20 Knee Left (Active)   Number of days: 253       Wound 20 Nose Abrasion (Active)   Number of days: 253       Wound 21 Buttocks Upper;Left Stage 2 (Active)   Wound Etiology Pressure Stage  2 21   Dressing Status Other (Comment) 21   Wound Cleansed Not Cleansed 21   Dressing/Treatment Zinc paste 21   Wound Length (cm) 2 cm 21 1705   Wound Width (cm) 2 cm 21 1705   Wound Surface Area (cm^2) 4 cm^2 21 1705   Wound Assessment Dry 09/01/21 2102   Drainage Amount None 09/01/21 2102   Drainage Description Pink 08/31/21 0915   Odor None 08/31/21 0915   Araseli-wound Assessment Non-blanchable erythema 09/01/21 2102   Number of days: 4       Wound 08/28/21 Buttocks Upper;Right Stage 2 (Active)   Wound Etiology Pressure Stage  2 09/01/21 2102   Dressing Status Other (Comment) 09/01/21 2102   Wound Cleansed Not Cleansed 09/01/21 2102   Dressing/Treatment Zinc paste 09/01/21 2102   Wound Length (cm) 1 cm 08/28/21 1705   Wound Width (cm) 0.5 cm 08/28/21 1705   Wound Surface Area (cm^2) 0.5 cm^2 08/28/21 1705   Wound Assessment Dry 09/01/21 2102   Drainage Amount None 09/01/21 2102   Drainage Description Pink 08/31/21 0915   Odor None 08/31/21 0915   Araseli-wound Assessment Non-blanchable erythema 09/01/21 2102   Number of days: 4        Elimination:  Continence:   · Bowel: Yes  · Bladder: No  Urinary Catheter: Insertion Date: 8/30/21   Colostomy/Ileostomy/Ileal Conduit: No       Date of Last BM:     Intake/Output Summary (Last 24 hours) at 9/2/2021 0641  Last data filed at 9/2/2021 0539  Gross per 24 hour   Intake 1064.7 ml   Output 2050 ml   Net -985.3 ml     I/O last 3 completed shifts: In: 1308.4 [P.O.:840; I.V.:137.1; IV Piggyback:331.3]  Out: 1350 [Urine:1350]    Safety Concerns: At Risk for Falls    Impairments/Disabilities:      None    Nutrition Therapy:  Current Nutrition Therapy:   - Oral Diet:  General    Routes of Feeding: Oral  Liquids: Thin Liquids  Daily Fluid Restriction: no  Last Modified Barium Swallow with Video (Video Swallowing Test): not done    Treatments at the Time of Hospital Discharge:   Respiratory Treatments:   Oxygen Therapy:  is not on home oxygen therapy.   Ventilator:    - No ventilator support    Rehab Therapies: Physical Therapy and Occupational Therapy  Weight Bearing Status/Restrictions: No weight bearing restirctions  Other Medical Equipment (for information only, NOT a DME order): walker and stedy  Other Treatments:     Patient's personal belongings (please select all that are sent with patient):  Hearing Aides bilateral    RN SIGNATURE:  Electronically signed by Jett Carl RN on 9/2/21 at 11:48 AM EDT    CASE MANAGEMENT/SOCIAL WORK SECTION    Inpatient Status Date: ***    Readmission Risk Assessment Score:  Readmission Risk              Risk of Unplanned Readmission:  16           Discharging to Facility/ Agency   · Name: [facility]  · Address:  [address] [place] [address]   · Phone:  [phone]  · Fax:  [phone]     / signature: Electronically signed by Juany Galindo RN on 9/2/21 at 10:47 AM EDT    PHYSICIAN SECTION    Prognosis: Fair    Condition at Discharge: Stable    Rehab Potential (if transferring to Rehab): Fair    Recommended Labs or Other Treatments After Discharge:   IV Meropenem x 500 mg x q 8 HR X STOP date x 9/14  CBC with difF, CMP, Weekly   Fax results to [phone]   PICC line in place  NO ID Follow up   First dose given in [address] Physician  Phone: [phone]   Fax : [phone]       Physician Certification: I certify the above information and transfer of Natali Talley  is necessary for the continuing treatment of the diagnosis listed and that he requires [facility] for greater 30 days.      Update Admission H&P: No change in H&P    PHYSICIAN SIGNATURE:  Electronically signed by Shari Baker MD on 9/2/21 at 9:41 AM EDT

## 2021-09-02 NOTE — PROGRESS NOTES
Report called to Flavia from morenita garcia. All questions answered and call bck number provided. Paperwork sent [facility] transportation. Picc line and spann in place.  Electronically signed by Maggie Myers RN on 9/2/2021 at 4:21 PM

## 2021-09-02 NOTE — PLAN OF CARE
Problem: Falls - Risk of:  Goal: Will remain free from falls  Description: Will remain free from falls  9/2/2021 0010 by Alessandra Shannon RN  Outcome: Ongoing  9/1/2021 1418 by Prashant Barber RN  Outcome: Ongoing  Note: Fall precautions in place. Bed in lowest position, wheels locked, call light in reach, non slip socks on, alarm armed. Pt educated on using call light for assistance when needing to get up. Pt agreeable. Goal: Absence of physical injury  Description: Absence of physical injury  Outcome: Ongoing   Pt free from falls this shift. Fall precautions in place at all times. Call light always within reach. Pt able and agreeable to contact for safety appropriately. Problem: Skin Integrity:  Goal: Will show no infection signs and symptoms  Description: Will show no infection signs and symptoms  9/2/2021 0010 by Alessandra Shannon RN  Outcome: Ongoing  9/1/2021 1418 by Prashant Barber RN  Note: Skin assessment per shift. Pt educated on turning and repositioning every two hours. Pt agreeable. Pillow support offered. Goal: Absence of new skin breakdown  Description: Absence of new skin breakdown  Outcome: Ongoing   Skin assessment performed each shift per protocol. Patient turned and repositioned every two hours and prn with pillow support. Patient checked for incontence every two hours. '    Problem: Nutrition  Goal: Optimal nutrition therapy  Outcome: Ongoing     Problem: Sensory:  Goal: General experience of comfort will improve  Description: General experience of comfort will improve  Outcome: Ongoing     Problem: Urinary Elimination:  Goal: Signs and symptoms of infection will decrease  Description: Signs and symptoms of infection will decrease  9/2/2021 0010 by Alessandra Shannon RN  Outcome: Ongoing  9/1/2021 1418 by Prashant Barber RN  Note: Pt educated on output and symptoms.    Goal: Ability to reestablish a normal urinary elimination pattern will improve - after catheter removal  Description: Ability to reestablish a normal urinary elimination pattern will improve  Outcome: Ongoing  Goal: Complications related to the disease process, condition or treatment will be avoided or minimized  Description: Complications related to the disease process, condition or treatment will be avoided or minimized  Outcome: Ongoing     Problem: Discharge Planning:  Goal: Discharged to appropriate level of care  Description: Discharged to appropriate level of care  Outcome: Ongoing   Continuing to work with patient and health care team on discharge plan. Discharge instructions and medication management will be reviewed prior to discharge.

## 2021-09-03 NOTE — CARE COORDINATION
[facility] authorization received via portal:    Payor approval ID:  Pending    Fernando Approval ID:  [id number]    Service - Location: [facility]    Dates Approved: 9/2 - 9/7/2021  Level II    NRD:  9/7/2021    Jez Borrego Sr.  Administrative Assist, Case Management  [phone]  Electronically signed by Jez Borrego on 9/3/2021 at 1:04 PM

## 2021-09-09 ENCOUNTER — TELEPHONE (OUTPATIENT)
Dept: FAMILY MEDICINE CLINIC | Age: 86
End: 2021-09-09
Payer: MEDICARE

## 2021-09-09 DIAGNOSIS — N40.0 BENIGN PROSTATIC HYPERPLASIA WITHOUT LOWER URINARY TRACT SYMPTOMS: Primary | ICD-10-CM

## 2021-09-09 PROCEDURE — G0179 MD RECERTIFICATION HHA PT: HCPCS | Performed by: FAMILY MEDICINE

## 2021-09-27 ENCOUNTER — TELEPHONE (OUTPATIENT)
Dept: FAMILY MEDICINE CLINIC | Age: 86
End: 2021-09-27

## 2021-09-27 PROBLEM — N39.0 UTI (URINARY TRACT INFECTION): Status: RESOLVED | Noted: 2021-08-28 | Resolved: 2021-09-27

## 2021-09-28 ENCOUNTER — OFFICE VISIT (OUTPATIENT)
Dept: FAMILY MEDICINE CLINIC | Age: 86
End: 2021-09-28
Payer: MEDICARE

## 2021-09-28 VITALS
BODY MASS INDEX: 30.06 KG/M2 | HEIGHT: 70 IN | SYSTOLIC BLOOD PRESSURE: 124 MMHG | DIASTOLIC BLOOD PRESSURE: 70 MMHG | WEIGHT: 210 LBS | TEMPERATURE: 97.4 F

## 2021-09-28 DIAGNOSIS — R29.898 WEAKNESS OF RIGHT ARM: Primary | ICD-10-CM

## 2021-09-28 PROCEDURE — 1111F DSCHRG MED/CURRENT MED MERGE: CPT | Performed by: FAMILY MEDICINE

## 2021-09-28 PROCEDURE — 1123F ACP DISCUSS/DSCN MKR DOCD: CPT | Performed by: FAMILY MEDICINE

## 2021-09-28 PROCEDURE — 1036F TOBACCO NON-USER: CPT | Performed by: FAMILY MEDICINE

## 2021-09-28 PROCEDURE — 4040F PNEUMOC VAC/ADMIN/RCVD: CPT | Performed by: FAMILY MEDICINE

## 2021-09-28 PROCEDURE — G8427 DOCREV CUR MEDS BY ELIG CLIN: HCPCS | Performed by: FAMILY MEDICINE

## 2021-09-28 PROCEDURE — 99213 OFFICE O/P EST LOW 20 MIN: CPT | Performed by: FAMILY MEDICINE

## 2021-09-28 PROCEDURE — G8417 CALC BMI ABV UP PARAM F/U: HCPCS | Performed by: FAMILY MEDICINE

## 2021-09-28 NOTE — PROGRESS NOTES
Subjective:      Patient ID: Tyree Humphrey is a 80 y.o. male. Chief Complaint   Patient presents with    Follow-Up from [facility]        Patient presents with:   Follow-Up from Hospital: [facility]    Hx is from son Anamaria Curtis and also jesús his wife   Was in hospital and then to NH  He is back home now    Patient with weak hand on the right since Friday last week  A nurse did see them on Saturday and nothing particular noted    He has visits with son and during day  Nurse visits weekly  The health aid visits during the week for bath etc    No fever no c/o pain  No back pain no neck pain  No cp no sob  Appetite is fine  No abd pain    Derm kavon on 11/2  For the lesion on the left neck here in [place]    YOB: 1935    Date of Visit:  9/28/2021    No Known Allergies    Current Outpatient Medications:  torsemide (DEMADEX) 20 MG tablet, Take 1 tablet by mouth daily, Disp: 30 tablet, Rfl: 3  tamsulosin (FLOMAX) 0.4 MG capsule, TAKE ONE CAPSULE BY MOUTH EVERY MORNING, Disp: 30 capsule, Rfl: 3  pantoprazole (PROTONIX) 40 MG tablet, TAKE ONE TABLET BY MOUTH EVERY EVENING, Disp: 30 tablet, Rfl: 3  levETIRAcetam (KEPPRA) 500 MG tablet, Take 1 tablet by mouth 2 times daily, Disp: 60 tablet, Rfl: 0  acetaminophen (TYLENOL) 325 MG tablet, Take 650 mg by mouth every 4 hours as needed for Fever, Disp: , Rfl:   lactobacillus (CULTURELLE) capsule, Take 1 capsule by mouth daily (with breakfast), Disp: 30 capsule, Rfl: 0  Multiple Vitamin (MULTIVITAMIN) TABS tablet, Take 1 tablet by mouth daily, Disp: 30 tablet, Rfl: 0    No current facility-administered medications for this visit.      ---------------------------               09/28/21                      1507         ---------------------------   BP:          124/70         Site:    Left Upper Arm     Position:     Sitting        Cuff Size:   Large Adult      Temp:   97.4 °F (36.3 °C)   TempSrc:    Temporal Weight: 210 lb (95.3 kg)    Height: 5' 10\" (1.778 m)   ---------------------------  Body mass index is 30.13 kg/m². Wt Readings from Last 3 Encounters:  09/28/21 : 210 lb (95.3 kg)  09/02/21 : 214 lb (97.1 kg)  08/12/21 : 217 lb (98.4 kg)    BP Readings from Last 3 Encounters:  09/28/21 : 124/70  09/02/21 : 110/61  08/12/21 : 128/78              Review of Systems    Objective:   Physical Exam  Constitutional:       General: He is not in acute distress. Appearance: Normal appearance. He is well-developed. He is not ill-appearing or diaphoretic. HENT:      Head: Normocephalic and atraumatic. Cardiovascular:      Rate and Rhythm: Normal rate and regular rhythm. Pulses:           Radial pulses are 2+ on the right side. Heart sounds: Normal heart sounds. No murmur heard. No friction rub. No gallop. Pulmonary:      Effort: Pulmonary effort is normal. No tachypnea, accessory muscle usage or respiratory distress. Breath sounds: Normal breath sounds. No decreased breath sounds, wheezing, rhonchi or rales. Musculoskeletal:      Comments: Right hand no extension no finger extension  Grasp weak on the right  Arm weakness on right to lift though he is able to do so  hand warm and pink both sides   Lymphadenopathy:      Cervical: No cervical adenopathy. Upper Body:      Right upper body: No supraclavicular adenopathy. Left upper body: No supraclavicular adenopathy. Skin:     General: Skin is warm and dry. Coloration: Skin is not pale. Neurological:      Mental Status: He is alert. Assessment:        Diagnosis Orders   1.  Weakness of right arm  Corrine Encarnacion MD, Neurology, [facility] follow up  Onset of right hand weakness       D/c summary reviewed on 9/2 for uti   Hx is from his sone today     No more seizure since hospital stay  Recent uti   He had a spann still   Gets changed every 30 days  He had two gi bleed per family   He is back on iron supplement   No current gi loss of blood aware     He will return in 4 weeks       Plan:      See the neurologist for a check on the right hand paralysis  And right arm weakness         Ayah Hoskins MD

## 2021-10-21 ENCOUNTER — TELEPHONE (OUTPATIENT)
Dept: FAMILY MEDICINE CLINIC | Age: 86
End: 2021-10-21
Payer: MEDICARE

## 2021-10-21 DIAGNOSIS — N39.0 URINARY TRACT INFECTION ASSOCIATED WITH INDWELLING URETHRAL CATHETER, SUBSEQUENT ENCOUNTER: Primary | ICD-10-CM

## 2021-10-21 DIAGNOSIS — T83.511D URINARY TRACT INFECTION ASSOCIATED WITH INDWELLING URETHRAL CATHETER, SUBSEQUENT ENCOUNTER: Primary | ICD-10-CM

## 2021-10-21 PROCEDURE — G0180 MD CERTIFICATION HHA PATIENT: HCPCS | Performed by: FAMILY MEDICINE

## 2021-10-26 ENCOUNTER — OFFICE VISIT (OUTPATIENT)
Dept: FAMILY MEDICINE CLINIC | Age: 86
End: 2021-10-26
Payer: MEDICARE

## 2021-10-26 VITALS
SYSTOLIC BLOOD PRESSURE: 134 MMHG | DIASTOLIC BLOOD PRESSURE: 82 MMHG | WEIGHT: 210 LBS | HEIGHT: 70 IN | TEMPERATURE: 97.2 F | BODY MASS INDEX: 30.06 KG/M2

## 2021-10-26 DIAGNOSIS — R29.898 WEAKNESS OF RIGHT ARM: Primary | ICD-10-CM

## 2021-10-26 DIAGNOSIS — D64.9 ANEMIA, UNSPECIFIED TYPE: ICD-10-CM

## 2021-10-26 DIAGNOSIS — Z23 NEEDS FLU SHOT: ICD-10-CM

## 2021-10-26 DIAGNOSIS — R73.09 ELEVATED GLUCOSE: ICD-10-CM

## 2021-10-26 PROCEDURE — 1036F TOBACCO NON-USER: CPT | Performed by: FAMILY MEDICINE

## 2021-10-26 PROCEDURE — 1123F ACP DISCUSS/DSCN MKR DOCD: CPT | Performed by: FAMILY MEDICINE

## 2021-10-26 PROCEDURE — G8427 DOCREV CUR MEDS BY ELIG CLIN: HCPCS | Performed by: FAMILY MEDICINE

## 2021-10-26 PROCEDURE — G8484 FLU IMMUNIZE NO ADMIN: HCPCS | Performed by: FAMILY MEDICINE

## 2021-10-26 PROCEDURE — G0008 ADMIN INFLUENZA VIRUS VAC: HCPCS | Performed by: FAMILY MEDICINE

## 2021-10-26 PROCEDURE — 99214 OFFICE O/P EST MOD 30 MIN: CPT | Performed by: FAMILY MEDICINE

## 2021-10-26 PROCEDURE — 90694 VACC AIIV4 NO PRSRV 0.5ML IM: CPT | Performed by: FAMILY MEDICINE

## 2021-10-26 PROCEDURE — 4040F PNEUMOC VAC/ADMIN/RCVD: CPT | Performed by: FAMILY MEDICINE

## 2021-10-26 PROCEDURE — G8417 CALC BMI ABV UP PARAM F/U: HCPCS | Performed by: FAMILY MEDICINE

## 2021-10-26 NOTE — PROGRESS NOTES
Vaccine Information Sheet, \"Influenza - Inactivated\"  given to Sid Negron, or parent/legal guardian of  Sid Negron and verbalized understanding. Patient responses:    Have you ever had a reaction to a flu vaccine? No  Do you have any current illness? No  Have you ever had Guillian Heflin Syndrome? No  Do you have a serious allergy to any of the follow: Neomycin, Polymyxin, Thimerosal, eggs or egg products? No    Flu vaccine given per order. Please see immunization tab. Risks and benefits explained. Current VIS given.

## 2021-10-26 NOTE — PATIENT INSTRUCTIONS
Do keep the area on back clean and dry  Keep the appointment with the dermatologist  Do have the nurse have the therapist come back out to evaluate  See back in 6 weeks

## 2021-10-26 NOTE — PROGRESS NOTES
Subjective:      Patient ID: Lexus Romero is a 80 y.o. male. Chief Complaint   Patient presents with    1 Month Follow-Up        Patient presents with:  1 Month Follow-Up    Here for f/u  Has not been able  To see the neurologist yet  He denies concerns  Discussed also with family here with him    The right lower arm still impaired  The right lower leg ok at times slow    He has no c/o of pain  He has been using the walker  C/o of some itch on the back    The weakness in the right arm occurred when he was in the NH per family in early September     YOB: 1935    Date of Visit:  10/26/2021    No Known Allergies    Current Outpatient Medications:  torsemide (DEMADEX) 20 MG tablet, Take 1 tablet by mouth daily, Disp: 30 tablet, Rfl: 3  tamsulosin (FLOMAX) 0.4 MG capsule, TAKE ONE CAPSULE BY MOUTH EVERY MORNING, Disp: 30 capsule, Rfl: 3  pantoprazole (PROTONIX) 40 MG tablet, TAKE ONE TABLET BY MOUTH EVERY EVENING, Disp: 30 tablet, Rfl: 3  levETIRAcetam (KEPPRA) 500 MG tablet, Take 1 tablet by mouth 2 times daily, Disp: 60 tablet, Rfl: 0  acetaminophen (TYLENOL) 325 MG tablet, Take 650 mg by mouth every 4 hours as needed for Fever, Disp: , Rfl:   lactobacillus (CULTURELLE) capsule, Take 1 capsule by mouth daily (with breakfast), Disp: 30 capsule, Rfl: 0  Multiple Vitamin (MULTIVITAMIN) TABS tablet, Take 1 tablet by mouth daily, Disp: 30 tablet, Rfl: 0    No current facility-administered medications for this visit. He is taking the iron once a day       ---------------------------               10/26/21                      1429         ---------------------------   BP:          134/82         Site:    Left Upper Arm     Position:     Sitting        Cuff Size:   Large Adult      Temp:   97.2 °F (36.2 °C)   TempSrc:    Temporal        Weight: 210 lb (95.3 kg)    Height: 5' 10\" (1.778 m)   ---------------------------  Body mass index is 30.13 kg/m².      Wt Readings from Last 3 Encounters:  10/26/21 : 210 lb (95.3 kg)  09/28/21 : 210 lb (95.3 kg)  09/02/21 : 214 lb (97.1 kg)    BP Readings from Last 3 Encounters:  10/26/21 : 134/82  09/28/21 : 124/70  09/02/21 : 110/61           Review of Systems    Objective:   Physical Exam  Constitutional:       General: He is not in acute distress. Appearance: Normal appearance. He is not ill-appearing. Comments: alert pleasant nad   Skin:     General: Skin is warm and dry. Coloration: Skin is not pale. Comments: Few excoriations on the back. One small area slight crusting skin lesion on upper back    Neurological:      Mental Status: He is alert. Comments: weakness right grasp and decrease arm strength on right         Assessment:        Diagnosis Orders   1. Weakness of right arm  Comprehensive Metabolic Panel    TSH without Reflex    CT HEAD WO CONTRAST   2. Anemia, unspecified type  CBC Auto Differential    TSH without Reflex   3. Elevated glucose  Comprehensive Metabolic Panel    Hemoglobin A1C   4.  Needs flu shot  INFLUENZA, QUADV, ADJUVANTED, 72 YRS =, IM, PF, PREFILL SYR, 0.5ML (FLUAD)       Nurses visit once a week  The therapist did not feel they could help  Stable no change  Suspect stroke at some point while in September at the [address] per family here today       Plan:      Do keep the area on back clean and dry  Keep the appointment with the dermatologist  When they check the neck skin lesion also have them check the area on the back  Do have the nurse have the therapist come back out to evaluate  See back in 6 weeks        Raman Corey MD

## 2021-10-27 DIAGNOSIS — D64.9 ANEMIA, UNSPECIFIED TYPE: ICD-10-CM

## 2021-10-27 DIAGNOSIS — R29.898 WEAKNESS OF RIGHT ARM: ICD-10-CM

## 2021-10-27 DIAGNOSIS — R73.09 ELEVATED GLUCOSE: ICD-10-CM

## 2021-10-27 LAB
A/G RATIO: 0.9 (ref 1.1–2.2)
ALBUMIN SERPL-MCNC: 3.4 G/DL (ref 3.4–5)
ALP BLD-CCNC: 122 U/L (ref 40–129)
ALT SERPL-CCNC: 8 U/L (ref 10–40)
ANION GAP SERPL CALCULATED.3IONS-SCNC: 13 MMOL/L (ref 3–16)
AST SERPL-CCNC: 16 U/L (ref 15–37)
BASOPHILS ABSOLUTE: 0.1 K/UL (ref 0–0.2)
BASOPHILS RELATIVE PERCENT: 0.6 %
BILIRUB SERPL-MCNC: 0.8 MG/DL (ref 0–1)
BUN BLDV-MCNC: 11 MG/DL (ref 7–20)
CALCIUM SERPL-MCNC: 9.2 MG/DL (ref 8.3–10.6)
CHLORIDE BLD-SCNC: 98 MMOL/L (ref 99–110)
CO2: 27 MMOL/L (ref 21–32)
CREAT SERPL-MCNC: 0.8 MG/DL (ref 0.8–1.3)
EOSINOPHILS ABSOLUTE: 0.2 K/UL (ref 0–0.6)
EOSINOPHILS RELATIVE PERCENT: 2.4 %
GFR AFRICAN AMERICAN: >60
GFR NON-AFRICAN AMERICAN: >60
GLOBULIN: 3.8 G/DL
GLUCOSE BLD-MCNC: 96 MG/DL (ref 70–99)
HCT VFR BLD CALC: 31.2 % (ref 40.5–52.5)
HEMOGLOBIN: 10.4 G/DL (ref 13.5–17.5)
LYMPHOCYTES ABSOLUTE: 1.4 K/UL (ref 1–5.1)
LYMPHOCYTES RELATIVE PERCENT: 14.8 %
MCH RBC QN AUTO: 30.4 PG (ref 26–34)
MCHC RBC AUTO-ENTMCNC: 33.4 G/DL (ref 31–36)
MCV RBC AUTO: 91 FL (ref 80–100)
MONOCYTES ABSOLUTE: 1 K/UL (ref 0–1.3)
MONOCYTES RELATIVE PERCENT: 9.8 %
NEUTROPHILS ABSOLUTE: 7.1 K/UL (ref 1.7–7.7)
NEUTROPHILS RELATIVE PERCENT: 72.4 %
PDW BLD-RTO: 16.1 % (ref 12.4–15.4)
PLATELET # BLD: 498 K/UL (ref 135–450)
PMV BLD AUTO: 8.1 FL (ref 5–10.5)
POTASSIUM SERPL-SCNC: 4.4 MMOL/L (ref 3.5–5.1)
RBC # BLD: 3.42 M/UL (ref 4.2–5.9)
SODIUM BLD-SCNC: 138 MMOL/L (ref 136–145)
TOTAL PROTEIN: 7.2 G/DL (ref 6.4–8.2)
TSH SERPL DL<=0.05 MIU/L-ACNC: 1.5 UIU/ML (ref 0.27–4.2)
WBC # BLD: 9.8 K/UL (ref 4–11)

## 2021-10-28 LAB
ESTIMATED AVERAGE GLUCOSE: 111.2 MG/DL
HBA1C MFR BLD: 5.5 %

## 2021-11-03 ENCOUNTER — CLINICAL DOCUMENTATION (OUTPATIENT)
Dept: FAMILY MEDICINE CLINIC | Age: 86
End: 2021-11-03

## 2021-11-03 NOTE — PROGRESS NOTES
Long hx of borderline blood count (hg) but much worse starting during the  12/20/20 stay     Per family while in the NH during early September  developed right arm weakness   Indwelling spann and see's urology   Hx of etoh abuse  Hx of diastolic dysfunction echo 12/22/20  Dependent edema legs  Seizure disorder    Gi consult done on 2/26/21 Dr Chioma Carias  He had presented  at [address] [place] [address] Dr ALANIS in [address] with gi bleed 1/5-12/2021 and found to have bleeding peptic ulcers on EGD. Was placed on protonix bid at that time and had been on eliquis at that time as well     Dr Lola Westfall removed basal cell off face and right arm on ~6/2020    Brief view of hospital stays:     Sepsis (gallbladder and uti) +altered mental status + gross hematuria d/c on 9/2/21  Gi consult 8/29/21 in hospital dr Savannah Xiong and 8/30/21  dr Jess rosas was + for acute gallbladder but was treated medically to follow up as outpatient   Subacute /chronic left gastrocnemius dvt on doppler 8/30/21  Surgical consult for acute gallbladder 8/30/21 not ideal for surgery risk high    Urology consult 7/15/21    D/c 6/4/21 gross hematuria (he had traumatized self by pulling spann out) and sepsis. D/c note reviewed and eliquis not likely needed as no documented a fib found. He did have bilateral pe December 2020 due to covid. Eliquis was stopped    Hospital d/c 2/15/21 for obstructive uropathy    [facility] for gi bleed 1/5/2021    Hospital d/c 12/28/20 sepsis covid resp failure, hx ETOH abuse, and PE     Hospital d/c 7/22/17 for seizure consult lizeth de la torre on 7/21/17.  MRA with and without contrast ok on 7/21/17 and MRA head ok on same date

## 2021-11-24 ENCOUNTER — OFFICE VISIT (OUTPATIENT)
Dept: NEUROLOGY | Age: 86
End: 2021-11-24
Payer: MEDICARE

## 2021-11-24 VITALS
DIASTOLIC BLOOD PRESSURE: 70 MMHG | SYSTOLIC BLOOD PRESSURE: 123 MMHG | HEART RATE: 90 BPM | BODY MASS INDEX: 29.35 KG/M2 | HEIGHT: 70 IN | WEIGHT: 205 LBS

## 2021-11-24 DIAGNOSIS — G40.909 SEIZURE DISORDER (HCC): Primary | ICD-10-CM

## 2021-11-24 DIAGNOSIS — M21.331 RIGHT WRIST DROP: ICD-10-CM

## 2021-11-24 DIAGNOSIS — G60.9 IDIOPATHIC PERIPHERAL NEUROPATHY: ICD-10-CM

## 2021-11-24 DIAGNOSIS — R27.0 ATAXIA: ICD-10-CM

## 2021-11-24 DIAGNOSIS — R29.898 RIGHT ARM WEAKNESS: ICD-10-CM

## 2021-11-24 PROCEDURE — 4040F PNEUMOC VAC/ADMIN/RCVD: CPT | Performed by: PSYCHIATRY & NEUROLOGY

## 2021-11-24 PROCEDURE — G8417 CALC BMI ABV UP PARAM F/U: HCPCS | Performed by: PSYCHIATRY & NEUROLOGY

## 2021-11-24 PROCEDURE — 1123F ACP DISCUSS/DSCN MKR DOCD: CPT | Performed by: PSYCHIATRY & NEUROLOGY

## 2021-11-24 PROCEDURE — G8427 DOCREV CUR MEDS BY ELIG CLIN: HCPCS | Performed by: PSYCHIATRY & NEUROLOGY

## 2021-11-24 PROCEDURE — G8484 FLU IMMUNIZE NO ADMIN: HCPCS | Performed by: PSYCHIATRY & NEUROLOGY

## 2021-11-24 PROCEDURE — 1036F TOBACCO NON-USER: CPT | Performed by: PSYCHIATRY & NEUROLOGY

## 2021-11-24 PROCEDURE — 99205 OFFICE O/P NEW HI 60 MIN: CPT | Performed by: PSYCHIATRY & NEUROLOGY

## 2021-11-24 NOTE — PROGRESS NOTES
NEUROLOGY CONSULTATION     Chief Complaint   Patient presents with    New Patient     weakness       HISTORY OF PRESENT ILLNESS :    Brett Rojas is a 80 y.o. male who is referred by Dr. Humble Jordan   History was obtained from patient  Additional history was obtained from the patient's family. Patient apparently had COVID-19 pneumonia back in December 2020. After that he went to a nursing home. By the time he came home family found that he had right-sided weakness especially in the right arm and to a lesser extent in the right leg. Patient has had poor balance for a number of months. Patient now lives at home. Patient has been able to take care of his daily chores to some extent. Patient had pulmonary embolism back in December and then developed a GI bleed the next month. He was initially on anticoagulation with Eliquis but it was stopped later. There is history of atrial fibrillation noted in the chart past history but patient and his family deny that. Patient has had seizures for a number of years. He saw neurology several years ago. Patient has been on Keppra.     REVIEW OF SYSTEMS    Constitutional:  []   Chills   []  Fatigue   []  Fevers   []  Malaise   []  Weight loss     [x] Denies all of the above    Eyes:  []  Double vision   []  Blurry vision     [x] Denies all of the above    Ears, nose, mouth, throat, and face:   [x] Hearing loss    []   Hoarseness      []  Snoring    []  Tinnitus       [] Denies all of the above     Respiratory:   []  Cough    []  Shortness of breath         [x] Denies all of the above     Cardiovascular:   []  Chest pain    []  Exertional chest pressure/discomfort           [] Palpitations    []  Syncope     [x] Denies all of the above    Gastrointestinal:   []  Abdominal pain   []  Constipation    [x]  Diarrhea    []   Dysphagia                      [] Denies all of the above    Genitourinary:      [] Frequency   []  Hematuria     []  Urinary incontinence           [x] Denies all of the above     Hematologic/lymphatic:  []  Bleeding    []  Easy bruising   []  Anemia  [x] Denies all of the above     Musculoskeletal:   [] Back pain       []  Myalgias    []  Neck pain           [x] Denies all of the above    Neurological: As noted in HPI    Behavioral/Psych:   [] Anxiety    []  Depression     []  Mood swings     [x] Denies all of the above     Endocrine:   []  Temperature intolerance     [] Fatigue      [x] Denies all of the above     Allergic/Immunologic:   [] Hay fever    [x] Denies all of the above     Past Medical History:   Diagnosis Date    [facility] 02/11/2021    Alcohol abuse 12/22/2020    Atrial fibrillation (HCC)     BPH (benign prostatic hyperplasia)     Dehydration     [facility]     ESBL (extended spectrum beta-lactamase) producing bacteria infection 08/28/2021    Proteus mirabilis ESBL. Urine    Leg weakness, bilateral 2014, 2015    legs give out and pt collaspes    Seizure ([facility] 07/20/2017    UTI (urinary tract infection) 08/10/2011    admitted with high fever and fainted. Family History   Problem Relation Age of Onset    Cancer Mother         pancreatic    Diabetes Father     Cancer Brother      Social History     Socioeconomic History    Marital status:      Spouse name: Not on file    Number of children: Not on file    Years of education: Not on file    Highest education level: Not on file   Occupational History    Not on file   Tobacco Use    Smoking status: Never Smoker    Smokeless tobacco: Never Used   Substance and Sexual Activity    Alcohol use:  Yes     Alcohol/week: 0.0 standard drinks     Comment: occasionally     Drug use: No    Sexual activity: Not Currently   Other Topics Concern    Not on file   Social History Narrative    Not on file     Social Determinants of Health     Financial Resource Strain: Low Risk     Difficulty of Paying Living Expenses: Not hard at all   Food Insecurity: No Food Insecurity    Worried About Running Out of Food in the Last Year: Never true    Rc of Food in the Last Year: Never true   Transportation Needs:     Lack of Transportation (Medical): Not on file    Lack of Transportation (Non-Medical): Not on file   Physical Activity:     Days of Exercise per Week: Not on file    Minutes of Exercise per Session: Not on file   Stress:     Feeling of Stress : Not on file   Social Connections:     Frequency of Communication with Friends and Family: Not on file    Frequency of Social Gatherings with Friends and Family: Not on file    Attends Yazidi Services: Not on file    Active Member of [employer] or Organizations: Not on file    Attends Club or Organization Meetings: Not on file    Marital Status: Not on file   Intimate Partner Violence:     Fear of Current or Ex-Partner: Not on file    Emotionally Abused: Not on file    Physically Abused: Not on file    Sexually Abused: Not on file   Housing Stability:     Unable to Pay for Housing in the Last Year: Not on file    Number of [place] in the Last Year: Not on file    Unstable Housing in the Last Year: Not on file       PHYSICAL EXAMINATION:    /70   Pulse 90   Ht 5' 10\" (1.778 m)   Wt 205 lb (93 kg)   BMI 29.41 kg/m²   Appearance: Well appearing, well nourished and in no distress  Mental Status Exam: Patient is alert, oriented to person, place and time. Recent and remote memory is normal  Fund of Knowledge is normal  Attention/concentration is normal.   Speech : No dysarthria  Language : No aphasia  Funduscopic Exam: sharp disc margins  Cranial Nerves:   II: Visual fields:  Full to confrontation  III: Pupils:  equal, round, reactive to light  III,IV,VI: Extra Ocular Movements are intact.  No nystagmus  V: Facial sensation is intact to pin prick and light touch  VII: Facial strength and movements: intact and symmetric smile,cheek puffing and eyebrow elevation  VIII: Hearing:  Intact to finger rub bilaterally  IX: Palate  elevation is symmetric  XI: Shoulder shrug is intact  XII: Tongue movements are normal  Motor:  Muscle tone and bulk are normal.  There is a mild tremor noted in the right hand. Strength is 4/5 all over except the right arm where patient has a wrist drop with a strength of 2+/5. Sensory: Decreased to light touch in the distal lower extremities bilaterally  Coordination:  Normal  Finger to Nose and Heel to Shin bilaterally    . Reflexes:  DTR barely elicitable all over  Plantar response: Withdrawal response bilaterally  Gait: Gait is significantly impaired Romberg: Could not be tested  Vascular: No carotid bruit bilaterally      DATA:  LABS:  General Labs:    CBC:   Lab Results   Component Value Date    WBC 9.8 10/27/2021    RBC 3.42 10/27/2021    HGB 10.4 10/27/2021    HCT 31.2 10/27/2021    MCV 91.0 10/27/2021    MCH 30.4 10/27/2021    MCHC 33.4 10/27/2021    RDW 16.1 10/27/2021     10/27/2021    MPV 8.1 10/27/2021     BMP:    Lab Results   Component Value Date     10/27/2021    K 4.4 10/27/2021    K 3.8 08/28/2021    CL 98 10/27/2021    CO2 27 10/27/2021    BUN 11 10/27/2021    LABALBU 3.4 10/27/2021    CREATININE 0.8 10/27/2021    CALCIUM 9.2 10/27/2021    GFRAA >60 10/27/2021    GFRAA >60 01/06/2013    LABGLOM >60 10/27/2021    GLUCOSE 96 10/27/2021     RADIOLOGY REVIEW:  I have reviewed radiology image(s) and reports(s) of: CT scan of the head from December 2020    IMPRESSION :  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 If there is concern for atrial fibrillation cardiology evaluation may be useful  Left metastatic stroke should be considered and ruled out.   CT head has been ordered by the primary care physician and will be done in the next several days. Patient has peripheral neuropathy probably related to previous alcohol abuse. Patient's ataxia could be from the peripheral neuropathy. Seizure disorder, reasonable control on Keppra        RECOMMENDATIONS :  Discussed at length with patient and his family  Reviewed extensive medical records from previous hospital admissions  Continue Keppra 500 mg twice daily  If there is concern for atrial fibrillation, cardiology evaluation may be reasonable. I will await the results of the CT head. I suspect that the right wrist drop will slowly improve over the next several months. I will see him back in 6 months for follow-up  Thank you for this consultation          Please note a portion of this chart was generated using dragon dictation software. Although every effort was made to ensure the accuracy of this automated transcription, some errors in transcription may have occurred.

## 2021-12-02 ENCOUNTER — TELEPHONE (OUTPATIENT)
Dept: FAMILY MEDICINE CLINIC | Age: 86
End: 2021-12-02

## 2021-12-02 ENCOUNTER — TELEPHONE (OUTPATIENT)
Dept: FAMILY MEDICINE CLINIC | Age: 86
End: 2021-12-02
Payer: MEDICARE

## 2021-12-02 DIAGNOSIS — N40.0 BENIGN PROSTATIC HYPERPLASIA WITHOUT LOWER URINARY TRACT SYMPTOMS: Primary | ICD-10-CM

## 2021-12-02 PROCEDURE — G0179 MD RECERTIFICATION HHA PT: HCPCS | Performed by: FAMILY MEDICINE

## 2021-12-06 ENCOUNTER — HOSPITAL ENCOUNTER (OUTPATIENT)
Dept: CT IMAGING | Age: 86
Discharge: HOME | End: 2021-12-06
Payer: MEDICARE

## 2021-12-06 DIAGNOSIS — R29.898 WEAKNESS OF RIGHT ARM: ICD-10-CM

## 2021-12-06 PROCEDURE — 70450 CT HEAD/BRAIN W/O DYE: CPT

## 2021-12-07 ENCOUNTER — OFFICE VISIT (OUTPATIENT)
Dept: FAMILY MEDICINE CLINIC | Age: 86
End: 2021-12-07
Payer: MEDICARE

## 2021-12-07 VITALS
WEIGHT: 204 LBS | DIASTOLIC BLOOD PRESSURE: 64 MMHG | SYSTOLIC BLOOD PRESSURE: 122 MMHG | TEMPERATURE: 97.3 F | HEIGHT: 70 IN | BODY MASS INDEX: 29.2 KG/M2

## 2021-12-07 DIAGNOSIS — R33.9 URINARY RETENTION: ICD-10-CM

## 2021-12-07 DIAGNOSIS — R29.898 WEAKNESS OF RIGHT ARM: ICD-10-CM

## 2021-12-07 DIAGNOSIS — D64.9 ANEMIA, UNSPECIFIED TYPE: Primary | ICD-10-CM

## 2021-12-07 PROCEDURE — G8427 DOCREV CUR MEDS BY ELIG CLIN: HCPCS | Performed by: FAMILY MEDICINE

## 2021-12-07 PROCEDURE — 99213 OFFICE O/P EST LOW 20 MIN: CPT | Performed by: FAMILY MEDICINE

## 2021-12-07 PROCEDURE — 1036F TOBACCO NON-USER: CPT | Performed by: FAMILY MEDICINE

## 2021-12-07 PROCEDURE — G8484 FLU IMMUNIZE NO ADMIN: HCPCS | Performed by: FAMILY MEDICINE

## 2021-12-07 PROCEDURE — 4040F PNEUMOC VAC/ADMIN/RCVD: CPT | Performed by: FAMILY MEDICINE

## 2021-12-07 PROCEDURE — 1123F ACP DISCUSS/DSCN MKR DOCD: CPT | Performed by: FAMILY MEDICINE

## 2021-12-07 PROCEDURE — G8417 CALC BMI ABV UP PARAM F/U: HCPCS | Performed by: FAMILY MEDICINE

## 2021-12-07 NOTE — PROGRESS NOTES
Subjective:      Patient ID: Ofe Marroquin is a 80 y.o. male. Chief Complaint   Patient presents with    Follow-up     weakness        Patient presents with: Follow-up: weakness    He is here for recheck  He is feeling well  No c/o  He is using the walker    He did see derm and having further surgery on the skin for ca  He did see neuro   He did get ct head     Patient has no c/o of pain  Eating well  Does get around with the walker  He did have some PT coming to the home    YOB: 1935    Date of Visit:  12/7/2021    No Known Allergies    Current Outpatient Medications:  torsemide (DEMADEX) 20 MG tablet, Take 1 tablet by mouth daily, Disp: 30 tablet, Rfl: 3  tamsulosin (FLOMAX) 0.4 MG capsule, TAKE ONE CAPSULE BY MOUTH EVERY MORNING, Disp: 30 capsule, Rfl: 3  pantoprazole (PROTONIX) 40 MG tablet, TAKE ONE TABLET BY MOUTH EVERY EVENING, Disp: 30 tablet, Rfl: 3  levETIRAcetam (KEPPRA) 500 MG tablet, Take 1 tablet by mouth 2 times daily, Disp: 60 tablet, Rfl: 0  acetaminophen (TYLENOL) 325 MG tablet, Take 650 mg by mouth every 4 hours as needed for Fever, Disp: , Rfl:   lactobacillus (CULTURELLE) capsule, Take 1 capsule by mouth daily (with breakfast), Disp: 30 capsule, Rfl: 0  Multiple Vitamin (MULTIVITAMIN) TABS tablet, Take 1 tablet by mouth daily, Disp: 30 tablet, Rfl: 0    No current facility-administered medications for this visit.      ---------------------------               12/07/21                      1306         ---------------------------   BP:          122/64         Site:    Left Upper Arm     Position:     Sitting        Cuff Size:   Large Adult      Temp:   97.3 °F (36.3 °C)   TempSrc:    Temporal        Weight: 204 lb (92.5 kg)    Height: 5' 10\" (1.778 m)   ---------------------------  Body mass index is 29.27 kg/m².      Wt Readings from Last 3 Encounters:  12/07/21 : 204 lb (92.5 kg)  11/24/21 : 205 lb (93 kg)  10/26/21 : 210 lb (95.3 kg)    BP Readings from Last 3 Encounters:  12/07/21 : 122/64  11/24/21 : 123/70  10/26/21 : 134/82              Review of Systems    Objective:   Physical Exam  Constitutional:       General: He is not in acute distress. Appearance: Normal appearance. He is well-developed. He is not ill-appearing or diaphoretic. Cardiovascular:      Rate and Rhythm: Normal rate and regular rhythm. Heart sounds: Normal heart sounds. No murmur heard. No friction rub. No gallop. Pulmonary:      Effort: Pulmonary effort is normal. No tachypnea, accessory muscle usage or respiratory distress. Breath sounds: Normal breath sounds. No decreased breath sounds, wheezing, rhonchi or rales. Chest:   Breasts:      Right: No supraclavicular adenopathy. Left: No supraclavicular adenopathy. Lymphadenopathy:      Cervical: No cervical adenopathy. Upper Body:      Right upper body: No supraclavicular adenopathy. Left upper body: No supraclavicular adenopathy. Skin:     General: Skin is warm and dry. Coloration: Skin is not pale. Neurological:      Mental Status: He is alert. Assessment:        Diagnosis Orders   1. Anemia, unspecified type     2. Urinary retention     3.  Weakness of right arm         Neuro visit on 11/24/21 reviewed   He did see urology in summer and they did not need to see back  He uses a spann change every 30 days    I did discuss care with son who came in today and also assisted with the hx         Plan:      Continue the same for now  No change  See in 3 months         Cinthya Recinos MD

## 2022-02-15 ENCOUNTER — TELEPHONE (OUTPATIENT)
Dept: FAMILY MEDICINE CLINIC | Age: 87
End: 2022-02-15
Payer: MEDICARE

## 2022-02-15 DIAGNOSIS — R33.9 URINARY RETENTION: Primary | ICD-10-CM

## 2022-02-15 PROCEDURE — G0179 MD RECERTIFICATION HHA PT: HCPCS | Performed by: FAMILY MEDICINE

## 2022-03-08 ENCOUNTER — OFFICE VISIT (OUTPATIENT)
Dept: FAMILY MEDICINE CLINIC | Age: 87
End: 2022-03-08
Payer: MEDICARE

## 2022-03-08 VITALS
WEIGHT: 228 LBS | DIASTOLIC BLOOD PRESSURE: 78 MMHG | SYSTOLIC BLOOD PRESSURE: 130 MMHG | HEART RATE: 80 BPM | TEMPERATURE: 97 F | HEIGHT: 70 IN | BODY MASS INDEX: 32.64 KG/M2

## 2022-03-08 DIAGNOSIS — I50.32 CHRONIC DIASTOLIC CONGESTIVE HEART FAILURE (HCC): ICD-10-CM

## 2022-03-08 DIAGNOSIS — D64.9 ANEMIA, UNSPECIFIED TYPE: Primary | ICD-10-CM

## 2022-03-08 DIAGNOSIS — G40.909 SEIZURE DISORDER (HCC): ICD-10-CM

## 2022-03-08 DIAGNOSIS — I26.99 BILATERAL PULMONARY EMBOLISM (HCC): ICD-10-CM

## 2022-03-08 PROBLEM — I50.31 ACUTE DIASTOLIC (CONGESTIVE) HEART FAILURE (HCC): Status: ACTIVE | Noted: 2022-03-08

## 2022-03-08 PROCEDURE — G8484 FLU IMMUNIZE NO ADMIN: HCPCS | Performed by: FAMILY MEDICINE

## 2022-03-08 PROCEDURE — 1123F ACP DISCUSS/DSCN MKR DOCD: CPT | Performed by: FAMILY MEDICINE

## 2022-03-08 PROCEDURE — 99214 OFFICE O/P EST MOD 30 MIN: CPT | Performed by: FAMILY MEDICINE

## 2022-03-08 PROCEDURE — 1036F TOBACCO NON-USER: CPT | Performed by: FAMILY MEDICINE

## 2022-03-08 PROCEDURE — 4040F PNEUMOC VAC/ADMIN/RCVD: CPT | Performed by: FAMILY MEDICINE

## 2022-03-08 PROCEDURE — G8427 DOCREV CUR MEDS BY ELIG CLIN: HCPCS | Performed by: FAMILY MEDICINE

## 2022-03-08 PROCEDURE — G8417 CALC BMI ABV UP PARAM F/U: HCPCS | Performed by: FAMILY MEDICINE

## 2022-03-08 RX ORDER — FUROSEMIDE 20 MG/1
20 TABLET ORAL 2 TIMES DAILY
Qty: 60 TABLET | Refills: 5 | Status: SHIPPED | OUTPATIENT
Start: 2022-03-08 | End: 2022-07-17

## 2022-03-08 RX ORDER — LEVETIRACETAM 500 MG/1
500 TABLET ORAL 2 TIMES DAILY
Qty: 60 TABLET | Refills: 5 | Status: SHIPPED | OUTPATIENT
Start: 2022-03-08 | End: 2022-07-14

## 2022-03-08 NOTE — PROGRESS NOTES
Subjective:      Patient ID: Jake Snider is a 80 y.o. male.     Chief Complaint   Patient presents with    3 Month Follow-Up    Skin Problem     spot on inside of right leg        Patient presents with:  3 Month Follow-Up  Skin Problem: spot on inside of right leg    He is here for a check  Doing well   He denies c/o at all and feels well  Skin area on back of the right thigh  For few months not open  He will possibly seeing skin dr next week per relative     He has aid come in and will bathe him twice a week  Nurse comes in for a check weekly  Getting spann change monthly via nurse as well     He is here with ed jordan son whom helps with the hx   He notes no concerns     I reviewed meds with son and it would seem still taking oral iron daily     YOB: 1935    Date of Visit:  3/8/2022    No Known Allergies    Current Outpatient Medications:  furosemide (LASIX) 20 MG tablet, TAKE ONE TABLET BY MOUTH EVERY MORNING, Disp: 30 tablet, Rfl: 3  pantoprazole (PROTONIX) 40 MG tablet, TAKE ONE TABLET BY MOUTH EVERY EVENING, Disp: 30 tablet, Rfl: 3  tamsulosin (FLOMAX) 0.4 MG capsule, TAKE ONE CAPSULE BY MOUTH EVERY MORNING, Disp: 30 capsule, Rfl: 3  levETIRAcetam (KEPPRA) 500 MG tablet, Take 1 tablet by mouth 2 times daily, Disp: 60 tablet, Rfl: 0  acetaminophen (TYLENOL) 325 MG tablet, Take 650 mg by mouth every 4 hours as needed for Fever, Disp: , Rfl:   lactobacillus (CULTURELLE) capsule, Take 1 capsule by mouth daily (with breakfast), Disp: 30 capsule, Rfl: 0  Multiple Vitamin (MULTIVITAMIN) TABS tablet, Take 1 tablet by mouth daily, Disp: 30 tablet, Rfl: 0    No current facility-administered medications for this visit.      ---------------------------               03/08/22                      1334         ---------------------------   BP:          130/78         Site:    Left Upper Arm     Position:     Sitting        Cuff Size:  Medium Adult      Pulse:         80 Temp:    97 °F (36.1 °C)    TempSrc:    Temporal        Weight: 228 lb (103.4 kg)   Height: 5' 10\" (1.778 m)   ---------------------------  Body mass index is 32.71 kg/m². Wt Readings from Last 3 Encounters:  03/08/22 : 228 lb (103.4 kg)  12/07/21 : 204 lb (92.5 kg)  11/24/21 : 205 lb (93 kg)    BP Readings from Last 3 Encounters:  03/08/22 : 130/78  12/07/21 : 122/64  11/24/21 : 123/70                Review of Systems    Objective:   Physical Exam  Constitutional:       General: He is not in acute distress. Appearance: He is well-developed. He is not ill-appearing or diaphoretic. Cardiovascular:      Rate and Rhythm: Normal rate and regular rhythm. Heart sounds: Normal heart sounds. No murmur heard. No friction rub. No gallop. Comments: He does have 2 plus edema lower legs  He does have support stocking on   Pulmonary:      Effort: Pulmonary effort is normal. No tachypnea, accessory muscle usage or respiratory distress. Breath sounds: Normal breath sounds. No decreased breath sounds, wheezing, rhonchi or rales. Chest:   Breasts:      Right: No supraclavicular adenopathy. Left: No supraclavicular adenopathy. Lymphadenopathy:      Cervical: No cervical adenopathy. Upper Body:      Right upper body: No supraclavicular adenopathy. Left upper body: No supraclavicular adenopathy. Skin:     General: Skin is warm and dry. Coloration: Skin is not pale. Neurological:      Mental Status: He is alert. Assessment:        Diagnosis Orders   1. Anemia, unspecified type  CBC with Auto Differential   2. Bilateral pulmonary embolism (HCC)     3. Seizure disorder ([address]     4.  Chronic diastolic congestive heart failure (HCC)  Basic Metabolic Panel     Dependent edema noted and increase in weight  Will increase the lasix     Orders Placed This Encounter   Medications    furosemide (LASIX) 20 MG tablet     Sig: Take 1 tablet by mouth 2 times daily Dispense:  60 tablet     Refill:  5    levETIRAcetam (KEPPRA) 500 MG tablet     Sig: Take 1 tablet by mouth 2 times daily     Dispense:  60 tablet     Refill:  5     Reviewed the meds and updated  The hx is reviewed with son whom assists.  Patient not fully reliable     Hx of chf hx of pe  He had lost blood last year and had been on iron  He does have increase in the dependent edema  Stable       Plan:      Increase the furosemide to one twice daily  As soon as he wakes and then again at 3 pm   Do check the blood  Do blood in about 4 week   Stop the oral iron tablet   See in 4 months        Ron Johnson MD

## 2022-03-08 NOTE — PATIENT INSTRUCTIONS
Increase the furosemide to one twice daily  As soon as he wakes and then again at 3 pm   Do check the blood  Do blood in about 4 week   Stop the oral iron tablet   See in 4 months

## 2022-03-24 NOTE — CARE COORDINATION
[id number] [facility] Wound Ostomy Continence Nurse  Consult Note       NAME:  Alexis Mejia  MEDICAL RECORD NUMBER:  [id number]  AGE: 80 y.o. GENDER: male  : 1935  TODAY'S DATE:  2021    Subjective   Reason for WOCN Evaluation and Assessment: Unstageable wounds; previously documented as DTI's. Difficult to determine pt previous downtime and if wounds are in fact from pressure or trauma from the fall. Alexis Mejia is a 80 y.o. male referred by:   [] Physician  [x] Nursing  [] Other:     Wound Identification:  Wound Type: traumatic Vs. Pressure  Contributing Factors: decreased mobility and Previous fall    Wound History: Wound care previously consulted last admit on  when pt fell at [facility] [place]. Had wounds to his bilateral knees, left wrist and face. Appeared to be possible DTI's vs trauma from fall. Was unable to do full assessment due to pt being in COVID isolation. Difficult to determine pt down time. Venelex was ordered on previous admission. Current Wound Care Treatment:  santyl    Patient Goal of Care:  [x] Wound Healing  [] Odor Control  [] Palliative Care  [] Pain Control   [] Other:         PAST MEDICAL HISTORY        Diagnosis Date    GE[facility] (acute [facility] injury) ([address] 2021    Alcohol abuse 2020    Atrial fibrillation (HCC)     BPH (benign prostatic hyperplasia)     Dehydration     [facility]     Leg weakness, bilateral ,     legs give out and pt collaspes    Seizure ([address] 2017    UTI (urinary tract infection) 8-    admitted with high fever and fainted. PAST SURGICAL HISTORY    Past Surgical History:   Procedure Laterality Date    CATARACT REMOVAL Bilateral     SPLENECTOMY, TOTAL      at age 15 y old.        FAMILY HISTORY    Family History   Problem Relation Age of Onset    Cancer Mother         pancreatic    Diabetes Father     Cancer Brother        SOCIAL HISTORY    Social History     Tobacco Use BP (!) 95/49   Pulse 91   Temp 97.9 °F (36.6 °C) (Oral)   Resp 15   Ht 5' 10\" (1.778 m)   Wt 219 lb 12.8 oz (99.7 kg)   SpO2 93%   BMI 31.54 kg/m²     LABS:  WBC:    Lab Results   Component Value Date    WBC 7.5 02/12/2021     H/H:    Lab Results   Component Value Date    HGB 7.2 02/12/2021    HCT 22.5 02/12/2021     PTT:    Lab Results   Component Value Date    APTT 68.9 12/24/2020   [APTT}  PT/INR:    Lab Results   Component Value Date    PROTIME 12.0 07/20/2017    INR 1.06 07/20/2017     HgBA1c:  No results found for: LABA1C    Assessment   Leo Risk Score: Leo Scale Score: 13    Patient Active Problem List   Diagnosis Code    Se[facility]ure ([address] R56.9    Lactic acidosis E87.2    Sepsis ([address] A41.9    Pneumonia due to COVID-19 virus U07.1, J12.82    Bilateral pulmonary embolism (HCC) I26.99    Elevated LFTs R79.89    Rhabdomyolysis M62.82    Facial trauma S09. 93XA    Bacterial pneumonia J15.9    Alcohol abuse F10.10    GEOVANI (acute kidney injury) ([address] N17.9       Measurements:  Wound 12/22/20 Knee Anterior;Left;Mid;Outer abrasion (Active)   Number of days: 51       Wound 12/22/20 Face Left;Upper (Active)   Number of days: 51       Wound 12/22/20 Knee Right; Inner (Active)   Wound Image   02/12/21 1027   Wound Etiology Traumatic 02/12/21 1027   Dressing Status New dressing applied 02/11/21 2234   Dressing/Treatment Silicone border 63/54/80 1027   Wound Length (cm) 1.5 cm 02/12/21 1027   Wound Width (cm) 1 cm 02/12/21 1027   Wound Surface Area (cm^2) 1.5 cm^2 02/12/21 1027   Change in Wound Size % (l*w) -167.86 02/12/21 1027   Wound Assessment Eschar dry 02/12/21 1027   Drainage Amount None 02/12/21 1027   Odor None 02/11/21 2234   Araseli-wound Assessment Intact 02/12/21 1027   Number of days: 51       Wound 12/22/20 Knee Left (Active)   Wound Image   02/12/21 1027   Wound Etiology Traumatic 02/12/21 1027   Dressing Status New dressing applied 02/11/21 2236 Wound Cleansed Not Cleansed 02/11/21 2233   Dressing/Treatment Silicone border 61/78/81 2233   Wound Length (cm) 5 cm 02/12/21 1027   Wound Width (cm) 3 cm 02/12/21 1027   Wound Surface Area (cm^2) 15 cm^2 02/12/21 1027   Change in Wound Size % (l*w) -284.62 02/12/21 1027   Wound Assessment Eschar moist;Slough 02/12/21 1027   Drainage Amount Scant 02/11/21 2233   Drainage Description Yellow 02/11/21 2233   Odor None 02/11/21 2233   Araseli-wound Assessment Intact; Warm 02/12/21 1027   Number of days: 51         Pt alert sitting up in bed. Bedside RN in room at time of assessment. Pt has blanchable redness to his coccyx. MASD. Zinc barrier applied. Dry eschar to his R knee. L knee wound has evolved to an unstageable wound with slough and moist eschar. Would recommend medihoney daily. Pt L wrist wound has dry eschar with loose scabbed area. Mepilex borders intact. Pt facial wounds healed. Per bedside RN, pt is bed bound at nursing home. Specialty bed ordered. 12/22/2020 Bilateral knees    L knee 2/12/2021  R knee 2/12/21    Plan   Plan of Care:   Medihoney to L knee wound daily under border. Change foam border q3 days.    Foam border to L wrist healed area and R knee healed area  -moisture barrier to buttocks  -turn and reposition every 2 hours  -chair cushion, encourage to reposition self frequently while in chair  -elevate heels, apply liquid barrier film twice daily    Specialty Bed Required : Yes   [x] Low Air Loss   [] Pressure Redistribution  [] Fluid Immersion  [] Bariatric  [] Total Pressure Relief  [] Other:     Current Diet: Diet NPO Effective Now  Dietician consult:  N/A    Discharge Plan:  Placement for patient upon discharge: skilled nursing    Patient appropriate for Outpatient [address]: No    Referrals:  []   [] [address] [facility] [facility]  [] Supplies  [] Other    Patient/Caregiver Teaching:  Level of patient/caregiver understanding able to: [] Indicates understanding       [] Needs reinforcement  [] Unsuccessful      [] Verbal Understanding  [] Demonstrated understanding       [x] No evidence of learning  [] Refused teaching         [] N/A       Electronically signed by Alexandro Baldwin RN, on 2/12/2021 at 10:32 AM shortness of breath

## 2022-04-07 ENCOUNTER — TELEPHONE (OUTPATIENT)
Dept: FAMILY MEDICINE CLINIC | Age: 87
End: 2022-04-07

## 2022-04-12 DIAGNOSIS — D64.9 ANEMIA, UNSPECIFIED TYPE: ICD-10-CM

## 2022-04-12 DIAGNOSIS — I50.32 CHRONIC DIASTOLIC CONGESTIVE HEART FAILURE (HCC): ICD-10-CM

## 2022-04-12 LAB
ANION GAP SERPL CALCULATED.3IONS-SCNC: 12 MMOL/L (ref 3–16)
BASOPHILS ABSOLUTE: 0 K/UL (ref 0–0.2)
BASOPHILS RELATIVE PERCENT: 0.6 %
BUN BLDV-MCNC: 13 MG/DL (ref 7–20)
CALCIUM SERPL-MCNC: 9.2 MG/DL (ref 8.3–10.6)
CHLORIDE BLD-SCNC: 101 MMOL/L (ref 99–110)
CO2: 27 MMOL/L (ref 21–32)
CREAT SERPL-MCNC: 0.9 MG/DL (ref 0.8–1.3)
EOSINOPHILS ABSOLUTE: 0.1 K/UL (ref 0–0.6)
EOSINOPHILS RELATIVE PERCENT: 2 %
GFR AFRICAN AMERICAN: >60
GFR NON-AFRICAN AMERICAN: >60
GLUCOSE BLD-MCNC: 107 MG/DL (ref 70–99)
HCT VFR BLD CALC: 37.1 % (ref 40.5–52.5)
HEMOGLOBIN: 12.7 G/DL (ref 13.5–17.5)
LYMPHOCYTES ABSOLUTE: 2.3 K/UL (ref 1–5.1)
LYMPHOCYTES RELATIVE PERCENT: 30.3 %
MCH RBC QN AUTO: 33.1 PG (ref 26–34)
MCHC RBC AUTO-ENTMCNC: 34.3 G/DL (ref 31–36)
MCV RBC AUTO: 96.5 FL (ref 80–100)
MONOCYTES ABSOLUTE: 1.1 K/UL (ref 0–1.3)
MONOCYTES RELATIVE PERCENT: 15 %
NEUTROPHILS ABSOLUTE: 3.9 K/UL (ref 1.7–7.7)
NEUTROPHILS RELATIVE PERCENT: 52.1 %
PDW BLD-RTO: 15.5 % (ref 12.4–15.4)
PLATELET # BLD: 338 K/UL (ref 135–450)
PMV BLD AUTO: 8.9 FL (ref 5–10.5)
POTASSIUM SERPL-SCNC: 4.1 MMOL/L (ref 3.5–5.1)
RBC # BLD: 3.84 M/UL (ref 4.2–5.9)
SODIUM BLD-SCNC: 140 MMOL/L (ref 136–145)
WBC # BLD: 7.4 K/UL (ref 4–11)

## 2022-04-15 ENCOUNTER — TELEPHONE (OUTPATIENT)
Dept: FAMILY MEDICINE CLINIC | Age: 87
End: 2022-04-15
Payer: MEDICARE

## 2022-04-15 DIAGNOSIS — I50.32 CHRONIC DIASTOLIC CONGESTIVE HEART FAILURE (HCC): Primary | ICD-10-CM

## 2022-04-15 PROCEDURE — G0179 MD RECERTIFICATION HHA PT: HCPCS | Performed by: FAMILY MEDICINE

## 2022-04-29 RX ORDER — PANTOPRAZOLE SODIUM 40 MG/1
TABLET, DELAYED RELEASE ORAL
Qty: 30 TABLET | Refills: 3 | Status: SHIPPED | OUTPATIENT
Start: 2022-04-29 | End: 2022-08-26

## 2022-04-29 RX ORDER — TAMSULOSIN HYDROCHLORIDE 0.4 MG/1
CAPSULE ORAL
Qty: 30 CAPSULE | Refills: 3 | Status: SHIPPED | OUTPATIENT
Start: 2022-04-29 | End: 2022-08-26

## 2022-05-23 ENCOUNTER — OFFICE VISIT (OUTPATIENT)
Dept: NEUROLOGY | Age: 87
End: 2022-05-23
Payer: MEDICARE

## 2022-05-23 VITALS
DIASTOLIC BLOOD PRESSURE: 70 MMHG | SYSTOLIC BLOOD PRESSURE: 127 MMHG | HEART RATE: 87 BPM | WEIGHT: 206 LBS | BODY MASS INDEX: 29.56 KG/M2

## 2022-05-23 DIAGNOSIS — G60.9 IDIOPATHIC PERIPHERAL NEUROPATHY: ICD-10-CM

## 2022-05-23 DIAGNOSIS — G40.909 SEIZURE DISORDER (HCC): ICD-10-CM

## 2022-05-23 DIAGNOSIS — G20 PARKINSON'S DISEASE (HCC): Primary | ICD-10-CM

## 2022-05-23 PROCEDURE — G8417 CALC BMI ABV UP PARAM F/U: HCPCS | Performed by: PSYCHIATRY & NEUROLOGY

## 2022-05-23 PROCEDURE — G8427 DOCREV CUR MEDS BY ELIG CLIN: HCPCS | Performed by: PSYCHIATRY & NEUROLOGY

## 2022-05-23 PROCEDURE — 99214 OFFICE O/P EST MOD 30 MIN: CPT | Performed by: PSYCHIATRY & NEUROLOGY

## 2022-05-23 PROCEDURE — 1036F TOBACCO NON-USER: CPT | Performed by: PSYCHIATRY & NEUROLOGY

## 2022-05-23 PROCEDURE — 1123F ACP DISCUSS/DSCN MKR DOCD: CPT | Performed by: PSYCHIATRY & NEUROLOGY

## 2022-05-23 NOTE — PROGRESS NOTES
Rosa Paulson   Neurology followup    Subjective:   CC/HP  History was obtained from the patient. Additional history was obtained from his family. Patient has now developed some tremors in his right arm. She also has increased stiffness. Patient strength in the right arm is now pretty much back to normal.  Patient has had previous COVID-19 pneumonia back in December 2020. He had pulmonary embolism and then a GI bleed. He was initially on anticoagulation but it was stopped later. Patient has had seizures several years ago. Patient was seen by neurology several years ago and had been on Keppra. REVIEW OF SYSTEMS    Constitutional:  []   Chills   []  Fatigue   []  Fevers   []  Malaise   []  Weight loss     [] Denies all of the above    Respiratory:   []  Cough    []  Shortness of breath         [] Denies all of the above     Cardiovascular:   []  Chest pain    []  Exertional chest pressure/discomfort           [] Palpitations    []  Syncope     [] Denies all of the above        Past Medical History:   Diagnosis Date    Acute diastolic (congestive) heart failure ([facility] [address] 3/8/2022    GEOVANI (acute kidney injury) ([facility] [address] 02/11/2021    Alcohol abuse 12/22/2020    Atrial fibrillation (HCC)     BPH (benign prostatic hyperplasia)     Dehydration     [facility] (HCC)     ESBL (extended spectrum beta-lactamase) producing bacteria infection 08/28/2021    Proteus mirabilis ESBL. Urine    Leg weakness, bilateral 2014, 2015    legs give out and pt collaspes    Seizure ([facility] [address] 07/20/2017    UTI (urinary tract infection) 08/10/2011    admitted with high fever and fainted.      Family History   Problem Relation Age of Onset    Cancer Mother         pancreatic    Diabetes Father     Cancer Brother      Social History     Socioeconomic History    Marital status:      Spouse name: None    Number of children: None    Years of education: None    Highest education level: None   Occupational History    None   Tobacco Use    Smoking status: Never Smoker    Smokeless tobacco: Never Used   Substance and Sexual Activity    Alcohol use: Yes     Alcohol/week: 0.0 standard drinks     Comment: occasionally     Drug use: No    Sexual activity: Not Currently   Other Topics Concern    None   Social History Narrative    None     Social Determinants of Health     Financial Resource Strain: Low Risk     Difficulty of Paying Living Expenses: Not hard at all   Food Insecurity: No Food Insecurity    Worried About Running Out of Food in the Last Year: Never true    Rc of Food in the Last Year: Never true   Transportation Needs:     Lack of Transportation (Medical): Not on file    Lack of Transportation (Non-Medical): Not on file   Physical Activity:     Days of Exercise per Week: Not on file    Minutes of Exercise per Session: Not on file   Stress:     Feeling of Stress : Not on file   Social Connections:     Frequency of Communication with Friends and Family: Not on file    Frequency of Social Gatherings with Friends and Family: Not on file    Attends Jain Services: Not on file    Active Member of [address] [place] [address] or Organizations: Not on file    Attends Club or Organization Meetings: Not on file    Marital Status: Not on file   Intimate Partner Violence:     Fear of Current or Ex-Partner: Not on file    Emotionally Abused: Not on file    Physically Abused: Not on file    Sexually Abused: Not on file   Housing Stability:     Unable to Pay for Housing in the Last Year: Not on file    Number of [place] in the Last Year: Not on file    Unstable Housing in the Last Year: Not on file        Objective:  Exam:  /70   Pulse 87   Wt 206 lb (93.4 kg)   BMI 29.56 kg/m²   This is a well-nourished patient in no acute distress  Patient is awake, alert and oriented x3.  Speech is normal.  Pupils are equal round reacting to light. Extraocular movements intact. Face symmetrical. Tongue midline. Motor exam shows normal symmetrical strength. Increased tone with cogwheel rigidity especially on the right side and pill-rolling tremors present. Deep tendon reflexes are decreased in the legs. Plantar reflexes or withdrawal  Sensory exam normal. Coordination normal. Gait impaired and patient uses a walker to ambulate. No carotid bruit. No neck stiffness. Data :  LABS:  General Labs:    CBC:   Lab Results   Component Value Date    WBC 7.4 04/12/2022    RBC 3.84 04/12/2022    HGB 12.7 04/12/2022    HCT 37.1 04/12/2022    MCV 96.5 04/12/2022    MCH 33.1 04/12/2022    MCHC 34.3 04/12/2022    RDW 15.5 04/12/2022     04/12/2022    MPV 8.9 04/12/2022     BMP:    Lab Results   Component Value Date     04/12/2022    K 4.1 04/12/2022    K 3.8 08/28/2021     04/12/2022    CO2 27 04/12/2022    BUN 13 04/12/2022    LABALBU 3.4 10/27/2021    CREATININE 0.9 04/12/2022    CALCIUM 9.2 04/12/2022    GFRAA >60 04/12/2022    GFRAA >60 01/06/2013    LABGLOM >60 04/12/2022    GLUCOSE 107 04/12/2022     RADIOLOGY REVIEW:  I have reviewed radiology report(s) of: CT scan of the head    Impression :  Parkinson's disease with pill-rolling tremors and cogwheeling fatigue, predominantly involving the right side  Ataxia  Right wrist drop now improved  Seizure disorder  Idiopathic peripheral neuropathy    Plan :  Discussed with patient and his family  Reviewed CT head  I will start him on low-dose Sinemet  Side effects were discussed. I will see him back in 3 months for follow-up        Please note a portion of  this chart was generated using dragon dictation software. Although every effort was made to ensure the accuracy of this automated transcription, some errors in transcription may have occurred.

## 2022-06-07 ENCOUNTER — TELEPHONE (OUTPATIENT)
Dept: FAMILY MEDICINE CLINIC | Age: 87
End: 2022-06-07
Payer: MEDICARE

## 2022-06-07 DIAGNOSIS — I50.32 CHRONIC DIASTOLIC CONGESTIVE HEART FAILURE (HCC): Primary | ICD-10-CM

## 2022-06-07 PROCEDURE — G0179 MD RECERTIFICATION HHA PT: HCPCS | Performed by: FAMILY MEDICINE

## 2022-07-12 ENCOUNTER — OFFICE VISIT (OUTPATIENT)
Dept: FAMILY MEDICINE CLINIC | Age: 87
End: 2022-07-12
Payer: MEDICARE

## 2022-07-12 VITALS
SYSTOLIC BLOOD PRESSURE: 124 MMHG | BODY MASS INDEX: 31.5 KG/M2 | TEMPERATURE: 97.9 F | DIASTOLIC BLOOD PRESSURE: 70 MMHG | HEART RATE: 80 BPM | HEIGHT: 70 IN | WEIGHT: 220 LBS

## 2022-07-12 DIAGNOSIS — I50.32 CHRONIC DIASTOLIC CONGESTIVE HEART FAILURE (HCC): ICD-10-CM

## 2022-07-12 DIAGNOSIS — D64.9 ANEMIA, UNSPECIFIED TYPE: Primary | ICD-10-CM

## 2022-07-12 DIAGNOSIS — G40.909 SEIZURE DISORDER (HCC): ICD-10-CM

## 2022-07-12 DIAGNOSIS — R73.09 ELEVATED GLUCOSE: ICD-10-CM

## 2022-07-12 DIAGNOSIS — D64.9 ANEMIA, UNSPECIFIED TYPE: ICD-10-CM

## 2022-07-12 LAB
ANION GAP SERPL CALCULATED.3IONS-SCNC: 9 MMOL/L (ref 3–16)
BASOPHILS ABSOLUTE: 0 K/UL (ref 0–0.2)
BASOPHILS RELATIVE PERCENT: 0.3 %
BUN BLDV-MCNC: 14 MG/DL (ref 7–20)
CALCIUM SERPL-MCNC: 9.2 MG/DL (ref 8.3–10.6)
CHLORIDE BLD-SCNC: 98 MMOL/L (ref 99–110)
CO2: 30 MMOL/L (ref 21–32)
CREAT SERPL-MCNC: 0.9 MG/DL (ref 0.8–1.3)
EOSINOPHILS ABSOLUTE: 0.3 K/UL (ref 0–0.6)
EOSINOPHILS RELATIVE PERCENT: 2.4 %
GFR AFRICAN AMERICAN: >60
GFR NON-AFRICAN AMERICAN: >60
GLUCOSE BLD-MCNC: 107 MG/DL (ref 70–99)
HCT VFR BLD CALC: 35.7 % (ref 40.5–52.5)
HEMOGLOBIN: 12.6 G/DL (ref 13.5–17.5)
KEPPRA DOSE AMT: ABNORMAL
KEPPRA: <2 UG/ML (ref 6–46)
LYMPHOCYTES ABSOLUTE: 2.4 K/UL (ref 1–5.1)
LYMPHOCYTES RELATIVE PERCENT: 23.1 %
MCH RBC QN AUTO: 34.4 PG (ref 26–34)
MCHC RBC AUTO-ENTMCNC: 35.3 G/DL (ref 31–36)
MCV RBC AUTO: 97.4 FL (ref 80–100)
MONOCYTES ABSOLUTE: 1.2 K/UL (ref 0–1.3)
MONOCYTES RELATIVE PERCENT: 11 %
NEUTROPHILS ABSOLUTE: 6.7 K/UL (ref 1.7–7.7)
NEUTROPHILS RELATIVE PERCENT: 63.2 %
PDW BLD-RTO: 13.6 % (ref 12.4–15.4)
PLATELET # BLD: 384 K/UL (ref 135–450)
PMV BLD AUTO: 8.7 FL (ref 5–10.5)
POTASSIUM SERPL-SCNC: 4.2 MMOL/L (ref 3.5–5.1)
RBC # BLD: 3.66 M/UL (ref 4.2–5.9)
SODIUM BLD-SCNC: 137 MMOL/L (ref 136–145)
WBC # BLD: 10.5 K/UL (ref 4–11)

## 2022-07-12 PROCEDURE — G8427 DOCREV CUR MEDS BY ELIG CLIN: HCPCS | Performed by: FAMILY MEDICINE

## 2022-07-12 PROCEDURE — G8417 CALC BMI ABV UP PARAM F/U: HCPCS | Performed by: FAMILY MEDICINE

## 2022-07-12 PROCEDURE — 1036F TOBACCO NON-USER: CPT | Performed by: FAMILY MEDICINE

## 2022-07-12 PROCEDURE — 1123F ACP DISCUSS/DSCN MKR DOCD: CPT | Performed by: FAMILY MEDICINE

## 2022-07-12 PROCEDURE — 99214 OFFICE O/P EST MOD 30 MIN: CPT | Performed by: FAMILY MEDICINE

## 2022-07-12 ASSESSMENT — ENCOUNTER SYMPTOMS
VOMITING: 0
CONSTIPATION: 0
DIARRHEA: 0
NAUSEA: 0
BLOOD IN STOOL: 0
ABDOMINAL DISTENTION: 0
ABDOMINAL PAIN: 0
SHORTNESS OF BREATH: 0
CHEST TIGHTNESS: 0

## 2022-07-12 ASSESSMENT — PATIENT HEALTH QUESTIONNAIRE - PHQ9
SUM OF ALL RESPONSES TO PHQ9 QUESTIONS 1 & 2: 0
2. FEELING DOWN, DEPRESSED OR HOPELESS: 0
1. LITTLE INTEREST OR PLEASURE IN DOING THINGS: 0
SUM OF ALL RESPONSES TO PHQ QUESTIONS 1-9: 0

## 2022-07-12 NOTE — PROGRESS NOTES
10\" (1.778 m)   ---------------------------  Body mass index is 31.57 kg/m². Wt Readings from Last 3 Encounters:  07/12/22 : 220 lb (99.8 kg)  05/23/22 : 206 lb (93.4 kg)  03/08/22 : 228 lb (103.4 kg)    BP Readings from Last 3 Encounters:  07/12/22 : 124/70  05/23/22 : 127/70  03/08/22 : 130/78              Review of Systems   Constitutional: Negative for appetite change, chills, fever and unexpected weight change. Respiratory: Negative for chest tightness and shortness of breath. Cardiovascular: Negative for chest pain, palpitations and leg swelling. Gastrointestinal: Negative for abdominal distention, abdominal pain, blood in stool, constipation, diarrhea, nausea and vomiting. Genitourinary: Negative for dysuria and hematuria. Neurological: Negative for dizziness, seizures and headaches. Objective:   Physical Exam  Constitutional:       General: He is not in acute distress. Appearance: Normal appearance. He is well-developed. He is not ill-appearing or diaphoretic. Comments: Alert pleasant answers appropriately  With walker and indwelling spann   Cardiovascular:      Rate and Rhythm: Normal rate and regular rhythm. Heart sounds: Normal heart sounds. No murmur heard. No friction rub. No gallop. Pulmonary:      Effort: Pulmonary effort is normal. No tachypnea, accessory muscle usage or respiratory distress. Breath sounds: Normal breath sounds. No decreased breath sounds, wheezing, rhonchi or rales. Chest:   Breasts:      Right: No supraclavicular adenopathy. Left: No supraclavicular adenopathy. Lymphadenopathy:      Cervical: No cervical adenopathy. Upper Body:      Right upper body: No supraclavicular adenopathy. Left upper body: No supraclavicular adenopathy. Skin:     General: Skin is warm and dry. Coloration: Skin is not pale. Neurological:      Mental Status: He is alert. Assessment:        Diagnosis Orders   1.  Anemia, unspecified type  CBC with Auto Differential   2. Seizure disorder (HCC)  Levetiracetam Level   3. Chronic diastolic congestive heart failure (HCC)  Basic Metabolic Panel   4.  Elevated glucose  Basic Metabolic Panel    Hemoglobin A1C       Unknown event and unwitenessed   They will discuss with the neurologist r/o recurring seizure   I will order Elaina Ignacio level   Ed tells me he is seeing the derm in the next 3 months for a routine follow up on the skin ca  discussed care and management with son ed,  whom is very helpful  Seen neurology for parkinson  on 5/23/22 reviewed      Plan:      Do discuss the recent event with the neurologist  Let me know of any other concerns  See in about 5 months         Vito Patel MD

## 2022-07-12 NOTE — PATIENT INSTRUCTIONS
Do discuss the recent event with the neurologist  Let me know of any other concerns  See in about 5 months

## 2022-07-13 LAB
ESTIMATED AVERAGE GLUCOSE: 102.5 MG/DL
HBA1C MFR BLD: 5.2 %

## 2022-07-14 DIAGNOSIS — G40.909 SEIZURE DISORDER (HCC): Primary | ICD-10-CM

## 2022-07-14 RX ORDER — LEVETIRACETAM 500 MG/1
750 TABLET ORAL 2 TIMES DAILY
Qty: 90 TABLET | Refills: 3 | Status: SHIPPED | OUTPATIENT
Start: 2022-07-14

## 2022-07-17 RX ORDER — FUROSEMIDE 20 MG/1
TABLET ORAL
Qty: 180 TABLET | Refills: 1 | Status: SHIPPED | OUTPATIENT
Start: 2022-07-17

## 2022-08-09 DIAGNOSIS — G40.909 SEIZURE DISORDER (HCC): ICD-10-CM

## 2022-08-09 LAB
KEPPRA DOSE AMT: NORMAL
KEPPRA: 23.9 UG/ML (ref 6–46)

## 2022-08-11 ENCOUNTER — TELEPHONE (OUTPATIENT)
Dept: FAMILY MEDICINE CLINIC | Age: 87
End: 2022-08-11
Payer: MEDICARE

## 2022-08-11 DIAGNOSIS — I50.32 CHRONIC DIASTOLIC CONGESTIVE HEART FAILURE (HCC): Primary | ICD-10-CM

## 2022-08-11 PROCEDURE — G0179 MD RECERTIFICATION HHA PT: HCPCS | Performed by: FAMILY MEDICINE

## 2022-08-19 NOTE — TELEPHONE ENCOUNTER
30 day supply until visit     Medication:   Requested Prescriptions     Pending Prescriptions Disp Refills    carbidopa-levodopa (SINEMET)  MG per tablet [Pharmacy Med Name: CARBIDOPA-LEVODOPA  TAB] 90 tablet 2     Sig: TAKE ONE TABLET BY MOUTH THREE TIMES A DAY        Last Filled:      Patient Phone Number: [phone] (home)     Last appt: 5/23/2022   Next appt: 8/29/2022    Last OARRS: No flowsheet data found.

## 2022-08-26 RX ORDER — TAMSULOSIN HYDROCHLORIDE 0.4 MG/1
CAPSULE ORAL
Qty: 90 CAPSULE | Refills: 1 | Status: SHIPPED | OUTPATIENT
Start: 2022-08-26

## 2022-08-26 RX ORDER — PANTOPRAZOLE SODIUM 40 MG/1
TABLET, DELAYED RELEASE ORAL
Qty: 90 TABLET | Refills: 1 | Status: SHIPPED | OUTPATIENT
Start: 2022-08-26

## 2022-08-29 ENCOUNTER — OFFICE VISIT (OUTPATIENT)
Dept: NEUROLOGY | Age: 87
End: 2022-08-29
Payer: MEDICARE

## 2022-08-29 VITALS
HEIGHT: 70 IN | SYSTOLIC BLOOD PRESSURE: 139 MMHG | HEART RATE: 94 BPM | DIASTOLIC BLOOD PRESSURE: 85 MMHG | WEIGHT: 220 LBS | BODY MASS INDEX: 31.5 KG/M2

## 2022-08-29 DIAGNOSIS — R27.0 ATAXIA: ICD-10-CM

## 2022-08-29 DIAGNOSIS — G60.9 IDIOPATHIC PERIPHERAL NEUROPATHY: ICD-10-CM

## 2022-08-29 DIAGNOSIS — G40.909 SEIZURE DISORDER (HCC): ICD-10-CM

## 2022-08-29 DIAGNOSIS — G20 PARKINSON'S DISEASE (HCC): Primary | ICD-10-CM

## 2022-08-29 PROCEDURE — G8427 DOCREV CUR MEDS BY ELIG CLIN: HCPCS | Performed by: PSYCHIATRY & NEUROLOGY

## 2022-08-29 PROCEDURE — 1036F TOBACCO NON-USER: CPT | Performed by: PSYCHIATRY & NEUROLOGY

## 2022-08-29 PROCEDURE — 1123F ACP DISCUSS/DSCN MKR DOCD: CPT | Performed by: PSYCHIATRY & NEUROLOGY

## 2022-08-29 PROCEDURE — 99214 OFFICE O/P EST MOD 30 MIN: CPT | Performed by: PSYCHIATRY & NEUROLOGY

## 2022-08-29 PROCEDURE — G8417 CALC BMI ABV UP PARAM F/U: HCPCS | Performed by: PSYCHIATRY & NEUROLOGY

## 2022-08-29 NOTE — PROGRESS NOTES
[facility]   Neurology followup    Subjective:   CC/HP  History was obtained from the patient. Additional history was obtained from his family. Patient has known Parkinson's disease. Patient's tremors seem to have improved on the Sinemet but he still has it to some extent. Patient developed radial nerve palsy but. The right arm strength iis now pretty much back to normal.  Patient has had previous COVID-19 pneumonia back in December 2020. He had pulmonary embolism and then a GI bleed. He was initially on anticoagulation but it was stopped later. Patient had few breakthrough seizures. He was seen by his primary care physician. A Keppra level was checked and was pretty much undetectable at less than 2. His dose was increased to Keppra 750 mg twice daily. Repeat level is now therapeutic at 23.9 and patient has not had any further seizures. Parul Araiza REVIEW OF SYSTEMS    Constitutional:  []   Chills   []  Fatigue   []  Fevers   []  Malaise   []  Weight loss     [x] Denies all of the above    Respiratory:   []  Cough    []  Shortness of breath         [x] Denies all of the above     Cardiovascular:   []  Chest pain    []  Exertional chest pressure/discomfort           [] Palpitations    []  Syncope     [x] Denies all of the above        Past Medical History:   Diagnosis Date    Acute diastolic (congestive) heart failure ([facility] [address] 3/8/2022    GEOVANI (acute kidney injury) ([facility] [address] 02/11/2021    Alcohol abuse 12/22/2020    Atrial fibrillation (HCC)     BPH (benign prostatic hyperplasia)     Dehydration     [facility] ([facility])     ESBL (extended spectrum beta-lactamase) producing bacteria infection 08/28/2021    Proteus mirabilis ESBL. Urine    Leg weakness, bilateral 2014, 2015    legs give out and pt collaspes    Seizure ([address] 07/20/2017    UTI (urinary tract infection) 08/10/2011    admitted with high fever and fainted. Family History   Problem Relation Age of Onset    Cancer Mother         pancreatic    Diabetes Father     Cancer Brother      Social History     Socioeconomic History    Marital status:    Tobacco Use    Smoking status: Never    Smokeless tobacco: Never   Substance and Sexual Activity    Alcohol use: Yes     Alcohol/week: 0.0 standard drinks     Comment: occasionally     Drug use: No    Sexual activity: Not Currently        Objective:  Exam:  /85   Pulse 94   Ht 5' 10\" (1.778 m)   Wt 220 lb (99.8 kg)   BMI 31.57 kg/m²   This is a well-nourished patient in no acute distress  Patient is awake, alert and oriented x3. Speech is normal.  Pupils are equal round reacting to light. Extraocular movements intact. Face symmetrical. Tongue midline. Motor exam shows normal symmetrical strength. Increased tone with cogwheel rigidity especially on the right side and pill-rolling tremors present. Deep tendon reflexes are decreased in the legs. Plantar reflexes or withdrawal  Sensory exam normal. Coordination normal. Gait impaired and patient uses a walker to ambulate. No carotid bruit. No neck stiffness.       Data :  LABS:  General Labs:    CBC:   Lab Results   Component Value Date/Time    WBC 10.5 07/12/2022 02:12 PM    RBC 3.66 07/12/2022 02:12 PM    HGB 12.6 07/12/2022 02:12 PM    HCT 35.7 07/12/2022 02:12 PM    MCV 97.4 07/12/2022 02:12 PM    MCH 34.4 07/12/2022 02:12 PM    MCHC 35.3 07/12/2022 02:12 PM    RDW 13.6 07/12/2022 02:12 PM     07/12/2022 02:12 PM    MPV 8.7 07/12/2022 02:12 PM     BMP:    Lab Results   Component Value Date/Time     07/12/2022 02:12 PM    K 4.2 07/12/2022 02:12 PM    K 3.8 08/28/2021 09:46 AM    CL 98 07/12/2022 02:12 PM    CO2 30 07/12/2022 02:12 PM    BUN 14 07/12/2022 02:12 PM    LABALBU 3.4 10/27/2021 10:48 AM    CREATININE 0.9 07/12/2022 02:12 PM    CALCIUM 9.2 07/12/2022 02:12 PM    GFRAA >60 07/12/2022 02:12 PM    GFRAA >60 01/06/2013 09:50 PM    LABGLOM >60 07/12/2022 02:12 PM    GLUCOSE 107 07/12/2022 02:12 PM     RADIOLOGY REVIEW:  I have reviewed radiology report(s) of: CT scan of the head    Impression :  Parkinson's disease with pill-rolling tremors and cogwheeling fatigue, predominantly involving the right side  Ataxia  Right wrist drop now improved  Seizure disorder, patient had some breakthrough seizures with low Keppra level and now is doing better after the Keppra dose was increased and his level is now normal.  Continue  Idiopathic peripheral neuropathy    Plan :  Discussed with patient and his family  Reviewed CT head  Current dose of Sinemet  Side effects were discussed. I will see him back in 6 months for follow-up        Please note a portion of  this chart was generated using dragon dictation software. Although every effort was made to ensure the accuracy of this automated transcription, some errors in transcription may have occurred.

## 2022-09-09 NOTE — PROGRESS NOTES
Orders for admission, patient is aware of plan and ready to go upstairs. Any questions, please call ED RN Lakshmi at extension [phone].      Patient Covid vaccination status: Fully vaccinated     COVID Test Ordered in ED: Rapid SARS-CoV-2 by PCR    COVID Suspicion at Admission: Negative    Running Infusions:  Zosyn 3.375g    Mental Status/LOC at time of transport: A & O x 4    Other pertinent information:   CIWA score: N/A   NIH score:  N/A Patient to CT w/ hospital transport

## 2022-09-12 NOTE — CONSULTS
Consulting Physician: Dr. Mynor Crook    Reason for Consult: Gross hematuria, pulled out catheter    History of Present Illness: Charline White is a 80 y.o. male who we were initially involved for urinary retention 2/2021. He was sent to the hospital from nursing home with elevated creatinine. Imaging with bilateral hydronephrosis. Spann catheter was placed for 1700cc during that admission. He was discharged back to the nursing home with the spann catheter in place. He followed up outpatient 1 week ago where his catheter was removed for a voiding trial and the nursing home was provided instructions for intermittent catheterization but to replace spann catheter if unable to void by day #4. The patient pulled the catheter out yesterday morning, he doesn't recall pulling it out and had significant amount of gross hematuria with clots upon initial removal. He was also on Eliquis for history of pulmonary embolism and Afib 12/2020, this is on hold. CBI was started. His urine is clear with CBI running at slow-moderate pace. This was stopped to monitor. Past Medical History:   Past Medical History:   Diagnosis Date    GEOVANI (acute kidney injury) ([facility] [address] 2/11/2021    Alcohol abuse 12/22/2020    Atrial fibrillation (HCC)     BPH (benign prostatic hyperplasia)     Dehydration     [facility] ([facility])     Leg weakness, bilateral 2014, 2015    legs give out and pt collaspes    Seizure ([address] 7/20/2017    UTI (urinary tract infection) 8-    admitted with high fever and fainted. Past Surgical History:  Past Surgical History:   Procedure Laterality Date    CATARACT REMOVAL Bilateral     SPLENECTOMY, TOTAL      at age 15 y old.        Social History:  Social History     Socioeconomic History    Marital status:      Spouse name: Not on file    Number of children: Not on file    Years of education: Not on file    Highest education level: Not on file   Occupational History    Not Subcutaneous, TID WC  insulin lispro (HUMALOG) injection vial 0-3 Units, 0-3 Units, Subcutaneous, Nightly  multivitamin 1 tablet, 1 tablet, Oral, Daily  thiamine mononitrate tablet 100 mg, 100 mg, Oral, Daily  lactobacillus (CULTURELLE) capsule 1 capsule, 1 capsule, Oral, Daily with breakfast  zinc sulfate (ZINCATE) capsule 50 mg, 50 mg, Oral, Daily  tamsulosin (FLOMAX) capsule 0.4 mg, 0.4 mg, Oral, Daily  ascorbic acid (VITAMIN C) tablet 1,000 mg, 1,000 mg, Oral, Daily  vitamin D (CHOLECALCIFEROL) capsule 5,000 Units, 5,000 Units, Oral, Daily  pantoprazole (PROTONIX) tablet 40 mg, 40 mg, Oral, BID  [Held by provider] apixaban (ELIQUIS) tablet 5 mg, 5 mg, Oral, BID  levETIRAcetam (KEPPRA) tablet 500 mg, 500 mg, Oral, BID  sodium chloride flush 0.9 % injection 5-40 mL, 5-40 mL, Intravenous, 2 times per day  sodium chloride flush 0.9 % injection 5-40 mL, 5-40 mL, Intravenous, PRN  0.9 % sodium chloride infusion, 25 mL, Intravenous, PRN  promethazine (PHENERGAN) tablet 12.5 mg, 12.5 mg, Oral, Q6H PRN **OR** ondansetron (ZOFRAN) injection 4 mg, 4 mg, Intravenous, Q6H PRN  polyethylene glycol (GLYCOLAX) packet 17 g, 17 g, Oral, Daily PRN  acetaminophen (TYLENOL) tablet 650 mg, 650 mg, Oral, Q6H PRN **OR** acetaminophen (TYLENOL) suppository 650 mg, 650 mg, Rectal, Q6H PRN  0.9 % sodium chloride infusion, , Intravenous, Continuous    Review of Systems:  10 Systems were reviewed and negative except as in HPI    Vitals:  BP (!) 142/72   Pulse 108   Temp 98.9 °F (37.2 °C) (Rectal)   Resp 13   Ht 5' 10\" (1.778 m)   Wt 210 lb 1.6 oz (95.3 kg)   SpO2 100%   BMI 30.15 kg/m²     Intake/Output Summary (Last 24 hours) at 6/1/2021 0855  Last data filed at 6/1/2021 0560  Gross per 24 hour   Intake 5512.3 ml   Output 26690 ml   Net -88183.7 ml       Physical Exam:  General Appearance: Alert and oriented, cooperative, no distress, appears stated age  Head: Normocephalic, without obvious abnormality, atraumatic  Back: no CVA tenderness  Lungs: respirations unlabored, no wheezing  Heart: Regular rate and rhythm, no lower extremity edema noted, NC  Abdomen: Soft, non-tender, non-distended, no masses  Skin: Skin color, texture, turgor normal, no rashes or lesions  Neurologic: no gross deficits   Male :   Nonpalpable bladder   No CVA tenderness   CBI running with clear output. Stopped.  RAISA Not indicated    Labs:  CBC   Lab Results   Component Value Date    WBC 28.2 06/01/2021    RBC 2.72 06/01/2021    HGB 8.3 06/01/2021    HCT 24.9 06/01/2021    MCV 91.6 06/01/2021    MCH 30.6 06/01/2021    MCHC 33.5 06/01/2021    RDW 16.0 06/01/2021     06/01/2021    MPV 8.5 06/01/2021     BMP   Lab Results   Component Value Date     06/01/2021    K 4.2 06/01/2021     06/01/2021    CO2 25 06/01/2021    BUN 29 06/01/2021    CREATININE 1.1 06/01/2021    GLUCOSE 143 06/01/2021    CALCIUM 8.2 06/01/2021       Urinalysis:   Lab Results   Component Value Date    COLORU DARK YELLOW 05/31/2021    GLUCOSEU 100 05/31/2021    GLUCOSEU Negative 08/10/2011    BLOODU LARGE 05/31/2021    NITRU POSITIVE 05/31/2021    LEUKOCYTESUR LARGE 05/31/2021       Imaging:  NA    Impression/Plan:   - 85y. o. male admitted for gross hematuria s/p traumatic catheter removal by patient.   - CBI running slow-moderate pace with clear output. This was stopped. Re-start if needed  - Acute blood loss anemia - H/H stable, Eliquis on hold  - Will need spann catheter long term. Urethral vs SP tube. - Urine culture pending- continue antibiotics.        CHRISTINE Steinberg - CNP 6/4/42973:18 AM

## 2022-11-30 ENCOUNTER — TELEPHONE (OUTPATIENT)
Dept: FAMILY MEDICINE CLINIC | Age: 87
End: 2022-11-30
Payer: MEDICARE

## 2022-11-30 DIAGNOSIS — Z46.6 ENCOUNTER FOR FITTING OR ADJUSTMENT OF URINARY DEVICE: Primary | ICD-10-CM

## 2022-11-30 DIAGNOSIS — N31.9 NEUROMUSCULAR DYSFUNCTION OF BLADDER, UNSPECIFIED: ICD-10-CM

## 2022-11-30 PROCEDURE — G0179 MD RECERTIFICATION HHA PT: HCPCS | Performed by: FAMILY MEDICINE

## 2022-12-02 RX ORDER — LEVETIRACETAM 500 MG/1
TABLET ORAL
Qty: 90 TABLET | Refills: 4 | Status: SHIPPED | OUTPATIENT
Start: 2022-12-02

## 2022-12-12 NOTE — CARE COORDINATION
Last refill: Zyrtec 4mls, 7/27/22, 120mls, 3 refills  Last office visit: 10/25/22    The prescription has been sent to the pharmacy as requested.   [facility] PRE-CERT REQUEST SUBMITTED VIA [facility] ACCESS PORTAL    REQUESTED SETTING:  SNF    ANTICIPATED ADMIT DATE TO SNF:  06/03/2021    [facility] AT [place] #:  [id number]  Ramesh Travis RN, BSN, Case Management  Phone: [phone]  Electronically signed by Ramesh Travis RN on 6/2/2021 at 10:48 AM     Received a call back from Curtis Webb at [place] advising that the skilled stay is being denied and peer to peer option is available by calling [phone] option 5. Updated Dr. Sameer Mir who advised patient will likely need to return long term care with part B therapy services.     Ramesh Travis RN, BSN, Case Management  Phone: [phone]  Electronically signed by Ramesh Travis RN on 6/2/2021 at 1:32 PM

## 2022-12-13 ENCOUNTER — OFFICE VISIT (OUTPATIENT)
Dept: FAMILY MEDICINE CLINIC | Age: 87
End: 2022-12-13
Payer: MEDICARE

## 2022-12-13 VITALS
TEMPERATURE: 97.9 F | SYSTOLIC BLOOD PRESSURE: 124 MMHG | WEIGHT: 229 LBS | HEART RATE: 92 BPM | HEIGHT: 70 IN | BODY MASS INDEX: 32.78 KG/M2 | DIASTOLIC BLOOD PRESSURE: 74 MMHG

## 2022-12-13 DIAGNOSIS — G40.909 SEIZURE DISORDER (HCC): ICD-10-CM

## 2022-12-13 DIAGNOSIS — R60.9 DEPENDENT EDEMA: ICD-10-CM

## 2022-12-13 DIAGNOSIS — D64.9 ANEMIA, UNSPECIFIED TYPE: ICD-10-CM

## 2022-12-13 DIAGNOSIS — G40.909 SEIZURE DISORDER (HCC): Primary | ICD-10-CM

## 2022-12-13 LAB
ANION GAP SERPL CALCULATED.3IONS-SCNC: 11 MMOL/L (ref 3–16)
BUN BLDV-MCNC: 10 MG/DL (ref 7–20)
CALCIUM SERPL-MCNC: 8.6 MG/DL (ref 8.3–10.6)
CHLORIDE BLD-SCNC: 100 MMOL/L (ref 99–110)
CO2: 29 MMOL/L (ref 21–32)
CREAT SERPL-MCNC: 0.9 MG/DL (ref 0.8–1.3)
GFR SERPL CREATININE-BSD FRML MDRD: >60 ML/MIN/{1.73_M2}
GLUCOSE BLD-MCNC: 111 MG/DL (ref 70–99)
HCT VFR BLD CALC: 35.7 % (ref 40.5–52.5)
HEMOGLOBIN: 12.4 G/DL (ref 13.5–17.5)
KEPPRA DOSE AMT: NORMAL
KEPPRA: 27.6 UG/ML (ref 6–46)
MAGNESIUM: 2 MG/DL (ref 1.8–2.4)
POTASSIUM SERPL-SCNC: 4.5 MMOL/L (ref 3.5–5.1)
SODIUM BLD-SCNC: 140 MMOL/L (ref 136–145)

## 2022-12-13 PROCEDURE — 99214 OFFICE O/P EST MOD 30 MIN: CPT | Performed by: FAMILY MEDICINE

## 2022-12-13 PROCEDURE — 1123F ACP DISCUSS/DSCN MKR DOCD: CPT | Performed by: FAMILY MEDICINE

## 2022-12-13 PROCEDURE — G8427 DOCREV CUR MEDS BY ELIG CLIN: HCPCS | Performed by: FAMILY MEDICINE

## 2022-12-13 PROCEDURE — 1036F TOBACCO NON-USER: CPT | Performed by: FAMILY MEDICINE

## 2022-12-13 PROCEDURE — G8417 CALC BMI ABV UP PARAM F/U: HCPCS | Performed by: FAMILY MEDICINE

## 2022-12-13 PROCEDURE — G8484 FLU IMMUNIZE NO ADMIN: HCPCS | Performed by: FAMILY MEDICINE

## 2022-12-13 SDOH — ECONOMIC STABILITY: FOOD INSECURITY: WITHIN THE PAST 12 MONTHS, THE FOOD YOU BOUGHT JUST DIDN'T LAST AND YOU DIDN'T HAVE MONEY TO GET MORE.: NEVER TRUE

## 2022-12-13 SDOH — ECONOMIC STABILITY: FOOD INSECURITY: WITHIN THE PAST 12 MONTHS, YOU WORRIED THAT YOUR FOOD WOULD RUN OUT BEFORE YOU GOT MONEY TO BUY MORE.: NEVER TRUE

## 2022-12-13 ASSESSMENT — ENCOUNTER SYMPTOMS
BLOOD IN STOOL: 0
CONSTIPATION: 0
NAUSEA: 0
VOMITING: 0
CHEST TIGHTNESS: 0
ABDOMINAL DISTENTION: 0
ABDOMINAL PAIN: 0
SHORTNESS OF BREATH: 0
DIARRHEA: 0

## 2022-12-13 ASSESSMENT — PATIENT HEALTH QUESTIONNAIRE - PHQ9
SUM OF ALL RESPONSES TO PHQ QUESTIONS 1-9: 0
2. FEELING DOWN, DEPRESSED OR HOPELESS: 0
1. LITTLE INTEREST OR PLEASURE IN DOING THINGS: 0
SUM OF ALL RESPONSES TO PHQ9 QUESTIONS 1 & 2: 0
SUM OF ALL RESPONSES TO PHQ QUESTIONS 1-9: 0

## 2022-12-13 ASSESSMENT — SOCIAL DETERMINANTS OF HEALTH (SDOH): HOW HARD IS IT FOR YOU TO PAY FOR THE VERY BASICS LIKE FOOD, HOUSING, MEDICAL CARE, AND HEATING?: NOT HARD AT ALL

## 2022-12-13 NOTE — PROGRESS NOTES
Subjective:      Patient ID: Ernestine Humphrey is a 80 y.o. male. Chief Complaint   Patient presents with    Follow-up     anemia        Patient presents with: Follow-up: anemia    He is here for the above and checkup  He is on the meds  He is eating well and no c/o actually   Here with his son  No seizure since august before seeing the neurologist     YOB: 1935    Date of Visit:  12/13/2022    No Known Allergies    Current Outpatient Medications:  levETIRAcetam (KEPPRA) 500 MG tablet, TAKE 1 AND 1/2 TABLET BY MOUTH TWICE A DAY, Disp: 90 tablet, Rfl: 4  carbidopa-levodopa (SINEMET)  MG per tablet, TAKE ONE TABLET BY MOUTH THREE TIMES A DAY, Disp: 270 tablet, Rfl: 1  tamsulosin (FLOMAX) 0.4 MG capsule, TAKE ONE CAPSULE BY MOUTH EVERY MORNING, Disp: 90 capsule, Rfl: 1  pantoprazole (PROTONIX) 40 MG tablet, TAKE ONE TABLET BY MOUTH EVERY EVENING, Disp: 90 tablet, Rfl: 1  furosemide (LASIX) 20 MG tablet, TAKE ONE TABLET BY MOUTH TWICE A DAY, Disp: 180 tablet, Rfl: 1  acetaminophen (TYLENOL) 325 MG tablet, Take 650 mg by mouth every 4 hours as needed for Fever, Disp: , Rfl:   Multiple Vitamin (MULTIVITAMIN) TABS tablet, Take 1 tablet by mouth daily, Disp: 30 tablet, Rfl: 0    No current facility-administered medications for this visit.      ---------------------------               12/13/22                      1311         ---------------------------   BP:          124/74         Site:    Left Upper Arm     Position:     Sitting        Cuff Size:   Large Adult      Pulse:         92           Temp:   97.9 °F (36.6 °C)   TempSrc:    Temporal        Weight: 229 lb (103.9 kg)   Height: 5' 10\" (1.778 m)   ---------------------------  Body mass index is 32.86 kg/m².      Wt Readings from Last 3 Encounters:  12/13/22 : 229 lb (103.9 kg)  08/29/22 : 220 lb (99.8 kg)  07/12/22 : 220 lb (99.8 kg)    BP Readings from Last 3 Encounters:  12/13/22 : 124/74  08/29/22 : 139/85  07/12/22 : 124/70          Review of Systems   Constitutional:  Negative for appetite change, chills, fever and unexpected weight change. Respiratory:  Negative for chest tightness and shortness of breath. Cardiovascular:  Negative for chest pain, palpitations and leg swelling. Gastrointestinal:  Negative for abdominal distention, abdominal pain, blood in stool, constipation, diarrhea, nausea and vomiting. Genitourinary:  Negative for difficulty urinating. Indwelling spann    Neurological:  Negative for headaches. Objective:   Physical Exam  Constitutional:       General: He is not in acute distress. Appearance: Normal appearance. He is well-developed. He is not ill-appearing or diaphoretic. Cardiovascular:      Rate and Rhythm: Normal rate and regular rhythm. Heart sounds: Normal heart sounds. No murmur heard. No friction rub. No gallop. Pulmonary:      Effort: Pulmonary effort is normal. No tachypnea, accessory muscle usage or respiratory distress. Breath sounds: Normal breath sounds. No decreased breath sounds, wheezing, rhonchi or rales. Lymphadenopathy:      Cervical: No cervical adenopathy. Upper Body:      Right upper body: No supraclavicular adenopathy. Left upper body: No supraclavicular adenopathy. Skin:     General: Skin is warm and dry. Coloration: Skin is not pale. Neurological:      Mental Status: He is alert. Assessment:       Diagnosis Orders   1. Seizure disorder (HCC)  Levetiracetam Level      2. Anemia, unspecified type  Hemoglobin and Hematocrit      3. Dependent edema  Basic Metabolic Panel    Magnesium        Stable   He still has nurse seeing every 30 days for cath change  I reviewed with son who is here on behalf of patient     Anemia has stabilized out from the gi bleed he had last year   He had egd on 1/8/21 showing ulcers gastric and he did receive pRBC then as well     Neurology visit on 8/29/22.  For the parkinson and the seizure hx.stable and reviewed.    Son tells me derm visit and ok about 2 weeks ago for his hx of skin ca       Plan:      Continue the medicines for now   No change  See in about 6 months        Linda Grey MD

## 2022-12-19 ENCOUNTER — CARE COORDINATION (OUTPATIENT)
Dept: CARE COORDINATION | Age: 87
End: 2022-12-19

## 2022-12-21 ENCOUNTER — CARE COORDINATION (OUTPATIENT)
Dept: CARE COORDINATION | Age: 87
End: 2022-12-21

## 2022-12-21 SDOH — HEALTH STABILITY: PHYSICAL HEALTH: ON AVERAGE, HOW MANY MINUTES DO YOU ENGAGE IN EXERCISE AT THIS LEVEL?: 0 MIN

## 2022-12-21 SDOH — ECONOMIC STABILITY: FOOD INSECURITY: WITHIN THE PAST 12 MONTHS, THE FOOD YOU BOUGHT JUST DIDN'T LAST AND YOU DIDN'T HAVE MONEY TO GET MORE.: NEVER TRUE

## 2022-12-21 SDOH — ECONOMIC STABILITY: INCOME INSECURITY: IN THE LAST 12 MONTHS, WAS THERE A TIME WHEN YOU WERE NOT ABLE TO PAY THE MORTGAGE OR RENT ON TIME?: NO

## 2022-12-21 SDOH — ECONOMIC STABILITY: HOUSING INSECURITY
IN THE LAST 12 MONTHS, WAS THERE A TIME WHEN YOU DID NOT HAVE A STEADY PLACE TO SLEEP OR SLEPT IN A SHELTER (INCLUDING NOW)?: NO

## 2022-12-21 SDOH — HEALTH STABILITY: PHYSICAL HEALTH: ON AVERAGE, HOW MANY DAYS PER WEEK DO YOU ENGAGE IN MODERATE TO STRENUOUS EXERCISE (LIKE A BRISK WALK)?: 0 DAYS

## 2022-12-21 SDOH — ECONOMIC STABILITY: TRANSPORTATION INSECURITY
IN THE PAST 12 MONTHS, HAS THE LACK OF TRANSPORTATION KEPT YOU FROM MEDICAL APPOINTMENTS OR FROM GETTING MEDICATIONS?: NO

## 2022-12-21 SDOH — ECONOMIC STABILITY: FOOD INSECURITY: WITHIN THE PAST 12 MONTHS, YOU WORRIED THAT YOUR FOOD WOULD RUN OUT BEFORE YOU GOT MONEY TO BUY MORE.: NEVER TRUE

## 2022-12-21 SDOH — ECONOMIC STABILITY: TRANSPORTATION INSECURITY
IN THE PAST 12 MONTHS, HAS LACK OF TRANSPORTATION KEPT YOU FROM MEETINGS, WORK, OR FROM GETTING THINGS NEEDED FOR DAILY LIVING?: NO

## 2022-12-21 SDOH — ECONOMIC STABILITY: HOUSING INSECURITY: IN THE LAST 12 MONTHS, HOW MANY PLACES HAVE YOU LIVED?: 1

## 2022-12-21 ASSESSMENT — LIFESTYLE VARIABLES
HOW MANY STANDARD DRINKS CONTAINING ALCOHOL DO YOU HAVE ON A TYPICAL DAY: PATIENT DOES NOT DRINK
HOW OFTEN DO YOU HAVE A DRINK CONTAINING ALCOHOL: NEVER

## 2022-12-21 ASSESSMENT — SOCIAL DETERMINANTS OF HEALTH (SDOH)
DO YOU BELONG TO ANY CLUBS OR ORGANIZATIONS SUCH AS CHURCH GROUPS UNIONS, FRATERNAL OR ATHLETIC GROUPS, OR SCHOOL GROUPS?: NO
HOW OFTEN DO YOU ATTENT MEETINGS OF THE CLUB OR ORGANIZATION YOU BELONG TO?: NEVER
IN A TYPICAL WEEK, HOW MANY TIMES DO YOU TALK ON THE PHONE WITH FAMILY, FRIENDS, OR NEIGHBORS?: TWICE A WEEK
HOW OFTEN DO YOU GET TOGETHER WITH FRIENDS OR RELATIVES?: TWICE A WEEK
HOW OFTEN DO YOU ATTEND CHURCH OR RELIGIOUS SERVICES?: NEVER
HOW HARD IS IT FOR YOU TO PAY FOR THE VERY BASICS LIKE FOOD, HOUSING, MEDICAL CARE, AND HEATING?: NOT HARD AT ALL

## 2022-12-21 NOTE — CARE COORDINATION
Ambulatory Care Coordination Note  12/21/2022    ACC: Yoanna Stubbs, ROSENDA    Spoke to patient and son for initial cc screening from payor referral. Patient is 80 male who lives alone. Son frequently comes to patients house to check on him daily. Patient has no reported falls or injuries. Patient has an environment that is reported as well lit, reduced clutter grab bars, tub mat. Patient uses a cane or walker for ambulation. Does use a wc when needed. Patient confirms he monitors his weight daily, and follows a low sodium diet. No c/o sob, cp, BLE edema. Educated on when to report fluid/weight increases of 3 lbs in 3-5 day duration. Offered rpm- son and patient would like time to think it over. Son did patients vs this am and 135/78 bp, spo2 95% RA, not on oxygen in the home. Able to afford basic needs of housing, food, medical. Sending chf handouts to patient. No current needs. No current questions or concerns. Plan:  POC to pcp  FU chf handouts in postal mail  Quiz teach back chf edu  Reassess if would like to try rpm  Additional questions, concerns, needs    Offered patient enrollment in the Remote Patient Monitoring (RPM) program for in-home monitoring:  undecided at this time . Ambulatory Care Coordination Assessment    Care Coordination Protocol  Referral from Primary Care Provider: No  Week 1 - Initial Assessment     Do you have all of your prescriptions and are they filled?: Yes  Barriers to medication adherence: None  Are you able to afford your medications?: Yes  How often do you have trouble taking your medications the way you have been told to take them?: I always take them as prescribed. Do you have Home O2 Therapy?: No      Ability to seek help/take action for Emergent Urgent situations i.e. fire, crime, inclement weather or health crisis. : Independent  Ability to ambulate to restroom: Independent  Ability handle personal hygeine needs (bathing/dressing/grooming):  Independent  Ability to manage Medications: Independent  Ability to prepare Food Preparation: Independent  Ability to maintain home (clean home, laundry): Needs Assistance  Ability to drive and/or has transportation: Needs Assistance  Ability to do shopping: Needs Assistance  Ability to manage finances: Needs Assistance  Is patient able to live independently?: Yes     Current Housing: Private Residence        Per the Fall Risk Screening, did the patient have 2 or more falls or 1 fall with injury in the past year?: No     Frequent urination at night?: No  Do you use rails/bars?: Yes  Do you have a non-slip tub mat?: Yes     Are you experiencing loss of meaning?: No  Are you experiencing loss of hope and peace?: No     Thinking about your patient's physical health needs, are there any symptoms or problems (risk indicators) you are unsure about that require further investigation?: No identified areas of uncertainly or problems already being investigated   Are the patients physical health problems impacting on their mental well-being?: No identified areas of concern   Are there any problems with your patients lifestyle behaviors (alcohol, drugs, diet, exercise) that are impacting on physical or mental well-being?: No identified areas of concern   Do you have any other concerns about your patients mental well-being?  How would you rate their severity and impact on the patient?: No identified areas of concern   How would you rate their home environment in terms of safety and stability (including domestic violence, insecure housing, neighbor harassment)?: Consistently safe, supportive, stable, no identified problems   How do daily activities impact on the patient's well-being? (include current or anticipated unemployment, work, caregiving, access to transportation or other): No identified problems or perceived positive benefits   How would you rate their social network (family, work, friends)?: Adequate participation with social networks   How would you rate their financial resources (including ability to afford all required medical care)?: Financially secure, resources adequate, no identified problems   How wells does the patient now understand their health and well-being (symptoms, signs or risk factors) and what they need to do to manage their health?: Reasonable to good understanding and already engages in managing health or is willing to undertake better management   How well do you think your patient can engage in healthcare discussions? (Barriers include language, deafness, aphasia, alcohol or drug problems, learning difficulties, concentration): Clear and open communication, no identified barriers   Do other services need to be involved to help this patient?: Other care/services not required at this time   Suggested Interventions and Community Resources   Zone Management Tools: In Process         Set up/Review Goals, Set up/Review an Education Plan              Prior to Admission medications    Medication Sig Start Date End Date Taking?  Authorizing Provider   levETIRAcetam (KEPPRA) 500 MG tablet TAKE 1 AND 1/2 TABLET BY MOUTH TWICE A DAY 12/2/22   Heidy Ravi MD   carbidopa-levodopa (SINEMET)  MG per tablet TAKE ONE TABLET BY MOUTH THREE TIMES A DAY 8/29/22   Manuel Quijano MD   tamsulosin (FLOMAX) 0.4 MG capsule TAKE ONE CAPSULE BY MOUTH EVERY MORNING 8/26/22   Heidy Ravi MD   pantoprazole (PROTONIX) 40 MG tablet TAKE ONE TABLET BY MOUTH EVERY EVENING 8/26/22   Heidy Ravi MD   furosemide (LASIX) 20 MG tablet TAKE ONE TABLET BY MOUTH TWICE A DAY 7/17/22   Heidy Ravi MD   acetaminophen (TYLENOL) 325 MG tablet Take 650 mg by mouth every 4 hours as needed for Fever    Historical Provider, MD   Multiple Vitamin (MULTIVITAMIN) TABS tablet Take 1 tablet by mouth daily 12/26/20   Joan Cain MD       Future Appointments   Date Time Provider Curtis Gallegos   3/9/2023  2:00 PM MD Yenifer Maurice [facility] [place] [facility] -   6/13/2023  1:20 PM Heidy Ravi MD [facility]     ,   Congestive Heart Failure Assessment    Are you currently restricting fluids?: No Restriction  Do you understand a low sodium diet?: Yes  Do you understand how to read food labels?: Yes  How many restaurant meals do you eat per week?: 1-2  Do you salt your food before tasting it?: No     No patient-reported symptoms      Symptoms:          , and   General Assessment    Do you have any symptoms that are causing concern?: No

## 2023-01-11 ENCOUNTER — CARE COORDINATION (OUTPATIENT)
Dept: CARE COORDINATION | Age: 88
End: 2023-01-11

## 2023-01-12 RX ORDER — FUROSEMIDE 20 MG/1
TABLET ORAL
Qty: 180 TABLET | Refills: 1 | Status: SHIPPED | OUTPATIENT
Start: 2023-01-12

## 2023-01-25 ENCOUNTER — CARE COORDINATION (OUTPATIENT)
Dept: CARE COORDINATION | Age: 88
End: 2023-01-25

## 2023-01-26 ENCOUNTER — TELEPHONE (OUTPATIENT)
Dept: FAMILY MEDICINE CLINIC | Age: 88
End: 2023-01-26
Payer: MEDICARE

## 2023-01-26 DIAGNOSIS — N31.9 NEUROGENIC DYSFUNCTION OF THE URINARY BLADDER: Primary | ICD-10-CM

## 2023-01-26 DIAGNOSIS — Z46.6 FITTING AND ADJUSTMENT OF URINARY DEVICE: ICD-10-CM

## 2023-01-26 PROCEDURE — G0179 MD RECERTIFICATION HHA PT: HCPCS | Performed by: FAMILY MEDICINE

## 2023-02-07 ENCOUNTER — CARE COORDINATION (OUTPATIENT)
Dept: CARE COORDINATION | Age: 88
End: 2023-02-07

## 2023-02-07 NOTE — CARE COORDINATION
Utr x 3 outreaches. Resolved episode. Will remain available if return call back is received. No additional outreaches.

## 2023-02-20 RX ORDER — TAMSULOSIN HYDROCHLORIDE 0.4 MG/1
CAPSULE ORAL
Qty: 90 CAPSULE | Refills: 1 | Status: SHIPPED | OUTPATIENT
Start: 2023-02-20

## 2023-02-21 RX ORDER — PANTOPRAZOLE SODIUM 40 MG/1
TABLET, DELAYED RELEASE ORAL
Qty: 90 TABLET | Refills: 1 | Status: SHIPPED | OUTPATIENT
Start: 2023-02-21

## 2023-03-09 ENCOUNTER — OFFICE VISIT (OUTPATIENT)
Dept: NEUROLOGY | Age: 88
End: 2023-03-09
Payer: MEDICARE

## 2023-03-09 VITALS
DIASTOLIC BLOOD PRESSURE: 70 MMHG | HEART RATE: 93 BPM | BODY MASS INDEX: 32.78 KG/M2 | WEIGHT: 229 LBS | HEIGHT: 70 IN | SYSTOLIC BLOOD PRESSURE: 129 MMHG

## 2023-03-09 DIAGNOSIS — G60.9 IDIOPATHIC PERIPHERAL NEUROPATHY: ICD-10-CM

## 2023-03-09 DIAGNOSIS — G20 PARKINSON'S DISEASE (HCC): Primary | ICD-10-CM

## 2023-03-09 DIAGNOSIS — R27.0 ATAXIA: ICD-10-CM

## 2023-03-09 DIAGNOSIS — G40.909 SEIZURE DISORDER (HCC): ICD-10-CM

## 2023-03-09 PROCEDURE — 1123F ACP DISCUSS/DSCN MKR DOCD: CPT | Performed by: PSYCHIATRY & NEUROLOGY

## 2023-03-09 PROCEDURE — G8427 DOCREV CUR MEDS BY ELIG CLIN: HCPCS | Performed by: PSYCHIATRY & NEUROLOGY

## 2023-03-09 PROCEDURE — 1036F TOBACCO NON-USER: CPT | Performed by: PSYCHIATRY & NEUROLOGY

## 2023-03-09 PROCEDURE — G8417 CALC BMI ABV UP PARAM F/U: HCPCS | Performed by: PSYCHIATRY & NEUROLOGY

## 2023-03-09 PROCEDURE — G8484 FLU IMMUNIZE NO ADMIN: HCPCS | Performed by: PSYCHIATRY & NEUROLOGY

## 2023-03-09 PROCEDURE — 99214 OFFICE O/P EST MOD 30 MIN: CPT | Performed by: PSYCHIATRY & NEUROLOGY

## 2023-03-09 NOTE — PROGRESS NOTES
Niki Sorenson   Neurology followup    Subjective:   CC/HP  History was obtained from the patient. Additional history was obtained from his family. Patient has known Parkinson's disease. Patient's tremors seem to have improved on the Sinemet but he still has it to some extent. .  Patient has had previous COVID-19 pneumonia back in December 2020. He had pulmonary embolism and then a GI bleed. He was initially on anticoagulation but it was stopped later. Patient had few breakthrough seizures. He was seen by his primary care physician. A Keppra level was checked and was pretty much undetectable at less than 2. His dose was increased to Keppra 750 mg twice daily. Repeat level was therapeutic at 23.9 and patient has not had any further seizures. Tello Dunaway REVIEW OF SYSTEMS    Constitutional:  []   Chills   []  Fatigue   []  Fevers   []  Malaise   []  Weight loss     [x] Denies all of the above    Respiratory:   []  Cough    []  Shortness of breath         [x] Denies all of the above     Cardiovascular:   []  Chest pain    []  Exertional chest pressure/discomfort           [] Palpitations    []  Syncope     [x] Denies all of the above        Past Medical History:   Diagnosis Date    Acute diastolic (congestive) heart failure ([address] 3/8/2022    GEOVANI (acute kidney injury) ([address] 02/11/2021    Alcohol abuse 12/22/2020    Atrial fibrillation (HCC)     BPH (benign prostatic hyperplasia)     Dehydration     [facility] ([facility])     ESBL (extended spectrum beta-lactamase) producing bacteria infection 08/28/2021    Proteus mirabilis ESBL. Urine    Leg weakness, bilateral 2014, 2015    legs give out and pt collaspes    Seizure ([address] 07/20/2017    UTI (urinary tract infection) 08/10/2011    admitted with high fever and fainted.      Family History   Problem Relation Age of Onset    Cancer Mother         pancreatic    Diabetes Father Cancer Brother      Social History     Socioeconomic History    Marital status:      Spouse name: None    Number of children: None    Years of education: None    Highest education level: None   Tobacco Use    Smoking status: Never    Smokeless tobacco: Never   Substance and Sexual Activity    Alcohol use: Yes     Alcohol/week: 0.0 standard drinks     Comment: occasionally     Drug use: No    Sexual activity: Not Currently     Social Determinants of Health     Financial Resource Strain: Low Risk     Difficulty of Paying Living Expenses: Not hard at all   Food Insecurity: No Food Insecurity    Worried About Running Out of Food in the Last Year: Never true    Ran Out of Food in the Last Year: Never true   Transportation Needs: No Transportation Needs    Lack of Transportation (Medical): No    Lack of Transportation (Non-Medical): No   Physical Activity: Inactive    Days of Exercise per Week: 0 days    Minutes of Exercise per Session: 0 min   Stress: No Stress Concern Present    Feeling of Stress : Not at all   Social Connections: Socially Isolated    Frequency of Communication with Friends and Family: Twice a week    Frequency of Social Gatherings with Friends and Family: Twice a week    Attends Yazidism Services: Never    Active Member of Clubs or Organizations: No    Attends Club or Organization Meetings: Never    Marital Status:    Housing Stability: Low Risk     Unable to Pay for Housing in the Last Year: No    Number of Places Lived in the Last Year: 1    Unstable Housing in the Last Year: No        Objective:  Exam:  /70   Pulse 93   Ht 5' 10\" (1.778 m)   Wt 229 lb (103.9 kg)   BMI 32.86 kg/m²   This is a well-nourished patient in no acute distress  Patient is awake, alert and oriented x3. Speech is normal.  Pupils are equal round reacting to light. Extraocular movements intact. Face symmetrical. Tongue midline. Motor exam shows normal symmetrical strength.   Increased tone with cogwheel rigidity especially on the right side and pill-rolling tremors present. Deep tendon reflexes are decreased in the legs. Plantar reflexes or withdrawal  Sensory exam normal. Coordination normal. Gait impaired and patient uses a walker to ambulate. No carotid bruit. No neck stiffness. Data :  LABS:  General Labs:    CBC:   Lab Results   Component Value Date/Time    WBC 10.5 07/12/2022 02:12 PM    RBC 3.66 07/12/2022 02:12 PM    HGB 12.4 12/13/2022 02:05 PM    HCT 35.7 12/13/2022 02:05 PM    MCV 97.4 07/12/2022 02:12 PM    MCH 34.4 07/12/2022 02:12 PM    MCHC 35.3 07/12/2022 02:12 PM    RDW 13.6 07/12/2022 02:12 PM     07/12/2022 02:12 PM    MPV 8.7 07/12/2022 02:12 PM     BMP:    Lab Results   Component Value Date/Time     12/13/2022 02:05 PM    K 4.5 12/13/2022 02:05 PM    K 3.8 08/28/2021 09:46 AM     12/13/2022 02:05 PM    CO2 29 12/13/2022 02:05 PM    BUN 10 12/13/2022 02:05 PM    LABALBU 3.4 10/27/2021 10:48 AM    CREATININE 0.9 12/13/2022 02:05 PM    CALCIUM 8.6 12/13/2022 02:05 PM    GFRAA >60 07/12/2022 02:12 PM    GFRAA >60 01/06/2013 09:50 PM    LABGLOM >60 12/13/2022 02:05 PM    GLUCOSE 111 12/13/2022 02:05 PM     RADIOLOGY REVIEW:  I have reviewed radiology report(s) of: CT scan of the head    Impression :  Parkinson's disease with pill-rolling tremors and cogwheeling fatigue, predominantly involving the right side  Ataxia  Seizure disorder, patient had some breakthrough seizures with low Keppra level and now is doing better after the Keppra dose was increased and his level is now normal.    Idiopathic peripheral neuropathy    Plan :  Discussed with patient and his family  Continue current dose of Keppra  Current dose of Sinemet  Side effects were discussed. I will see him back in 6 months for follow-up        Please note a portion of  this chart was generated using dragon dictation software.  Although every effort was made to ensure the accuracy of this automated transcription, some errors in transcription may have occurred.

## 2023-03-21 ENCOUNTER — TELEPHONE (OUTPATIENT)
Dept: FAMILY MEDICINE CLINIC | Age: 88
End: 2023-03-21
Payer: MEDICARE

## 2023-03-21 DIAGNOSIS — Z46.6 FITTING AND ADJUSTMENT OF URINARY DEVICE: ICD-10-CM

## 2023-03-21 DIAGNOSIS — N31.9 NEUROGENIC DYSFUNCTION OF THE URINARY BLADDER: Primary | ICD-10-CM

## 2023-03-21 PROCEDURE — G0179 MD RECERTIFICATION HHA PT: HCPCS | Performed by: FAMILY MEDICINE

## 2023-03-24 ENCOUNTER — TELEPHONE (OUTPATIENT)
Dept: FAMILY MEDICINE CLINIC | Age: 88
End: 2023-03-24

## 2023-03-24 NOTE — TELEPHONE ENCOUNTER
Pt son Ed calling pt is having sinus issues, congestion a lot of mucous, cough, His son wants to know what they can use OTC with the medication pt takes       [phone]

## 2023-05-02 ENCOUNTER — TELEPHONE (OUTPATIENT)
Dept: FAMILY MEDICINE CLINIC | Age: 88
End: 2023-05-02

## 2023-05-02 NOTE — TELEPHONE ENCOUNTER
Nurse needs a verbal ok to go see pt tonight to insert a spann.     Please call Darcie Frazier at [phone] with [facility] [phone]

## 2023-05-03 ENCOUNTER — TELEPHONE (OUTPATIENT)
Dept: FAMILY MEDICINE CLINIC | Age: 88
End: 2023-05-03

## 2023-05-03 RX ORDER — LEVETIRACETAM 500 MG/1
TABLET ORAL
Qty: 90 TABLET | Refills: 5 | Status: SHIPPED | OUTPATIENT
Start: 2023-05-03

## 2023-06-05 ENCOUNTER — TELEPHONE (OUTPATIENT)
Dept: FAMILY MEDICINE CLINIC | Age: 88
End: 2023-06-05
Payer: MEDICARE

## 2023-06-05 DIAGNOSIS — Z46.6 ENCOUNTER FOR FITTING AND ADJUSTMENT OF URINARY DEVICE: Primary | ICD-10-CM

## 2023-06-05 PROCEDURE — G0179 MD RECERTIFICATION HHA PT: HCPCS | Performed by: FAMILY MEDICINE

## 2023-06-13 DIAGNOSIS — I50.32 CHRONIC DIASTOLIC CONGESTIVE HEART FAILURE (HCC): ICD-10-CM

## 2023-06-13 DIAGNOSIS — R56.9 SEIZURE (HCC): ICD-10-CM

## 2023-06-13 DIAGNOSIS — D64.9 ANEMIA, UNSPECIFIED TYPE: ICD-10-CM

## 2023-06-13 DIAGNOSIS — R73.09 ELEVATED GLUCOSE: ICD-10-CM

## 2023-06-13 PROBLEM — G20 PARKINSON'S DISEASE (HCC): Status: ACTIVE | Noted: 2023-06-13

## 2023-06-13 PROBLEM — G20.A1 PARKINSON'S DISEASE: Status: ACTIVE | Noted: 2023-06-13

## 2023-06-13 LAB
ALBUMIN SERPL-MCNC: 3.7 G/DL (ref 3.4–5)
ALBUMIN/GLOB SERPL: 1.2 {RATIO} (ref 1.1–2.2)
ALP SERPL-CCNC: 184 U/L (ref 40–129)
ALT SERPL-CCNC: 6 U/L (ref 10–40)
ANION GAP SERPL CALCULATED.3IONS-SCNC: 12 MMOL/L (ref 3–16)
AST SERPL-CCNC: 15 U/L (ref 15–37)
BASOPHILS # BLD: 0 K/UL (ref 0–0.2)
BASOPHILS NFR BLD: 0.5 %
BILIRUB SERPL-MCNC: 0.4 MG/DL (ref 0–1)
BUN SERPL-MCNC: 12 MG/DL (ref 7–20)
CALCIUM SERPL-MCNC: 8.6 MG/DL (ref 8.3–10.6)
CHLORIDE SERPL-SCNC: 101 MMOL/L (ref 99–110)
CO2 SERPL-SCNC: 27 MMOL/L (ref 21–32)
CREAT SERPL-MCNC: 1 MG/DL (ref 0.8–1.3)
DEPRECATED RDW RBC AUTO: 14.6 % (ref 12.4–15.4)
EOSINOPHIL # BLD: 0.2 K/UL (ref 0–0.6)
EOSINOPHIL NFR BLD: 2.4 %
GFR SERPLBLD CREATININE-BSD FMLA CKD-EPI: >60 ML/MIN/{1.73_M2}
GLUCOSE SERPL-MCNC: 115 MG/DL (ref 70–99)
HCT VFR BLD AUTO: 36.3 % (ref 40.5–52.5)
HGB BLD-MCNC: 12.3 G/DL (ref 13.5–17.5)
LYMPHOCYTES # BLD: 2.4 K/UL (ref 1–5.1)
LYMPHOCYTES NFR BLD: 24.4 %
MCH RBC QN AUTO: 33 PG (ref 26–34)
MCHC RBC AUTO-ENTMCNC: 33.9 G/DL (ref 31–36)
MCV RBC AUTO: 97.5 FL (ref 80–100)
MONOCYTES # BLD: 1.2 K/UL (ref 0–1.3)
MONOCYTES NFR BLD: 12 %
NEUTROPHILS # BLD: 6.1 K/UL (ref 1.7–7.7)
NEUTROPHILS NFR BLD: 60.7 %
PLATELET # BLD AUTO: 373 K/UL (ref 135–450)
PMV BLD AUTO: 8.9 FL (ref 5–10.5)
POTASSIUM SERPL-SCNC: 4.4 MMOL/L (ref 3.5–5.1)
PROT SERPL-MCNC: 6.9 G/DL (ref 6.4–8.2)
RBC # BLD AUTO: 3.72 M/UL (ref 4.2–5.9)
SODIUM SERPL-SCNC: 140 MMOL/L (ref 136–145)
WBC # BLD AUTO: 10 K/UL (ref 4–11)

## 2023-06-14 LAB
EST. AVERAGE GLUCOSE BLD GHB EST-MCNC: 114 MG/DL
HBA1C MFR BLD: 5.6 %

## 2023-07-10 RX ORDER — FUROSEMIDE 20 MG/1
TABLET ORAL
Qty: 180 TABLET | Refills: 1 | Status: SHIPPED | OUTPATIENT
Start: 2023-07-10

## 2023-07-17 DIAGNOSIS — R74.8 ELEVATED ALKALINE PHOSPHATASE LEVEL: ICD-10-CM

## 2023-07-17 LAB
ALBUMIN SERPL-MCNC: 3.8 G/DL (ref 3.4–5)
ALP SERPL-CCNC: 209 U/L (ref 40–129)
ALT SERPL-CCNC: <5 U/L (ref 10–40)
AST SERPL-CCNC: 15 U/L (ref 15–37)
BILIRUB DIRECT SERPL-MCNC: <0.2 MG/DL (ref 0–0.3)
BILIRUB INDIRECT SERPL-MCNC: ABNORMAL MG/DL (ref 0–1)
BILIRUB SERPL-MCNC: 0.7 MG/DL (ref 0–1)
GGT SERPL-CCNC: 8 U/L (ref 8–61)
PROT SERPL-MCNC: 6.8 G/DL (ref 6.4–8.2)

## 2023-07-18 ENCOUNTER — TELEPHONE (OUTPATIENT)
Dept: FAMILY MEDICINE CLINIC | Age: 88
End: 2023-07-18

## 2023-07-20 LAB
ALP BONE SERPL-CCNC: 49 U/L (ref 0–55)
ALP ISOS SERPL HS-CCNC: 0 U/L
ALP LIVER SERPL-CCNC: 141 U/L (ref 0–94)
ALP SERPL-CCNC: 190 U/L (ref 40–120)

## 2023-07-21 DIAGNOSIS — R74.8 ELEVATED ALKALINE PHOSPHATASE LEVEL: Primary | ICD-10-CM

## 2023-08-02 ENCOUNTER — TELEPHONE (OUTPATIENT)
Dept: FAMILY MEDICINE CLINIC | Age: 88
End: 2023-08-02

## 2023-08-02 NOTE — TELEPHONE ENCOUNTER
Mckenzie with [facility],was out at patient home on Monday, she states the bag his urine was in was purple. She asked his wife why the bag was purple and she said he drinks a lot of purple Gatorade. Mckenzie has never heard that Gatorade can change urine purple. She did not get a sample at that time. She was asking if he sees kidney doctor. She will be out this way later this week and can stop and get urine sample. Please advise, [phone].

## 2023-08-09 ENCOUNTER — HOSPITAL ENCOUNTER (OUTPATIENT)
Dept: ULTRASOUND IMAGING | Age: 88
Discharge: HOME | End: 2023-08-09
Payer: MEDICARE

## 2023-08-09 DIAGNOSIS — R74.8 ELEVATED ALKALINE PHOSPHATASE LEVEL: ICD-10-CM

## 2023-08-09 PROCEDURE — 76705 ECHO EXAM OF ABDOMEN: CPT

## 2023-08-11 ENCOUNTER — TELEPHONE (OUTPATIENT)
Dept: FAMILY MEDICINE CLINIC | Age: 88
End: 2023-08-11

## 2023-08-11 NOTE — TELEPHONE ENCOUNTER
Call son and let him know that the ultrasound showed fat in liver  I would like for him to see dr Daron Ramos or [facility] for the abnormal liver enzymes   It is possible could be related to the anemia but the cbc is stable   See me back in September please instead of december        (Elevated alkaline phosphatase since 6/2023 suggestive of liver origin on w/u  Us showed fat in liver on 7/21/23     Cbc is stable at this time   Renal function is good   Hx of anemia for 10 + years recurrent  B12 and iron testing ok July and august 2021  Anemia was worsened briefly with gi bleeding early in 2021 )

## 2023-08-11 NOTE — TELEPHONE ENCOUNTER
[phone] - Ed (son, on HIPAA) advised and verbalized understanding. Dr. Ruth Chand [phone] was given to Ed.

## 2023-08-16 RX ORDER — TAMSULOSIN HYDROCHLORIDE 0.4 MG/1
CAPSULE ORAL
Qty: 90 CAPSULE | Refills: 1 | Status: SHIPPED | OUTPATIENT
Start: 2023-08-16

## 2023-08-17 RX ORDER — PANTOPRAZOLE SODIUM 40 MG/1
TABLET, DELAYED RELEASE ORAL
Qty: 90 TABLET | Refills: 1 | Status: SHIPPED | OUTPATIENT
Start: 2023-08-17

## 2023-08-30 ENCOUNTER — HOSPITAL ENCOUNTER (OUTPATIENT)
Dept: CT IMAGING | Age: 88
Discharge: HOME | End: 2023-08-30
Attending: INTERNAL MEDICINE
Payer: MEDICARE

## 2023-08-30 DIAGNOSIS — R93.5 ABNORMAL ABDOMINAL ULTRASOUND: ICD-10-CM

## 2023-08-30 DIAGNOSIS — R74.8 ACID PHOSPHATASE ELEVATED: ICD-10-CM

## 2023-08-30 LAB
CREAT SERPL-MCNC: 1 MG/DL (ref 0.8–1.3)
GFR SERPLBLD CREATININE-BSD FMLA CKD-EPI: >60 ML/MIN/{1.73_M2}

## 2023-08-30 PROCEDURE — 6360000004 HC RX CONTRAST MEDICATION: Performed by: INTERNAL MEDICINE

## 2023-08-30 PROCEDURE — 36415 COLL VENOUS BLD VENIPUNCTURE: CPT

## 2023-08-30 PROCEDURE — 82565 ASSAY OF CREATININE: CPT

## 2023-08-30 PROCEDURE — 74177 CT ABD & PELVIS W/CONTRAST: CPT

## 2023-08-30 RX ADMIN — IOMEPROL INJECTION 100 ML: 714 INJECTION, SOLUTION INTRAVASCULAR at 12:17

## 2023-09-07 ENCOUNTER — OFFICE VISIT (OUTPATIENT)
Dept: NEUROLOGY | Age: 88
End: 2023-09-07
Payer: MEDICARE

## 2023-09-07 VITALS
WEIGHT: 227 LBS | BODY MASS INDEX: 32.57 KG/M2 | HEART RATE: 85 BPM | DIASTOLIC BLOOD PRESSURE: 81 MMHG | SYSTOLIC BLOOD PRESSURE: 141 MMHG

## 2023-09-07 DIAGNOSIS — G20 PARKINSON'S DISEASE (HCC): Primary | ICD-10-CM

## 2023-09-07 DIAGNOSIS — G40.909 SEIZURE DISORDER (HCC): ICD-10-CM

## 2023-09-07 DIAGNOSIS — G60.9 IDIOPATHIC PERIPHERAL NEUROPATHY: ICD-10-CM

## 2023-09-07 PROCEDURE — 1036F TOBACCO NON-USER: CPT | Performed by: PSYCHIATRY & NEUROLOGY

## 2023-09-07 PROCEDURE — G8417 CALC BMI ABV UP PARAM F/U: HCPCS | Performed by: PSYCHIATRY & NEUROLOGY

## 2023-09-07 PROCEDURE — 1123F ACP DISCUSS/DSCN MKR DOCD: CPT | Performed by: PSYCHIATRY & NEUROLOGY

## 2023-09-07 PROCEDURE — 99214 OFFICE O/P EST MOD 30 MIN: CPT | Performed by: PSYCHIATRY & NEUROLOGY

## 2023-09-07 PROCEDURE — G8428 CUR MEDS NOT DOCUMENT: HCPCS | Performed by: PSYCHIATRY & NEUROLOGY

## 2023-09-07 NOTE — PROGRESS NOTES
Gurwinder Herrera   Neurology followup    Subjective:   CC/HP  History was obtained from the patient. Additional history was obtained from his family. Patient has known Parkinson's disease. Patient's tremors seem to have improved on the Sinemet but he still has it to some extent. .  Patient has had previous COVID-19 pneumonia back in December 2020. He had pulmonary embolism and then a GI bleed. He was initially on anticoagulation but it was stopped later. Patient had few breakthrough seizures. He was seen by his primary care physician. A Keppra level was checked and was pretty much undetectable at less than 2. His dose was increased to Keppra 750 mg twice daily. Repeat level was therapeutic at 23.9 and patient has not had any further seizures. Binh Vasquez REVIEW OF SYSTEMS    Constitutional:  []   Chills   []  Fatigue   []  Fevers   []  Malaise   []  Weight loss     [x] Denies all of the above    Respiratory:   []  Cough    []  Shortness of breath         [x] Denies all of the above     Cardiovascular:   []  Chest pain    []  Exertional chest pressure/discomfort           [] Palpitations    []  Syncope     [x] Denies all of the above        Past Medical History:   Diagnosis Date    Acute diastolic (congestive) heart failure ([address]) 3/8/2022    GEOVANI (acute kidney injury) ([address]) 02/11/2021    Alcohol abuse 12/22/2020    Atrial fibrillation (HCC)     BPH (benign prostatic hyperplasia)     Dehydration     [facility] ([facility])     ESBL (extended spectrum beta-lactamase) producing bacteria infection 08/28/2021    Proteus mirabilis ESBL. Urine    Leg weakness, bilateral 2014, 2015    legs give out and pt collaspes    Seizure ([address]) 07/20/2017    UTI (urinary tract infection) 08/10/2011    admitted with high fever and fainted.      Family History   Problem Relation Age of Onset    Cancer Mother         pancreatic    Diabetes Father

## 2023-09-12 ENCOUNTER — OFFICE VISIT (OUTPATIENT)
Dept: FAMILY MEDICINE CLINIC | Age: 88
End: 2023-09-12
Payer: MEDICARE

## 2023-09-12 VITALS
WEIGHT: 237 LBS | HEIGHT: 70 IN | HEART RATE: 76 BPM | DIASTOLIC BLOOD PRESSURE: 78 MMHG | TEMPERATURE: 98.2 F | SYSTOLIC BLOOD PRESSURE: 128 MMHG | BODY MASS INDEX: 33.93 KG/M2

## 2023-09-12 DIAGNOSIS — R56.9 SEIZURE (HCC): ICD-10-CM

## 2023-09-12 DIAGNOSIS — R74.8 ELEVATED ALKALINE PHOSPHATASE LEVEL: ICD-10-CM

## 2023-09-12 DIAGNOSIS — G20 PARKINSON'S DISEASE (HCC): Primary | ICD-10-CM

## 2023-09-12 DIAGNOSIS — D64.9 ANEMIA, UNSPECIFIED TYPE: ICD-10-CM

## 2023-09-12 PROCEDURE — G8417 CALC BMI ABV UP PARAM F/U: HCPCS | Performed by: FAMILY MEDICINE

## 2023-09-12 PROCEDURE — 1036F TOBACCO NON-USER: CPT | Performed by: FAMILY MEDICINE

## 2023-09-12 PROCEDURE — 1123F ACP DISCUSS/DSCN MKR DOCD: CPT | Performed by: FAMILY MEDICINE

## 2023-09-12 PROCEDURE — G8427 DOCREV CUR MEDS BY ELIG CLIN: HCPCS | Performed by: FAMILY MEDICINE

## 2023-09-12 PROCEDURE — 99214 OFFICE O/P EST MOD 30 MIN: CPT | Performed by: FAMILY MEDICINE

## 2023-09-12 ASSESSMENT — ENCOUNTER SYMPTOMS
DIARRHEA: 0
ABDOMINAL PAIN: 0
CONSTIPATION: 0
BLOOD IN STOOL: 0
SHORTNESS OF BREATH: 0

## 2023-09-12 NOTE — PATIENT INSTRUCTIONS
Consider the RSV vaccine  Call dr Ricco Cordon to see if anything further needs to be done   No change   See in about 5 months

## 2023-09-14 DIAGNOSIS — G20 PARKINSON'S DISEASE (HCC): ICD-10-CM

## 2023-09-15 NOTE — TELEPHONE ENCOUNTER
Per LOV 9/7/23    Since patient is doing fairly well from a neurological standpoint, he will get his further refills from Dr. Raghu Ross and I will see him on a as needed basis.

## 2023-09-20 DIAGNOSIS — D64.9 ANEMIA, UNSPECIFIED TYPE: ICD-10-CM

## 2023-09-20 DIAGNOSIS — R74.8 ELEVATED ALKALINE PHOSPHATASE LEVEL: ICD-10-CM

## 2023-09-20 LAB
ALBUMIN SERPL-MCNC: 3.8 G/DL (ref 3.4–5)
ALP SERPL-CCNC: 197 U/L (ref 40–129)
ALT SERPL-CCNC: 6 U/L (ref 10–40)
AST SERPL-CCNC: 17 U/L (ref 15–37)
BASOPHILS # BLD: 0.1 K/UL (ref 0–0.2)
BASOPHILS NFR BLD: 0.6 %
BILIRUB DIRECT SERPL-MCNC: <0.2 MG/DL (ref 0–0.3)
BILIRUB INDIRECT SERPL-MCNC: ABNORMAL MG/DL (ref 0–1)
BILIRUB SERPL-MCNC: 0.7 MG/DL (ref 0–1)
DEPRECATED RDW RBC AUTO: 14.1 % (ref 12.4–15.4)
EOSINOPHIL # BLD: 0.2 K/UL (ref 0–0.6)
EOSINOPHIL NFR BLD: 2.3 %
FOLATE SERPL-MCNC: >20 NG/ML (ref 4.78–24.2)
HCT VFR BLD AUTO: 35.9 % (ref 40.5–52.5)
HGB BLD-MCNC: 12.5 G/DL (ref 13.5–17.5)
IRON SATN MFR SERPL: 24 % (ref 20–50)
IRON SERPL-MCNC: 70 UG/DL (ref 59–158)
LYMPHOCYTES # BLD: 2.6 K/UL (ref 1–5.1)
LYMPHOCYTES NFR BLD: 27.5 %
MCH RBC QN AUTO: 32.9 PG (ref 26–34)
MCHC RBC AUTO-ENTMCNC: 34.9 G/DL (ref 31–36)
MCV RBC AUTO: 94.1 FL (ref 80–100)
MONOCYTES # BLD: 1.3 K/UL (ref 0–1.3)
MONOCYTES NFR BLD: 13.3 %
NEUTROPHILS # BLD: 5.3 K/UL (ref 1.7–7.7)
NEUTROPHILS NFR BLD: 56.3 %
PLATELET # BLD AUTO: 393 K/UL (ref 135–450)
PMV BLD AUTO: 9.3 FL (ref 5–10.5)
PROT SERPL-MCNC: 7 G/DL (ref 6.4–8.2)
RBC # BLD AUTO: 3.81 M/UL (ref 4.2–5.9)
TIBC SERPL-MCNC: 288 UG/DL (ref 260–445)
VIT B12 SERPL-MCNC: 1021 PG/ML (ref 211–911)
WBC # BLD AUTO: 9.4 K/UL (ref 4–11)

## 2023-09-25 DIAGNOSIS — G20.A1 PARKINSON'S DISEASE: ICD-10-CM

## 2023-10-24 NOTE — PROGRESS NOTES
[address]  time 0800_________        Surgery time__0930_________    Take the following medications with a sip of water: Follow your MD/Surgeons pre-procedure instructions regarding your medications     Do not eat or drink anything after 12:00 midnight prior to your surgery. This includes water chewing gum, mints and ice chips. You may brush your teeth and gargle the morning of your surgery, but do not swallow the water     Please see your family doctor/pediatrician for a history and physical and/or concerning medications. Bring any test results/reports from your physicians office. If you are under the care of a heart doctor or specialist doctor, please be aware that you may be asked to them for clearance    You may be asked to stop blood thinners such as Coumadin, Plavix, Fragmin, Lovenox, etc., or any anti-inflammatories such as:  Aspirin, Ibuprofen, Advil, Naproxen prior to your surgery. We also ask that you stop any OTC medications such as fish oil, vitamin E, glucosamine, garlic, Multivitamins, COQ 10, etc.    We ask that you do not smoke 24 hours prior to surgery  We ask that you do not  drink any alcoholic beverages 24 hours prior to surgery     You must make arrangements for a responsible adult to take you home after your surgery. For your safety you will not be allowed to leave alone or drive yourself home. Your surgery will be cancelled if you do not have a ride home. Also for your safety, it is strongly suggested that someone stay with you the first 24 hours after your surgery. A parent or legal guardian must accompany a child scheduled for surgery and plan to stay at the hospital until the child is discharged. Please do not bring other children with you. For your comfort, please wear simple loose fitting clothing to the hospital.  Please do not bring valuables.     Do not wear any make-up or nail polish on your fingers or

## 2023-10-24 NOTE — PROGRESS NOTES
[facility] Pre-Admission Testing Electronic Communication Worksheet for OR/ENDO Procedures        Patient: Nicki Yee    DOS: 10/27/23    Arrival Time: 9:30AM    Surgery Time: 8AM    Meds to Bed:  [] YES    [x]  NO    Transportation Confirmed: [x] YES    []  NO SON EDWARD    History and Physical:  [] YES    []  NO  [x] N/A  If yes, please list doctor or Urgent Care and date of H&P:  [facility] DR. GGHASTINE    Additional Clearance(Cardiac, Pulmonary, etc):  [] YES    [x]  NO    Pre-Admission Testing Visit:  [] YES    [x]  NO If no, do labs/testing need to be done DOS? [] YES    [x]  NO    Medication Reconciliation Complete:  [x] YES    []  NO        Additional Notes:   FOLLOW UP ON RECTAL BLEEDING OR ? STOMACH BLEEDING-?  ANEMIA    INDWELLING ESTRADA CATHETER              Interview Complete: [x] YES    []  NO          Nery Holliday RN  12:45 PM

## 2023-10-26 ENCOUNTER — ANESTHESIA EVENT (OUTPATIENT)
Dept: ENDOSCOPY | Age: 88
End: 2023-10-26
Payer: MEDICARE

## 2023-10-27 ENCOUNTER — ANESTHESIA (OUTPATIENT)
Dept: ENDOSCOPY | Age: 88
End: 2023-10-27
Payer: MEDICARE

## 2023-10-27 ENCOUNTER — HOSPITAL ENCOUNTER (OUTPATIENT)
Age: 88
Setting detail: OUTPATIENT SURGERY
Discharge: HOME | End: 2023-10-27
Attending: INTERNAL MEDICINE | Admitting: INTERNAL MEDICINE
Payer: MEDICARE

## 2023-10-27 VITALS
RESPIRATION RATE: 16 BRPM | SYSTOLIC BLOOD PRESSURE: 158 MMHG | OXYGEN SATURATION: 92 % | WEIGHT: 231.48 LBS | BODY MASS INDEX: 35.08 KG/M2 | HEIGHT: 68 IN | TEMPERATURE: 96.9 F | HEART RATE: 101 BPM | DIASTOLIC BLOOD PRESSURE: 103 MMHG

## 2023-10-27 DIAGNOSIS — D64.9 ANEMIA, UNSPECIFIED TYPE: ICD-10-CM

## 2023-10-27 PROCEDURE — 2580000003 HC RX 258: Performed by: ANESTHESIOLOGY

## 2023-10-27 PROCEDURE — 6370000000 HC RX 637 (ALT 250 FOR IP): Performed by: INTERNAL MEDICINE

## 2023-10-27 PROCEDURE — 7100000000 HC PACU RECOVERY - FIRST 15 MIN: Performed by: INTERNAL MEDICINE

## 2023-10-27 PROCEDURE — 6360000002 HC RX W HCPCS: Performed by: NURSE ANESTHETIST, CERTIFIED REGISTERED

## 2023-10-27 PROCEDURE — 3700000001 HC ADD 15 MINUTES (ANESTHESIA): Performed by: INTERNAL MEDICINE

## 2023-10-27 PROCEDURE — 2709999900 HC NON-CHARGEABLE SUPPLY: Performed by: INTERNAL MEDICINE

## 2023-10-27 PROCEDURE — 3609010600 HC COLONOSCOPY POLYPECTOMY SNARE/COLD BIOPSY: Performed by: INTERNAL MEDICINE

## 2023-10-27 PROCEDURE — 7100000010 HC PHASE II RECOVERY - FIRST 15 MIN: Performed by: INTERNAL MEDICINE

## 2023-10-27 PROCEDURE — 7100000011 HC PHASE II RECOVERY - ADDTL 15 MIN: Performed by: INTERNAL MEDICINE

## 2023-10-27 PROCEDURE — 7100000001 HC PACU RECOVERY - ADDTL 15 MIN: Performed by: INTERNAL MEDICINE

## 2023-10-27 PROCEDURE — 3609012400 HC EGD TRANSORAL BIOPSY SINGLE/MULTIPLE: Performed by: INTERNAL MEDICINE

## 2023-10-27 PROCEDURE — 2500000003 HC RX 250 WO HCPCS: Performed by: NURSE ANESTHETIST, CERTIFIED REGISTERED

## 2023-10-27 PROCEDURE — 3700000000 HC ANESTHESIA ATTENDED CARE: Performed by: INTERNAL MEDICINE

## 2023-10-27 PROCEDURE — 88305 TISSUE EXAM BY PATHOLOGIST: CPT

## 2023-10-27 PROCEDURE — 3609010300 HC COLONOSCOPY W/BIOPSY SINGLE/MULTIPLE: Performed by: INTERNAL MEDICINE

## 2023-10-27 RX ORDER — SODIUM CHLORIDE 0.9 % (FLUSH) 0.9 %
5-40 SYRINGE (ML) INJECTION EVERY 12 HOURS SCHEDULED
Status: DISCONTINUED | OUTPATIENT
Start: 2023-10-27 | End: 2023-10-27 | Stop reason: HOSPADM

## 2023-10-27 RX ORDER — SODIUM CHLORIDE 0.9 % (FLUSH) 0.9 %
5-40 SYRINGE (ML) INJECTION PRN
Status: DISCONTINUED | OUTPATIENT
Start: 2023-10-27 | End: 2023-10-27 | Stop reason: HOSPADM

## 2023-10-27 RX ORDER — PROPOFOL 10 MG/ML
INJECTION, EMULSION INTRAVENOUS PRN
Status: DISCONTINUED | OUTPATIENT
Start: 2023-10-27 | End: 2023-10-27 | Stop reason: SDUPTHER

## 2023-10-27 RX ORDER — LABETALOL HYDROCHLORIDE 5 MG/ML
5 INJECTION, SOLUTION INTRAVENOUS
Status: DISCONTINUED | OUTPATIENT
Start: 2023-10-27 | End: 2023-10-27 | Stop reason: HOSPADM

## 2023-10-27 RX ORDER — LIDOCAINE HYDROCHLORIDE 20 MG/ML
INJECTION, SOLUTION EPIDURAL; INFILTRATION; INTRACAUDAL; PERINEURAL PRN
Status: DISCONTINUED | OUTPATIENT
Start: 2023-10-27 | End: 2023-10-27 | Stop reason: SDUPTHER

## 2023-10-27 RX ORDER — FENTANYL CITRATE 50 UG/ML
25 INJECTION, SOLUTION INTRAMUSCULAR; INTRAVENOUS EVERY 5 MIN PRN
Status: DISCONTINUED | OUTPATIENT
Start: 2023-10-27 | End: 2023-10-27 | Stop reason: HOSPADM

## 2023-10-27 RX ORDER — SODIUM CHLORIDE 9 MG/ML
INJECTION, SOLUTION INTRAVENOUS PRN
Status: DISCONTINUED | OUTPATIENT
Start: 2023-10-27 | End: 2023-10-27 | Stop reason: HOSPADM

## 2023-10-27 RX ORDER — SIMETHICONE 20 MG/.3ML
EMULSION ORAL PRN
Status: DISCONTINUED | OUTPATIENT
Start: 2023-10-27 | End: 2023-10-27 | Stop reason: ALTCHOICE

## 2023-10-27 RX ORDER — DIPHENHYDRAMINE HYDROCHLORIDE 50 MG/ML
12.5 INJECTION INTRAMUSCULAR; INTRAVENOUS
Status: DISCONTINUED | OUTPATIENT
Start: 2023-10-27 | End: 2023-10-27 | Stop reason: HOSPADM

## 2023-10-27 RX ORDER — MEPERIDINE HYDROCHLORIDE 25 MG/ML
12.5 INJECTION INTRAMUSCULAR; INTRAVENOUS; SUBCUTANEOUS EVERY 5 MIN PRN
Status: DISCONTINUED | OUTPATIENT
Start: 2023-10-27 | End: 2023-10-27 | Stop reason: HOSPADM

## 2023-10-27 RX ORDER — ONDANSETRON 2 MG/ML
4 INJECTION INTRAMUSCULAR; INTRAVENOUS
Status: DISCONTINUED | OUTPATIENT
Start: 2023-10-27 | End: 2023-10-27 | Stop reason: HOSPADM

## 2023-10-27 RX ADMIN — LIDOCAINE HYDROCHLORIDE 100 MG: 20 INJECTION, SOLUTION EPIDURAL; INFILTRATION; INTRACAUDAL; PERINEURAL at 09:49

## 2023-10-27 RX ADMIN — SODIUM CHLORIDE: 9 INJECTION, SOLUTION INTRAVENOUS at 09:44

## 2023-10-27 RX ADMIN — PROPOFOL 150 MCG/KG/MIN: 10 INJECTION, EMULSION INTRAVENOUS at 09:50

## 2023-10-27 RX ADMIN — PROPOFOL 50 MG: 10 INJECTION, EMULSION INTRAVENOUS at 09:49

## 2023-10-27 ASSESSMENT — PAIN - FUNCTIONAL ASSESSMENT: PAIN_FUNCTIONAL_ASSESSMENT: NONE - DENIES PAIN

## 2023-10-27 ASSESSMENT — ENCOUNTER SYMPTOMS: SHORTNESS OF BREATH: 0

## 2023-10-27 ASSESSMENT — LIFESTYLE VARIABLES: SMOKING_STATUS: 0

## 2023-10-27 NOTE — H&P
Gastroenteroloy   Attending Pre-operative History and Physical      PROCEDURE:  EGD and Colonoscopy    Indication:The patient is a 80 y.o. male presents for an upper endoscopy and colonoscopy. Past Medical History:    Past Medical History:   Diagnosis Date    Acute diastolic (congestive) heart failure ([address]) 03/08/2022    GEOVANI (acute kidney injury) ([address]) 02/11/2021    Alcohol abuse 12/22/2020    Basal cell adenocarcinoma     BACK    Bilateral lower extremity edema     BPH (benign prostatic hyperplasia)     Dehydration     [facility] ([facility])     ESBL (extended spectrum beta-lactamase) producing bacteria infection 08/28/2021    Proteus mirabilis ESBL. Urine    Full dentures     H/O gastric ulcer     Indwelling Ambrosio catheter present     Leg weakness, bilateral 2014, 2015    legs give out and pt collaspes    Parkinson disease     RIGHT HAND    Pulmonary embolism ([address])     Seizure ([address]) 07/20/2017    LAST ONE-SUMMER 2022    UTI (urinary tract infection) 08/10/2011    admitted with high fever and fainted. Wears hearing aid in both ears       Past Surgical History:    Past Surgical History:   Procedure Laterality Date    CATARACT REMOVAL Bilateral     SPLENECTOMY, TOTAL      at age 15 y old. Medications Prior to Admission:   Prior to Admission medications    Medication Sig Start Date End Date Taking?  Authorizing Provider   levETIRAcetam (KEPPRA) 500 MG tablet TAKE 1 AND 1/2 TABLET BY MOUTH TWICE A DAY 10/24/23   Eudelia Overall, MD   carbidopa-levodopa (SINEMET)  MG per tablet TAKE ONE TABLET BY MOUTH THREE TIMES A DAY 9/25/23   Eudelia Overall, MD   pantoprazole (PROTONIX) 40 MG tablet TAKE ONE TABLET BY MOUTH EVERY EVENING 8/17/23   Eudelia Overall, MD   tamsulosin [facility]) 0.4 MG capsule TAKE ONE CAPSULE BY MOUTH EVERY MORNING 8/16/23   Eudelia Overall, MD   furosemide (LASIX) 20 MG tablet TAKE ONE TABLET BY MOUTH TWICE A DAY 7/10/23   Eudelia Overall, MD   acetaminophen (TYLENOL) 325 MG tablet Take 2

## 2023-10-27 NOTE — DISCHARGE INSTRUCTIONS

## 2023-10-27 NOTE — OP NOTE
Esophagogastroduodenoscopy and Colonoscopy Note    Patient:   Dipti Yanez    :    1935    Facility:   [facility] [Outpatient]   Referring/PCP: Christine Kocher, MD    Procedure:   Esophagogastroduodenoscopy and Colonoscopy  Date:     10/27/2023   Endoscopist:  Wanda Cruz MD     Preoperative Diagnosis:   Anemia    Postoperative Diagnosis: Gastritis. Stricture at the junction of the duodenal bulb and descending duodenum. Small hiatal hernia. Anesthesia:  MAC    Estimated blood loss: Minimal    Complications: None    Specimen: Biopsy of gastric mucosa, cecal polyp, erythematous mucosa in the distal ascending colon, suspect focal diverticulitis. Diverticular disease. Instrument: ,     Description of Procedure:  Informed consent was obtained from the patient after explanation of the procedures including indications, description of the procedures,  benefits and possible risks and complications of the procedures, and alternatives. Questions were answered. The patient's history was reviewed and a directed physical examination was performed prior to the procedures. Patient was monitored throughout the procedures with pulse oximetry and periodic assessment of vital signs. Patient was sedated as noted above. With the patient in the left lateral decubitus position, the Olympus videoendoscope was placed in the patient's mouth and under direct visualization passed into the esophagus. Visualization of the esophagus, stomach, and duodenum was performed during both introduction and withdrawal of the endoscope and retroflexed view of the proximal stomach was obtained. The scope was passed to the 2nd portion of the duodenum. Next, with the patient in the left lateral decubitus position, a digital rectal examination was performed and revealed negative without mass, lesions or tenderness.   The Olympus video colonoscope was placed in the patient's rectum and

## 2023-10-27 NOTE — ANESTHESIA POSTPROCEDURE EVALUATION
Department of Anesthesiology  Postprocedure Note    Patient: Alexis Vergara  MRN: [id number]  YOB: 1935  Date of evaluation: 10/27/2023      Procedure Summary       Date: 10/27/23 Room / Location: [address] [place] [address]    Anesthesia Start: 0945 Anesthesia Stop: 5552    Procedures:       COLONOSCOPY POLYPECTOMY SNARE/COLD BIOPSY      EGD BIOPSY      COLONOSCOPY WITH BIOPSY Diagnosis:       Anemia, unspecified type      (Anemia, unspecified type [D64.9])    Surgeons: Miriam Page MD Responsible Provider: Ninfa Morris MD    Anesthesia Type: MAC ASA Status: 3            Anesthesia Type: No value filed.     Bharath Phase I: Bharath Score: 8    Bharath Phase II:        Anesthesia Post Evaluation    Patient location during evaluation: PACU  Patient participation: complete - patient participated  Level of consciousness: awake  Airway patency: patent  Nausea & Vomiting: no nausea  Complications: no  Cardiovascular status: hemodynamically stable  Respiratory status: acceptable  Hydration status: euvolemic  Multimodal analgesia pain management approach

## 2023-11-09 ENCOUNTER — TELEPHONE (OUTPATIENT)
Dept: FAMILY MEDICINE CLINIC | Age: 88
End: 2023-11-09

## 2023-11-09 DIAGNOSIS — R56.9 SEIZURE (HCC): Primary | ICD-10-CM

## 2023-11-09 NOTE — TELEPHONE ENCOUNTER
[facility] nurse called, patient son reported he had a seizure he did not fall or get hurt.      Please advise, [phone]

## 2023-11-17 LAB — LEVETIRACETAM: 20 MCG/ML (ref 6–46)

## 2024-01-03 RX ORDER — FUROSEMIDE 20 MG/1
20 TABLET ORAL 2 TIMES DAILY
Qty: 180 TABLET | Refills: 1 | Status: SHIPPED | OUTPATIENT
Start: 2024-01-03

## 2024-01-17 ENCOUNTER — TELEPHONE (OUTPATIENT)
Dept: FAMILY MEDICINE CLINIC | Age: 89
End: 2024-01-17

## 2024-01-17 NOTE — TELEPHONE ENCOUNTER
[place] for patient regarding Initial AWV. Patient has an appointment with Dr. Payton on 2-. Can see him after his appointment same day.

## 2024-01-19 PROCEDURE — G0179 MD RECERTIFICATION HHA PT: HCPCS | Performed by: FAMILY MEDICINE

## 2024-01-19 RX ORDER — LEVETIRACETAM 500 MG/1
TABLET ORAL
Qty: 270 TABLET | Refills: 1 | Status: SHIPPED | OUTPATIENT
Start: 2024-01-19

## 2024-01-23 ENCOUNTER — TELEPHONE (OUTPATIENT)
Dept: FAMILY MEDICINE CLINIC | Age: 89
End: 2024-01-23
Payer: MEDICARE

## 2024-01-23 DIAGNOSIS — Z46.6 ENCOUNTER FOR FITTING AND ADJUSTMENT OF URINARY DEVICE: ICD-10-CM

## 2024-02-05 RX ORDER — TAMSULOSIN HYDROCHLORIDE 0.4 MG/1
CAPSULE ORAL
Qty: 90 CAPSULE | Refills: 1 | Status: SHIPPED | OUTPATIENT
Start: 2024-02-05

## 2024-02-12 ENCOUNTER — TELEPHONE (OUTPATIENT)
Dept: FAMILY MEDICINE CLINIC | Age: 89
End: 2024-02-12

## 2024-02-12 RX ORDER — PANTOPRAZOLE SODIUM 40 MG/1
TABLET, DELAYED RELEASE ORAL
Qty: 90 TABLET | Refills: 1 | Status: SHIPPED | OUTPATIENT
Start: 2024-02-12

## 2024-02-12 NOTE — TELEPHONE ENCOUNTER
[place] for patient regarding AWV. Patient has an appointment tomorrow with Dr. Payton. Patient can schedule his AWV after this appointment.

## 2024-02-13 ENCOUNTER — OFFICE VISIT (OUTPATIENT)
Dept: FAMILY MEDICINE CLINIC | Age: 89
End: 2024-02-13
Payer: MEDICARE

## 2024-02-13 VITALS
WEIGHT: 232 LBS | TEMPERATURE: 97.2 F | HEART RATE: 84 BPM | SYSTOLIC BLOOD PRESSURE: 128 MMHG | HEIGHT: 68 IN | DIASTOLIC BLOOD PRESSURE: 80 MMHG | BODY MASS INDEX: 35.16 KG/M2

## 2024-02-13 DIAGNOSIS — R56.9 SEIZURE (HCC): Primary | ICD-10-CM

## 2024-02-13 DIAGNOSIS — I50.32 CHRONIC DIASTOLIC CONGESTIVE HEART FAILURE (HCC): ICD-10-CM

## 2024-02-13 DIAGNOSIS — G20.A1 PARKINSON'S DISEASE, UNSPECIFIED WHETHER DYSKINESIA PRESENT, UNSPECIFIED WHETHER MANIFESTATIONS FLUCTUATE: ICD-10-CM

## 2024-02-13 DIAGNOSIS — Z00.00 INITIAL MEDICARE ANNUAL WELLNESS VISIT: Primary | ICD-10-CM

## 2024-02-13 PROCEDURE — G0438 PPPS, INITIAL VISIT: HCPCS | Performed by: FAMILY MEDICINE

## 2024-02-13 PROCEDURE — 1036F TOBACCO NON-USER: CPT | Performed by: FAMILY MEDICINE

## 2024-02-13 PROCEDURE — 1123F ACP DISCUSS/DSCN MKR DOCD: CPT | Performed by: FAMILY MEDICINE

## 2024-02-13 PROCEDURE — G8417 CALC BMI ABV UP PARAM F/U: HCPCS | Performed by: FAMILY MEDICINE

## 2024-02-13 PROCEDURE — G8484 FLU IMMUNIZE NO ADMIN: HCPCS | Performed by: FAMILY MEDICINE

## 2024-02-13 PROCEDURE — 99214 OFFICE O/P EST MOD 30 MIN: CPT | Performed by: FAMILY MEDICINE

## 2024-02-13 PROCEDURE — G8427 DOCREV CUR MEDS BY ELIG CLIN: HCPCS | Performed by: FAMILY MEDICINE

## 2024-02-13 RX ORDER — LEVETIRACETAM 500 MG/1
TABLET ORAL
Qty: 315 TABLET | Refills: 1 | Status: SHIPPED | OUTPATIENT
Start: 2024-02-13

## 2024-02-13 NOTE — PATIENT INSTRUCTIONS
Increase the keppra to one and half in am and take 2 in the pm   See the neurologist  Do a blood test in 2 weeks   See me back in 3 months

## 2024-02-13 NOTE — PROGRESS NOTES
Subjective:      Patient ID: Marco Antonio Price is a 88 y.o. male.    Chief Complaint   Patient presents with    Check-Up        Patient presents with:  Check-Up    Here for above  Here with his son who give hx     Has had some seizures  Brief  On the medicines   Last seizures on the 17th  About 1 minute   And will have for several min after lethargy  He will have some loss of tone  Arms drop down  And have head movements  To the side   In last three months he has had 2  No loss of bowel    Patient has no c/o himself  No other c/o from son     Meds noted     YOB: 1935    Date of Visit:  2/13/2024    No Known Allergies    Current Outpatient Medications:  pantoprazole (PROTONIX) 40 MG tablet, TAKE ONE TABLET BY MOUTH EVERY EVENING, Disp: 90 tablet, Rfl: 1  tamsulosin (FLOMAX) 0.4 MG capsule, TAKE ONE CAPSULE BY MOUTH EVERY MORNING, Disp: 90 capsule, Rfl: 1  levETIRAcetam (KEPPRA) 500 MG tablet, TAKE 1 AND 1/2 TABLET BY MOUTH TWO TIMES A DAY, Disp: 270 tablet, Rfl: 1  furosemide (LASIX) 20 MG tablet, TAKE 1 TABLET BY MOUTH TWICE A DAY, Disp: 180 tablet, Rfl: 1  carbidopa-levodopa (SINEMET)  MG per tablet, TAKE ONE TABLET BY MOUTH THREE TIMES A DAY, Disp: 270 tablet, Rfl: 1  acetaminophen (TYLENOL) 325 MG tablet, Take 2 tablets by mouth every 4 hours as needed for Fever, Disp: , Rfl:   Multiple Vitamin (MULTIVITAMIN) TABS tablet, Take 1 tablet by mouth daily, Disp: 30 tablet, Rfl: 0    No current facility-administered medications for this visit.      ---------------------------               02/13/24                      1407         ---------------------------   BP:          128/80         Site:    Left Upper Arm     Position:     Sitting        Cuff Size:   Large Adult      Pulse:         84           Temp:   97.2 °F (36.2 °C)   TempSrc:    Temporal        Weight: 105.2 kg (232 lb)   Height:  1.727 m (5' 8\")   ---------------------------  Body mass index is

## 2024-02-13 NOTE — PROGRESS NOTES
Medicare Annual Wellness Visit    Marco Antonio Price is here for Medicare AWV    Assessment & Plan   Initial Medicare annual wellness visit  Recommendations for Preventive Services Due: see orders and patient instructions/AVS.  Recommended screening schedule for the next 5-10 years is provided to the patient in written form: see Patient Instructions/AVS.     Return in 1 year (on 2/13/2025).     Subjective   Medicare AWV    Patient's complete Health Risk Assessment and screening values have been reviewed and are found in Flowsheets. The following problems were reviewed today and where indicated follow up appointments were made and/or referrals ordered.    Positive Risk Factor Screenings with Interventions:                Activity, Diet, and Weight:  On average, how many days per week do you engage in moderate to strenuous exercise (like a brisk walk)?: 7 days  On average, how many minutes do you engage in exercise at this level?: 40 min    Do you eat balanced/healthy meals regularly?: Yes    There is no height or weight on file to calculate BMI. (!) Abnormal    Obesity Interventions:  See AVS for additional education material             Hearing Screen:  Do you or your family notice any trouble with your hearing that hasn't been managed with hearing aids?: (!) Yes    Interventions:  See AVS for additional education material    Vision Screen:  Do you have difficulty driving, watching TV, or doing any of your daily activities because of your eyesight?: No  Have you had an eye exam within the past year?: (!) No  No results found.    Interventions:   See AVS for additional education material     ADL's:   Patient reports needing help with:  Select all that apply: (!) Bathing  Select all that apply: (!) Housekeeping, Transportation, Shopping  Interventions:  See AVS for additional education material                  Objective   There were no vitals filed for this visit.   There is no height or weight on file to calculate BMI.

## 2024-03-07 DIAGNOSIS — R56.9 SEIZURE (HCC): ICD-10-CM

## 2024-03-07 DIAGNOSIS — I50.32 CHRONIC DIASTOLIC CONGESTIVE HEART FAILURE (HCC): ICD-10-CM

## 2024-03-07 LAB
BASOPHILS # BLD: 0.1 K/UL (ref 0–0.2)
BASOPHILS NFR BLD: 0.7 %
DEPRECATED RDW RBC AUTO: 13.9 % (ref 12.4–15.4)
EOSINOPHIL # BLD: 0.2 K/UL (ref 0–0.6)
EOSINOPHIL NFR BLD: 2.3 %
HCT VFR BLD AUTO: 37 % (ref 40.5–52.5)
HGB BLD-MCNC: 12.9 G/DL (ref 13.5–17.5)
LEVETIRACETAM SERPL-MCNC: 47.9 UG/ML (ref 6–46)
LYMPHOCYTES # BLD: 2.2 K/UL (ref 1–5.1)
LYMPHOCYTES NFR BLD: 22.9 %
MCH RBC QN AUTO: 33 PG (ref 26–34)
MCHC RBC AUTO-ENTMCNC: 34.8 G/DL (ref 31–36)
MCV RBC AUTO: 94.9 FL (ref 80–100)
MEDICATION DOSE-MCNC: ABNORMAL
MONOCYTES # BLD: 1.2 K/UL (ref 0–1.3)
MONOCYTES NFR BLD: 12.1 %
NEUTROPHILS # BLD: 6 K/UL (ref 1.7–7.7)
NEUTROPHILS NFR BLD: 62 %
PLATELET # BLD AUTO: 359 K/UL (ref 135–450)
PMV BLD AUTO: 10.1 FL (ref 5–10.5)
RBC # BLD AUTO: 3.9 M/UL (ref 4.2–5.9)
WBC # BLD AUTO: 9.7 K/UL (ref 4–11)

## 2024-03-08 LAB
ALBUMIN SERPL-MCNC: 3.8 G/DL (ref 3.4–5)
ALBUMIN/GLOB SERPL: 1.1 {RATIO} (ref 1.1–2.2)
ALP SERPL-CCNC: 197 U/L (ref 40–129)
ALT SERPL-CCNC: <5 U/L (ref 10–40)
ANION GAP SERPL CALCULATED.3IONS-SCNC: 11 MMOL/L (ref 3–16)
AST SERPL-CCNC: 13 U/L (ref 15–37)
BILIRUB SERPL-MCNC: 0.5 MG/DL (ref 0–1)
BUN SERPL-MCNC: 11 MG/DL (ref 7–20)
CALCIUM SERPL-MCNC: 8.9 MG/DL (ref 8.3–10.6)
CHLORIDE SERPL-SCNC: 98 MMOL/L (ref 99–110)
CO2 SERPL-SCNC: 29 MMOL/L (ref 21–32)
CREAT SERPL-MCNC: 1 MG/DL (ref 0.8–1.3)
GFR SERPLBLD CREATININE-BSD FMLA CKD-EPI: >60 ML/MIN/{1.73_M2}
GLUCOSE SERPL-MCNC: 104 MG/DL (ref 70–99)
POTASSIUM SERPL-SCNC: 3.8 MMOL/L (ref 3.5–5.1)
PROT SERPL-MCNC: 7.2 G/DL (ref 6.4–8.2)
SODIUM SERPL-SCNC: 138 MMOL/L (ref 136–145)

## 2024-03-19 ENCOUNTER — TELEPHONE (OUTPATIENT)
Dept: FAMILY MEDICINE CLINIC | Age: 89
End: 2024-03-19
Payer: MEDICARE

## 2024-03-19 DIAGNOSIS — Z46.6 ENCOUNTER FOR FITTING AND ADJUSTMENT OF URINARY DEVICE: Primary | ICD-10-CM

## 2024-03-19 PROCEDURE — G0179 MD RECERTIFICATION HHA PT: HCPCS | Performed by: FAMILY MEDICINE

## 2024-03-21 NOTE — PROGRESS NOTES
Comprehensive Nutrition Assessment    Type and Reason for Visit:  Initial, Positive Nutrition Screen, Wound    Nutrition Recommendations/Plan:   Will monitor need ONS when po resumes   Will monitor nutritional adequacy, nutrition-related labs, weights, BMs, and clinical progress     Nutrition Assessment:  + Screen for wounds. Pt admitted for GEOVANI. Pt with hx of seizures, alcohol abuse, CKD. Pt is poor historian. Per EMR, pt with unstageable wound to knees and wrist. Currently NPO (for testing?), did not note any weight loss. Will monitor for supplement needs when po resumes. Malnutrition Assessment:  Malnutrition Status:  No malnutrition      Estimated Daily Nutrient Needs:  Energy (kcal):  8186-3770 kcal (15-20 kcal/kg ABW); Weight Used for Energy Requirements:  Current     Protein (g):   gm (1.2-2 gm/kg ABW); Weight Used for Protein Requirements:  Ideal        Fluid (ml/day):   ; Method Used for Fluid Requirements:  1 ml/kcal      Nutrition Related Findings:  Last BM 2/10      Wounds:  Unstageable, Multiple, Wound Consult Pending       Current Nutrition Therapies:    Diet NPO Effective Now    Anthropometric Measures:  · Height: 5' 10\" (177.8 cm)  · Current Body Weight: 219 lb (99.3 kg)   · Admission Body Weight: 229 lb (103.9 kg)    · Usual Body Weight: 205 lb (93 kg)     · Ideal Body Weight: 166 lbs; % Ideal Body Weight 131.9 %   · BMI: 31.4  · Adjusted Body Weight:  ; No Adjustment   · BMI Categories: Obese Class 1 (BMI 30.0-34. 9)       Nutrition Diagnosis:   · Increased nutrient needs related to increase demand for energy/nutrients as evidenced by wounds    Nutrition Interventions:   Food and/or Nutrient Delivery:  Continue NPO(start nutrition when appropriate)  Nutrition Education/Counseling:  No recommendation at this time   Coordination of Nutrition Care:  Continue to monitor while inpatient    Goals:   Tolerate diet and consume greater than 50% of meals and supplements on the discharge service for the patient. I have reviewed and made amendments to the documentation where necessary.

## 2024-03-24 ENCOUNTER — APPOINTMENT (OUTPATIENT)
Dept: GENERAL RADIOLOGY | Age: 89
DRG: 698 | End: 2024-03-24
Payer: MEDICARE

## 2024-03-24 ENCOUNTER — APPOINTMENT (OUTPATIENT)
Dept: CT IMAGING | Age: 89
DRG: 698 | End: 2024-03-24
Payer: MEDICARE

## 2024-03-24 ENCOUNTER — HOSPITAL ENCOUNTER (INPATIENT)
Age: 89
LOS: 3 days | Discharge: HOME | DRG: 698 | End: 2024-03-27
Attending: EMERGENCY MEDICINE | Admitting: INTERNAL MEDICINE
Payer: MEDICARE

## 2024-03-24 DIAGNOSIS — N39.0 URINARY TRACT INFECTION ASSOCIATED WITH INDWELLING URETHRAL CATHETER, INITIAL ENCOUNTER (HCC): ICD-10-CM

## 2024-03-24 DIAGNOSIS — I21.4 NSTEMI (NON-ST ELEVATED MYOCARDIAL INFARCTION) (HCC): Primary | ICD-10-CM

## 2024-03-24 DIAGNOSIS — Z71.89 GOALS OF CARE, COUNSELING/DISCUSSION: ICD-10-CM

## 2024-03-24 DIAGNOSIS — R65.20 SEVERE SEPSIS (HCC): ICD-10-CM

## 2024-03-24 DIAGNOSIS — T83.511A URINARY TRACT INFECTION ASSOCIATED WITH INDWELLING URETHRAL CATHETER, INITIAL ENCOUNTER (HCC): ICD-10-CM
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
## 2024-03-24 DIAGNOSIS — A41.9 SEVERE SEPSIS (HCC): ICD-10-CM

## 2024-03-24 DIAGNOSIS — J96.01 ACUTE RESPIRATORY FAILURE WITH HYPOXIA (HCC): ICD-10-CM

## 2024-03-24 PROBLEM — R55 TRANSIENT LOSS OF CONSCIOUSNESS: Status: ACTIVE | Noted: 2024-03-24

## 2024-03-24 PROBLEM — N30.00 ACUTE CYSTITIS WITHOUT HEMATURIA: Status: ACTIVE | Noted: 2024-03-24

## 2024-03-24 PROBLEM — G40.909 SEIZURE DISORDER (HCC): Status: ACTIVE | Noted: 2017-07-20

## 2024-03-24 PROBLEM — I50.33 ACUTE ON CHRONIC DIASTOLIC HEART FAILURE (HCC): Status: ACTIVE | Noted: 2022-03-08

## 2024-03-24 LAB
ALBUMIN SERPL-MCNC: 3.2 G/DL (ref 3.4–5)
ALBUMIN/GLOB SERPL: 0.9 {RATIO} (ref 1.1–2.2)
ALP SERPL-CCNC: 172 U/L (ref 40–129)
ALT SERPL-CCNC: 7 U/L (ref 10–40)
ANION GAP SERPL CALCULATED.3IONS-SCNC: 11 MMOL/L (ref 3–16)
APTT BLD: 30.2 SEC (ref 22.7–35.9)
AST SERPL-CCNC: 25 U/L (ref 15–37)
BACTERIA URNS QL MICRO: ABNORMAL /HPF
BASE EXCESS BLDV CALC-SCNC: 4.7 MMOL/L
BASOPHILS # BLD: 0 K/UL (ref 0–0.2)
BASOPHILS NFR BLD: 0 %
BILIRUB SERPL-MCNC: 1.4 MG/DL (ref 0–1)
BILIRUB UR QL STRIP.AUTO: NEGATIVE
BUN SERPL-MCNC: 16 MG/DL (ref 7–20)
CALCIUM OXALATE CRYSTALS: PRESENT
CALCIUM SERPL-MCNC: 8.3 MG/DL (ref 8.3–10.6)
CHLORIDE SERPL-SCNC: 102 MMOL/L (ref 99–110)
CK SERPL-CCNC: 1928 U/L (ref 39–308)
CK SERPL-CCNC: 2972 U/L (ref 39–308)
CLARITY UR: ABNORMAL
CO2 BLDV-SCNC: 31 MMOL/L
CO2 SERPL-SCNC: 26 MMOL/L (ref 21–32)
COHGB MFR BLDV: 2 %
COLOR UR: ABNORMAL
CREAT SERPL-MCNC: 1.3 MG/DL (ref 0.8–1.3)
DEPRECATED RDW RBC AUTO: 14 % (ref 12.4–15.4)
EOSINOPHIL # BLD: 0 K/UL (ref 0–0.6)
EOSINOPHIL NFR BLD: 0 %
EPI CELLS #/AREA URNS AUTO: 1 /HPF (ref 0–5)
FLUAV RNA UPPER RESP QL NAA+PROBE: NEGATIVE
FLUBV AG NPH QL: NEGATIVE
GFR SERPLBLD CREATININE-BSD FMLA CKD-EPI: 53 ML/MIN/{1.73_M2}
GLUCOSE SERPL-MCNC: 181 MG/DL (ref 70–99)
GLUCOSE UR STRIP.AUTO-MCNC: NEGATIVE MG/DL
HCO3 BLDV-SCNC: 29 MMOL/L (ref 23–29)
HCT VFR BLD AUTO: 35.5 % (ref 40.5–52.5)
HGB BLD-MCNC: 12.7 G/DL (ref 13.5–17.5)
HGB UR QL STRIP.AUTO: ABNORMAL
HYALINE CASTS #/AREA URNS AUTO: 30 /LPF (ref 0–8)
HYALINE CASTS: PRESENT
KETONES UR STRIP.AUTO-MCNC: ABNORMAL MG/DL
LACTATE BLDV-SCNC: 1.3 MMOL/L (ref 0.4–1.9)
LACTATE BLDV-SCNC: 2.3 MMOL/L (ref 0.4–1.9)
LEUKOCYTE ESTERASE UR QL STRIP.AUTO: ABNORMAL
LEVETIRACETAM SERPL-MCNC: 31.5 UG/ML (ref 6–46)
LYMPHOCYTES # BLD: 0.4 K/UL (ref 1–5.1)
LYMPHOCYTES NFR BLD: 2 %
MCH RBC QN AUTO: 33 PG (ref 26–34)
MCHC RBC AUTO-ENTMCNC: 35.6 G/DL (ref 31–36)
MCV RBC AUTO: 92.6 FL (ref 80–100)
MEDICATION DOSE-MCNC: NORMAL
METHGB MFR BLDV: 0.1 %
MONOCYTES # BLD: 0.6 K/UL (ref 0–1.3)
MONOCYTES NFR BLD: 3 %
NEUTROPHILS # BLD: 19.4 K/UL (ref 1.7–7.7)
NEUTROPHILS NFR BLD: 95 %
NITRITE UR QL STRIP.AUTO: NEGATIVE
NT-PROBNP SERPL-MCNC: 665 PG/ML (ref 0–449)
O2 THERAPY: NORMAL
PCO2 BLDV: 42.9 MMHG (ref 40–50)
PH BLDV: 7.44 [PH] (ref 7.35–7.45)
PH UR STRIP.AUTO: 6.5 [PH] (ref 5–8)
PLATELET # BLD AUTO: 319 K/UL (ref 135–450)
PLATELET BLD QL SMEAR: ADEQUATE
PMV BLD AUTO: 8.8 FL (ref 5–10.5)
PO2 BLDV: 44 MMHG
POTASSIUM SERPL-SCNC: 4.2 MMOL/L (ref 3.5–5.1)
PROT SERPL-MCNC: 6.7 G/DL (ref 6.4–8.2)
PROT UR STRIP.AUTO-MCNC: 100 MG/DL
RBC # BLD AUTO: 3.83 M/UL (ref 4.2–5.9)
RBC CLUMPS #/AREA URNS AUTO: 19 /HPF (ref 0–4)
RBC MORPH BLD: NORMAL
SAO2 % BLDV: 83 %
SARS-COV-2 RDRP RESP QL NAA+PROBE: NOT DETECTED
SLIDE REVIEW: ABNORMAL
SODIUM SERPL-SCNC: 139 MMOL/L (ref 136–145)
SP GR UR STRIP.AUTO: 1.02 (ref 1–1.03)
TROPONIN, HIGH SENSITIVITY: 131 NG/L (ref 0–22)
TROPONIN, HIGH SENSITIVITY: 183 NG/L (ref 0–22)
UA COMPLETE W REFLEX CULTURE PNL UR: YES
UA DIPSTICK W REFLEX MICRO PNL UR: YES
URN SPEC COLLECT METH UR: ABNORMAL
UROBILINOGEN UR STRIP-ACNC: 4 E.U./DL
WBC # BLD AUTO: 20.4 K/UL (ref 4–11)
WBC #/AREA URNS AUTO: 122 /HPF (ref 0–5)

## 2024-03-24 PROCEDURE — 72131 CT LUMBAR SPINE W/O DYE: CPT

## 2024-03-24 PROCEDURE — 96360 HYDRATION IV INFUSION INIT: CPT

## 2024-03-24 PROCEDURE — 87635 SARS-COV-2 COVID-19 AMP PRB: CPT

## 2024-03-24 PROCEDURE — 36415 COLL VENOUS BLD VENIPUNCTURE: CPT

## 2024-03-24 PROCEDURE — 81001 URINALYSIS AUTO W/SCOPE: CPT

## 2024-03-24 PROCEDURE — 85025 COMPLETE CBC W/AUTO DIFF WBC: CPT

## 2024-03-24 PROCEDURE — 87086 URINE CULTURE/COLONY COUNT: CPT

## 2024-03-24 PROCEDURE — 83605 ASSAY OF LACTIC ACID: CPT

## 2024-03-24 PROCEDURE — 70450 CT HEAD/BRAIN W/O DYE: CPT

## 2024-03-24 PROCEDURE — 87077 CULTURE AEROBIC IDENTIFY: CPT

## 2024-03-24 PROCEDURE — 85730 THROMBOPLASTIN TIME PARTIAL: CPT

## 2024-03-24 PROCEDURE — 6360000002 HC RX W HCPCS: Performed by: EMERGENCY MEDICINE

## 2024-03-24 PROCEDURE — 83880 ASSAY OF NATRIURETIC PEPTIDE: CPT

## 2024-03-24 PROCEDURE — 87186 SC STD MICRODIL/AGAR DIL: CPT

## 2024-03-24 PROCEDURE — 99285 EMERGENCY DEPT VISIT HI MDM: CPT

## 2024-03-24 PROCEDURE — 2580000003 HC RX 258: Performed by: INTERNAL MEDICINE

## 2024-03-24 PROCEDURE — 2060000000 HC ICU INTERMEDIATE R&B

## 2024-03-24 PROCEDURE — 80177 DRUG SCRN QUAN LEVETIRACETAM: CPT

## 2024-03-24 PROCEDURE — 84484 ASSAY OF TROPONIN QUANT: CPT

## 2024-03-24 PROCEDURE — 87804 INFLUENZA ASSAY W/OPTIC: CPT

## 2024-03-24 PROCEDURE — 6370000000 HC RX 637 (ALT 250 FOR IP): Performed by: INTERNAL MEDICINE

## 2024-03-24 PROCEDURE — 2580000003 HC RX 258: Performed by: EMERGENCY MEDICINE

## 2024-03-24 PROCEDURE — 82803 BLOOD GASES ANY COMBINATION: CPT

## 2024-03-24 PROCEDURE — 93005 ELECTROCARDIOGRAM TRACING: CPT | Performed by: EMERGENCY MEDICINE

## 2024-03-24 PROCEDURE — 80053 COMPREHEN METABOLIC PANEL: CPT

## 2024-03-24 PROCEDURE — 71045 X-RAY EXAM CHEST 1 VIEW: CPT

## 2024-03-24 PROCEDURE — 87040 BLOOD CULTURE FOR BACTERIA: CPT

## 2024-03-24 PROCEDURE — 6360000002 HC RX W HCPCS: Performed by: INTERNAL MEDICINE

## 2024-03-24 PROCEDURE — 82550 ASSAY OF CK (CPK): CPT

## 2024-03-24 RX ORDER — HEPARIN SODIUM 1000 [USP'U]/ML
4000 INJECTION, SOLUTION INTRAVENOUS; SUBCUTANEOUS ONCE
Status: DISCONTINUED | OUTPATIENT
Start: 2024-03-24 | End: 2024-03-24

## 2024-03-24 RX ORDER — ACETAMINOPHEN 325 MG/1
650 TABLET ORAL EVERY 6 HOURS PRN
Status: DISCONTINUED | OUTPATIENT
Start: 2024-03-24 | End: 2024-03-27 | Stop reason: HOSPADM

## 2024-03-24 RX ORDER — ATORVASTATIN CALCIUM 40 MG/1
40 TABLET, FILM COATED ORAL NIGHTLY
Status: DISCONTINUED | OUTPATIENT
Start: 2024-03-24 | End: 2024-03-27 | Stop reason: HOSPADM

## 2024-03-24 RX ORDER — LEVETIRACETAM 500 MG/1
1000 TABLET ORAL NIGHTLY
Status: DISCONTINUED | OUTPATIENT
Start: 2024-03-24 | End: 2024-03-27 | Stop reason: HOSPADM

## 2024-03-24 RX ORDER — SODIUM CHLORIDE 0.9 % (FLUSH) 0.9 %
5-40 SYRINGE (ML) INJECTION EVERY 12 HOURS SCHEDULED
Status: DISCONTINUED | OUTPATIENT
Start: 2024-03-24 | End: 2024-03-27 | Stop reason: HOSPADM

## 2024-03-24 RX ORDER — LISINOPRIL 5 MG/1
5 TABLET ORAL DAILY
Status: DISCONTINUED | OUTPATIENT
Start: 2024-03-25 | End: 2024-03-27 | Stop reason: HOSPADM

## 2024-03-24 RX ORDER — MULTIVITAMIN WITH IRON
1 TABLET ORAL DAILY
Status: DISCONTINUED | OUTPATIENT
Start: 2024-03-25 | End: 2024-03-27 | Stop reason: HOSPADM

## 2024-03-24 RX ORDER — CARVEDILOL 3.12 MG/1
3.12 TABLET ORAL 2 TIMES DAILY WITH MEALS
Status: DISCONTINUED | OUTPATIENT
Start: 2024-03-24 | End: 2024-03-27 | Stop reason: HOSPADM

## 2024-03-24 RX ORDER — SODIUM CHLORIDE 0.9 % (FLUSH) 0.9 %
5-40 SYRINGE (ML) INJECTION PRN
Status: DISCONTINUED | OUTPATIENT
Start: 2024-03-24 | End: 2024-03-27 | Stop reason: HOSPADM

## 2024-03-24 RX ORDER — ACETAMINOPHEN 650 MG/1
650 SUPPOSITORY RECTAL EVERY 6 HOURS PRN
Status: DISCONTINUED | OUTPATIENT
Start: 2024-03-24 | End: 2024-03-27 | Stop reason: HOSPADM

## 2024-03-24 RX ORDER — HEPARIN SODIUM 10000 [USP'U]/100ML
0-4000 INJECTION, SOLUTION INTRAVENOUS CONTINUOUS
Status: DISCONTINUED | OUTPATIENT
Start: 2024-03-24 | End: 2024-03-24

## 2024-03-24 RX ORDER — HEPARIN SODIUM 1000 [USP'U]/ML
4000 INJECTION, SOLUTION INTRAVENOUS; SUBCUTANEOUS PRN
Status: DISCONTINUED | OUTPATIENT
Start: 2024-03-24 | End: 2024-03-24

## 2024-03-24 RX ORDER — ASPIRIN 81 MG/1
81 TABLET ORAL DAILY
Status: DISCONTINUED | OUTPATIENT
Start: 2024-03-25 | End: 2024-03-27 | Stop reason: HOSPADM

## 2024-03-24 RX ORDER — 0.9 % SODIUM CHLORIDE 0.9 %
1000 INTRAVENOUS SOLUTION INTRAVENOUS ONCE
Status: COMPLETED | OUTPATIENT
Start: 2024-03-24 | End: 2024-03-24

## 2024-03-24 RX ORDER — HEPARIN SODIUM 1000 [USP'U]/ML
2000 INJECTION, SOLUTION INTRAVENOUS; SUBCUTANEOUS PRN
Status: DISCONTINUED | OUTPATIENT
Start: 2024-03-24 | End: 2024-03-24

## 2024-03-24 RX ORDER — SODIUM CHLORIDE 9 MG/ML
INJECTION, SOLUTION INTRAVENOUS PRN
Status: DISCONTINUED | OUTPATIENT
Start: 2024-03-24 | End: 2024-03-27 | Stop reason: HOSPADM

## 2024-03-24 RX ORDER — LEVETIRACETAM 500 MG/1
1000 TABLET ORAL 2 TIMES DAILY
Status: DISCONTINUED | OUTPATIENT
Start: 2024-03-24 | End: 2024-03-24

## 2024-03-24 RX ORDER — TAMSULOSIN HYDROCHLORIDE 0.4 MG/1
0.4 CAPSULE ORAL EVERY MORNING
Status: DISCONTINUED | OUTPATIENT
Start: 2024-03-25 | End: 2024-03-27 | Stop reason: HOSPADM

## 2024-03-24 RX ORDER — HEPARIN SODIUM 1000 [USP'U]/ML
4000 INJECTION, SOLUTION INTRAVENOUS; SUBCUTANEOUS ONCE
Status: COMPLETED | OUTPATIENT
Start: 2024-03-24 | End: 2024-03-24

## 2024-03-24 RX ORDER — LEVETIRACETAM 500 MG/1
750 TABLET ORAL EVERY MORNING
Status: DISCONTINUED | OUTPATIENT
Start: 2024-03-25 | End: 2024-03-27 | Stop reason: HOSPADM

## 2024-03-24 RX ADMIN — CARVEDILOL 3.12 MG: 3.12 TABLET, FILM COATED ORAL at 23:43

## 2024-03-24 RX ADMIN — HEPARIN SODIUM 1000 UNITS/HR: 10000 INJECTION, SOLUTION INTRAVENOUS at 20:05

## 2024-03-24 RX ADMIN — CEFEPIME 2000 MG: 2 INJECTION, POWDER, FOR SOLUTION INTRAVENOUS at 19:41

## 2024-03-24 RX ADMIN — MEROPENEM 1000 MG: 1 INJECTION, POWDER, FOR SOLUTION INTRAVENOUS at 23:42

## 2024-03-24 RX ADMIN — LEVETIRACETAM 1000 MG: 500 TABLET, FILM COATED ORAL at 23:43

## 2024-03-24 RX ADMIN — HEPARIN SODIUM 4000 UNITS: 1000 INJECTION INTRAVENOUS; SUBCUTANEOUS at 20:01

## 2024-03-24 RX ADMIN — SODIUM CHLORIDE 1000 ML: 9 INJECTION, SOLUTION INTRAVENOUS at 17:20

## 2024-03-24 RX ADMIN — CARBIDOPA AND LEVODOPA 1 TABLET: 25; 100 TABLET ORAL at 23:45

## 2024-03-24 RX ADMIN — SODIUM CHLORIDE: 9 INJECTION, SOLUTION INTRAVENOUS at 23:41

## 2024-03-24 RX ADMIN — VANCOMYCIN HYDROCHLORIDE 1500 MG: 1.5 INJECTION, POWDER, LYOPHILIZED, FOR SOLUTION INTRAVENOUS at 20:21

## 2024-03-24 RX ADMIN — ATORVASTATIN CALCIUM 40 MG: 40 TABLET, FILM COATED ORAL at 23:45

## 2024-03-24 ASSESSMENT — PAIN - FUNCTIONAL ASSESSMENT: PAIN_FUNCTIONAL_ASSESSMENT: NONE - DENIES PAIN

## 2024-03-24 NOTE — ED PROVIDER NOTES
seizure, CHF, Parkinson's, endoscopic visit note on 10/27/2023 colonoscopy polypectomy      Disposition Considerations:   I am the Primary Clinician of Record.        FINAL IMPRESSION    1. NSTEMI (non-ST elevated myocardial infarction) (HCC)    2. Fall, initial encounter    3. Severe sepsis (HCC)    4. Urinary tract infection associated with indwelling urethral catheter, initial encounter (HCC)    5. Acute respiratory failure with hypoxia (HCC)    6. Goals of care, counseling/discussion           DISPOSITION/PLAN:     Pt admitted to hospital for further workup.      PATIENT REFERRED TO:   No follow-up provider specified.     DISCHARGE MEDICATIONS:   Current Discharge Medication List           DISCONTINUED MEDICATIONS:   Current Discharge Medication List                 (Please note that portions of this note were completed with a voice recognition program.  Efforts were made to edit the dictations but occasionally words are mis-transcribed.)       Cornel Rogers MD (electronically signed)             Cornel Rogers MD  03/25/24 0037       Cornel Rogers MD  03/25/24 0038

## 2024-03-24 NOTE — H&P
reasonably achievable. COMPARISON: 12/06/2021 HISTORY: ORDERING SYSTEM PROVIDED HISTORY: Fall, unwitnessed, possible seizure, history of seizures TECHNOLOGIST PROVIDED HISTORY: Reason for exam:->Fall, unwitnessed, possible seizure, history of seizures Has a \"code stroke\" or \"stroke alert\" been called?->No Decision Support Exception - unselect if not a suspected or confirmed emergency medical condition->Emergency Medical Condition (MA) Reason for Exam: Fall, unwitnessed, possible seizure, history of seizures FINDINGS: BRAIN/VENTRICLES: No acute blood products, shift of the midline structures, or CT evidence of acute infarct.  Mild diffuse cortical volume loss and compensatory ventricular enlargement appears unchanged.  Periventricular white matter hypoattenuation bilaterally compatible with chronic microvascular ischemic changes. ORBITS: The visualized portion of the orbits demonstrate no acute abnormality. SINUSES: The visualized paranasal sinuses and mastoid air cells demonstrate no acute abnormality. SOFT TISSUES/SKULL:  No acute abnormality of the visualized skull or soft tissues.     No acute intracranial abnormality.     XR CHEST PORTABLE    Result Date: 3/24/2024  EXAMINATION: ONE XRAY VIEW OF THE CHEST 3/24/2024 5:26 pm COMPARISON: 05/31/2021, 02/11/2021 HISTORY: ORDERING SYSTEM PROVIDED HISTORY: fall, sepsis? TECHNOLOGIST PROVIDED HISTORY: Reason for exam:->fall, sepsis? Reason for Exam: covid rule out FINDINGS: Mild cardiomegaly.  Cardiomediastinal silhouette is not appreciably changed. Diffuse interstitial prominence.  Low volume inspiration.  Mild bibasilar atelectasis.  Blunting of the left costophrenic sulcus, trace effusion cannot be excluded.  No acute osseous abnormality identified.     1. Interstitial pulmonary edema suspected. 2. Mild bibasilar atelectasis. 3. Blunting of the left costophrenic sulcus, trace effusion cannot be excluded.         Electronically signed by Manju Byrnes MD on

## 2024-03-25 LAB
ACANTHOCYTES BLD QL SMEAR: ABNORMAL
ALBUMIN SERPL-MCNC: 2.9 G/DL (ref 3.4–5)
ALBUMIN/GLOB SERPL: 1 {RATIO} (ref 1.1–2.2)
ALP SERPL-CCNC: 144 U/L (ref 40–129)
ALT SERPL-CCNC: <5 U/L (ref 10–40)
ANION GAP SERPL CALCULATED.3IONS-SCNC: 7 MMOL/L (ref 3–16)
AST SERPL-CCNC: 79 U/L (ref 15–37)
BASOPHILS # BLD: 0 K/UL (ref 0–0.2)
BASOPHILS NFR BLD: 0 %
BILIRUB SERPL-MCNC: 1 MG/DL (ref 0–1)
BUN SERPL-MCNC: 17 MG/DL (ref 7–20)
BURR CELLS BLD QL SMEAR: ABNORMAL
CALCIUM SERPL-MCNC: 8 MG/DL (ref 8.3–10.6)
CHLORIDE SERPL-SCNC: 105 MMOL/L (ref 99–110)
CO2 SERPL-SCNC: 27 MMOL/L (ref 21–32)
CREAT SERPL-MCNC: 1 MG/DL (ref 0.8–1.3)
DEPRECATED RDW RBC AUTO: 14 % (ref 12.4–15.4)
EKG ATRIAL RATE: 107 BPM
EKG DIAGNOSIS: NORMAL
EKG P AXIS: 34 DEGREES
EKG P-R INTERVAL: 188 MS
EKG Q-T INTERVAL: 342 MS
EKG QRS DURATION: 82 MS
EKG QTC CALCULATION (BAZETT): 456 MS
EKG R AXIS: -16 DEGREES
EKG T AXIS: 36 DEGREES
EKG VENTRICULAR RATE: 107 BPM
EOSINOPHIL # BLD: 0 K/UL (ref 0–0.6)
EOSINOPHIL NFR BLD: 0 %
GFR SERPLBLD CREATININE-BSD FMLA CKD-EPI: >60 ML/MIN/{1.73_M2}
GLUCOSE SERPL-MCNC: 123 MG/DL (ref 70–99)
HCT VFR BLD AUTO: 33.4 % (ref 40.5–52.5)
HGB BLD-MCNC: 11.8 G/DL (ref 13.5–17.5)
LEVETIRACETAM SERPL-MCNC: 30.7 UG/ML (ref 6–46)
LYMPHOCYTES # BLD: 1.2 K/UL (ref 1–5.1)
LYMPHOCYTES NFR BLD: 6 %
MCH RBC QN AUTO: 33 PG (ref 26–34)
MCHC RBC AUTO-ENTMCNC: 35.3 G/DL (ref 31–36)
MCV RBC AUTO: 93.4 FL (ref 80–100)
MEDICATION DOSE-MCNC: NORMAL
MONOCYTES # BLD: 0.6 K/UL (ref 0–1.3)
MONOCYTES NFR BLD: 3 %
NEUTROPHILS # BLD: 18.5 K/UL (ref 1.7–7.7)
NEUTROPHILS NFR BLD: 91 %
PLATELET # BLD AUTO: 297 K/UL (ref 135–450)
PLATELET BLD QL SMEAR: ADEQUATE
PMV BLD AUTO: 9 FL (ref 5–10.5)
POTASSIUM SERPL-SCNC: 3.7 MMOL/L (ref 3.5–5.1)
PROCALCITONIN SERPL IA-MCNC: 0.64 NG/ML (ref 0–0.15)
PROT SERPL-MCNC: 5.9 G/DL (ref 6.4–8.2)
RBC # BLD AUTO: 3.57 M/UL (ref 4.2–5.9)
SLIDE REVIEW: ABNORMAL
SODIUM SERPL-SCNC: 139 MMOL/L (ref 136–145)
TSH SERPL DL<=0.005 MIU/L-ACNC: 1.03 UIU/ML (ref 0.27–4.2)
WBC # BLD AUTO: 20.3 K/UL (ref 4–11)

## 2024-03-25 PROCEDURE — 6360000002 HC RX W HCPCS: Performed by: INTERNAL MEDICINE

## 2024-03-25 PROCEDURE — 84145 PROCALCITONIN (PCT): CPT

## 2024-03-25 PROCEDURE — C9113 INJ PANTOPRAZOLE SODIUM, VIA: HCPCS | Performed by: INTERNAL MEDICINE

## 2024-03-25 PROCEDURE — 6370000000 HC RX 637 (ALT 250 FOR IP): Performed by: INTERNAL MEDICINE

## 2024-03-25 PROCEDURE — 2700000000 HC OXYGEN THERAPY PER DAY

## 2024-03-25 PROCEDURE — 97530 THERAPEUTIC ACTIVITIES: CPT

## 2024-03-25 PROCEDURE — 93010 ELECTROCARDIOGRAM REPORT: CPT | Performed by: INTERNAL MEDICINE

## 2024-03-25 PROCEDURE — 85025 COMPLETE CBC W/AUTO DIFF WBC: CPT

## 2024-03-25 PROCEDURE — 2580000003 HC RX 258: Performed by: INTERNAL MEDICINE

## 2024-03-25 PROCEDURE — 2060000000 HC ICU INTERMEDIATE R&B

## 2024-03-25 PROCEDURE — 84443 ASSAY THYROID STIM HORMONE: CPT

## 2024-03-25 PROCEDURE — 80053 COMPREHEN METABOLIC PANEL: CPT

## 2024-03-25 PROCEDURE — 80177 DRUG SCRN QUAN LEVETIRACETAM: CPT

## 2024-03-25 PROCEDURE — 36415 COLL VENOUS BLD VENIPUNCTURE: CPT

## 2024-03-25 PROCEDURE — 94760 N-INVAS EAR/PLS OXIMETRY 1: CPT

## 2024-03-25 PROCEDURE — 97162 PT EVAL MOD COMPLEX 30 MIN: CPT

## 2024-03-25 RX ADMIN — CARVEDILOL 3.12 MG: 3.12 TABLET, FILM COATED ORAL at 17:30

## 2024-03-25 RX ADMIN — LEVETIRACETAM 750 MG: 500 TABLET, FILM COATED ORAL at 09:38

## 2024-03-25 RX ADMIN — ASPIRIN 81 MG: 81 TABLET, COATED ORAL at 09:43

## 2024-03-25 RX ADMIN — TAMSULOSIN HYDROCHLORIDE 0.4 MG: 0.4 CAPSULE ORAL at 09:43

## 2024-03-25 RX ADMIN — CARBIDOPA AND LEVODOPA 1 TABLET: 25; 100 TABLET ORAL at 09:43

## 2024-03-25 RX ADMIN — MEROPENEM 1000 MG: 1 INJECTION, POWDER, FOR SOLUTION INTRAVENOUS at 09:57

## 2024-03-25 RX ADMIN — Medication 10 ML: at 21:12

## 2024-03-25 RX ADMIN — ATORVASTATIN CALCIUM 40 MG: 40 TABLET, FILM COATED ORAL at 21:12

## 2024-03-25 RX ADMIN — PANTOPRAZOLE SODIUM 40 MG: 40 INJECTION, POWDER, FOR SOLUTION INTRAVENOUS at 09:45

## 2024-03-25 RX ADMIN — CARVEDILOL 3.12 MG: 3.12 TABLET, FILM COATED ORAL at 09:38

## 2024-03-25 RX ADMIN — CARBIDOPA AND LEVODOPA 1 TABLET: 25; 100 TABLET ORAL at 21:11

## 2024-03-25 RX ADMIN — LEVETIRACETAM 1000 MG: 500 TABLET, FILM COATED ORAL at 21:11

## 2024-03-25 RX ADMIN — CARBIDOPA AND LEVODOPA 1 TABLET: 25; 100 TABLET ORAL at 13:27

## 2024-03-25 RX ADMIN — THERA TABS 1 TABLET: TAB at 09:38

## 2024-03-25 RX ADMIN — Medication 10 ML: at 09:43

## 2024-03-25 RX ADMIN — MEROPENEM 1000 MG: 1 INJECTION, POWDER, FOR SOLUTION INTRAVENOUS at 21:18

## 2024-03-25 ASSESSMENT — PAIN SCALES - GENERAL: PAINLEVEL_OUTOF10: 0

## 2024-03-25 NOTE — ED NOTES
Report given to inpatient nurse. Chief complaint, results, vitals, and medications provider all reviewed. No further questions at this time. Patient to be taken to floor with RN or Tech transport

## 2024-03-26 LAB
DEPRECATED RDW RBC AUTO: 14 % (ref 12.4–15.4)
HCT VFR BLD AUTO: 32.2 % (ref 40.5–52.5)
HGB BLD-MCNC: 11.5 G/DL (ref 13.5–17.5)
MCH RBC QN AUTO: 33.4 PG (ref 26–34)
MCHC RBC AUTO-ENTMCNC: 35.7 G/DL (ref 31–36)
MCV RBC AUTO: 93.6 FL (ref 80–100)
PLATELET # BLD AUTO: 282 K/UL (ref 135–450)
PMV BLD AUTO: 9.3 FL (ref 5–10.5)
RBC # BLD AUTO: 3.44 M/UL (ref 4.2–5.9)
WBC # BLD AUTO: 13.6 K/UL (ref 4–11)

## 2024-03-26 PROCEDURE — 97530 THERAPEUTIC ACTIVITIES: CPT

## 2024-03-26 PROCEDURE — 2580000003 HC RX 258: Performed by: INTERNAL MEDICINE

## 2024-03-26 PROCEDURE — 6370000000 HC RX 637 (ALT 250 FOR IP): Performed by: INTERNAL MEDICINE

## 2024-03-26 PROCEDURE — 6360000002 HC RX W HCPCS: Performed by: INTERNAL MEDICINE

## 2024-03-26 PROCEDURE — 2060000000 HC ICU INTERMEDIATE R&B

## 2024-03-26 PROCEDURE — 6360000004 HC RX CONTRAST MEDICATION: Performed by: INTERNAL MEDICINE

## 2024-03-26 PROCEDURE — 85027 COMPLETE CBC AUTOMATED: CPT

## 2024-03-26 PROCEDURE — 97116 GAIT TRAINING THERAPY: CPT

## 2024-03-26 PROCEDURE — 36415 COLL VENOUS BLD VENIPUNCTURE: CPT

## 2024-03-26 PROCEDURE — 97166 OT EVAL MOD COMPLEX 45 MIN: CPT

## 2024-03-26 PROCEDURE — C8929 TTE W OR WO FOL WCON,DOPPLER: HCPCS

## 2024-03-26 PROCEDURE — 99223 1ST HOSP IP/OBS HIGH 75: CPT | Performed by: INTERNAL MEDICINE

## 2024-03-26 PROCEDURE — 97535 SELF CARE MNGMENT TRAINING: CPT

## 2024-03-26 PROCEDURE — 94760 N-INVAS EAR/PLS OXIMETRY 1: CPT

## 2024-03-26 RX ORDER — ENOXAPARIN SODIUM 100 MG/ML
40 INJECTION SUBCUTANEOUS DAILY
Status: DISCONTINUED | OUTPATIENT
Start: 2024-03-26 | End: 2024-03-27 | Stop reason: HOSPADM

## 2024-03-26 RX ORDER — PANTOPRAZOLE SODIUM 40 MG/1
40 TABLET, DELAYED RELEASE ORAL EVERY MORNING
Status: DISCONTINUED | OUTPATIENT
Start: 2024-03-26 | End: 2024-03-27 | Stop reason: HOSPADM

## 2024-03-26 RX ORDER — ENOXAPARIN SODIUM 100 MG/ML
30 INJECTION SUBCUTANEOUS DAILY
Status: DISCONTINUED | OUTPATIENT
Start: 2024-03-26 | End: 2024-03-26 | Stop reason: DRUGHIGH

## 2024-03-26 RX ADMIN — AMPICILLIN SODIUM AND SULBACTAM SODIUM 3000 MG: 2; 1 INJECTION, POWDER, FOR SOLUTION INTRAMUSCULAR; INTRAVENOUS at 23:34

## 2024-03-26 RX ADMIN — ASPIRIN 81 MG: 81 TABLET, COATED ORAL at 11:37

## 2024-03-26 RX ADMIN — LEVETIRACETAM 750 MG: 500 TABLET, FILM COATED ORAL at 11:37

## 2024-03-26 RX ADMIN — MEROPENEM 1000 MG: 1 INJECTION, POWDER, FOR SOLUTION INTRAVENOUS at 11:49

## 2024-03-26 RX ADMIN — ATORVASTATIN CALCIUM 40 MG: 40 TABLET, FILM COATED ORAL at 19:48

## 2024-03-26 RX ADMIN — CARVEDILOL 3.12 MG: 3.12 TABLET, FILM COATED ORAL at 17:40

## 2024-03-26 RX ADMIN — CARBIDOPA AND LEVODOPA 1 TABLET: 25; 100 TABLET ORAL at 14:45

## 2024-03-26 RX ADMIN — PANTOPRAZOLE SODIUM 40 MG: 40 TABLET, DELAYED RELEASE ORAL at 11:38

## 2024-03-26 RX ADMIN — CARVEDILOL 3.12 MG: 3.12 TABLET, FILM COATED ORAL at 11:38

## 2024-03-26 RX ADMIN — Medication 10 ML: at 11:39

## 2024-03-26 RX ADMIN — Medication 10 ML: at 19:49

## 2024-03-26 RX ADMIN — CARBIDOPA AND LEVODOPA 1 TABLET: 25; 100 TABLET ORAL at 11:38

## 2024-03-26 RX ADMIN — SODIUM CHLORIDE: 9 INJECTION, SOLUTION INTRAVENOUS at 11:49

## 2024-03-26 RX ADMIN — PERFLUTREN 1.5 ML: 6.52 INJECTION, SUSPENSION INTRAVENOUS at 08:37

## 2024-03-26 RX ADMIN — AMPICILLIN SODIUM AND SULBACTAM SODIUM 3000 MG: 2; 1 INJECTION, POWDER, FOR SOLUTION INTRAMUSCULAR; INTRAVENOUS at 15:44

## 2024-03-26 RX ADMIN — ACETAMINOPHEN 325MG 650 MG: 325 TABLET ORAL at 02:42

## 2024-03-26 RX ADMIN — TAMSULOSIN HYDROCHLORIDE 0.4 MG: 0.4 CAPSULE ORAL at 11:38

## 2024-03-26 RX ADMIN — CARBIDOPA AND LEVODOPA 1 TABLET: 25; 100 TABLET ORAL at 19:48

## 2024-03-26 RX ADMIN — THERA TABS 1 TABLET: TAB at 11:37

## 2024-03-26 RX ADMIN — LEVETIRACETAM 1000 MG: 500 TABLET, FILM COATED ORAL at 19:48

## 2024-03-26 RX ADMIN — ENOXAPARIN SODIUM 40 MG: 100 INJECTION SUBCUTANEOUS at 11:43

## 2024-03-26 ASSESSMENT — PAIN SCALES - GENERAL
PAINLEVEL_OUTOF10: 0
PAINLEVEL_OUTOF10: 3
PAINLEVEL_OUTOF10: 3

## 2024-03-26 ASSESSMENT — PAIN DESCRIPTION - LOCATION: LOCATION: BACK

## 2024-03-26 ASSESSMENT — PAIN - FUNCTIONAL ASSESSMENT: PAIN_FUNCTIONAL_ASSESSMENT: ACTIVITIES ARE NOT PREVENTED

## 2024-03-26 ASSESSMENT — PAIN DESCRIPTION - ONSET: ONSET: ON-GOING

## 2024-03-26 ASSESSMENT — PAIN DESCRIPTION - FREQUENCY: FREQUENCY: CONTINUOUS

## 2024-03-26 ASSESSMENT — PAIN DESCRIPTION - ORIENTATION: ORIENTATION: LOWER

## 2024-03-26 ASSESSMENT — PAIN DESCRIPTION - PAIN TYPE: TYPE: CHRONIC PAIN

## 2024-03-26 ASSESSMENT — PAIN DESCRIPTION - DESCRIPTORS: DESCRIPTORS: ACHING

## 2024-03-26 NOTE — DISCHARGE INSTR - COC
transferring to Rehab): Fair    Recommended Labs or Other Treatments After Discharge: NA    Physician Certification: I certify the above information and transfer of Marco Antonio Price  is necessary for the continuing treatment of the diagnosis listed and that he requires Home Care for less 30 days.     Update Admission H&P: No change in H&P    PHYSICIAN SIGNATURE:  Electronically signed by Dorota Leal MD on 3/27/24 at 10:15 AM EDT

## 2024-03-26 NOTE — CONSULTS
NEUROLOGY CONSULT NOTE  Reason for Consult: Dr Leal, Dorota GILMAN MD asked me to see Marco Antonio Price in consultation for evaluation of:  breakthrough seizure    Chief complaint: Seizure    HISTORY OF PRESENT ILLNESS:  HPI was gathered from the patient and his son at the bedside as well as EMR review.    Marco Antonio Price is a 88 y.o. male with a PMH that includes seizure, PD, alcohol abuse, basal cell adenocarcinoma, DVT, indwelling Ambrosio, PE, and UTI.  Please see below for additional medical and surgical histories.      Patient presented to the ED yesterday after an unwitnessed fall at home.  Apparently the patient's family has cameras in his house and they saw patient was on the ground.  EMS was called and found patient face down and hypoxic.       Today the patient has no recollection of the fall.  He states that he went to sit in his chair and the next thing he knew he was on the ground.  He states he is feeling good today.    Patient's family reported that his Keppra dose had recently been changes.  Review of the EMR shows patient's Keppra has been managed by his PCP.  He saw patient on 2/13/24 where patient reported his last seizure had been on 1/17/24.  Level was checked and was subtherapeutic and PCP increased dose to 750 mg QAM and 1000 mg QPM.  PCP also recommended he see a neurologist.  He already follows with Dr. Roy for his PD.  Last visit was 9/2023.    Per patient's son, when patient has a seizure it involves head shaking and lasts 30-45 seconds and he returns to baseline after about 45 minutes. This event was not witnessed.  There was no tongue bite or incontinence.    HCT and CT lumbar spine were non-acute.  Patient met sepsis criteria on admission with leukocytosis and elevated lactate and was found to have a UTI and mild bibasilar atelectasis. Keppra level was therapeutic.    The patient's son was at the bedside at the time of evaluation.    MEDICAL HISTORY:  Past Medical History:   Diagnosis 
BLOODU MODERATE 03/24/2024 06:05 PM    PHUR 6.5 03/24/2024 06:05 PM    PROTEINU 100 03/24/2024 06:05 PM    UROBILINOGEN 4.0 03/24/2024 06:05 PM    NITRU Negative 03/24/2024 06:05 PM    LEUKOCYTESUR LARGE 03/24/2024 06:05 PM    URINETYPE NotGiven 03/24/2024 06:05 PM      Urine Microscopic:   Lab Results   Component Value Date/Time    LABCAST 1-3 Hyaline 01/06/2013 10:40 PM    BACTERIA 1+ 03/24/2024 06:05 PM    COMU see below 05/31/2021 08:15 PM    HYALCAST 30 03/24/2024 06:05 PM    HYALCAST Present 03/24/2024 06:05 PM    WBCUA 122 03/24/2024 06:05 PM    RBCUA 19 03/24/2024 06:05 PM    EPIU 1 03/24/2024 06:05 PM     Urine Reflex to Culture:   Lab Results   Component Value Date/Time    URRFLXCULT Yes 03/24/2024 06:05 PM     CK  2972     pROCAL  0.64     Wbc  20.4         MICRO: cultures reviewed and updated by me     Date/TimeCulture, Urine [[id number]] (Abnormal) Collected: 03/24/24 1805Order Status: CompletedSpecimen: Urine, clean catchUpdated: 03/26/24 1036OrganismProteus mirabilis Abnormal Urine Culture, Routine>100,000 CFU/mlOrganismEnterococcus faecalis Abnormal Urine Culture, Routine-->100,000 CFU/ml  Sensitivity to follow  Repeating sensitivity  Narrative:  ORDER#: [id number]                          ORDERED BY: PARVEEN JERNIGAN  SOURCE: Urine Clean Catch                  COLLECTED:  03/24/24 18:05  [facility] AT [place].:                      RECEIVED :  03/24/24 18:46Blood Culture 1 [[id number]]Collected: 03/24/24 1735Order Status: CompletedSpecimen: BloodUpdated: 03/25/24 1915Blood Culture, RoutineNo Growth to date.  Any change in status will be called.Narrative:  ORDER#: [id number]                          ORDERED BY: PARVEEN JERNIGAN  SOURCE: Blood                              COLLECTED:  03/24/24 17:35  [facility] [place].:                      RECEIVED :  03/24/24 17:42  If child <=2 yrs old please draw pediatric bottle.~Blood Culture 1Blood Culture 2 [[id number]]Collected: 03/24/24 3683Order Status:

## 2024-03-26 NOTE — DISCHARGE INSTRUCTIONS
Extra Heart Failure Education/ Tools/ Resources:     https://[employer].com/publication/?[email or web address]   --- this is American Heart Association interactive Healthier Living with Heart Failure guidebook.  Please click hyperlink or copy / paste link into search bar. The QR Code is also available below. Use your mouse to scroll through the pages.  Lots of information about weight monitoring, diet tips, activity, meds, etc    Heart Failure Tools and Resources QR Code is below. It includes multiple resources to include symptom tracker, med tracker, further HF info, and access to a HF Support Network online Community    [facility] [place]  -- this is a free smart phone doron available for iPhone and Android download.  Use your phone to track sodium / fluid intake, zone tool symptom tracking, weights, medications, etc. Click on this hyperlink  [facility] [place]   for QR code for easy download or the link is also found in the below HF Tools and Resources.      DASH (Dietary Approach to Stop Hypertension) diet --  https://www.nhlbi.nih.gov/education/dash-eating-plan -- this diet is a flexible eating plan that promotes heart healthy eating style.  Click on hyperlink or copy / paste link into search bar.  Lots of low sodium recipes and tips.    [email or web address]  -- more free recipes

## 2024-03-27 VITALS
TEMPERATURE: 97.6 F | SYSTOLIC BLOOD PRESSURE: 113 MMHG | RESPIRATION RATE: 16 BRPM | OXYGEN SATURATION: 94 % | DIASTOLIC BLOOD PRESSURE: 68 MMHG | BODY MASS INDEX: 33.3 KG/M2 | HEIGHT: 70 IN | HEART RATE: 83 BPM | WEIGHT: 232.59 LBS

## 2024-03-27 PROBLEM — A49.8 PROTEUS MIRABILIS INFECTION: Status: ACTIVE | Noted: 2024-03-27

## 2024-03-27 PROBLEM — D72.9 NEUTROPHILIA: Status: ACTIVE | Noted: 2024-03-27

## 2024-03-27 PROBLEM — B95.2 ENTEROCOCCUS UTI: Status: ACTIVE | Noted: 2024-03-27

## 2024-03-27 PROBLEM — N39.0 COMPLICATED UTI (URINARY TRACT INFECTION): Status: ACTIVE | Noted: 2024-03-27

## 2024-03-27 PROBLEM — D72.828 NEUTROPHILIA: Status: ACTIVE | Noted: 2024-03-27

## 2024-03-27 PROBLEM — N39.0 ENTEROCOCCUS UTI: Status: ACTIVE | Noted: 2024-03-27

## 2024-03-27 PROBLEM — Z97.8 FOLEY CATHETER IN PLACE ON ADMISSION: Status: ACTIVE | Noted: 2024-03-27

## 2024-03-27 LAB
ANION GAP SERPL CALCULATED.3IONS-SCNC: 9 MMOL/L (ref 3–16)
BACTERIA UR CULT: ABNORMAL
BACTERIA UR CULT: ABNORMAL
BASOPHILS # BLD: 0.1 K/UL (ref 0–0.2)
BASOPHILS NFR BLD: 1.2 %
BUN SERPL-MCNC: 11 MG/DL (ref 7–20)
CALCIUM SERPL-MCNC: 8.6 MG/DL (ref 8.3–10.6)
CHLORIDE SERPL-SCNC: 105 MMOL/L (ref 99–110)
CK SERPL-CCNC: 984 U/L (ref 39–308)
CO2 SERPL-SCNC: 28 MMOL/L (ref 21–32)
DEPRECATED RDW RBC AUTO: 14 % (ref 12.4–15.4)
EOSINOPHIL # BLD: 0.5 K/UL (ref 0–0.6)
EOSINOPHIL NFR BLD: 4.6 %
GFR SERPLBLD CREATININE-BSD FMLA CKD-EPI: 88 ML/MIN/{1.73_M2}
GLUCOSE SERPL-MCNC: 120 MG/DL (ref 70–99)
HCT VFR BLD AUTO: 34.5 % (ref 40.5–52.5)
HGB BLD-MCNC: 12.5 G/DL (ref 13.5–17.5)
LYMPHOCYTES # BLD: 1.6 K/UL (ref 1–5.1)
LYMPHOCYTES NFR BLD: 16.3 %
MCH RBC QN AUTO: 33.7 PG (ref 26–34)
MCHC RBC AUTO-ENTMCNC: 36.1 G/DL (ref 31–36)
MCV RBC AUTO: 93.3 FL (ref 80–100)
MONOCYTES # BLD: 1.2 K/UL (ref 0–1.3)
MONOCYTES NFR BLD: 12.2 %
ORGANISM: ABNORMAL
ORGANISM: ABNORMAL
PLATELET # BLD AUTO: 313 K/UL (ref 135–450)
PMV BLD AUTO: 9.3 FL (ref 5–10.5)
POTASSIUM SERPL-SCNC: 3.7 MMOL/L (ref 3.5–5.1)
PROCALCITONIN SERPL IA-MCNC: 0.27 NG/ML (ref 0–0.15)
RBC # BLD AUTO: 3.7 M/UL (ref 4.2–5.9)
SODIUM SERPL-SCNC: 142 MMOL/L (ref 136–145)
TROPONIN, HIGH SENSITIVITY: 78 NG/L (ref 0–22)
WBC # BLD AUTO: 9.9 K/UL (ref 4–11)

## 2024-03-27 PROCEDURE — 94760 N-INVAS EAR/PLS OXIMETRY 1: CPT

## 2024-03-27 PROCEDURE — 6360000002 HC RX W HCPCS: Performed by: INTERNAL MEDICINE

## 2024-03-27 PROCEDURE — 85025 COMPLETE CBC W/AUTO DIFF WBC: CPT

## 2024-03-27 PROCEDURE — 2580000003 HC RX 258: Performed by: INTERNAL MEDICINE

## 2024-03-27 PROCEDURE — 97530 THERAPEUTIC ACTIVITIES: CPT

## 2024-03-27 PROCEDURE — 36415 COLL VENOUS BLD VENIPUNCTURE: CPT

## 2024-03-27 PROCEDURE — 80048 BASIC METABOLIC PNL TOTAL CA: CPT

## 2024-03-27 PROCEDURE — 97110 THERAPEUTIC EXERCISES: CPT

## 2024-03-27 PROCEDURE — 84145 PROCALCITONIN (PCT): CPT

## 2024-03-27 PROCEDURE — 97535 SELF CARE MNGMENT TRAINING: CPT

## 2024-03-27 PROCEDURE — 6370000000 HC RX 637 (ALT 250 FOR IP): Performed by: INTERNAL MEDICINE

## 2024-03-27 PROCEDURE — 82550 ASSAY OF CK (CPK): CPT

## 2024-03-27 PROCEDURE — 84484 ASSAY OF TROPONIN QUANT: CPT

## 2024-03-27 RX ORDER — ATORVASTATIN CALCIUM 40 MG/1
40 TABLET, FILM COATED ORAL NIGHTLY
Qty: 30 TABLET | Refills: 3 | Status: SHIPPED | OUTPATIENT
Start: 2024-03-27

## 2024-03-27 RX ORDER — CARVEDILOL 3.12 MG/1
3.12 TABLET ORAL 2 TIMES DAILY WITH MEALS
Qty: 60 TABLET | Refills: 3 | Status: SHIPPED | OUTPATIENT
Start: 2024-03-27

## 2024-03-27 RX ORDER — AMOXICILLIN AND CLAVULANATE POTASSIUM 875; 125 MG/1; MG/1
1 TABLET, FILM COATED ORAL 2 TIMES DAILY
Qty: 14 TABLET | Refills: 0 | Status: SHIPPED | OUTPATIENT
Start: 2024-03-27 | End: 2024-04-03

## 2024-03-27 RX ORDER — ASPIRIN 81 MG/1
81 TABLET ORAL DAILY
Qty: 30 TABLET | Refills: 3 | Status: SHIPPED | OUTPATIENT
Start: 2024-03-28

## 2024-03-27 RX ADMIN — CARBIDOPA AND LEVODOPA 1 TABLET: 25; 100 TABLET ORAL at 15:20

## 2024-03-27 RX ADMIN — ENOXAPARIN SODIUM 40 MG: 100 INJECTION SUBCUTANEOUS at 09:24

## 2024-03-27 RX ADMIN — CARVEDILOL 3.12 MG: 3.12 TABLET, FILM COATED ORAL at 09:24

## 2024-03-27 RX ADMIN — TAMSULOSIN HYDROCHLORIDE 0.4 MG: 0.4 CAPSULE ORAL at 09:23

## 2024-03-27 RX ADMIN — LEVETIRACETAM 750 MG: 500 TABLET, FILM COATED ORAL at 09:24

## 2024-03-27 RX ADMIN — Medication 10 ML: at 09:25

## 2024-03-27 RX ADMIN — ASPIRIN 81 MG: 81 TABLET, COATED ORAL at 09:24

## 2024-03-27 RX ADMIN — PANTOPRAZOLE SODIUM 40 MG: 40 TABLET, DELAYED RELEASE ORAL at 09:24

## 2024-03-27 RX ADMIN — CARBIDOPA AND LEVODOPA 1 TABLET: 25; 100 TABLET ORAL at 09:23

## 2024-03-27 RX ADMIN — AMPICILLIN SODIUM AND SULBACTAM SODIUM 3000 MG: 2; 1 INJECTION, POWDER, FOR SOLUTION INTRAMUSCULAR; INTRAVENOUS at 09:45

## 2024-03-27 RX ADMIN — THERA TABS 1 TABLET: TAB at 09:24

## 2024-03-27 NOTE — PROGRESS NOTES
Hospitalist Progress Note      PCP: Oren Payton MD    Date of Admission: 3/24/2024    Subjective: denies any complaints, denies pain    Medications:  Reviewed    Infusion Medications    sodium chloride Stopped (03/25/24 0148)     Scheduled Medications    sodium chloride flush  5-40 mL IntraVENous 2 times per day    pantoprazole (PROTONIX) 40 mg in sodium chloride (PF) 0.9 % 10 mL injection  40 mg IntraVENous Daily    carbidopa-levodopa  1 tablet Oral TID    multivitamin  1 tablet Oral Daily    tamsulosin  0.4 mg Oral QAM    aspirin  81 mg Oral Daily    carvedilol  3.125 mg Oral BID WC    [Held by provider] lisinopril  5 mg Oral Daily    atorvastatin  40 mg Oral Nightly    meropenem  1,000 mg IntraVENous Q12H    levETIRAcetam  750 mg Oral QAM    levETIRAcetam  1,000 mg Oral Nightly     PRN Meds: sodium chloride flush, sodium chloride, acetaminophen **OR** acetaminophen      Intake/Output Summary (Last 24 hours) at 3/25/2024 1632  Last data filed at 3/25/2024 1328  Gross per 24 hour   Intake 1224.58 ml   Output 210 ml   Net 1014.58 ml       Physical Exam Performed:    BP (!) 100/52   Pulse 69   Temp 98.2 °F (36.8 °C) (Oral)   Resp 24   Ht 1.767 m (5' 9.57\")   Wt 103.2 kg (227 lb 8.2 oz)   SpO2 93%   BMI 33.05 kg/m²     General: Overweight/well-developed, chronically ill-appearing in NAD  Eyes: EOMI, anicteric sclerae, clear conjunctivae  ENT: Dry oral mucosa, neck supple, no bruit,  or JVD  Cardiovascular: Regular tachycardia.  Normal S1 and S2.  Respiratory: Bilateral crackles with adequate air movement otherwise, normal bowel respiratory effort  Gastrointestinal: Soft, obese, non tender  Musculoskeletal: Bilateral nonpitting edema with chronic stasis changes  Skin: warm, dry  Neuro: AAO x 3, nonfocal with generalized weakness, pill-rolling tremor of the right hand, speech garbled.  Psych: Mood appropriate, decreased insight.        Labs:   Recent Labs     03/24/24  1723 03/25/24  0519   WBC 20.4* 20.3* 
      Hospitalist Progress Note      PCP: Oren Payton MD    Date of Admission: 3/24/2024    Subjective: denies any complaints, with spann +    Medications:  Reviewed    Infusion Medications    sodium chloride 5 mL/hr at 03/26/24 1149     Scheduled Medications    pantoprazole  40 mg Oral QAM    enoxaparin  40 mg SubCUTAneous Daily    ampicillin-sulbactam  3,000 mg IntraVENous Q8H    sodium chloride flush  5-40 mL IntraVENous 2 times per day    carbidopa-levodopa  1 tablet Oral TID    multivitamin  1 tablet Oral Daily    tamsulosin  0.4 mg Oral QAM    aspirin  81 mg Oral Daily    carvedilol  3.125 mg Oral BID WC    [Held by provider] lisinopril  5 mg Oral Daily    atorvastatin  40 mg Oral Nightly    levETIRAcetam  750 mg Oral QAM    levETIRAcetam  1,000 mg Oral Nightly     PRN Meds: sodium chloride flush, sodium chloride, acetaminophen **OR** acetaminophen      Intake/Output Summary (Last 24 hours) at 3/26/2024 1556  Last data filed at 3/26/2024 1449  Gross per 24 hour   Intake 1033.89 ml   Output 800 ml   Net 233.89 ml       Physical Exam Performed:    /64   Pulse 56   Temp 98.1 °F (36.7 °C) (Oral)   Resp 16   Ht 1.767 m (5' 9.57\")   Wt 103.6 kg (228 lb 6.3 oz)   SpO2 93%   BMI 33.18 kg/m²     General: Overweight/well-developed, chronically ill-appearing in NAD  Eyes: EOMI, anicteric sclerae, clear conjunctivae  ENT: Dry oral mucosa, neck supple, no bruit,  or JVD  Cardiovascular: Regular tachycardia.  Normal S1 and S2.  Respiratory: Bilateral crackles with adequate air movement otherwise, normal bowel respiratory effort  Gastrointestinal: Soft, obese, non tender  Musculoskeletal: Bilateral nonpitting edema with chronic stasis changes  Skin: warm, dry  Neuro: AAO x 3, nonfocal with generalized weakness, pill-rolling tremor of the right hand, speech garbled.  Psych: Mood appropriate, decreased insight.     Labs:   Recent Labs     03/24/24  1723 03/25/24  0519 03/26/24  0507   WBC 20.4* 20.3* 13.6*   HGB 
4 Eyes Skin Assessment     NAME:  Marco Antonio Price  YOB: 1935  MEDICAL RECORD NUMBER:  [id number]    The patient is being assessed for  Admission    I agree that at least one RN has performed a thorough Head to Toe Skin Assessment on the patient. ALL assessment sites listed below have been assessed.      Areas assessed by both nurses:    Head, Face, Ears, Shoulders, Back, Chest, Arms, Elbows, Hands, Sacrum. Buttock, Coccyx, Ischium, Legs. Feet and Heels, and Under Medical Devices         Does the Patient have a Wound? No noted wound(s)       Leo Prevention initiated by RN: Yes  Wound Care Orders initiated by RN: No    Pressure Injury (Stage 3,4, Unstageable, DTI, NWPT, and Complex wounds) if present, place Wound referral order by RN under : No    New Ostomies, if present place, Ostomy referral order under : No     Nurse 1 eSignature: Electronically signed by Amanda Schneider RN on 3/25/24 at 4:45 AM EDT    **SHARE this note so that the co-signing nurse can place an eSignature**    Nurse 2 eSignature: Electronically signed by Tammi Cade RN on 3/25/24 at 5:41 AM EDT   
BRIEF NEUROLOGY NOTE - Chart check only    No recurrent seizures overnight.  TSH was normal.  Keppra was therapeutic at 30.7.  No change to AEDs recommended.  Will sign off.  Please call with additional questions or concerns.    Lucia Chandler [facility] Neurology  
Clinical Pharmacy Note  Heparin Dosing Consult    Marco Antonio Price is a 88 y.o. male ordered heparin per CAD/STEMI/NSTEMI/UA/AFIB nomogram by Dr. Cornel Curtis.     No results found for: \"ANTIXAUHEP\", \"LABHEPA\"   Lab Results   Component Value Date/Time    HGB 12.7 03/24/2024 05:23 PM    HCT 35.5 03/24/2024 05:23 PM     03/24/2024 05:23 PM    INR 1.14 09/02/2021 11:17 AM       Ht Readings from Last 1 Encounters:   02/13/24 1.727 m (5' 8\")        Wt Readings from Last 1 Encounters:   03/24/24 101.4 kg (223 lb 8.7 oz)        Assessment/Plan:  Initial bolus: 4000 units  Initial infusion rate: 1000 units/hr  Next anti-Xa:: 0200 3-25-24    Pharmacy will continue to monitor adjust heparin based on anti-Xa results using nomogram below:     CAD/STEMI/NSTEMI/UA/AFIB Heparin Nomogram     Initial Bolus: 60 units/kg Max Bolus: 4,000 units       Initial Rate: 12 units/kg/hr Max Initial Rate: 1,000 units/hr     anti-Xa Bolus Titration   < 0.1 Heparin 60 units/kg bolus Increase drip by 4 units/kg/hr   0.1 - 0.29 Heparin 30 units/kg bolus Increase drip by 2 units/kg/hr   0.3 - 0.7 No Bolus No Change   0.71 - 0.8 No Bolus Decrease drip by 1 units/kg/hr   0.81 - 0.99 No Bolus Decrease drip by 2 units/kg/hr   > 1 Hold Heparin for 1 hour Decrease drip by 3 units/kg/hr     Obtain anti-Xa 6 hours after initial bolus and 6 hours after any dose change until two consecutive therapeutic anti-Xa levels are achieved - then daily.     Roque Mccauley, [facility], [facility] 3/24/2024  8:19 PM     
Did not sleep much tonight. Needed 2x assist with stedy to transfer from chair to bed. C/o chronic back pain, PRN tylenol provided adequate relief. In bed, call light in reach.  
Discharge instructions reviewed with pt/family, discussed medications, denies any further questions or anxiety related to discharge. Educational handouts in regards to new medications, given via [employer], side effects discussed. IV/tele discontinued. Pt discharged to home. Taken from room via wheelchair by transport staff, personal belongings taken with.     Follow up appointment made with cardiology for patient.    New medications supplied through [facility] outpatient pharmacy.     Electronically signed by MARBELLA PUGH RN on 3/27/2024 at 3:52 PM     
Medication Reconciliation    List of medications patient is currently taking is complete.     Source of information: 1. Conversation with patient's son at bedside                                      2. EPIC records         Notes regarding home medications:   1. Son can confirm all medications except the Lasix. Doesn't know if they have medication at home, but is \"pretty sure\" he is still supposed to be on it      Juan Cheatham, [facility]   3/25/2024  9:00 AM    
Occupational Therapy  Facility/Department: [address] PROGRESSIVE CARE  Occupational Therapy Daily Note  Name: Marco Antonio Price  : 1935  MRN: [id number]  Date of Service: 3/27/2024    Discharge Recommendations:  24 hour supervision or assist, Home with Home health OT   Marco Antonio Price scored a  on the -[facility] ADL Inpatient form. It is recommended that the patient have 2-3 sessions per week of Occupational Therapy at d/c to increase the patient's independence.  At this time, this patient demonstrates the endurance and safety to discharge home with HHOT and a follow up treatment frequency of 2-3x/wk.   Please see assessment section for further patient specific details.    If patient discharges prior to next session this note will serve as a discharge summary.  Please see below for the latest assessment towards goals.         Patient Diagnosis(es): The primary encounter diagnosis was NSTEMI (non-ST elevated myocardial infarction) ([facility]). Diagnoses of Fall, initial encounter, Severe sepsis ([facility]), Urinary tract infection associated with indwelling urethral catheter, initial encounter ([facility]), Acute respiratory failure with hypoxia ([facility]), and Goals of care, counseling/discussion were also pertinent to this visit.  Past Medical History:  has a past medical history of Acute diastolic (congestive) heart failure ([facility]), GEOVANI (acute kidney injury) ([facility]), Alcohol abuse, Basal cell adenocarcinoma, Bilateral lower extremity edema, BPH (benign prostatic hyperplasia), Dehydration, [facility] ([facility]), ESBL (extended spectrum beta-lactamase) producing bacteria infection, Full dentures, H/O gastric ulcer, Indwelling Ambrosio catheter present, Leg weakness, bilateral, Parkinson disease, Pulmonary embolism ([facility]), Seizure ([facility]), UTI (urinary tract infection), and Wears hearing aid in both ears.  Past Surgical History:  has a past surgical history that includes Splenectomy, total; Cataract removal (Bilateral); Colonoscopy 
Patient admitted to room 5103 at 10 pm. Vital signs checked and stable. Telemetry activated. Patient is oriented to room and use of call lights. Admission and assessment initiated. Patient is alert and oriented with a GCS 15. Patient denied needs or questions at this time. Plan of care on going.  
Physical Therapy  Facility/Department: [address] PROGRESSIVE CARE  Physical Therapy Daily Note    Name: Marco Antonio Price  : 1935  MRN: [id number]  Date of Service: 3/27/2024    Discharge Recommendations:  Continue to assess pending progress, Patient would benefit from continued therapy after discharge (3-5x/wk)   PT Equipment Recommendations  Equipment Needed: No  Other: will cont to assess      Patient Diagnosis(es): The primary encounter diagnosis was NSTEMI (non-ST elevated myocardial infarction) ([facility]). Diagnoses of Fall, initial encounter, Severe sepsis ([facility]), Urinary tract infection associated with indwelling urethral catheter, initial encounter ([facility]), Acute respiratory failure with hypoxia ([facility]), and Goals of care, counseling/discussion were also pertinent to this visit.  Past Medical History:  has a past medical history of Acute diastolic (congestive) heart failure ([facility]), GEOVANI (acute kidney injury) ([facility]), Alcohol abuse, Basal cell adenocarcinoma, Bilateral lower extremity edema, BPH (benign prostatic hyperplasia), Dehydration, [facility] ([facility]), ESBL (extended spectrum beta-lactamase) producing bacteria infection, Full dentures, H/O gastric ulcer, Indwelling Ambrosio catheter present, Leg weakness, bilateral, Parkinson disease, Pulmonary embolism ([facility]), Seizure ([facility]), UTI (urinary tract infection), and Wears hearing aid in both ears.  Past Surgical History:  has a past surgical history that includes Splenectomy, total; Cataract removal (Bilateral); Colonoscopy (N/A, 10/27/2023); Upper gastrointestinal endoscopy (N/A, 10/27/2023); and Colonoscopy (10/27/2023).    Assessment   Body Structures, Functions, Activity Limitations Requiring Skilled Therapeutic Intervention: Decreased functional mobility ;Decreased strength;Decreased safe awareness;Decreased cognition;Decreased endurance;Decreased balance  Assessment: Patient is a 87 yo male with generalized weakness s/p severe sepsis and unwittnessed 
Physical Therapy  Facility/Department: [address] PROGRESSIVE CARE  Physical Therapy Initial Assessment    Name: Marco Antonio Price  : 1935  MRN: [id number]  Date of Service: 3/25/2024    Discharge Recommendations:  Continue to assess pending progress, Patient would benefit from continued therapy after discharge (3-5x/wk)   PT Equipment Recommendations  Equipment Needed: No  Other: will cont to assess    Marco Antonio Price scored a 13/24 on the AM-PAC short mobility form. Current research shows that an AM-PAC score of 17 or less is typically not associated with a discharge to the patient's home setting. Based on the patient's AM-PAC score and their current functional mobility deficits, it is recommended that the patient have 3-5 sessions per week of Physical Therapy at d/c to increase the patient's independence.  Please see assessment section for further patient specific details.    If patient discharges prior to next session this note will serve as a discharge summary.  Please see below for the latest assessment towards goals.       Patient Diagnosis(es): The primary encounter diagnosis was NSTEMI (non-ST elevated myocardial infarction) ([facility]). Diagnoses of Fall, initial encounter, Severe sepsis ([facility]), Urinary tract infection associated with indwelling urethral catheter, initial encounter ([facility]), Acute respiratory failure with hypoxia ([facility]), and Goals of care, counseling/discussion were also pertinent to this visit.  Past Medical History:  has a past medical history of Acute diastolic (congestive) heart failure ([facility]), GEOVANI (acute kidney injury) ([facility]), Alcohol abuse, Basal cell adenocarcinoma, Bilateral lower extremity edema, BPH (benign prostatic hyperplasia), Dehydration, [facility] ([facility]), ESBL (extended spectrum beta-lactamase) producing bacteria infection, Full dentures, H/O gastric ulcer, Indwelling Ambrosio catheter present, Leg weakness, bilateral, Parkinson disease, Pulmonary embolism ([facility]), Seizure 
Slept well, easily aroused for routine checks. Up to bathroom with walker and contact guard assist multiple times throughout night shift. 4x small formed bowel movements. Tolerated well. In recliner, call light in reach.  
CGA/[place] at  while therapist assisted with pericare after completing BM  Education Given To: Patient  Education Provided: Role of Therapy;Plan of Care;Transfer Training;Equipment;Energy Conservation;Fall Prevention Strategies  Education Method: Verbal;Demonstration  Barriers to Learning: Cognition (decreased safety awareness/insight)  Education Outcome: Verbalized understanding;Continued education needed                    AM-PAC - ADL  AM-PAC Daily Activity - Inpatient   How much help is needed for putting on and taking off regular lower body clothing?: A Lot  How much help is needed for bathing (which includes washing, rinsing, drying)?: A Lot  How much help is needed for toileting (which includes using toilet, bedpan, or urinal)?: A Lot  How much help is needed for putting on and taking off regular upper body clothing?: A Little  How much help is needed for taking care of personal grooming?: A Little  How much help for eating meals?: None  AM-PAC Inpatient Daily Activity Raw Score: 16  AM-PAC Inpatient ADL T-Scale Score : 35.96  ADL Inpatient CMS 0-100% Score: 53.32  ADL Inpatient CMS G-Code Modifier : CK    Goals  Short Term Goals  Time Frame for Short Term Goals: prior to d/c  Short Term Goal 1: SUP fxl tx and fxl mobility in prep to complete ADL routine  Short Term Goal 2: SUP LB dressing/bathing  Short Term Goal 3: SUP standing x3-4 minutes to complete ADL task to improve balance/endurance  Short Term Goal 4: Pt will complete BUE exercises in all planes in prep to complete ADL task with independence  Long Term Goals  Time Frame for Long Term Goals : STGs=LTGs  Patient Goals   Patient goals : to return home     Therapy Time   Individual Concurrent Group Co-treatment   Time In 1306         Time Out 1348         Minutes 42         Timed Code Treatment Minutes: 27 Minutes (15 minute eval)     Electronically signed by JERRY Odell on 3/26/2024 at 1:56 PM      
to Learning: Cognition;Hearing  Education Outcome: Continued education needed      Therapy Time   Individual Concurrent Group Co-treatment   Time In 1306         Time Out 1348         Minutes 42         Timed Code Treatment Minutes: 42 Minutes       Elpidio Black, PT  Elpidio Black, PT, DPT [id number]

## 2024-03-27 NOTE — PLAN OF CARE
Problem: Discharge Planning  Goal: Discharge to home or other facility with appropriate resources  3/25/2024 1711 by Dotty Sheriff, RN  Outcome: Progressing     Problem: ABCDS Injury Assessment  Goal: Absence of physical injury  3/25/2024 1711 by Dotty Sheriff, RN  Outcome: Progressing     Problem: Safety - Adult  Goal: Free from fall injury  3/25/2024 1711 by Dotty Sheriff, RN  Outcome: Progressing     Problem: Skin/Tissue Integrity  Goal: Absence of new skin breakdown  Description: 1.  Monitor for areas of redness and/or skin breakdown  2.  Assess vascular access sites hourly  3.  Every 4-6 hours minimum:  Change oxygen saturation probe site  4.  Every 4-6 hours:  If on nasal continuous positive airway pressure, respiratory therapy assess nares and determine need for appliance change or resting period.  Outcome: Progressing     
  Problem: Discharge Planning  Goal: Discharge to home or other facility with appropriate resources  3/25/2024 2315 by Cisco Medina, RN  Outcome: Progressing  Flowsheets (Taken 3/25/2024 2101)  Discharge to home or other facility with appropriate resources: Identify barriers to discharge with patient and caregiver     Problem: ABCDS Injury Assessment  Goal: Absence of physical injury  3/25/2024 2315 by Cisco Medina, RN  Outcome: Progressing     Problem: Safety - Adult  Goal: Free from fall injury  3/25/2024 2315 by Cisco Medina, RN  Outcome: Progressing     Problem: Skin/Tissue Integrity  Goal: Absence of new skin breakdown  Description: 1.  Monitor for areas of redness and/or skin breakdown  2.  Assess vascular access sites hourly  3.  Every 4-6 hours minimum:  Change oxygen saturation probe site  4.  Every 4-6 hours:  If on nasal continuous positive airway pressure, respiratory therapy assess nares and determine need for appliance change or resting period.  3/25/2024 2315 by Cisco Medina RN  Outcome: Progressing     Problem: Pain  Goal: Verbalizes/displays adequate comfort level or baseline comfort level  Outcome: Progressing     
  Problem: Discharge Planning  Goal: Discharge to home or other facility with appropriate resources  3/26/2024 2340 by Cisco Medina RN  Outcome: Progressing  Flowsheets (Taken 3/26/2024 1951)  Discharge to home or other facility with appropriate resources: Identify barriers to discharge with patient and caregiver     Problem: ABCDS Injury Assessment  Goal: Absence of physical injury  3/26/2024 2340 by Cisco Medina RN  Outcome: Progressing     Problem: Safety - Adult  Goal: Free from fall injury  3/26/2024 2340 by Cisco Medina RN  Outcome: Progressing     Problem: Skin/Tissue Integrity  Goal: Absence of new skin breakdown  Description: 1.  Monitor for areas of redness and/or skin breakdown  2.  Assess vascular access sites hourly  3.  Every 4-6 hours minimum:  Change oxygen saturation probe site  4.  Every 4-6 hours:  If on nasal continuous positive airway pressure, respiratory therapy assess nares and determine need for appliance change or resting period.  3/26/2024 2340 by Cisco Medina RN  Outcome: Progressing     Problem: Chronic Conditions and Co-morbidities  Goal: Patient's chronic conditions and co-morbidity symptoms are monitored and maintained or improved  3/26/2024 2340 by Cisco Medina RN  Outcome: Progressing  Flowsheets (Taken 3/26/2024 1951)  Care Plan - Patient's Chronic Conditions and Co-Morbidity Symptoms are Monitored and Maintained or Improved: Monitor and assess patient's chronic conditions and comorbid symptoms for stability, deterioration, or improvement     Problem: Neurosensory - Adult  Goal: Achieves stable or improved neurological status  3/26/2024 2340 by Cisco Medina RN  Outcome: Progressing  Flowsheets (Taken 3/26/2024 1951)  Achieves stable or improved neurological status: Assess for and report changes in neurological status     Problem: Skin/Tissue Integrity - Adult  Goal: Skin integrity remains intact  3/26/2024 2340 by Cisco Medina 
  Problem: Discharge Planning  Goal: Discharge to home or other facility with appropriate resources  Outcome: Progressing     Problem: ABCDS Injury Assessment  Goal: Absence of physical injury  Outcome: Progressing     Problem: Safety - Adult  Goal: Free from fall injury  Outcome: Progressing     
  Problem: Discharge Planning  Goal: Discharge to home or other facility with appropriate resources  Outcome: Progressing  Flowsheets  Taken 3/26/2024 1608  Discharge to home or other facility with appropriate resources:   Identify barriers to discharge with patient and caregiver   Identify discharge learning needs (meds, wound care, etc)   Arrange for needed discharge resources and transportation as appropriate  Taken 3/26/2024 1150  Discharge to home or other facility with appropriate resources:   Identify barriers to discharge with patient and caregiver   Arrange for needed discharge resources and transportation as appropriate   Identify discharge learning needs (meds, wound care, etc)     Problem: ABCDS Injury Assessment  Goal: Absence of physical injury  Outcome: Progressing  Flowsheets (Taken 3/26/2024 1608)  Absence of Physical Injury: Implement safety measures based on patient assessment     Problem: Safety - Adult  Goal: Free from fall injury  Outcome: Progressing  Flowsheets (Taken 3/26/2024 1608)  Free From Fall Injury: Instruct family/caregiver on patient safety     Problem: Skin/Tissue Integrity  Goal: Absence of new skin breakdown  Description: 1.  Monitor for areas of redness and/or skin breakdown  2.  Assess vascular access sites hourly  3.  Every 4-6 hours minimum:  Change oxygen saturation probe site  4.  Every 4-6 hours:  If on nasal continuous positive airway pressure, respiratory therapy assess nares and determine need for appliance change or resting period.  Outcome: Progressing     Problem: Pain  Goal: Verbalizes/displays adequate comfort level or baseline comfort level  Outcome: Progressing  Flowsheets (Taken 3/26/2024 1608)  Verbalizes/displays adequate comfort level or baseline comfort level:   Encourage patient to monitor pain and request assistance   Administer analgesics based on type and severity of pain and evaluate response   Assess pain using appropriate pain scale     Problem: 
- Adult  Goal: Electrolytes maintained within normal limits  Outcome: Adequate for Discharge     Problem: Hematologic - Adult  Goal: Maintains hematologic stability  Outcome: Adequate for Discharge     Problem: Musculoskeletal - Adult  Goal: Return mobility to safest level of function  Outcome: Adequate for Discharge     Problem: Musculoskeletal - Adult  Goal: Return ADL status to a safe level of function  Outcome: Adequate for Discharge

## 2024-03-27 NOTE — CARE COORDINATION
Case Management Discharge Note          Date / Time of Note: 3/27/2024 10:55 AM                  Patient Name: Marco Antonio Price   YOB: 1935  Diagnosis: NSTEMI (non-ST elevated myocardial infarction) ([facility]) [I21.4]  Acute respiratory failure with hypoxia ([facility]) [J96.01]  Fall, initial encounter [W19.XXXA]  Severe sepsis ([facility]) [A41.9, R65.20]  Severe sepsis with acute organ dysfunction (HCC) [A41.9, R65.20]  Urinary tract infection associated with indwelling urethral catheter, initial encounter ([facility]) [T83.511A, N39.0]   Date / Time: 3/24/2024  5:00 PM    Financial:  Payor: [facility] MEDICARE / Plan: [facility] MEDICARE COMPLETE / Product Type: *No Product type* /      Pharmacy:    [facility] [id number] - [place], [place] - [address] [place] [address] -  [phone] - F [phone]  [address] [place] [address]  [place] [address]  Phone: [phone] Fax: [phone]    [facility] - [place] [id number] [facility] [phone] -  [phone]  [address] [place] [id number]  Phone: [phone] Fax: [phone]      Assistance purchasing medications?: Potential Assistance Purchasing Medications: No  Assistance provided by Case Management: None at this time    DISCHARGE Disposition: Home with Home Health Care    Home Care:  Home Care ordered at discharge: Yes  Home Care Agency: Discharging to Facility/ Agency   Name: [facility]  Phone: [phone]   Fax: [phone]    Orders faxed: Yes    Transportation:  Transportation PLAN for discharge: family   Mode of Transport: Private Car    Time of Transport: 1530    IMM Completed:   Yes, Case management has presented and reviewed IMM letter #2.   IMM Letter given to Patient/Family/Significant other/Guardian/POA/by:: Provided to patient at bedside and son via telephone by SEUN Crane. Education provided to family, family reported no questions and verbalized understanding. family aware of 4 hours 
Discharge Planning:  SW met with pt to discuss d/c plans.  SW reviewed the physical and occupational recommendations with pt.  Pt stated \"I am not going anywhere but home.\"  Pt stated [facility] the past and will not go back there.  SW presented pt with a SNF list and encouraged pt to review other facilities.  Pt again stated he will not go to any facility upon d/c and will be going home.  SW expressed concern that pt will be going home alone.  Pt stated his son comes over multiple times per days and he could have his dgtr in law help as well.  Pt was adamant that he will not be going to any facility upon d/c.    SW contacted [facility] [phone] as pt was previously active with this agency for RN and bath aide.  JOSUÉ spoke with Ynes at [facility] who confirmed this agency would be able to provide PT/OT/RN bath aide and a  upon d/c.  SW explained to Ynes that at this time, SNF is being recommended and this pt is refusing.     PLAN: Undetermined.  Pt is refusing SNF placement.  Was active with [facility] PTA and [facility] is willing to provide PT/OT/RN/Aide and SW upon d/c.  Pts son has applied for services through [facility] and said pt is over income. SNF list left with pt and SW will revisit after PT/OT work with pt on 3/27.  If pt goes home and does not improve from a physical standpoint, may need APS contacted.   SEUN [facility]  [phone]  Electronically signed by SEUN Lynch on 3/26/2024 at 3:13 PM    
Discharge order acknowledged.   Met with patient at bedside. Called to son Ed ([phone]) on speaker telephone at bedside.   Discussed discharge plan. Confirmed plan is to return home with family and home health care.   Reviewed IMM Letter with family, copy provided to patient at bedside.     [facility]   Spoke to [facility]  and notified of discharge for today. Confirmed all documents needed are home care order and JUAN MIGUEL.     Discharging to Facility/ Agency   Name: [facility]  Phone: [phone]   Fax: [phone]  Home care order and JUAN MIGUEL sent via [employer].     SEUN Crane, LSW, Social Work/Case Management   [phone]  Electronically signed by SEUN Crane on 3/27/2024 at 10:53 AM    
Discharge order acknowledged.   Per conversation with RN, discharge is pending labs and Infectious Disease physician.     Called to son, Ed, notified of the above. Notified of possible discharge around 6/7 PM once labs result and consulted physician rounds. Confirmed he will come to the hospital this afternoon for transportation home.     SEUN Crane, LSW, Social Work/Case Management   [phone]  Electronically signed by SEUN Crane on 3/27/2024 at 11:50 AM    
[id number]  Phone: [phone] Fax: [phone]      Notes:    Factors facilitating achievement of predicted outcomes: Family support, Has needed Durable Medical Equipment at home, Knowledge about rehab, and Has homemaker services    Barriers to discharge: Confusion    Additional Case Management Notes: Pt is from home alone where he resides in a bi level home.  Stair lifts for all interior steps.  Pts son, Ed had cameras installed in the home so he is able to check on pt.  Ed stated pt has times where he becomes a bit disoriented but this is often related to a recent seizure.  Ed reported pt being active with [facility] for a visiting RN and an aide 2xs a week to assist pt with bathing.  Ed stated he goes to pts home 2xs a day to assist pt with dressing and any other needs he may have.  Ed has reached out to [employer] but reported pt was over income for services.  Ed is not interested in a lifeline for this pt as he feels the cameras in the home are sufficient.  Pt has been to [facility] in the past and Ed stated pt will refuse SNF placement.  Ed would like pt to return home upon d/c with the resumption of skilled HC through [facility].  Awaiting PT/OT recommendations.     PLAN: From home alone.  Pts son, Ed is very involved in pts care.  Awaiting for PT/OT recommendations.  Pt was active with [facility] for a bath aide 2xs a week and a visting nurse.     The Plan for Transition of Care is related to the following treatment goals of NSTEMI (non-ST elevated myocardial infarction) ([facility]) [I21.4]  Acute respiratory failure with hypoxia (HCC) [J96.01]  Fall, initial encounter [W19.XXXA]  Severe sepsis (HCC) [A41.9, R65.20]  Severe sepsis with acute organ dysfunction (HCC) [A41.9, R65.20]  Urinary tract infection associated with indwelling urethral catheter, initial encounter ([facility]) [T83.511A, N39.0]    IF APPLICABLE: The Patient and/or patient representative Marco Antonio and his family were provided with a

## 2024-03-28 ENCOUNTER — CARE COORDINATION (OUTPATIENT)
Dept: CASE MANAGEMENT | Age: 89
End: 2024-03-28

## 2024-03-28 LAB
BACTERIA BLD CULT ORG #2: NORMAL
BACTERIA BLD CULT: NORMAL

## 2024-03-28 NOTE — CARE COORDINATION
Care Transitions Initial Follow Up Call    Call within 2 business days of discharge: Yes    Patient Current Location:  Home: [address] [place] [address]    Care Transition Nurse contacted the patient by telephone to perform post hospital discharge assessment.  Provided introduction to self, and explanation of the Care Transition Nurse role.     Patient: Marco Antonio Price Patient : 1935   MRN: [id number]  Reason for Admission: S/P fall  Discharge Date: 3/27/24 RARS: Readmission Risk Score: 12.4      Last Discharge Facility       Date Complaint Diagnosis Description Type Department Provider    3/24/24 Fall NSTEMI (non-ST elevated myocardial infarction) (HCC) ... ED to Hosp-Admission (Discharged) (ADMITTED) [facility] 5W Dorota Leal MD; Ken,...            Was this an external facility discharge? No Discharge Facility: [facility]    Challenges to be reviewed by the provider   Additional needs identified to be addressed with provider: No                 Method of communication with provider: none.    Initial attempt at CT discharge phone call. Spoke with Berto - son - HIPAA verified. He put writer on speaker phone and writer also spoke with Marco Antonio. They decline completing the CT discharge phone call or any additional CT outreach. Marco Antonio states his father has a HFU appt  on 2024. He states his father is doing \"fine.\" He states [facility] [employer] [facility] is active. Ed denies any needs for his father at this time.                     Follow Up  Future Appointments   Date Time Provider Department Center   2024  3:00 PM Oren Payton MD [facility]   2024  1:20 PM Oren Payton MD [facility] Brenda Beuerlein RN BSN  Care Transition Nurse  [phone]

## 2024-04-01 ENCOUNTER — OFFICE VISIT (OUTPATIENT)
Dept: FAMILY MEDICINE CLINIC | Age: 89
End: 2024-04-01
Payer: MEDICARE

## 2024-04-01 VITALS
HEIGHT: 68 IN | TEMPERATURE: 98.3 F | WEIGHT: 226 LBS | BODY MASS INDEX: 34.25 KG/M2 | SYSTOLIC BLOOD PRESSURE: 116 MMHG | DIASTOLIC BLOOD PRESSURE: 80 MMHG | HEART RATE: 72 BPM

## 2024-04-01 DIAGNOSIS — G20.A1 PARKINSON'S DISEASE, UNSPECIFIED WHETHER DYSKINESIA PRESENT, UNSPECIFIED WHETHER MANIFESTATIONS FLUCTUATE (HCC): ICD-10-CM

## 2024-04-01 DIAGNOSIS — D64.9 ANEMIA, UNSPECIFIED TYPE: ICD-10-CM

## 2024-04-01 DIAGNOSIS — N39.0 URINARY TRACT INFECTION WITHOUT HEMATURIA, SITE UNSPECIFIED: ICD-10-CM

## 2024-04-01 DIAGNOSIS — G40.909 SEIZURE DISORDER (HCC): ICD-10-CM

## 2024-04-01 DIAGNOSIS — Z09 HOSPITAL DISCHARGE FOLLOW-UP: Primary | ICD-10-CM

## 2024-04-01 DIAGNOSIS — E78.5 HYPERLIPIDEMIA, UNSPECIFIED HYPERLIPIDEMIA TYPE: ICD-10-CM

## 2024-04-01 PROCEDURE — G8417 CALC BMI ABV UP PARAM F/U: HCPCS | Performed by: FAMILY MEDICINE

## 2024-04-01 PROCEDURE — 1036F TOBACCO NON-USER: CPT | Performed by: FAMILY MEDICINE

## 2024-04-01 PROCEDURE — 1123F ACP DISCUSS/DSCN MKR DOCD: CPT | Performed by: FAMILY MEDICINE

## 2024-04-01 PROCEDURE — 99214 OFFICE O/P EST MOD 30 MIN: CPT | Performed by: FAMILY MEDICINE

## 2024-04-01 PROCEDURE — 1111F DSCHRG MED/CURRENT MED MERGE: CPT | Performed by: FAMILY MEDICINE

## 2024-04-01 PROCEDURE — G8427 DOCREV CUR MEDS BY ELIG CLIN: HCPCS | Performed by: FAMILY MEDICINE

## 2024-04-01 NOTE — PROGRESS NOTES
whether dyskinesia present, unspecified whether manifestations fluctuate (HCC)        4. Seizure disorder (HCC)        5. Anemia, unspecified type        6. Hyperlipidemia, unspecified hyperlipidemia type  Comprehensive Metabolic Panel    Lipid Panel              Spoke to son about neuro and seeing them April 23. This is scheduled   Dr anders retired     Egd and colon done on 10/27/23 dr caballero   Blood loss anemia in 2021   Iron and b12 ok on 9/20/23  ?hematology consult in future     Interval Hx:  D/c hospital on 3/27/24  Consult note I.D. on 3/27/24. Dr diaz and home on 7 days augmentin   Hospitalist note sepsis/nstemi/ acute on chronic diastolic failure   Cystitis   NP visit Ms Chandler on from [facility] on 3/25/24 was not felt to have had seizure. With dr Quick     Ct lumbar spine ok 3/25/24 Ct head ok on 3/24/24/ cxr mild edema noted   Bmp fine on 3/27/24 nml   Hepatic off earlier   Cbc mild anemia on 3/26/24  Keppra level ok       Plan:      Finish out the antibiotic  Continue all the medicines including the new medicine  Do keep the appointment later this month with the neurologist about the seizures and the parkinson disease   See me back as scheduled in May   Do the fasting blood work after May 1         Oren Payton MD

## 2024-04-01 NOTE — PATIENT INSTRUCTIONS
Finish out the antibiotic  Continue all the medicines including the new medicine  Do keep the appointment later this month with the neurologist about the seizures and the parkinson disease   See me back as scheduled in May   Do the fasting blood work after May 1

## 2024-04-02 ENCOUNTER — TELEPHONE (OUTPATIENT)
Dept: FAMILY MEDICINE CLINIC | Age: 89
End: 2024-04-02

## 2024-04-02 NOTE — TELEPHONE ENCOUNTER
[facility] went out and saw patient yesterday, they do not feel like he need PT at this time.  They are asking for ok for resumption of care, OT, skilled nursing.    Please call, [phone]

## 2024-04-12 DIAGNOSIS — G20.A1 PARKINSON'S DISEASE (HCC): ICD-10-CM

## 2024-04-22 ENCOUNTER — APPOINTMENT (OUTPATIENT)
Dept: CT IMAGING | Age: 89
End: 2024-04-22
Payer: MEDICARE

## 2024-04-22 ENCOUNTER — HOSPITAL ENCOUNTER (EMERGENCY)
Age: 89
Discharge: HOME | End: 2024-04-22
Attending: EMERGENCY MEDICINE
Payer: MEDICARE

## 2024-04-22 VITALS
OXYGEN SATURATION: 94 % | WEIGHT: 222.8 LBS | TEMPERATURE: 98.1 F | SYSTOLIC BLOOD PRESSURE: 148 MMHG | RESPIRATION RATE: 18 BRPM | HEART RATE: 90 BPM | BODY MASS INDEX: 33.88 KG/M2 | DIASTOLIC BLOOD PRESSURE: 90 MMHG
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
## 2024-04-22 DIAGNOSIS — R03.0 ELEVATED BLOOD PRESSURE READING: ICD-10-CM

## 2024-04-22 DIAGNOSIS — S01.01XA LACERATION OF SCALP, INITIAL ENCOUNTER: Primary | ICD-10-CM

## 2024-04-22 PROCEDURE — 12002 RPR S/N/AX/GEN/TRNK2.6-7.5CM: CPT

## 2024-04-22 PROCEDURE — 90715 TDAP VACCINE 7 YRS/> IM: CPT | Performed by: EMERGENCY MEDICINE

## 2024-04-22 PROCEDURE — 6360000002 HC RX W HCPCS: Performed by: EMERGENCY MEDICINE

## 2024-04-22 PROCEDURE — 72125 CT NECK SPINE W/O DYE: CPT

## 2024-04-22 PROCEDURE — 99284 EMERGENCY DEPT VISIT MOD MDM: CPT

## 2024-04-22 PROCEDURE — 90471 IMMUNIZATION ADMIN: CPT | Performed by: EMERGENCY MEDICINE

## 2024-04-22 PROCEDURE — 70450 CT HEAD/BRAIN W/O DYE: CPT

## 2024-04-22 RX ADMIN — TETANUS TOXOID, REDUCED DIPHTHERIA TOXOID AND ACELLULAR PERTUSSIS VACCINE, ADSORBED 0.5 ML: 5; 2.5; 8; 8; 2.5 SUSPENSION INTRAMUSCULAR at 09:39

## 2024-04-22 ASSESSMENT — LIFESTYLE VARIABLES
HOW OFTEN DO YOU HAVE A DRINK CONTAINING ALCOHOL: NEVER
HOW MANY STANDARD DRINKS CONTAINING ALCOHOL DO YOU HAVE ON A TYPICAL DAY: PATIENT DOES NOT DRINK

## 2024-04-22 ASSESSMENT — ENCOUNTER SYMPTOMS
SHORTNESS OF BREATH: 0
ABDOMINAL PAIN: 0
BACK PAIN: 0

## 2024-04-22 ASSESSMENT — PAIN - FUNCTIONAL ASSESSMENT: PAIN_FUNCTIONAL_ASSESSMENT: NONE - DENIES PAIN

## 2024-04-22 NOTE — ED NOTES
Patient's wound cleaned out with saline and chlorhexidine solution. Bleeding controlled at this time. Two lacerations noted to back left of head.

## 2024-04-22 NOTE — ED NOTES
Discharge instructions reviewed with patient and family. Patient discharged home by family and used personal walker to ambulate out of the ED.

## 2024-04-22 NOTE — DISCHARGE INSTRUCTIONS
Take Tylenol as needed for pain.  Keep the wound clean and dry as it heals over the next 10 to 12 days.  Remove staples in 10 to 12 days.    Return to the emergency department for any new confusion, severe headache that develops, vomiting, trouble walking, or any other concerns over the next 12 to 24 hours.    Have staples removed by your primary doctor.  Have your blood pressure rechecked at that time.

## 2024-04-22 NOTE — ED NOTES
Patient arrives via walk-in with family for a chief complaint of a head laceration. Patient states that around 0630 this morning he was using his walker at home and \"tripped over my own feet.\" Patient states that he did not lose consciousness nor feel dizzy/weak at any point. Patient is at his baseline per family and is able to provide medical history independently.

## 2024-04-22 NOTE — ED PROVIDER NOTES
scalp laceration, CT of the head was obtained along with CT of the cervical spine although he denied any neck pain.  This ultimately came back negative for intracranial hemorrhage or cervical spine fracture per radiologist.  Wound was cleaned by nurse.  I did place staples.  Tetanus was updated.  Upon repeat assessment, he was still feeling fine and felt comfortable going home.  His son also felt comfortable with discharge.  They are aware that staples should be removed in 10 to 12 days and to keep the area clean and dry.  The patient and son are aware of symptoms develop associated with severe headache, new onset confusion, vomiting, trouble walking, or any other issues over the next 12 to 24 hours, then return to the emergency department immediately for further assessment but otherwise follow-up with primary doctor for further evaluation in regards to his fall and staple removal in about 10 days.  His son plans to call to make an appointment with his primary doctor.  The patient was well-appearing and in no acute distress at time of discharge, and he and his son felt comfortable with this plan.        I was the primary provider for the patient.      The patient tolerated their visit well.   The patient and / or the family were informed of the results of any tests, a time was given to answer questions.    FINAL IMPRESSION      1. Laceration of scalp, initial encounter    2. Elevated blood pressure reading    3. Fall from slip, trip, or stumble, initial encounter          DISPOSITION/PLAN   DISPOSITION Decision To Discharge 04/22/2024 10:00:22 AM-discharged in improved, stable condition      PATIENT REFERRED TO:  [facility]  [address]  [facility] [address]  [phone]  Go to   If symptoms worsen    Oren Payton MD  [address]  [facility] [address]  [phone]    In 10 days  For suture removal      DISCHARGEMEDICATIONS:  Discharge Medication List as of 4/22/2024 10:23 AM

## 2024-04-24 ENCOUNTER — CARE COORDINATION (OUTPATIENT)
Dept: CARE COORDINATION | Age: 89
End: 2024-04-24

## 2024-04-25 ENCOUNTER — CARE COORDINATION (OUTPATIENT)
Dept: CARE COORDINATION | Age: 89
End: 2024-04-25

## 2024-05-03 ENCOUNTER — OFFICE VISIT (OUTPATIENT)
Dept: FAMILY MEDICINE CLINIC | Age: 89
End: 2024-05-03
Payer: MEDICARE

## 2024-05-03 VITALS
BODY MASS INDEX: 33.04 KG/M2 | HEIGHT: 68 IN | TEMPERATURE: 97.7 F | SYSTOLIC BLOOD PRESSURE: 124 MMHG | WEIGHT: 218 LBS | DIASTOLIC BLOOD PRESSURE: 82 MMHG

## 2024-05-03 DIAGNOSIS — Z48.02 ENCOUNTER FOR STAPLE REMOVAL: Primary | ICD-10-CM

## 2024-05-03 PROCEDURE — G8417 CALC BMI ABV UP PARAM F/U: HCPCS | Performed by: FAMILY MEDICINE

## 2024-05-03 PROCEDURE — G8427 DOCREV CUR MEDS BY ELIG CLIN: HCPCS | Performed by: FAMILY MEDICINE

## 2024-05-03 PROCEDURE — 99212 OFFICE O/P EST SF 10 MIN: CPT | Performed by: FAMILY MEDICINE

## 2024-05-03 PROCEDURE — 1036F TOBACCO NON-USER: CPT | Performed by: FAMILY MEDICINE

## 2024-05-03 PROCEDURE — 1123F ACP DISCUSS/DSCN MKR DOCD: CPT | Performed by: FAMILY MEDICINE

## 2024-05-03 RX ORDER — LEVETIRACETAM 500 MG/1
TABLET ORAL
Qty: 315 TABLET | Refills: 1
Start: 2024-05-03

## 2024-05-03 NOTE — PROGRESS NOTES
Subjective:      Patient ID: Marco Antonio Price is a 88 y.o. male.    Chief Complaint   Patient presents with    ED Follow-up     [facility] 4- \"Head Laceration\"  remove 4 staples        Patient presents with:  ED Follow-up: [facility] 4- \"Head Laceration\"  remove 4 staples    Here for the above   Here with daughter in law     He got his feet caught up with the clothing and fell against a chair  No loc  He tells me well now no sx  No ha     YOB: 1935    Date of Visit:  5/3/2024    No Known Allergies    Current Outpatient Medications:  carbidopa-levodopa (SINEMET)  MG per tablet, TAKE ONE TABLET BY MOUTH THREE TIMES A DAY, Disp: 270 tablet, Rfl: 1  carvedilol (COREG) 3.125 MG tablet, Take 1 tablet by mouth 2 times daily (with meals), Disp: 60 tablet, Rfl: 3  aspirin 81 MG EC tablet, Take 1 tablet by mouth daily, Disp: 30 tablet, Rfl: 3  atorvastatin (LIPITOR) 40 MG tablet, Take 1 tablet by mouth nightly, Disp: 30 tablet, Rfl: 3  levETIRAcetam (KEPPRA) 500 MG tablet, One and half tablet in am and 2 tablet in pm, Disp: 315 tablet, Rfl: 1  pantoprazole (PROTONIX) 40 MG tablet, TAKE ONE TABLET BY MOUTH EVERY EVENING, Disp: 90 tablet, Rfl: 1  tamsulosin (FLOMAX) 0.4 MG capsule, TAKE ONE CAPSULE BY MOUTH EVERY MORNING, Disp: 90 capsule, Rfl: 1  furosemide (LASIX) 20 MG tablet, TAKE 1 TABLET BY MOUTH TWICE A DAY, Disp: 180 tablet, Rfl: 1  acetaminophen (TYLENOL) 325 MG tablet, Take 2 tablets by mouth every 4 hours as needed for Fever, Disp: , Rfl:   Multiple Vitamin (MULTIVITAMIN) TABS tablet, Take 1 tablet by mouth daily, Disp: 30 tablet, Rfl: 0    No current facility-administered medications for this visit.      ---------------------------               05/03/24                      1518         ---------------------------   BP:          124/82         Site:    Left Upper Arm     Position:     Sitting        Cuff Size:   Large Adult      Temp:   97.7 °F (36.5 °C)

## 2024-05-09 DIAGNOSIS — E78.5 HYPERLIPIDEMIA, UNSPECIFIED HYPERLIPIDEMIA TYPE: ICD-10-CM

## 2024-05-09 LAB
ALBUMIN SERPL-MCNC: 3.7 G/DL (ref 3.4–5)
ALBUMIN/GLOB SERPL: 1.2 {RATIO} (ref 1.1–2.2)
ALP SERPL-CCNC: 244 U/L (ref 40–129)
ALT SERPL-CCNC: <5 U/L (ref 10–40)
ANION GAP SERPL CALCULATED.3IONS-SCNC: 9 MMOL/L (ref 3–16)
AST SERPL-CCNC: 14 U/L (ref 15–37)
BILIRUB SERPL-MCNC: 0.8 MG/DL (ref 0–1)
BUN SERPL-MCNC: 8 MG/DL (ref 7–20)
CALCIUM SERPL-MCNC: 9.1 MG/DL (ref 8.3–10.6)
CHLORIDE SERPL-SCNC: 103 MMOL/L (ref 99–110)
CHOLEST SERPL-MCNC: 86 MG/DL (ref 0–199)
CO2 SERPL-SCNC: 30 MMOL/L (ref 21–32)
CREAT SERPL-MCNC: 0.8 MG/DL (ref 0.8–1.3)
GFR SERPLBLD CREATININE-BSD FMLA CKD-EPI: 85 ML/MIN/{1.73_M2}
GLUCOSE SERPL-MCNC: 101 MG/DL (ref 70–99)
HDLC SERPL-MCNC: 51 MG/DL (ref 40–60)
LDLC SERPL CALC-MCNC: 24 MG/DL
POTASSIUM SERPL-SCNC: 4.4 MMOL/L (ref 3.5–5.1)
PROT SERPL-MCNC: 6.8 G/DL (ref 6.4–8.2)
SODIUM SERPL-SCNC: 142 MMOL/L (ref 136–145)
TRIGL SERPL-MCNC: 55 MG/DL (ref 0–150)
VLDLC SERPL CALC-MCNC: 11 MG/DL

## 2024-05-13 ENCOUNTER — TELEPHONE (OUTPATIENT)
Dept: FAMILY MEDICINE CLINIC | Age: 89
End: 2024-05-13

## 2024-05-13 NOTE — TELEPHONE ENCOUNTER
Mckenzie with Home Health was checking patients medications today, he is still Flomax and he has a catheter.  She feels like he should stop it, do can cause dizziness. He has fallen twice and has staples on top of his head.    Please advise, [phone]

## 2024-05-14 ENCOUNTER — OFFICE VISIT (OUTPATIENT)
Dept: FAMILY MEDICINE CLINIC | Age: 89
End: 2024-05-14
Payer: MEDICARE

## 2024-05-14 VITALS
HEIGHT: 68 IN | WEIGHT: 218 LBS | TEMPERATURE: 98.6 F | HEART RATE: 88 BPM | BODY MASS INDEX: 33.04 KG/M2 | DIASTOLIC BLOOD PRESSURE: 64 MMHG | SYSTOLIC BLOOD PRESSURE: 124 MMHG

## 2024-05-14 DIAGNOSIS — R33.9 CHRONIC RETENTION OF URINE: ICD-10-CM

## 2024-05-14 DIAGNOSIS — G20.A1 PARKINSON'S DISEASE, UNSPECIFIED WHETHER DYSKINESIA PRESENT, UNSPECIFIED WHETHER MANIFESTATIONS FLUCTUATE (HCC): ICD-10-CM

## 2024-05-14 DIAGNOSIS — E78.5 HYPERLIPIDEMIA, UNSPECIFIED HYPERLIPIDEMIA TYPE: Primary | ICD-10-CM

## 2024-05-14 DIAGNOSIS — G40.909 SEIZURE DISORDER (HCC): ICD-10-CM

## 2024-05-14 DIAGNOSIS — I50.32 CHRONIC DIASTOLIC CONGESTIVE HEART FAILURE (HCC): ICD-10-CM

## 2024-05-14 DIAGNOSIS — Z97.8 INDWELLING FOLEY CATHETER PRESENT: ICD-10-CM

## 2024-05-14 PROCEDURE — 1036F TOBACCO NON-USER: CPT | Performed by: FAMILY MEDICINE

## 2024-05-14 PROCEDURE — 99213 OFFICE O/P EST LOW 20 MIN: CPT | Performed by: FAMILY MEDICINE

## 2024-05-14 PROCEDURE — G8417 CALC BMI ABV UP PARAM F/U: HCPCS | Performed by: FAMILY MEDICINE

## 2024-05-14 PROCEDURE — 1123F ACP DISCUSS/DSCN MKR DOCD: CPT | Performed by: FAMILY MEDICINE

## 2024-05-14 PROCEDURE — G8427 DOCREV CUR MEDS BY ELIG CLIN: HCPCS | Performed by: FAMILY MEDICINE

## 2024-05-14 ASSESSMENT — ENCOUNTER SYMPTOMS
ABDOMINAL PAIN: 0
CONSTIPATION: 0
DIARRHEA: 0
SHORTNESS OF BREATH: 0

## 2024-05-14 NOTE — PROGRESS NOTES
Subjective:      Patient ID: Marco Antonio Price is a 88 y.o. male.    Chief Complaint   Patient presents with    3 Month Follow-Up        Patient presents with:  3 Month Follow-Up    He is here with son  Spoke with him to get the hx    Recent visit with neurology and the keppra is up to 1000 mg bid now   Urine is better  From last time    No seizures     YOB: 1935    Date of Visit:  5/14/2024    No Known Allergies    Current Outpatient Medications:  levETIRAcetam (KEPPRA) 500 MG tablet, 2 tablets twice a day (Patient taking differently: Take 2 tablets by mouth 2 times daily 2 tablets twice a day), Disp: 315 tablet, Rfl: 1  carbidopa-levodopa (SINEMET)  MG per tablet, TAKE ONE TABLET BY MOUTH THREE TIMES A DAY, Disp: 270 tablet, Rfl: 1  carvedilol (COREG) 3.125 MG tablet, Take 1 tablet by mouth 2 times daily (with meals), Disp: 60 tablet, Rfl: 3  aspirin 81 MG EC tablet, Take 1 tablet by mouth daily, Disp: 30 tablet, Rfl: 3  atorvastatin (LIPITOR) 40 MG tablet, Take 1 tablet by mouth nightly, Disp: 30 tablet, Rfl: 3  pantoprazole (PROTONIX) 40 MG tablet, TAKE ONE TABLET BY MOUTH EVERY EVENING, Disp: 90 tablet, Rfl: 1  tamsulosin (FLOMAX) 0.4 MG capsule, TAKE ONE CAPSULE BY MOUTH EVERY MORNING, Disp: 90 capsule, Rfl: 1  furosemide (LASIX) 20 MG tablet, TAKE 1 TABLET BY MOUTH TWICE A DAY, Disp: 180 tablet, Rfl: 1  acetaminophen (TYLENOL) 325 MG tablet, Take 2 tablets by mouth every 4 hours as needed for Fever, Disp: , Rfl:   Multiple Vitamin (MULTIVITAMIN) TABS tablet, Take 1 tablet by mouth daily, Disp: 30 tablet, Rfl: 0    No current facility-administered medications for this visit.      --------------------------               05/14/24                     1325        --------------------------   BP:          124/64        Site:    Left Upper Arm    Position:    Sitting        Cuff Size:  Large Adult      Pulse:         88          Temp:   98.6 °F (37 °C)    TempSrc:

## 2024-05-14 NOTE — PATIENT INSTRUCTIONS
Continue the medicines  Do stay with careful diet  See us in September  Or in August   Consider the RSV vaccine  Consider the shingle vaccine

## 2024-05-22 ENCOUNTER — TELEPHONE (OUTPATIENT)
Dept: FAMILY MEDICINE CLINIC | Age: 89
End: 2024-05-22

## 2024-05-22 DIAGNOSIS — Z46.6 ENCOUNTER FOR FITTING AND ADJUSTMENT OF URINARY DEVICE: Primary | ICD-10-CM

## 2024-05-31 ENCOUNTER — TELEPHONE (OUTPATIENT)
Dept: PHARMACY | Facility: CLINIC | Age: 89
End: 2024-05-31

## 2024-05-31 NOTE — TELEPHONE ENCOUNTER
[facility] [place] [facility]: ADHERENCE REVIEW  Identified care gap per [employer]: fills at [facility] : Statin adherence    ASSESSMENT  STATIN ADHERENCE    Insurance Records claims through 2024 (Prior Year PDC = not reported; YTD PDC = FIRST FILL; Potential Fail Date: 24):     Atorvastatin 40mg last filled on 3.27.24 for 30  day supply. Next refill due: 24    Prescribed si tablet/capsule daily    Per Insurer Portal: same as above.    Per  Pharmacy:  3 refills remaining.    Lab Results   Component Value Date    CHOL 86 2024    TRIG 55 2024    HDL 51 2024     Lab Results   Component Value Date    LDL 24 2024      ALT   Date Value Ref Range Status   2024 <5 (L) 10 - 40 U/L Final     AST   Date Value Ref Range Status   2024 14 (L) 15 - 37 U/L Final     The ASCVD Risk score (Julissa MARTINES, et al., 2019) failed to calculate for the following reasons:    The 2019 ASCVD risk score is only valid for ages 40 to 79    The patient has a prior MI or stroke diagnosis     PLAN  Per insurer report, LIS-0 - co-pays are based on tiers and patient is subject to coverage gap.    The following are interventions that have been identified:   Patient OVERDUE refilling Atorvastatin 40mg and active on home medication list.     Attempting to reach patient to review.  Left message asking for return call. Letter sent to patient.    Last Visit: 24  Next Visit: 9.10.24  Recent Visits  Date Type Provider Dept   24 Office Visit Oren Payton MD [facility]   24 Office Visit Oren Payton MD [facility]   24 Office Visit Oren Payton MD [facility]   24 Office Visit Oren Payton MD [facility]   24 Office Visit Oren Payton MD [facility]   23 Office Visit Oren Payton MD [facility]   23 Office Visit Oren Payton MD [facility]   22 Office Visit Oren Payton MD [facility]

## 2024-06-30 RX ORDER — FUROSEMIDE 20 MG/1
20 TABLET ORAL 2 TIMES DAILY
Qty: 180 TABLET | Refills: 1 | Status: SHIPPED | OUTPATIENT
Start: 2024-06-30

## 2024-07-16 PROCEDURE — G0179 MD RECERTIFICATION HHA PT: HCPCS | Performed by: FAMILY MEDICINE

## 2024-07-16 RX ORDER — LEVETIRACETAM 500 MG/1
1000 TABLET ORAL 2 TIMES DAILY
Qty: 360 TABLET | Refills: 1 | Status: SHIPPED | OUTPATIENT
Start: 2024-07-16

## 2024-07-18 ENCOUNTER — TELEPHONE (OUTPATIENT)
Dept: FAMILY MEDICINE CLINIC | Age: 89
End: 2024-07-18
Payer: MEDICARE

## 2024-07-18 DIAGNOSIS — Z46.6 ENCOUNTER FOR FITTING AND ADJUSTMENT OF URINARY DEVICE: Primary | ICD-10-CM

## 2024-07-31 RX ORDER — TAMSULOSIN HYDROCHLORIDE 0.4 MG/1
CAPSULE ORAL
Qty: 90 CAPSULE | Refills: 1 | Status: SHIPPED | OUTPATIENT
Start: 2024-07-31

## 2024-08-08 RX ORDER — PANTOPRAZOLE SODIUM 40 MG/1
TABLET, DELAYED RELEASE ORAL
Qty: 90 TABLET | Refills: 2 | Status: SHIPPED | OUTPATIENT
Start: 2024-08-08

## 2024-08-08 RX ORDER — LEVETIRACETAM 500 MG/1
TABLET ORAL
Qty: 315 TABLET | Refills: 2 | Status: SHIPPED | OUTPATIENT
Start: 2024-08-08

## 2024-08-16 ENCOUNTER — TELEPHONE (OUTPATIENT)
Dept: FAMILY MEDICINE CLINIC | Age: 89
End: 2024-08-16

## 2024-08-16 NOTE — TELEPHONE ENCOUNTER
Mckenzie nurse with [facility] home care saw patient today, his blood pressure has been low  the lowest its been is 88, highest is 130.   He stopped the coreg on his own.  He did have a fall and and has a goose egg knot on his forehead.    Please call [phone]

## 2024-08-16 NOTE — TELEPHONE ENCOUNTER
[phone] - Is not Mckenzie's number -   [phone] (home) [place] [facility] [place] call.

## 2024-08-19 ENCOUNTER — OFFICE VISIT (OUTPATIENT)
Dept: FAMILY MEDICINE CLINIC | Age: 89
End: 2024-08-19
Payer: MEDICARE

## 2024-08-19 VITALS
HEART RATE: 88 BPM | DIASTOLIC BLOOD PRESSURE: 70 MMHG | BODY MASS INDEX: 31.67 KG/M2 | TEMPERATURE: 97.8 F | WEIGHT: 209 LBS | SYSTOLIC BLOOD PRESSURE: 118 MMHG | HEIGHT: 68 IN
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
## 2024-08-19 DIAGNOSIS — S09.90XA TRAUMATIC INJURY OF HEAD, INITIAL ENCOUNTER: ICD-10-CM

## 2024-08-19 PROBLEM — J15.9 BACTERIAL PNEUMONIA: Status: RESOLVED | Noted: 2020-12-22 | Resolved: 2024-08-19

## 2024-08-19 PROBLEM — A41.9 SEVERE SEPSIS WITH ACUTE ORGAN DYSFUNCTION (HCC): Status: RESOLVED | Noted: 2024-03-24 | Resolved: 2024-08-19

## 2024-08-19 PROBLEM — N39.0 COMPLICATED UTI (URINARY TRACT INFECTION): Status: RESOLVED | Noted: 2024-03-27 | Resolved: 2024-08-19

## 2024-08-19 PROBLEM — I26.99 BILATERAL PULMONARY EMBOLISM (HCC): Status: RESOLVED | Noted: 2020-12-22 | Resolved: 2024-08-19

## 2024-08-19 PROBLEM — J12.82 PNEUMONIA DUE TO COVID-19 VIRUS: Status: RESOLVED | Noted: 2020-12-22 | Resolved: 2024-08-19

## 2024-08-19 PROBLEM — A41.9 SEPSIS (HCC): Status: RESOLVED | Noted: 2020-12-22 | Resolved: 2024-08-19

## 2024-08-19 PROBLEM — N30.00 ACUTE CYSTITIS WITHOUT HEMATURIA: Status: RESOLVED | Noted: 2024-03-24 | Resolved: 2024-08-19

## 2024-08-19 PROBLEM — R65.20 SEVERE SEPSIS WITH ACUTE ORGAN DYSFUNCTION (HCC): Status: RESOLVED | Noted: 2024-03-24 | Resolved: 2024-08-19

## 2024-08-19 PROBLEM — U07.1 PNEUMONIA DUE TO COVID-19 VIRUS: Status: RESOLVED | Noted: 2020-12-22 | Resolved: 2024-08-19

## 2024-08-19 PROCEDURE — 1036F TOBACCO NON-USER: CPT | Performed by: FAMILY MEDICINE

## 2024-08-19 PROCEDURE — G8417 CALC BMI ABV UP PARAM F/U: HCPCS | Performed by: FAMILY MEDICINE

## 2024-08-19 PROCEDURE — 1123F ACP DISCUSS/DSCN MKR DOCD: CPT | Performed by: FAMILY MEDICINE

## 2024-08-19 PROCEDURE — 99213 OFFICE O/P EST LOW 20 MIN: CPT | Performed by: FAMILY MEDICINE

## 2024-08-19 PROCEDURE — G8427 DOCREV CUR MEDS BY ELIG CLIN: HCPCS | Performed by: FAMILY MEDICINE

## 2024-08-19 RX ORDER — ATORVASTATIN CALCIUM 40 MG/1
40 TABLET, FILM COATED ORAL NIGHTLY
Qty: 90 TABLET | Refills: 2 | Status: SHIPPED | OUTPATIENT
Start: 2024-08-19

## 2024-08-19 RX ORDER — LEVETIRACETAM 500 MG/1
1000 TABLET ORAL 2 TIMES DAILY
Qty: 315 TABLET | Refills: 2
Start: 2024-08-19

## 2024-08-19 NOTE — PROGRESS NOTES
Subjective:      Patient ID: Marco Antonio Price is a 88 y.o. male.    Chief Complaint   Patient presents with    Follow-Up from Hospital     Fall , blood pressure low        Patient presents with:  Follow-Up from Hospital: Fall , blood pressure low    He did have a fall in the home  He notes feeling well   He denies pain   He did not go to the hospital     Nurse comes in once a month for the cath change   No seizure     YOB: 1935    Date of Visit:  8/19/2024    No Known Allergies    Current Outpatient Medications:  levETIRAcetam (KEPPRA) 500 MG tablet, Take 2 tablets by mouth 2 times daily, Disp: 315 tablet, Rfl: 2  pantoprazole (PROTONIX) 40 MG tablet, TAKE ONE TABLET BY MOUTH EVERY EVENING, Disp: 90 tablet, Rfl: 2  tamsulosin (FLOMAX) 0.4 MG capsule, TAKE 1 CAPSULE BY MOUTH EVERY MORNING, Disp: 90 capsule, Rfl: 1  furosemide (LASIX) 20 MG tablet, TAKE 1 TABLET BY MOUTH TWICE A DAY, Disp: 180 tablet, Rfl: 1  carbidopa-levodopa (SINEMET)  MG per tablet, TAKE ONE TABLET BY MOUTH THREE TIMES A DAY, Disp: 270 tablet, Rfl: 1  carvedilol (COREG) 3.125 MG tablet, Take 1 tablet by mouth 2 times daily (with meals), Disp: 60 tablet, Rfl: 3  aspirin 81 MG EC tablet, Take 1 tablet by mouth daily, Disp: 30 tablet, Rfl: 3  atorvastatin (LIPITOR) 40 MG tablet, Take 1 tablet by mouth nightly, Disp: 30 tablet, Rfl: 3  acetaminophen (TYLENOL) 325 MG tablet, Take 2 tablets by mouth every 4 hours as needed for Fever, Disp: , Rfl:   Multiple Vitamin (MULTIVITAMIN) TABS tablet, Take 1 tablet by mouth daily, Disp: 30 tablet, Rfl: 0    No current facility-administered medications for this visit.      ---------------------------               08/19/24                      1538         ---------------------------   BP:          118/70         Site:    Left Upper Arm     Position:     Sitting        Cuff Size:   Large Adult      Pulse:         88           Temp:   97.8 °F (36.6 °C)   TempSrc:

## 2024-08-20 ENCOUNTER — TELEPHONE (OUTPATIENT)
Dept: FAMILY MEDICINE CLINIC | Age: 89
End: 2024-08-20

## 2024-08-20 DIAGNOSIS — S09.90XA TRAUMATIC INJURY OF HEAD, INITIAL ENCOUNTER: ICD-10-CM

## 2024-08-20 DIAGNOSIS — G20.A1 PARKINSON'S DISEASE, UNSPECIFIED WHETHER DYSKINESIA PRESENT, UNSPECIFIED WHETHER MANIFESTATIONS FLUCTUATE (HCC): ICD-10-CM
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
## 2024-08-20 DIAGNOSIS — I50.32 CHRONIC DIASTOLIC CONGESTIVE HEART FAILURE (HCC): ICD-10-CM

## 2024-08-20 NOTE — TELEPHONE ENCOUNTER
Ok done     Diagnosis Orders   1. Fall, initial encounter  Non [facility] - External Referral To Home Health      2. Traumatic injury of head, initial encounter  Non [facility] - External Referral To Home Health      3. Chronic diastolic congestive heart failure (HCC)  Non [facility] - External Referral To Home Health      4. Parkinson's disease, unspecified whether dyskinesia present, unspecified whether manifestations fluctuate (HCC)  Non [facility] - External Referral To Home Health

## 2024-08-20 NOTE — TELEPHONE ENCOUNTER
Ed (son, on HIPAA) stopped by office with information on Therapy for Marco Antonio.    [facility] [phone]

## 2024-08-29 ENCOUNTER — HOSPITAL ENCOUNTER (OUTPATIENT)
Dept: CT IMAGING | Age: 89
Discharge: HOME | End: 2024-08-29
Attending: FAMILY MEDICINE
Payer: MEDICARE
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
## 2024-08-29 DIAGNOSIS — S09.90XA TRAUMATIC INJURY OF HEAD, INITIAL ENCOUNTER: ICD-10-CM

## 2024-08-29 PROCEDURE — 70450 CT HEAD/BRAIN W/O DYE: CPT

## 2024-09-11 ENCOUNTER — TELEPHONE (OUTPATIENT)
Dept: FAMILY MEDICINE CLINIC | Age: 89
End: 2024-09-11

## 2024-09-11 DIAGNOSIS — Z46.6 FITTING AND ADJUSTMENT OF URINARY DEVICE: Primary | ICD-10-CM

## 2024-09-11 DIAGNOSIS — Z46.6 ENCOUNTER FOR FITTING AND ADJUSTMENT OF URINARY DEVICE: ICD-10-CM

## 2024-10-05 DIAGNOSIS — G20.A1 PARKINSON'S DISEASE (HCC): ICD-10-CM

## 2024-10-07 RX ORDER — CARBIDOPA AND LEVODOPA 25; 100 MG/1; MG/1
TABLET ORAL
Qty: 270 TABLET | Refills: 1 | Status: SHIPPED | OUTPATIENT
Start: 2024-10-07

## 2024-12-24 RX ORDER — FUROSEMIDE 20 MG/1
20 TABLET ORAL 2 TIMES DAILY
Qty: 180 TABLET | Refills: 1 | Status: SHIPPED | OUTPATIENT
Start: 2024-12-24

## (undated) DEVICE — TRAP POLYP ETRAP

## (undated) DEVICE — SNARES COLD OVAL 10MM THIN

## (undated) DEVICE — BITE BLOCK ENDOSCP AD 60 FR W/ ADJ STRP PLAS GRN BLOX

## (undated) DEVICE — ENDOSCOPY KIT: Brand: MEDLINE INDUSTRIES, INC.

## (undated) DEVICE — FORCEPS BX 240CM 2.4MM L NDL RAD JAW 4 M00513334

## (undated) DEVICE — FORMALIN CLEAR VIAL 20 ML 10%